# Patient Record
Sex: MALE | Race: WHITE | Employment: FULL TIME | URBAN - METROPOLITAN AREA
[De-identification: names, ages, dates, MRNs, and addresses within clinical notes are randomized per-mention and may not be internally consistent; named-entity substitution may affect disease eponyms.]

---

## 2017-03-08 PROBLEM — I10 ESSENTIAL HYPERTENSION WITH GOAL BLOOD PRESSURE LESS THAN 130/85: Status: ACTIVE | Noted: 2017-03-08

## 2017-03-08 PROBLEM — R94.31 ABNORMAL EKG: Status: ACTIVE | Noted: 2017-03-08

## 2017-03-08 PROBLEM — E66.01 MORBID OBESITY (HCC): Status: ACTIVE | Noted: 2017-03-08

## 2017-03-08 PROBLEM — I26.99 PULMONARY EMBOLISM (HCC): Status: ACTIVE | Noted: 2017-03-08

## 2017-03-08 PROBLEM — Z79.01 WARFARIN ANTICOAGULATION: Status: ACTIVE | Noted: 2017-03-08

## 2017-03-08 PROBLEM — E11.9 TYPE 2 DIABETES MELLITUS WITHOUT COMPLICATION, WITHOUT LONG-TERM CURRENT USE OF INSULIN (HCC): Status: ACTIVE | Noted: 2017-03-08

## 2017-03-08 PROBLEM — G47.30 SLEEP APNEA: Status: ACTIVE | Noted: 2017-03-08

## 2017-05-05 PROBLEM — N41.0 PROSTATITIS, ACUTE: Status: ACTIVE | Noted: 2017-05-05

## 2017-05-05 PROBLEM — R30.0 BURNING WITH URINATION: Status: ACTIVE | Noted: 2017-05-05

## 2017-05-05 PROBLEM — R35.0 URINE FREQUENCY: Status: ACTIVE | Noted: 2017-05-05

## 2017-05-05 PROBLEM — N39.0 URINARY TRACT INFECTION WITH PYURIA: Status: ACTIVE | Noted: 2017-05-05

## 2017-05-05 PROBLEM — R50.9 FEVER AND CHILLS: Status: ACTIVE | Noted: 2017-05-05

## 2017-05-05 PROBLEM — E66.01 MORBID OBESITY (HCC): Status: RESOLVED | Noted: 2017-03-08 | Resolved: 2017-05-05

## 2017-05-12 ENCOUNTER — HOSPITAL ENCOUNTER (OUTPATIENT)
Dept: GENERAL RADIOLOGY | Age: 57
Discharge: HOME | End: 2017-05-12
Attending: INTERNAL MEDICINE
Payer: COMMERCIAL

## 2017-05-12 DIAGNOSIS — M25.561 CHRONIC PAIN OF RIGHT KNEE: ICD-10-CM

## 2017-05-12 DIAGNOSIS — G89.29 CHRONIC PAIN OF RIGHT KNEE: ICD-10-CM

## 2017-05-12 PROBLEM — I26.99 PULMONARY EMBOLISM (HCC): Status: RESOLVED | Noted: 2017-03-08 | Resolved: 2017-05-12

## 2017-05-12 PROBLEM — R30.0 BURNING WITH URINATION: Status: RESOLVED | Noted: 2017-05-05 | Resolved: 2017-05-12

## 2017-05-12 PROBLEM — R35.0 URINE FREQUENCY: Status: RESOLVED | Noted: 2017-05-05 | Resolved: 2017-05-12

## 2017-05-12 PROBLEM — N39.0 URINARY TRACT INFECTION WITH PYURIA: Status: RESOLVED | Noted: 2017-05-05 | Resolved: 2017-05-12

## 2017-05-12 PROBLEM — G47.30 SLEEP APNEA: Status: RESOLVED | Noted: 2017-03-08 | Resolved: 2017-05-12

## 2017-05-12 PROBLEM — R50.9 FEVER AND CHILLS: Status: RESOLVED | Noted: 2017-05-05 | Resolved: 2017-05-12

## 2017-05-12 PROCEDURE — 73562 X-RAY EXAM OF KNEE 3: CPT

## 2017-06-05 ENCOUNTER — HOSPITAL ENCOUNTER (OUTPATIENT)
Dept: PHYSICAL THERAPY | Age: 57
Discharge: HOME | End: 2017-06-05
Payer: COMMERCIAL

## 2017-06-05 PROCEDURE — 97110 THERAPEUTIC EXERCISES: CPT

## 2017-06-05 PROCEDURE — 97161 PT EVAL LOW COMPLEX 20 MIN: CPT

## 2017-06-05 NOTE — PROGRESS NOTES
Ambulatory/Rehab Services H2 Model Falls Risk Assessment    Risk Factor Pts. ·   Confusion/Disorientation/Impulsivity  []    4 ·   Symptomatic Depression  []   2 ·   Altered Elimination  []   1 ·   Dizziness/Vertigo  []   1 ·   Gender (Male)  [x]   1 ·   Any administered antiepileptics (anticonvulsants):  []   2 ·   Any administered benzodiazepines:  []   1 ·   Visual Impairment (specify):  []   1 ·   Portable Oxygen Use  []   1 ·   Orthostatic ? BP  []   1 ·   History of Recent Falls (within 3 mos.)  []   5     Ability to Rise from Chair (choose one) Pts. ·   Ability to rise in a single movement  []   0 ·   Pushes up, successful in one attempt  []   1 ·   Multiple attempts, but successful  []   3 ·   Unable to rise without assistance  []   4   Total: (5 or greater = High Risk) 1     Falls Prevention Plan:   [x]                Physical Limitations to Exercise (specify):  R knee pain   []                Mobility Assistance Device (type):   []                Exercise/Equipment Adaptation (specify):    ©2010 [facility] [address] [place] [address] Patent #[id number].  Federal Law prohibits the replication, distribution or use without written permission from [facility]

## 2017-06-05 NOTE — PROGRESS NOTES
Therapy Center at [facility] ASHKAN [facility]   [address] [place] [address], [address], [address]  Phone: [phone]   Fax: [phone]  Outpatient PHYSICAL THERAPY: Initial Assessment 6/5/2017  Fall Risk Score: 1 (? 5 = High Risk)    ICD-10: Treatment Diagnosis: Pain in right knee (M25.561), Difficulty in walking, not elsewhere classified (R26.2)  REFERRING PHYSICIAN: Cait Lau MD MD Orders: Evaluate and treat, water therapy (6/5/17)  Return Physician Appointment: TBD  MEDICAL/REFERRING DIAGNOSIS:   Pain in right knee [M25.561]  Unilateral primary osteoarthritis, right knee [M17.11]  DATE OF ONSET: Symptoms have worsened over the past 4-5 years  PRIOR LEVEL OF FUNCTION: Patient independent with all mobility and activities  PRECAUTIONS/ALLERGIES: None per patient intake form  ASSESSMENT:  ?????? ? ? This section established at most recent assessment??????????  Mr. Moises Delgado presents with R knee pain which he received a cortizone injection for 1.5 weeks ago. Pain, difficulty maintaining one position for prolonged durations of time, and functional weakness are limiting basic mobility and normal activity level. He will benefit from PT for guided rehab to promote normalized return of motion, strength, and function in order for patient to increase activity participation, improve mobility performance and improve his overall quality of life. PROBLEM LIST (Impairments causing functional limitations):  1. R knee pain  2. Decreased functional strength R knee/LE   3. Decreased gait skills    GOALS: (Goals have been discussed and agreed upon with patient.)    SHORT-TERM FUNCTIONAL GOALS: Time Frame: 4 weeks  1. Report no more than minimal, intermittent 3/10 pain R knee with basic walking and ADL's, and score greater than 60/80 on the LEFS. 2. Independent with initial HEP  3. Patient will report being able to drive without having to pull over to stretch R LE due to knee pain.   4. Patient will have 15 degrees active dorsiflexion in B LEs for improved ankle mobility and minimized passive knee strain. DISCHARGE GOALS: Time Frame: 8 weeks   1. No significant pain R knee with all normalized daily home and work activities, and score greater than 70/80 on the LEFS. 2. Patient will report no interruptions in sleep secondary to R knee pain and go up/down 6\" step with R LE leading the way on ascent and controlling descent. 4. Independent with HEP for advanced knee strengthening. REHABILITATION POTENTIAL FOR STATED GOALS: Teofilo Due OF CARE:  INTERVENTIONS PLANNED: (Benefits and precautions of physical therapy have been discussed with the patient.)  1. Aquatic physical therapy and HEP   2. Thermal and electric modalities, manual therapies for pain. 3. Manual therapies and therapeutic exercises for ROM and strength. 4. Therapeutic exercises for gait and balance. TREATMENT PLAN EFFECTIVE DATES: 6/5/17 TO 7/31/17  FREQUENCY/DURATION: Follow patient 2-3x per week for 8 weeks to address above goals. Regarding Colonel Ramón Dennison's therapy, I certify that the treatment plan above will be carried out by a therapist or under their direction. Thank you for this referral,      Marta Nicole, PT, DPT         Referring Physician Signature:          Date                             SUBJECTIVE:    Present Symptoms: Patient reports no pain upon arrival to PT. History of Present Injury/Illness (Reason for Referral): Patient reports that he has been having R knee pain for approximately 4-5 years, which began as weakness when he attempted to bring R foot into his car he would have to attempt it multiple times. Over the past year, the pain has worsened and the patient states that he experiences sharp and intense pain in R LE. Patient received a cortizone injection a week and a half ago to relieve symptoms, which some success.  Patient states that he has less pain now than prior to the injection, but his pain at its worse still approaches 7 to 8 out of 10 post-injection. States that his knee pain limits him from driving longer than 58-54 minutes as he has to pull over and stretch his R leg. Patient also states that he sits for long periods of time at work and has to move his R leg often to prevent pain. Patient currently has difficulty descending stairs with R LE controlling descent as it feels it may 'give way'. Going up/down curbs is difficult and painful with R LE. Past Medical History: None significant for this case  Current Medications: Per intake form invokana (300 mg), warfarin (3.75 mg), losartan (100 mg)     Date Last Reviewed: 6/5/17  Social History/Home Situation: Pt lives alone in a [place] home. He has  2 steps to enter house through garage. 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   OBJECTIVE:    Outcome Measure: Tool Used: Lower Extremity Functional Scale (LEFS)  Score:  Initial: 48/80 Most Recent: 48/80 (Date: 6/5/17 )   Interpretation of Score: 20 questions each scored on a 5 point scale with 0 representing \"extreme difficulty or unable to perform\" and 4 representing \"no difficulty\". The lower the score, the greater the functional disability. 80/80 represents no disability. Minimal detectable change is 9 points. Observation/Orthostatic Postural Assessment: B genu valgus. Patient ambulates with wide base of support with B pronation, which is addressed by orthotics in B shoes, and reduced B knee ROM during gait cycle. Palpation:   Reduced patellar mobility supeior/inferior bilaterally; mobility more restricted in R knee.   ROM:    R knee AROM: -2 to 109    R ankle passive DF: 15 degrees (knee extended)           L knee AROM: -1 to 110   L ankle DF PROM: 12 deg (knee extended)      Strength:    R hip flexion 5/5   R knee extension 5/5   R knee flexion 5/5 (tested in sitting)   R ankle dorsiflexion: 5/5    R ankle plantarflexion: 5/5     L hip flexion 5/5   L knee extension 5/5   L knee flexion 5/5 (tested in sitting)   L ankle dorsiflexion 5/5   L ankle plantarflexion 5/5           Neurological Screen: Sensation to light touch in tact in B LE  Functional Mobility:  Patient demonstrates independence with all functional mobility. TREATMENT:    (In addition to Assessment/Re-Assessment sessions the following treatments were rendered)    Therapeutic Exercise (9 Minutes): Instruction and performance in HEP of quad sets, gastrocsoleus stretch and standing hip abduction. Required minimal visual, verbal and demonstration cues to promote proper body alignment and reinformce proper exercise technique. .                                                                                                   HEP: Provided HEP. He verbalizes understanding.  _________________________________________________________________________________  Treatment Assessment:  Pt presents with limitations in patellar mobility bilaterally, increased knee pain and reduced ankle mobility bilaterally. Patient will benefit from continued PT intervention to address these areas and improve his overall quality of life to facilitate a return to increased activity participation. Progression/Medical Necessity:   · Skilled intervention continues to be required due to R knee pain, restricted patellar mobility and decreased activity participation and altered quality of life secondary to these deficits. .  Compliance with Program/Exercises: Will assess as treatment progresses. Reason for Continuation of Services/Other Comments:  · Patient continues to require skilled intervention due to R knee pain, restricted patellar mobility and patient's limitations with daily activities secondary to R knee  pain. .  Recommendations/Intent for next treatment session: \"Treatment next visit will focus on improving R LE strength and reducing R knee pain. \".     Total Treatment Duration: 35 minutes  PT Patient Time In/Time Out  Time In: 9045  Time Out: [address] O[place], PT, DPT

## 2017-06-07 ENCOUNTER — APPOINTMENT (OUTPATIENT)
Dept: PHYSICAL THERAPY | Age: 57
End: 2017-06-07
Payer: COMMERCIAL

## 2017-06-08 PROBLEM — M25.511 ACUTE PAIN OF RIGHT SHOULDER DUE TO TRAUMA: Status: ACTIVE | Noted: 2017-05-12

## 2017-06-08 PROBLEM — G89.11 ACUTE PAIN OF RIGHT SHOULDER DUE TO TRAUMA: Status: ACTIVE | Noted: 2017-05-12

## 2017-06-13 ENCOUNTER — HOSPITAL ENCOUNTER (OUTPATIENT)
Dept: PHYSICAL THERAPY | Age: 57
Discharge: HOME | End: 2017-06-13
Payer: COMMERCIAL

## 2017-06-13 PROCEDURE — 97110 THERAPEUTIC EXERCISES: CPT

## 2017-06-13 PROCEDURE — 97140 MANUAL THERAPY 1/> REGIONS: CPT

## 2017-06-13 NOTE — PROGRESS NOTES
Therapy Center at [facility]KAN [facility]   [address] [facility], [address] [facility], [address]  Phone: [phone]   Fax: [phone]  Outpatient PHYSICAL THERAPY: Daily Note 6/13/2017  Fall Risk Score: 1 (? 5 = High Risk)    ICD-10: Treatment Diagnosis: Pain in right knee (M25.561), Difficulty in walking, not elsewhere classified (R26.2)  REFERRING PHYSICIAN: Brittaney Cohen MD MD Orders: Evaluate and treat, water therapy (6/5/17)  Return Physician Appointment: TBD  MEDICAL/REFERRING DIAGNOSIS:   Pain in right knee [M25.561]  Unilateral primary osteoarthritis, right knee [M17.11]  DATE OF ONSET: Symptoms have worsened over the past 4-5 years  PRIOR LEVEL OF FUNCTION: Patient independent with all mobility and activities  PRECAUTIONS/ALLERGIES: None per patient intake form  ASSESSMENT:  ?????? ? ? This section established at most recent assessment??????????  Mr. Pio Murillo presents with R knee pain which he received a cortizone injection for 1.5 weeks ago. Pain, difficulty maintaining one position for prolonged durations of time, and functional weakness are limiting basic mobility and normal activity level. He will benefit from PT for guided rehab to promote normalized return of motion, strength, and function in order for patient to increase activity participation, improve mobility performance and improve his overall quality of life. PROBLEM LIST (Impairments causing functional limitations):  1. R knee pain  2. Decreased functional strength R knee/LE   3. Decreased gait skills    GOALS: (Goals have been discussed and agreed upon with patient.)    SHORT-TERM FUNCTIONAL GOALS: Time Frame: 4 weeks  1. Report no more than minimal, intermittent 3/10 pain R knee with basic walking and ADL's, and score greater than 60/80 on the LEFS. 2. Independent with initial HEP  3. Patient will report being able to drive without having to pull over to stretch R LE due to knee pain.   4. Patient will have 15 degrees active dorsiflexion in B LEs for improved ankle mobility and minimized passive knee strain. DISCHARGE GOALS: Time Frame: 8 weeks   1. No significant pain R knee with all normalized daily home and work activities, and score greater than 70/80 on the LEFS. 2. Patient will report no interruptions in sleep secondary to R knee pain and go up/down 6\" step with R LE leading the way on ascent and controlling descent. 4. Independent with HEP for advanced knee strengthening. REHABILITATION POTENTIAL FOR STATED GOALS: Ana Lauraon Diamondl OF CARE:  INTERVENTIONS PLANNED: (Benefits and precautions of physical therapy have been discussed with the patient.)  1. Aquatic physical therapy and HEP   2. Thermal and electric modalities, manual therapies for pain. 3. Manual therapies and therapeutic exercises for ROM and strength. 4. Therapeutic exercises for gait and balance. TREATMENT PLAN EFFECTIVE DATES: 6/5/17 TO 7/31/17  FREQUENCY/DURATION: Follow patient 2-3x per week for 8 weeks to address above goals. Regarding Bre Dennison's therapy, I certify that the treatment plan above will be carried out by a therapist or under their direction. Thank you for this referral,      Ceci Bermudez, PT, DPT         Referring Physician Signature:          Date                             SUBJECTIVE:    Present Symptoms: Patient reports no pain upon arrival to PT. History of Present Injury/Illness (Reason for Referral): Patient reports that he has been having R knee pain for approximately 4-5 years, which began as weakness when he attempted to bring R foot into his car he would have to attempt it multiple times. Over the past year, the pain has worsened and the patient states that he experiences sharp and intense pain in R LE. Patient received a cortizone injection a week and a half ago to relieve symptoms, which some success.  Patient states that he has less pain now than prior to the injection, but his pain at its worse still approaches 7 to 8 out of 10 post-injection. States that his knee pain limits him from driving longer than 27-64 minutes as he has to pull over and stretch his R leg. Patient also states that he sits for long periods of time at work and has to move his R leg often to prevent pain. Patient currently has difficulty descending stairs with R LE controlling descent as it feels it may 'give way'. Going up/down curbs is difficult and painful with R LE. Past Medical History: None significant for this case  Current Medications: Per intake form invokana (300 mg), warfarin (3.75 mg), losartan (100 mg)     Date Last Reviewed: 6/5/17  Social History/Home Situation: Pt lives alone in a [place] home. He has  2 steps to enter house through garage. 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   OBJECTIVE:    Outcome Measure: Tool Used: Lower Extremity Functional Scale (LEFS)  Score:  Initial: 48/80 Most Recent: 48/80 (Date: 6/5/17 )   Interpretation of Score: 20 questions each scored on a 5 point scale with 0 representing \"extreme difficulty or unable to perform\" and 4 representing \"no difficulty\". The lower the score, the greater the functional disability. 80/80 represents no disability. Minimal detectable change is 9 points. Observation/Orthostatic Postural Assessment: B genu valgus. Patient ambulates with wide base of support with B pronation, which is addressed by orthotics in B shoes, and reduced B knee ROM during gait cycle. Palpation:   Reduced patellar mobility supeior/inferior bilaterally; mobility more restricted in R knee.   ROM:    R knee AROM: -2 to 109    R ankle passive DF: 15 degrees (knee extended)           L knee AROM: -1 to 110   L ankle DF PROM: 12 deg (knee extended)      Strength:    R hip flexion 5/5   R knee extension 5/5   R knee flexion 5/5 (tested in sitting)   R ankle dorsiflexion: 5/5    R ankle plantarflexion: 5/5     L hip flexion 5/5   L knee extension 5/5   L knee flexion 5/5 (tested in sitting)   L ankle dorsiflexion 5/5   L ankle plantarflexion 5/5           Neurological Screen: Sensation to light touch in tact in B LE  Functional Mobility:  Patient demonstrates independence with all functional mobility. TREATMENT:    (In addition to Assessment/Re-Assessment sessions the following treatments were rendered)  Subjective: Patient reported that his pain has been significantly better since his knee injection. Pain pre-treatment: 0 out of 10             Pain post-treatment: 0 out of 10    Therapeutic Exercise (29 Minutes): Instruction and performance in HEP of quad sets, gastrocsoleus stretch and standing hip abduction. Required minimal visual, verbal and demonstration cues to promote proper body alignment and reinformce proper exercise technique. .     Manual therapy (11 minutes): Grade III-IV patellar mobilizations R LE for improved R patellar mobility, R knee PROM for reduced R knee discomfort, R ankle talocrural joint mobilizations (grade III-IV) for improved R ankle DF ROM     Date:  6/13/17 Date:   Date:     Activity/Exercise Parameters Parameters Parameters   Quad sets 2 x 15     Short arc quads 2 x 15, 4#     Long arc quads 2 x 10, 4#     Hamstring curls Blue, 2 x 15; standing 4# 2 x 10     Standing hip abduction 2 x 10 B     Calf raises 2 x 15     Step ups  4\", 2 x 10 B     Slantboard calf stretch 5 x 20\" B                                                                                                            Treatment Assessment:  Pt minimally limited by R knee symptoms, reporting mild discomfort with step ups in R LE. Patient demonstrates good R knee ROM. Progression/Medical Necessity: Compliant  Recommendations/Intent for next treatment session: \"Treatment next visit will focus on improving R LE strength and reducing R knee pain. \".     Total Treatment Duration: 40 minutes  PT Patient Time In/Time Out  Time In: 3181  Time Out: Sourav Pulido, PT, DPT

## 2017-06-14 ENCOUNTER — HOSPITAL ENCOUNTER (OUTPATIENT)
Dept: PHYSICAL THERAPY | Age: 57
Discharge: HOME | End: 2017-06-14
Payer: COMMERCIAL

## 2017-06-14 PROCEDURE — 97113 AQUATIC THERAPY/EXERCISES: CPT

## 2017-06-14 NOTE — PROGRESS NOTES
[facility] at [facility] ASHKAN BIGGS   [address] [place] [address], [address], [address]  Phone: [phone]   Fax: [phone]  Outpatient PHYSICAL THERAPY: Daily Note 6/14/2017  Fall Risk Score: 1 (? 5 = High Risk)    ICD-10: Treatment Diagnosis: Pain in right knee (M25.561), Difficulty in walking, not elsewhere classified (R26.2)  REFERRING PHYSICIAN: Bandar Herrera MD MD Orders: Evaluate and treat, water therapy (6/5/17)  Return Physician Appointment: TBD  MEDICAL/REFERRING DIAGNOSIS:   Pain in right knee [M25.561]  Unilateral primary osteoarthritis, right knee [M17.11]  DATE OF ONSET: Symptoms have worsened over the past 4-5 years  PRIOR LEVEL OF FUNCTION: Patient independent with all mobility and activities  PRECAUTIONS/ALLERGIES: None per patient intake form  ASSESSMENT:  ?????? ? ? This section established at most recent assessment??????????  Mr. Myranda Seymour presents with R knee pain which he received a cortizone injection for 1.5 weeks ago. Pain, difficulty maintaining one position for prolonged durations of time, and functional weakness are limiting basic mobility and normal activity level. He will benefit from PT for guided rehab to promote normalized return of motion, strength, and function in order for patient to increase activity participation, improve mobility performance and improve his overall quality of life. PROBLEM LIST (Impairments causing functional limitations):  1. R knee pain  2. Decreased functional strength R knee/LE   3. Decreased gait skills    GOALS: (Goals have been discussed and agreed upon with patient.)    SHORT-TERM FUNCTIONAL GOALS: Time Frame: 4 weeks  1. Report no more than minimal, intermittent 3/10 pain R knee with basic walking and ADL's, and score greater than 60/80 on the LEFS. 2. Independent with initial HEP  3. Patient will report being able to drive without having to pull over to stretch R LE due to knee pain.   4. Patient will have 15 degrees active dorsiflexion in B LEs for improved ankle mobility and minimized passive knee strain. DISCHARGE GOALS: Time Frame: 8 weeks   1. No significant pain R knee with all normalized daily home and work activities, and score greater than 70/80 on the LEFS. 2. Patient will report no interruptions in sleep secondary to R knee pain and go up/down 6\" step with R LE leading the way on ascent and controlling descent. 4. Independent with HEP for advanced knee strengthening. REHABILITATION POTENTIAL FOR STATED GOALS: Gaurav Metz OF CARE:  INTERVENTIONS PLANNED: (Benefits and precautions of physical therapy have been discussed with the patient.)  1. Aquatic physical therapy and HEP   2. Thermal and electric modalities, manual therapies for pain. 3. Manual therapies and therapeutic exercises for ROM and strength. 4. Therapeutic exercises for gait and balance. TREATMENT PLAN EFFECTIVE DATES: 6/5/17 TO 7/31/17  FREQUENCY/DURATION: Follow patient 2-3x per week for 8 weeks to address above goals. Regarding Jeffrey Dennison's therapy, I certify that the treatment plan above will be carried out by a therapist or under their direction. Thank you for this referral,      Amarjit Summers, PT, DPT         Referring Physician Signature:          Date                             SUBJECTIVE:    Present Symptoms: Patient reports no pain upon arrival to PT. History of Present Injury/Illness (Reason for Referral): Patient reports that he has been having R knee pain for approximately 4-5 years, which began as weakness when he attempted to bring R foot into his car he would have to attempt it multiple times. Over the past year, the pain has worsened and the patient states that he experiences sharp and intense pain in R LE. Patient received a cortizone injection a week and a half ago to relieve symptoms, which some success.  Patient states that he has less pain now than prior to the injection, but his pain at its worse still approaches 7 to 8 out of 10 post-injection. States that his knee pain limits him from driving longer than 49-93 minutes as he has to pull over and stretch his R leg. Patient also states that he sits for long periods of time at work and has to move his R leg often to prevent pain. Patient currently has difficulty descending stairs with R LE controlling descent as it feels it may 'give way'. Going up/down curbs is difficult and painful with R LE. Past Medical History: None significant for this case  Current Medications: Per intake form invokana (300 mg), warfarin (3.75 mg), losartan (100 mg)     Date Last Reviewed: 6/5/17  Social History/Home Situation: Pt lives alone in a [place] home. He has  2 steps to enter house through garage. 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   OBJECTIVE:    Outcome Measure: Tool Used: Lower Extremity Functional Scale (LEFS)  Score:  Initial: 48/80 Most Recent: 48/80 (Date: 6/5/17 )   Interpretation of Score: 20 questions each scored on a 5 point scale with 0 representing \"extreme difficulty or unable to perform\" and 4 representing \"no difficulty\". The lower the score, the greater the functional disability. 80/80 represents no disability. Minimal detectable change is 9 points. Observation/Orthostatic Postural Assessment: B genu valgus. Patient ambulates with wide base of support with B pronation, which is addressed by orthotics in B shoes, and reduced B knee ROM during gait cycle. Palpation:   Reduced patellar mobility supeior/inferior bilaterally; mobility more restricted in R knee.   ROM:    R knee AROM: -2 to 109    R ankle passive DF: 15 degrees (knee extended)           L knee AROM: -1 to 110   L ankle DF PROM: 12 deg (knee extended)      Strength:    R hip flexion 5/5   R knee extension 5/5   R knee flexion 5/5 (tested in sitting)   R ankle dorsiflexion: 5/5    R ankle plantarflexion: 5/5     L hip flexion 5/5   L knee extension 5/5   L knee flexion 5/5 (tested in sitting)   L ankle dorsiflexion 5/5   L ankle plantarflexion 5/5           Neurological Screen: Sensation to light touch in tact in B LE  Functional Mobility:  Patient demonstrates independence with all functional mobility. TREATMENT:    (In addition to Assessment/Re-Assessment sessions the following treatments were rendered)  Subjective: Patient reported that his pain has been significantly better since his knee injection. Pain pre-treatment: 5 out of 10             Pain post-treatment: 3 out of 10    Therapeutic Exercise ( ):      Manual therapy (0 minutes): not today   Date:  6/13/17 Date:   Date:     Activity/Exercise Parameters Parameters Parameters   Quad sets 2 x 15     Short arc quads 2 x 15, 4#     Long arc quads 2 x 10, 4#     Hamstring curls Blue, 2 x 15; standing 4# 2 x 10     Standing hip abduction 2 x 10 B     Calf raises 2 x 15     Step ups  4\", 2 x 10 B     Slantboard calf stretch 5 x 20\" B       Aquatic Therapy (45 minutes): Aquatic treatment performed per flow grid for Decreased muscle strength, Decreased endurance, Decreased range of motion, Decompression, Ease of movement and Low impact and reduced weight bearing activity. Cues provided for ex and gait.       Aquatic Exercise Log       Date  6/14 Date   Date   Date   Date     Activity/ Exercise Parameters Parameters Parameters Parameters Parameters   Walking forward 6       Walking backward 6       Walking sideways 6         Marching 6         Goose Step 6  Long step 6         Tip toes          Heels          Lunges        Side step squats        LE Exercises 2#         Hip Flex/Ext 12         Hip Abd/Add 12         Hip IR/ER          Calf raises 12         Knee Flex 12         Squats          Leg Circles 10/10         Step Ups 10       UE Exercises          Squeeze In          Push Down          Pull Down          Bicep/Tricep        Rows/Press outs         Chi Positions          Trunk Rotation        Deep H2O/ Noodles 7' with noodle and wts Stabilization          Arms only          Legs only Jog 2 min       Cross   Country 2 min         Scissors 2 min         Crab walk        Lower abdominal   work           Cardio          Jogging        Lap   Swimming          Stretches          Hamstrings          Heelcords          Piriformis          Neck                                                                                                               Treatment Assessment:  Pt did well in the pool with exercises. He had reduced pain in the water and was able to bear weight with the knee flexed which he reported he was not able to do on land. Progression/Medical Necessity: Compliant  Recommendations/Intent for next treatment session: \"Treatment next visit will focus on improving R LE strength and reducing R knee pain. \".     Total Treatment Duration: 45 minutes       Jessy Lambert, PT,

## 2017-06-20 ENCOUNTER — HOSPITAL ENCOUNTER (OUTPATIENT)
Dept: PHYSICAL THERAPY | Age: 57
Discharge: HOME | End: 2017-06-20
Payer: COMMERCIAL

## 2017-06-20 PROCEDURE — 97140 MANUAL THERAPY 1/> REGIONS: CPT

## 2017-06-20 PROCEDURE — 97110 THERAPEUTIC EXERCISES: CPT

## 2017-06-20 NOTE — PROGRESS NOTES
[facility]rapy Center at [facility]GS   [address] [place] [address], [address], [address]  Phone: [phone]   Fax: [phone]  Outpatient PHYSICAL THERAPY: Daily Note 6/20/2017  Fall Risk Score: 1 (? 5 = High Risk)    ICD-10: Treatment Diagnosis: Pain in right knee (M25.561), Difficulty in walking, not elsewhere classified (R26.2)  REFERRING PHYSICIAN: Gi Santos MD MD Orders: Evaluate and treat, water therapy (6/5/17)  Return Physician Appointment: TBD  MEDICAL/REFERRING DIAGNOSIS:   Pain in right knee [M25.561]  Unilateral primary osteoarthritis, right knee [M17.11]  DATE OF ONSET: Symptoms have worsened over the past 4-5 years  PRIOR LEVEL OF FUNCTION: Patient independent with all mobility and activities  PRECAUTIONS/ALLERGIES: None per patient intake form  ASSESSMENT:  ?????? ? ? This section established at most recent assessment??????????  Mr. Kyle Granger presents with R knee pain which he received a cortizone injection for 1.5 weeks ago. Pain, difficulty maintaining one position for prolonged durations of time, and functional weakness are limiting basic mobility and normal activity level. He will benefit from PT for guided rehab to promote normalized return of motion, strength, and function in order for patient to increase activity participation, improve mobility performance and improve his overall quality of life. PROBLEM LIST (Impairments causing functional limitations):  1. R knee pain  2. Decreased functional strength R knee/LE   3. Decreased gait skills    GOALS: (Goals have been discussed and agreed upon with patient.)    SHORT-TERM FUNCTIONAL GOALS: Time Frame: 4 weeks  1. Report no more than minimal, intermittent 3/10 pain R knee with basic walking and ADL's, and score greater than 60/80 on the LEFS. 2. Independent with initial HEP  3. Patient will report being able to drive without having to pull over to stretch R LE due to knee pain.   4. Patient will have 15 degrees active dorsiflexion in B LEs for improved ankle mobility and minimized passive knee strain. DISCHARGE GOALS: Time Frame: 8 weeks   1. No significant pain R knee with all normalized daily home and work activities, and score greater than 70/80 on the LEFS. 2. Patient will report no interruptions in sleep secondary to R knee pain and go up/down 6\" step with R LE leading the way on ascent and controlling descent. 4. Independent with HEP for advanced knee strengthening. REHABILITATION POTENTIAL FOR STATED GOALS: Teofilo Due OF CARE:  INTERVENTIONS PLANNED: (Benefits and precautions of physical therapy have been discussed with the patient.)  1. Aquatic physical therapy and HEP   2. Thermal and electric modalities, manual therapies for pain. 3. Manual therapies and therapeutic exercises for ROM and strength. 4. Therapeutic exercises for gait and balance. TREATMENT PLAN EFFECTIVE DATES: 6/5/17 TO 7/31/17  FREQUENCY/DURATION: Follow patient 2-3x per week for 8 weeks to address above goals. Regarding Colonel Ramón Dennison's therapy, I certify that the treatment plan above will be carried out by a therapist or under their direction. Thank you for this referral,      Marta Nicole, PT, DPT         Referring Physician Signature:          Date                             SUBJECTIVE:    Present Symptoms: Patient reports no pain upon arrival to PT. History of Present Injury/Illness (Reason for Referral): Patient reports that he has been having R knee pain for approximately 4-5 years, which began as weakness when he attempted to bring R foot into his car he would have to attempt it multiple times. Over the past year, the pain has worsened and the patient states that he experiences sharp and intense pain in R LE. Patient received a cortizone injection a week and a half ago to relieve symptoms, which some success.  Patient states that he has less pain now than prior to the injection, but his pain at its worse still approaches 7 to 8 out of 10 post-injection. States that his knee pain limits him from driving longer than 78-52 minutes as he has to pull over and stretch his R leg. Patient also states that he sits for long periods of time at work and has to move his R leg often to prevent pain. Patient currently has difficulty descending stairs with R LE controlling descent as it feels it may 'give way'. Going up/down curbs is difficult and painful with R LE. Past Medical History: None significant for this case  Current Medications: Per intake form invokana (300 mg), warfarin (3.75 mg), losartan (100 mg)     Date Last Reviewed: 6/5/17  Social History/Home Situation: Pt lives alone in a [place] home. He has  2 steps to enter house through garage. 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   OBJECTIVE:    Outcome Measure: Tool Used: Lower Extremity Functional Scale (LEFS)  Score:  Initial: 48/80 Most Recent: 48/80 (Date: 6/5/17 )   Interpretation of Score: 20 questions each scored on a 5 point scale with 0 representing \"extreme difficulty or unable to perform\" and 4 representing \"no difficulty\". The lower the score, the greater the functional disability. 80/80 represents no disability. Minimal detectable change is 9 points. Observation/Orthostatic Postural Assessment: B genu valgus. Patient ambulates with wide base of support with B pronation, which is addressed by orthotics in B shoes, and reduced B knee ROM during gait cycle. Palpation:   Reduced patellar mobility supeior/inferior bilaterally; mobility more restricted in R knee.   ROM:    R knee AROM: -2 to 109    R ankle passive DF: 15 degrees (knee extended)           L knee AROM: -1 to 110   L ankle DF PROM: 12 deg (knee extended)      Strength:    R hip flexion 5/5   R knee extension 5/5   R knee flexion 5/5 (tested in sitting)   R ankle dorsiflexion: 5/5    R ankle plantarflexion: 5/5     L hip flexion 5/5   L knee extension 5/5   L knee flexion 5/5 (tested in sitting)   L ankle dorsiflexion 5/5   L ankle plantarflexion 5/5           Neurological Screen: Sensation to light touch in tact in B LE  Functional Mobility:  Patient demonstrates independence with all functional mobility. TREATMENT:    (In addition to Assessment/Re-Assessment sessions the following treatments were rendered)  Subjective: Patient reported that he has had increased knee discomfort since aquatic therapy. Believes that he was too aggressive when performing exercises and should have been more mindful of his knee when exercising. Pain pre-treatment: No 'pain' reported but ongoing soreness since aquatic therapy reported            Pain post-treatment: Stated that his knee \"definitely felt better\" after PT    Therapeutic Exercise (16 Minutes):  Exercises performed for improved R knee strength and stability and reduced discomfort. Patient required verbal cues and demonstration for proper technique. See grid below for exercises performed:     Date:  6/13/17 Date:  6/20/17 Date:     Activity/Exercise Parameters Parameters Parameters   Quad sets 2 x 15 2 x 15    Short arc quads 2 x 15, 4# 2 x 15 5#     Long arc quads 2 x 10, 4# 2 x 15 5#    Hamstring curls Blue, 2 x 15; standing 4# 2 x 10 Blue 2 x 15    Standing hip abduction 2 x 10 B -    Calf raises 2 x 15 2 x 20    Step ups  4\", 2 x 10 B -    Slantboard calf stretch 5 x 20\" B 5 x 20\" B      Manual therapy (24 minutes): grade II R ant-post tibiofemoral joint mobilizations to improve R knee discomfort; grade III R patellofemoral joint mobilizations to reduce R knee discomfot; grade III R ankle dorsiflexion mobilizations to improve R ankle mobility to minimize discomfort to R knee    Aquatic Therapy (0 minutes): Not performed this treatment.       Aquatic Exercise Log       Date  6/14 Date   Date   Date   Date     Activity/ Exercise Parameters Parameters Parameters Parameters Parameters   Walking forward 6       Walking backward 6       Walking sideways 6 Marching 6         Goose Step 6  Long step 6         Tip toes          Heels          Lunges        Side step squats        LE Exercises 2#         Hip Flex/Ext 12         Hip Abd/Add 12         Hip IR/ER          Calf raises 12         Knee Flex 12         Squats          Leg Circles 10/10         Step Ups 10       UE Exercises          Squeeze In          Push Down          Pull Down          Bicep/Tricep        Rows/Press outs         Chi Positions          Trunk Rotation        Deep H2O/ Noodles 7' with noodle and wts         Stabilization          Arms only          Legs only Jog 2 min       Cross   Country 2 min         Scissors 2 min         Crab walk        Lower abdominal   work           Cardio          Jogging        Lap   Swimming          Stretches          Hamstrings          Heelcords          Piriformis          Neck                                                                                                                Treatment Assessment:  Pt able to relieve discomfort during PT today. Patient will benefit from continued aquatic therapy despite increased discomfort in order to facilitate improved R LE strengthening. Progression/Medical Necessity: Compliant  Recommendations/Intent for next treatment session: \"Treatment next visit will focus on improving R LE strength and reducing R knee pain. \".     Total Treatment Duration: 40 minutes  PT Patient Time In/Time Out  Time In: 1517  Time Out: [address], PT,DPT

## 2017-06-21 ENCOUNTER — APPOINTMENT (OUTPATIENT)
Dept: PHYSICAL THERAPY | Age: 57
End: 2017-06-21
Payer: COMMERCIAL

## 2017-06-27 ENCOUNTER — HOSPITAL ENCOUNTER (OUTPATIENT)
Dept: PHYSICAL THERAPY | Age: 57
Discharge: HOME | End: 2017-06-27
Payer: COMMERCIAL

## 2017-06-27 PROCEDURE — 97110 THERAPEUTIC EXERCISES: CPT

## 2017-06-27 PROCEDURE — 97140 MANUAL THERAPY 1/> REGIONS: CPT

## 2017-06-27 NOTE — PROGRESS NOTES
[facility]   [address] [place] [address], [address], [address]  Phone: [phone]   Fax: [phone]  Outpatient PHYSICAL THERAPY: Daily Note 6/27/2017  Fall Risk Score: 1 (? 5 = High Risk)    ICD-10: Treatment Diagnosis: Pain in right knee (M25.561), Difficulty in walking, not elsewhere classified (R26.2)  REFERRING PHYSICIAN: Marissa Burk MD MD Orders: Evaluate and treat, water therapy (6/5/17)  Return Physician Appointment: TBD  MEDICAL/REFERRING DIAGNOSIS:   Pain in right knee [M25.561]  Unilateral primary osteoarthritis, right knee [M17.11]  DATE OF ONSET: Symptoms have worsened over the past 4-5 years  PRIOR LEVEL OF FUNCTION: Patient independent with all mobility and activities  PRECAUTIONS/ALLERGIES: None per patient intake form  ASSESSMENT:  ?????? ? ? This section established at most recent assessment??????????  Mr. Nuno hWelan presents with R knee pain which he received a cortizone injection for 1.5 weeks ago. Pain, difficulty maintaining one position for prolonged durations of time, and functional weakness are limiting basic mobility and normal activity level. He will benefit from PT for guided rehab to promote normalized return of motion, strength, and function in order for patient to increase activity participation, improve mobility performance and improve his overall quality of life. PROBLEM LIST (Impairments causing functional limitations):  1. R knee pain  2. Decreased functional strength R knee/LE   3. Decreased gait skills    GOALS: (Goals have been discussed and agreed upon with patient.)    SHORT-TERM FUNCTIONAL GOALS: Time Frame: 4 weeks  1. Report no more than minimal, intermittent 3/10 pain R knee with basic walking and ADL's, and score greater than 60/80 on the LEFS. 2. Independent with initial HEP  3. Patient will report being able to drive without having to pull over to stretch R LE due to knee pain.   4. Patient will have 15 degrees active dorsiflexion in B LEs for improved ankle mobility and minimized passive knee strain. DISCHARGE GOALS: Time Frame: 8 weeks   1. No significant pain R knee with all normalized daily home and work activities, and score greater than 70/80 on the LEFS. 2. Patient will report no interruptions in sleep secondary to R knee pain and go up/down 6\" step with R LE leading the way on ascent and controlling descent. 4. Independent with HEP for advanced knee strengthening. REHABILITATION POTENTIAL FOR STATED GOALS: Tarsha Ernst OF CARE:  INTERVENTIONS PLANNED: (Benefits and precautions of physical therapy have been discussed with the patient.)  1. Aquatic physical therapy and HEP   2. Thermal and electric modalities, manual therapies for pain. 3. Manual therapies and therapeutic exercises for ROM and strength. 4. Therapeutic exercises for gait and balance. TREATMENT PLAN EFFECTIVE DATES: 6/5/17 TO 7/31/17  FREQUENCY/DURATION: Follow patient 2-3x per week for 8 weeks to address above goals. Regarding Leeanna Dennison's therapy, I certify that the treatment plan above will be carried out by a therapist or under their direction. Thank you for this referral,      Yadi Blas, PT, DPT         Referring Physician Signature:          Date                             SUBJECTIVE:    Present Symptoms: Patient reports no pain upon arrival to PT. History of Present Injury/Illness (Reason for Referral): Patient reports that he has been having R knee pain for approximately 4-5 years, which began as weakness when he attempted to bring R foot into his car he would have to attempt it multiple times. Over the past year, the pain has worsened and the patient states that he experiences sharp and intense pain in R LE. Patient received a cortizone injection a week and a half ago to relieve symptoms, which some success.  Patient states that he has less pain now than prior to the injection, but his pain at its worse still approaches 7 to 8 out of 10 post-injection. States that his knee pain limits him from driving longer than 71-07 minutes as he has to pull over and stretch his R leg. Patient also states that he sits for long periods of time at work and has to move his R leg often to prevent pain. Patient currently has difficulty descending stairs with R LE controlling descent as it feels it may 'give way'. Going up/down curbs is difficult and painful with R LE. Past Medical History: None significant for this case  Current Medications: Per intake form invokana (300 mg), warfarin (3.75 mg), losartan (100 mg)     Date Last Reviewed: 6/5/17  Social History/Home Situation: Pt lives alone in a [place] home. He has  2 steps to enter house through garage. 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   OBJECTIVE:    Outcome Measure: Tool Used: Lower Extremity Functional Scale (LEFS)  Score:  Initial: 48/80 Most Recent: 48/80 (Date: 6/5/17 )   Interpretation of Score: 20 questions each scored on a 5 point scale with 0 representing \"extreme difficulty or unable to perform\" and 4 representing \"no difficulty\". The lower the score, the greater the functional disability. 80/80 represents no disability. Minimal detectable change is 9 points. Observation/Orthostatic Postural Assessment: B genu valgus. Patient ambulates with wide base of support with B pronation, which is addressed by orthotics in B shoes, and reduced B knee ROM during gait cycle. Palpation:   Reduced patellar mobility supeior/inferior bilaterally; mobility more restricted in R knee.   ROM:    R knee AROM: -2 to 109    R ankle passive DF: 15 degrees (knee extended)           L knee AROM: -1 to 110   L ankle DF PROM: 12 deg (knee extended)      Strength:    R hip flexion 5/5   R knee extension 5/5   R knee flexion 5/5 (tested in sitting)   R ankle dorsiflexion: 5/5    R ankle plantarflexion: 5/5     L hip flexion 5/5   L knee extension 5/5   L knee flexion 5/5 (tested in sitting)   L ankle dorsiflexion 5/5   L ankle plantarflexion 5/5           Neurological Screen: Sensation to light touch in tact in B LE  Functional Mobility:  Patient demonstrates independence with all functional mobility. TREATMENT:    (In addition to Assessment/Re-Assessment sessions the following treatments were rendered)  Subjective: Patient repots that his R knee has been feeling better. He has consciously adjusted his walking and standing to avoid locking R knee, and reports that he has experienced improvements in symptoms following this adaptation. Pain pre-treatment: No 'pain'; quadriceps soreness reported following reduced knee locking when at rest and walking          Pain post-treatment: Stated that his knee \"felt good\"  Therapeutic Exercise (27 Minutes):  Exercises performed for improved R knee strength and stability and reduced discomfort. Patient required verbal cues and demonstration for proper technique. See grid below for exercises performed:      Date:  6/13/17 Date:  6/20/17 Date:  6/26/17   Activity/Exercise Parameters Parameters Parameters   Quad sets 2 x 15 2 x 15 -   Short arc quads 2 x 15, 4# 2 x 15 5#  2 x 15  5#   Long arc quads 2 x 10, 4# 2 x 15 5# -   Hamstring curls Blue, 2 x 15; standing 4# 2 x 10 Blue 2 x 15 Standing 3 x 15   Standing hip abduction 2 x 10 B - 3 x 15   Calf raises 2 x 15 2 x 20 2 x 15   Step ups  4\", 2 x 10 B - 4# 2 x 10 B   Slantboard calf stretch 5 x 20\" B 5 x 20\" B 3 x 30\" each LE     Manual therapy (15 minutes): grade III R ant-post tibiofemoral joint mobilizations to improve R knee discomfort and improve R knee ROM; grade III R patellofemoral joint mobilizations to reduce R knee discomfot; grade III R ankle dorsiflexion mobilizations to improve R ankle mobility to minimize discomfort to R knee    Aquatic Therapy (0 minutes): Not performed this treatment.       Aquatic Exercise Log       Date  6/14 Date   Date   Date   Date     Activity/ Exercise Parameters Parameters Parameters Parameters Parameters   Walking forward 6       Walking backward 6       Walking sideways 6         Marching 6         Goose Step 6  Long step 6         Tip toes          Heels          Lunges        Side step squats        LE Exercises 2#         Hip Flex/Ext 12         Hip Abd/Add 12         Hip IR/ER          Calf raises 12         Knee Flex 12         Squats          Leg Circles 10/10         Step Ups 10       UE Exercises          Squeeze In          Push Down          Pull Down          Bicep/Tricep        Rows/Press outs         Chi Positions          Trunk Rotation        Deep H2O/ Noodles 7' with noodle and wts         Stabilization          Arms only          Legs only Jog 2 min       Cross   Country 2 min         Scissors 2 min         Crab walk        Lower abdominal   work           Cardio          Jogging        Lap   Swimming          Stretches          Hamstrings          Heelcords          Piriformis          Neck                                                                                                                Treatment Assessment:  Pt has made good progress with gait performance and overall symptom relief. Patient demonstrated good ambulation performance with increased knee flexion and heel strike and minimizing joint stress by increasing muscle activity. Progression/Medical Necessity: Compliant  Recommendations/Intent for next treatment session: \"Treatment next visit will focus on improving R LE strength and reducing R knee pain. \".     Total Treatment Duration: 42 minutes  PT Patient Time In/Time Out  Time In: 0189  Time Out: [address], PT,DPT

## 2017-06-28 ENCOUNTER — HOSPITAL ENCOUNTER (OUTPATIENT)
Dept: PHYSICAL THERAPY | Age: 57
Discharge: HOME | End: 2017-06-28
Payer: COMMERCIAL

## 2017-06-28 PROCEDURE — 97110 THERAPEUTIC EXERCISES: CPT

## 2017-06-28 NOTE — PROGRESS NOTES
[facility] at [facility]   [address] [place] [address],  Lisa Dai, [address]  Phone: [phone]   Fax: [phone]  Outpatient PHYSICAL THERAPY: Daily Note 6/28/2017  Fall Risk Score: 1 (? 5 = High Risk)    ICD-10: Treatment Diagnosis: Pain in right knee (M25.561), Difficulty in walking, not elsewhere classified (R26.2)  REFERRING PHYSICIAN: Lee Goldberg MD MD Orders: Evaluate and treat, water therapy (6/5/17)  Return Physician Appointment: TBD  MEDICAL/REFERRING DIAGNOSIS:   Pain in right knee [M25.561]  Unilateral primary osteoarthritis, right knee [M17.11]  DATE OF ONSET: Symptoms have worsened over the past 4-5 years  PRIOR LEVEL OF FUNCTION: Patient independent with all mobility and activities  PRECAUTIONS/ALLERGIES: None per patient intake form  ASSESSMENT:  ?????? ? ? This section established at most recent assessment??????????  Mr. Yasmin Garcia presents with R knee pain which he received a cortizone injection for 1.5 weeks ago. Pain, difficulty maintaining one position for prolonged durations of time, and functional weakness are limiting basic mobility and normal activity level. He will benefit from PT for guided rehab to promote normalized return of motion, strength, and function in order for patient to increase activity participation, improve mobility performance and improve his overall quality of life. PROBLEM LIST (Impairments causing functional limitations):  1. R knee pain  2. Decreased functional strength R knee/LE   3. Decreased gait skills    GOALS: (Goals have been discussed and agreed upon with patient.)    SHORT-TERM FUNCTIONAL GOALS: Time Frame: 4 weeks  1. Report no more than minimal, intermittent 3/10 pain R knee with basic walking and ADL's, and score greater than 60/80 on the LEFS. 2. Independent with initial HEP  3. Patient will report being able to drive without having to pull over to stretch R LE due to knee pain.   4. Patient will have 15 degrees active dorsiflexion in B LEs for improved ankle mobility and minimized passive knee strain. DISCHARGE GOALS: Time Frame: 8 weeks   1. No significant pain R knee with all normalized daily home and work activities, and score greater than 70/80 on the LEFS. 2. Patient will report no interruptions in sleep secondary to R knee pain and go up/down 6\" step with R LE leading the way on ascent and controlling descent. 4. Independent with HEP for advanced knee strengthening. REHABILITATION POTENTIAL FOR STATED GOALS: Reatha Helio OF CARE:  INTERVENTIONS PLANNED: (Benefits and precautions of physical therapy have been discussed with the patient.)  1. Aquatic physical therapy and HEP   2. Thermal and electric modalities, manual therapies for pain. 3. Manual therapies and therapeutic exercises for ROM and strength. 4. Therapeutic exercises for gait and balance. TREATMENT PLAN EFFECTIVE DATES: 6/5/17 TO 7/31/17  FREQUENCY/DURATION: Follow patient 2-3x per week for 8 weeks to address above goals. Regarding Taurus Dennison's therapy, I certify that the treatment plan above will be carried out by a therapist or under their direction. Thank you for this referral,      Rafita Yañez, PT         Referring Physician Signature:          Date                             SUBJECTIVE:    Present Symptoms: Patient reports no pain upon arrival to PT. History of Present Injury/Illness (Reason for Referral): Patient reports that he has been having R knee pain for approximately 4-5 years, which began as weakness when he attempted to bring R foot into his car he would have to attempt it multiple times. Over the past year, the pain has worsened and the patient states that he experiences sharp and intense pain in R LE. Patient received a cortizone injection a week and a half ago to relieve symptoms, which some success.  Patient states that he has less pain now than prior to the injection, but his pain at its worse still approaches 7 to 8 out of 10 post-injection. States that his knee pain limits him from driving longer than 16-22 minutes as he has to pull over and stretch his R leg. Patient also states that he sits for long periods of time at work and has to move his R leg often to prevent pain. Patient currently has difficulty descending stairs with R LE controlling descent as it feels it may 'give way'. Going up/down curbs is difficult and painful with R LE. Past Medical History: None significant for this case  Current Medications: Per intake form invokana (300 mg), warfarin (3.75 mg), losartan (100 mg)     Date Last Reviewed: 6/28/17  Social History/Home Situation: Pt lives alone in a [place] home. He has  2 steps to enter house through garage. 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   OBJECTIVE:    Outcome Measure: Tool Used: Lower Extremity Functional Scale (LEFS)  Score:  Initial: 48/80 Most Recent: 48/80 (Date: 6/5/17 )   Interpretation of Score: 20 questions each scored on a 5 point scale with 0 representing \"extreme difficulty or unable to perform\" and 4 representing \"no difficulty\". The lower the score, the greater the functional disability. 80/80 represents no disability. Minimal detectable change is 9 points. Observation/Orthostatic Postural Assessment: B genu valgus. Patient ambulates with wide base of support with B pronation, which is addressed by orthotics in B shoes, and reduced B knee ROM during gait cycle. Palpation:   Reduced patellar mobility supeior/inferior bilaterally; mobility more restricted in R knee.   ROM:    R knee AROM: -2 to 109    R ankle passive DF: 15 degrees (knee extended)           L knee AROM: -1 to 110   L ankle DF PROM: 12 deg (knee extended)      Strength:    R hip flexion 5/5   R knee extension 5/5   R knee flexion 5/5 (tested in sitting)   R ankle dorsiflexion: 5/5    R ankle plantarflexion: 5/5     L hip flexion 5/5   L knee extension 5/5   L knee flexion 5/5 (tested in sitting)   L ankle dorsiflexion 5/5   L ankle plantarflexion 5/5           Neurological Screen: Sensation to light touch in tact in B LE  Functional Mobility:  Patient demonstrates independence with all functional mobility. TREATMENT:    (In addition to Assessment/Re-Assessment sessions the following treatments were rendered)  Subjective: Patient stated that he had been focusing on his gait pattern and that has helped with his pain. Pain pre-treatment: mild         Pain post-treatment: no real pain  Therapeutic Exercise ( ):  Exercises performed for improved R knee strength and stability and reduced discomfort. Patient required verbal cues and demonstration for proper technique. See grid below for exercises performed:      Date:  6/13/17 Date:  6/20/17 Date:  6/26/17   Activity/Exercise Parameters Parameters Parameters   Quad sets 2 x 15 2 x 15 -   Short arc quads 2 x 15, 4# 2 x 15 5#  2 x 15  5#   Long arc quads 2 x 10, 4# 2 x 15 5# -   Hamstring curls Blue, 2 x 15; standing 4# 2 x 10 Blue 2 x 15 Standing 3 x 15   Standing hip abduction 2 x 10 B - 3 x 15   Calf raises 2 x 15 2 x 20 2 x 15   Step ups  4\", 2 x 10 B - 4# 2 x 10 B   Slantboard calf stretch 5 x 20\" B 5 x 20\" B 3 x 30\" each LE     Manual therapy (0 minutes): Aquatic Therapy (30 minutes):        Aquatic Exercise Log       Date  6/14 Date  6/28 Date   Date   Date     Activity/ Exercise Parameters Parameters Parameters Parameters Parameters   Walking forward 6 6      Walking backward 6 6      Walking sideways 6 6        Marching 6 6        Goose Step 6  Long step 6 6  6 long step        Tip toes  3        Heels  3        Lunges        Side step squats        LE Exercises 2# 2#        Hip Flex/Ext 12 12        Hip Abd/Add 12 12        Hip IR/ER          Calf raises 12 12        Knee Flex 12 12        Squats          Leg Circles 10/10 10/10        Step Ups 10 10      UE Exercises          Squeeze In          Push Down          Pull Down          Bicep/Tricep Rows/Press outs         Chi Positions          Trunk Rotation        Deep H2O/ Noodles 7' with noodle and wts 7' with noodle and wts        Stabilization          Arms only          Legs only Jog 2 min Jog 2 min      Cross   Country 2 min 2 min        Scissors 2 min 2 min        Crab walk        Lower abdominal   work           Cardio          Jogging        Lap   Swimming          Stretches          Hamstrings          Heelcords          Piriformis          Neck                                                                                                                Treatment Assessment:  Pt did well with aquatics. He had min pain felt with backward walking associated with knee ext. Progression/Medical Necessity: Compliant  Recommendations/Intent for next treatment session: \"Treatment next visit will focus on improving R LE strength and reducing R knee pain. \".     Total Treatment Duration: 30 minutes  PT Patient Time In/Time Out  Time In: 0315  Time Out: 0345    Larry Ramirez, PT

## 2017-07-03 ENCOUNTER — APPOINTMENT (OUTPATIENT)
Dept: PHYSICAL THERAPY | Age: 57
End: 2017-07-03
Payer: COMMERCIAL

## 2017-07-05 ENCOUNTER — HOSPITAL ENCOUNTER (OUTPATIENT)
Dept: PHYSICAL THERAPY | Age: 57
Discharge: HOME | End: 2017-07-05
Payer: COMMERCIAL

## 2017-07-05 PROCEDURE — 97113 AQUATIC THERAPY/EXERCISES: CPT

## 2017-07-05 NOTE — PROGRESS NOTES
Therapy Center at [facility] ASHKAN BIGGS   [address] [facility], [address] [facility], [address]  Phone: [phone]   Fax: [phone]  Outpatient PHYSICAL THERAPY: Daily Note 7/5/2017  Fall Risk Score: 1 (? 5 = High Risk)    ICD-10: Treatment Diagnosis: Pain in right knee (M25.561), Difficulty in walking, not elsewhere classified (R26.2)  REFERRING PHYSICIAN: Pravin Juarez MD MD Orders: Evaluate and treat, water therapy (6/5/17)  Return Physician Appointment: TBD  MEDICAL/REFERRING DIAGNOSIS:   Pain in right knee [M25.561]  Unilateral primary osteoarthritis, right knee [M17.11]  DATE OF ONSET: Symptoms have worsened over the past 4-5 years  PRIOR LEVEL OF FUNCTION: Patient independent with all mobility and activities  PRECAUTIONS/ALLERGIES: None per patient intake form  ASSESSMENT:  ?????? ? ? This section established at most recent assessment??????????  Mr. Carlitos Heller presents with R knee pain which he received a cortizone injection for 1.5 weeks ago. Pain, difficulty maintaining one position for prolonged durations of time, and functional weakness are limiting basic mobility and normal activity level. He will benefit from PT for guided rehab to promote normalized return of motion, strength, and function in order for patient to increase activity participation, improve mobility performance and improve his overall quality of life. PROBLEM LIST (Impairments causing functional limitations):  1. R knee pain  2. Decreased functional strength R knee/LE   3. Decreased gait skills    GOALS: (Goals have been discussed and agreed upon with patient.)    SHORT-TERM FUNCTIONAL GOALS: Time Frame: 4 weeks  1. Report no more than minimal, intermittent 3/10 pain R knee with basic walking and ADL's, and score greater than 60/80 on the LEFS. 2. Independent with initial HEP  3. Patient will report being able to drive without having to pull over to stretch R LE due to knee pain.   4. Patient will have 15 degrees active dorsiflexion in B LEs for improved ankle mobility and minimized passive knee strain. DISCHARGE GOALS: Time Frame: 8 weeks   1. No significant pain R knee with all normalized daily home and work activities, and score greater than 70/80 on the LEFS. 2. Patient will report no interruptions in sleep secondary to R knee pain and go up/down 6\" step with R LE leading the way on ascent and controlling descent. 4. Independent with HEP for advanced knee strengthening. REHABILITATION POTENTIAL FOR STATED GOALS: Aster Au OF CARE:  INTERVENTIONS PLANNED: (Benefits and precautions of physical therapy have been discussed with the patient.)  1. Aquatic physical therapy and HEP   2. Thermal and electric modalities, manual therapies for pain. 3. Manual therapies and therapeutic exercises for ROM and strength. 4. Therapeutic exercises for gait and balance. TREATMENT PLAN EFFECTIVE DATES: 6/5/17 TO 7/31/17  FREQUENCY/DURATION: Follow patient 2-3x per week for 8 weeks to address above goals. Regarding Phillips Sever Mabry's therapy, I certify that the treatment plan above will be carried out by a therapist or under their direction. Thank you for this referral,      Rimma Zambrano, PT         Referring Physician Signature:          Date                             SUBJECTIVE:    Present Symptoms: Patient reports no pain upon arrival to PT. History of Present Injury/Illness (Reason for Referral): Patient reports that he has been having R knee pain for approximately 4-5 years, which began as weakness when he attempted to bring R foot into his car he would have to attempt it multiple times. Over the past year, the pain has worsened and the patient states that he experiences sharp and intense pain in R LE. Patient received a cortizone injection a week and a half ago to relieve symptoms, which some success.  Patient states that he has less pain now than prior to the injection, but his pain at its worse still approaches 7 to 8 out of 10 post-injection. States that his knee pain limits him from driving longer than 40-33 minutes as he has to pull over and stretch his R leg. Patient also states that he sits for long periods of time at work and has to move his R leg often to prevent pain. Patient currently has difficulty descending stairs with R LE controlling descent as it feels it may 'give way'. Going up/down curbs is difficult and painful with R LE. Past Medical History: None significant for this case  Current Medications: Per intake form invokana (300 mg), warfarin (3.75 mg), losartan (100 mg)     Date Last Reviewed: 6/28/17  Social History/Home Situation: Pt lives alone in a [place] home. He has  2 steps to enter house through garage. 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   OBJECTIVE:    Outcome Measure: Tool Used: Lower Extremity Functional Scale (LEFS)  Score:  Initial: 48/80 Most Recent: 48/80 (Date: 6/5/17 )   Interpretation of Score: 20 questions each scored on a 5 point scale with 0 representing \"extreme difficulty or unable to perform\" and 4 representing \"no difficulty\". The lower the score, the greater the functional disability. 80/80 represents no disability. Minimal detectable change is 9 points. Observation/Orthostatic Postural Assessment: B genu valgus. Patient ambulates with wide base of support with B pronation, which is addressed by orthotics in B shoes, and reduced B knee ROM during gait cycle. Palpation:   Reduced patellar mobility supeior/inferior bilaterally; mobility more restricted in R knee.   ROM:    R knee AROM: -2 to 109    R ankle passive DF: 15 degrees (knee extended)           L knee AROM: -1 to 110   L ankle DF PROM: 12 deg (knee extended)      Strength:    R hip flexion 5/5   R knee extension 5/5   R knee flexion 5/5 (tested in sitting)   R ankle dorsiflexion: 5/5    R ankle plantarflexion: 5/5     L hip flexion 5/5   L knee extension 5/5   L knee flexion 5/5 (tested in sitting)   L ankle dorsiflexion 5/5   L ankle plantarflexion 5/5           Neurological Screen: Sensation to light touch in tact in B LE  Functional Mobility:  Patient demonstrates independence with all functional mobility. TREATMENT:    (In addition to Assessment/Re-Assessment sessions the following treatments were rendered)  Subjective: Patient states he did not hurt after last pool session. Pain pre-treatment: mild         Pain post-treatment: no real pain  Therapeutic Exercise ( ):  Exercises performed for improved R knee strength and stability and reduced discomfort. Patient required verbal cues and demonstration for proper technique. See grid below for exercises performed:      Date:  6/13/17 Date:  6/20/17 Date:  6/26/17   Activity/Exercise Parameters Parameters Parameters   Quad sets 2 x 15 2 x 15 -   Short arc quads 2 x 15, 4# 2 x 15 5#  2 x 15  5#   Long arc quads 2 x 10, 4# 2 x 15 5# -   Hamstring curls Blue, 2 x 15; standing 4# 2 x 10 Blue 2 x 15 Standing 3 x 15   Standing hip abduction 2 x 10 B - 3 x 15   Calf raises 2 x 15 2 x 20 2 x 15   Step ups  4\", 2 x 10 B - 4# 2 x 10 B   Slantboard calf stretch 5 x 20\" B 5 x 20\" B 3 x 30\" each LE     Manual therapy (0 minutes): Aquatic Therapy (45 minutes):        Aquatic Exercise Log       Date  6/14 Date  6/28 Date  7/5/17 Date   Date     Activity/ Exercise Parameters Parameters Parameters Parameters Parameters   Walking forward 6 6 6     Walking backward 6 6 6     Walking sideways 6 6 6       Marching 6 6 6       Goose Step 6  Long step 6 6  6 long step 6  6 long step       Tip toes  3 3       Heels  3 3       Lunges        Side step squats        LE Exercises 2# 2# 2#       Hip Flex/Ext 12 12 15       Hip Abd/Add 12 12 15       Hip IR/ER          Calf raises 12 12 15       Knee Flex 12 12 15       Squats          Leg Circles 10/10 10/10 15/15       Step Ups 10 10 15     UE Exercises          Squeeze In          Push Down          Pull Down          Bicep/Tricep Rows/Press outs         Chi Positions          Trunk Rotation        Deep H2O/ Noodles 7' with noodle and wts 7' with noodle and wts 7' with noodle and 2#       Stabilization          Arms only          Legs only Jog 2 min Jog 2 min Jog 2 min     Cross   Country 2 min 2 min 2 min       Scissors 2 min 2 min 2 min       Knee ext kicks   2 min     Lower abdominal   work           Cardio          Jogging        Lap   Swimming          Stretches          Hamstrings          Heelcords          Piriformis          Neck                                                                                                                Treatment Assessment:  Pt did well with aquatics. He did not have any complaints of pain. Progression/Medical Necessity: Compliant  Recommendations/Intent for next treatment session: \"Treatment next visit will focus on improving R LE strength and reducing R knee pain. \".     Total Treatment Duration: 45 minutes  PT Patient Time In/Time Out  Time In: 0315  Time Out: 0400    Tanya Whitt, PT

## 2017-07-10 ENCOUNTER — HOSPITAL ENCOUNTER (OUTPATIENT)
Dept: PHYSICAL THERAPY | Age: 57
Discharge: HOME | End: 2017-07-10
Payer: COMMERCIAL

## 2017-07-10 PROCEDURE — 97110 THERAPEUTIC EXERCISES: CPT

## 2017-07-10 PROCEDURE — 97140 MANUAL THERAPY 1/> REGIONS: CPT

## 2017-07-10 NOTE — PROGRESS NOTES
Therapy Center at [facility] ASHKAN BIGGS   [address] [facility], [address] [facility], [address]  Phone: [phone]   Fax: [phone]  Outpatient PHYSICAL THERAPY: Daily Note 7/10/2017  Fall Risk Score: 1 (? 5 = High Risk)    ICD-10: Treatment Diagnosis: Pain in right knee (M25.561), Difficulty in walking, not elsewhere classified (R26.2)  REFERRING PHYSICIAN: Rosalinda Torres MD MD Orders: Evaluate and treat, water therapy (6/5/17)  Return Physician Appointment: TBD  MEDICAL/REFERRING DIAGNOSIS:   Pain in right knee [M25.561]  Unilateral primary osteoarthritis, right knee [M17.11]  DATE OF ONSET: Symptoms have worsened over the past 4-5 years  PRIOR LEVEL OF FUNCTION: Patient independent with all mobility and activities  PRECAUTIONS/ALLERGIES: None per patient intake form  ASSESSMENT:  ?????? ? ? This section established at most recent assessment??????????  Mr. Vero Herbert presents with R knee pain which he received a cortizone injection for 1.5 weeks ago. Pain, difficulty maintaining one position for prolonged durations of time, and functional weakness are limiting basic mobility and normal activity level. He will benefit from PT for guided rehab to promote normalized return of motion, strength, and function in order for patient to increase activity participation, improve mobility performance and improve his overall quality of life. PROBLEM LIST (Impairments causing functional limitations):  1. R knee pain  2. Decreased functional strength R knee/LE   3. Decreased gait skills    GOALS: (Goals have been discussed and agreed upon with patient.)    SHORT-TERM FUNCTIONAL GOALS: Time Frame: 4 weeks  1. Report no more than minimal, intermittent 3/10 pain R knee with basic walking and ADL's, and score greater than 60/80 on the LEFS. 2. Independent with initial HEP  3. Patient will report being able to drive without having to pull over to stretch R LE due to knee pain.   4. Patient will have 15 degrees active dorsiflexion in B LEs for improved ankle mobility and minimized passive knee strain. DISCHARGE GOALS: Time Frame: 8 weeks   1. No significant pain R knee with all normalized daily home and work activities, and score greater than 70/80 on the LEFS. 2. Patient will report no interruptions in sleep secondary to R knee pain and go up/down 6\" step with R LE leading the way on ascent and controlling descent. 4. Independent with HEP for advanced knee strengthening. REHABILITATION POTENTIAL FOR STATED GOALS: Sofia Amen OF CARE:  INTERVENTIONS PLANNED: (Benefits and precautions of physical therapy have been discussed with the patient.)  1. Aquatic physical therapy and HEP   2. Thermal and electric modalities, manual therapies for pain. 3. Manual therapies and therapeutic exercises for ROM and strength. 4. Therapeutic exercises for gait and balance. TREATMENT PLAN EFFECTIVE DATES: 6/5/17 TO 7/31/17  FREQUENCY/DURATION: Follow patient 2-3x per week for 8 weeks to address above goals. Regarding Torin Dennison's therapy, I certify that the treatment plan above will be carried out by a therapist or under their direction. Thank you for this referral,      Sheba Hinojosa, PT         Referring Physician Signature:          Date                             SUBJECTIVE:    Present Symptoms: Patient reports no pain upon arrival to PT. History of Present Injury/Illness (Reason for Referral): Patient reports that he has been having R knee pain for approximately 4-5 years, which began as weakness when he attempted to bring R foot into his car he would have to attempt it multiple times. Over the past year, the pain has worsened and the patient states that he experiences sharp and intense pain in R LE. Patient received a cortizone injection a week and a half ago to relieve symptoms, which some success.  Patient states that he has less pain now than prior to the injection, but his pain at its worse still approaches 7 to 8 out of 10 post-injection. States that his knee pain limits him from driving longer than 97-99 minutes as he has to pull over and stretch his R leg. Patient also states that he sits for long periods of time at work and has to move his R leg often to prevent pain. Patient currently has difficulty descending stairs with R LE controlling descent as it feels it may 'give way'. Going up/down curbs is difficult and painful with R LE. Past Medical History: None significant for this case  Current Medications: Per intake form invokana (300 mg), warfarin (3.75 mg), losartan (100 mg)     Date Last Reviewed: 6/28/17  Social History/Home Situation: Pt lives alone in a [place] home. He has  2 steps to enter house through garage. 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   OBJECTIVE:    Outcome Measure: Tool Used: Lower Extremity Functional Scale (LEFS)  Score:  Initial: 48/80 Most Recent: 48/80 (Date: 6/5/17 )   Interpretation of Score: 20 questions each scored on a 5 point scale with 0 representing \"extreme difficulty or unable to perform\" and 4 representing \"no difficulty\". The lower the score, the greater the functional disability. 80/80 represents no disability. Minimal detectable change is 9 points. Observation/Orthostatic Postural Assessment: B genu valgus. Patient ambulates with wide base of support with B pronation, which is addressed by orthotics in B shoes, and reduced B knee ROM during gait cycle. Palpation:   Reduced patellar mobility supeior/inferior bilaterally; mobility more restricted in R knee.   ROM:    R knee AROM: -2 to 109    R ankle passive DF: 15 degrees (knee extended)           L knee AROM: -1 to 110   L ankle DF PROM: 12 deg (knee extended)      Strength:    R hip flexion 5/5   R knee extension 5/5   R knee flexion 5/5 (tested in sitting)   R ankle dorsiflexion: 5/5    R ankle plantarflexion: 5/5     L hip flexion 5/5   L knee extension 5/5   L knee flexion 5/5 (tested in sitting)   L ankle dorsiflexion 5/5   L ankle plantarflexion 5/5           Neurological Screen: Sensation to light touch in tact in B LE  Functional Mobility:  Patient demonstrates independence with all functional mobility. TREATMENT:    (In addition to Assessment/Re-Assessment sessions the following treatments were rendered)  Subjective: Patient states that he has occasional R knee soreness and discomfort, primarily after prolonged sitting, but continues to be improving. Pain pre-treatment: none reported         Pain post-treatment: none reported  Therapeutic Exercise ( 15 minutes):  Exercises performed for improved R knee strength and stability and reduced discomfort. Patient required verbal cues and demonstration for proper technique. See grid below for exercises performed:      Date:  6/13/17 Date:  6/20/17 Date:  6/26/17 Date  7-10-17   Activity/Exercise Parameters Parameters Parameters    Quad sets 2 x 15 2 x 15 - -   Short arc quads 2 x 15, 4# 2 x 15 5#  2 x 15  5# -   Long arc quads 2 x 10, 4# 2 x 15 5# - -   Hamstring curls Blue, 2 x 15; standing 4# 2 x 10 Blue 2 x 15 Standing 3 x 15 2 x 15   Standing hip abduction 2 x 10 B - 3 x 15 2 x 15   Calf raises 2 x 15 2 x 20 2 x 15 2 x 20   Step ups  4\", 2 x 10 B - 4# 2 x 10 B 6\" 2 x 15 B   Slantboard calf stretch 5 x 20\" B 5 x 20\" B 3 x 30\" each LE 4 x 20\" each LE     Manual therapy (15 minutes): Grade II R tibiofemoral joint mobilizations for decreased symptoms, R patellar mobilizations for improved R knee discomfort.   Aquatic Therapy (0 minutes):  Not performed on 7/10/17    Aquatic Exercise Log       Date  6/14 Date  6/28 Date  7/5/17 Date   Date     Activity/ Exercise Parameters Parameters Parameters Parameters Parameters   Walking forward 6 6 6     Walking backward 6 6 6     Walking sideways 6 6 6       Marching 6 6 6       Goose Step 6  Long step 6 6  6 long step 6  6 long step       Tip toes  3 3       Heels  3 3       Lunges        Side step squats        LE Exercises 2# 2# 2#       Hip Flex/Ext 12 12 15       Hip Abd/Add 12 12 15       Hip IR/ER          Calf raises 12 12 15       Knee Flex 12 12 15       Squats          Leg Circles 10/10 10/10 15/15       Step Ups 10 10 15     UE Exercises          Squeeze In          Push Down          Pull Down          Bicep/Tricep        Rows/Press outs         Chi Positions          Trunk Rotation        Deep H2O/ Noodles 7' with noodle and wts 7' with noodle and wts 7' with noodle and 2#       Stabilization          Arms only          Legs only Jog 2 min Jog 2 min Jog 2 min     Cross   Country 2 min 2 min 2 min       Scissors 2 min 2 min 2 min       Knee ext kicks   2 min     Lower abdominal   work           Cardio          Jogging        Lap   Swimming          Stretches          Hamstrings          Heelcords          Piriformis          Neck                                                                                                                Treatment Assessment:  Pt continues to make progress with less R knee pain. Patient able to progress with step ups without pain. Progression/Medical Necessity: Compliant  Recommendations/Intent for next treatment session: \"Treatment next visit will focus on improving R LE strength and reducing R knee pain. \".     Total Treatment Duration: 30 minutes  PT Patient Time In/Time Out  Time In: 7849  Time Out: 0358 Florence Aponte, PT

## 2017-07-12 ENCOUNTER — HOSPITAL ENCOUNTER (OUTPATIENT)
Dept: PHYSICAL THERAPY | Age: 57
Discharge: HOME | End: 2017-07-12
Payer: COMMERCIAL

## 2017-07-12 PROCEDURE — 97113 AQUATIC THERAPY/EXERCISES: CPT

## 2017-07-13 NOTE — PROGRESS NOTES
Therapy Center at [facility] A[facility]KAN [facility]   [address] [place] [address], [address], [address]  Phone: [phone]   Fax: [phone]  Outpatient PHYSICAL THERAPY: Daily Note 7/12/2017  Fall Risk Score: 1 (? 5 = High Risk)    ICD-10: Treatment Diagnosis: Pain in right knee (M25.561), Difficulty in walking, not elsewhere classified (R26.2)  REFERRING PHYSICIAN: Beverly Canales MD MD Orders: Evaluate and treat, water therapy (6/5/17)  Return Physician Appointment: TBD  MEDICAL/REFERRING DIAGNOSIS:   Pain in right knee [M25.561]  Unilateral primary osteoarthritis, right knee [M17.11]  DATE OF ONSET: Symptoms have worsened over the past 4-5 years  PRIOR LEVEL OF FUNCTION: Patient independent with all mobility and activities  PRECAUTIONS/ALLERGIES: None per patient intake form  ASSESSMENT:  ?????? ? ? This section established at most recent assessment??????????  Mr. Atkinson Sender presents with R knee pain which he received a cortizone injection for 1.5 weeks ago. Pain, difficulty maintaining one position for prolonged durations of time, and functional weakness are limiting basic mobility and normal activity level. He will benefit from PT for guided rehab to promote normalized return of motion, strength, and function in order for patient to increase activity participation, improve mobility performance and improve his overall quality of life. PROBLEM LIST (Impairments causing functional limitations):  1. R knee pain  2. Decreased functional strength R knee/LE   3. Decreased gait skills    GOALS: (Goals have been discussed and agreed upon with patient.)    SHORT-TERM FUNCTIONAL GOALS: Time Frame: 4 weeks  1. Report no more than minimal, intermittent 3/10 pain R knee with basic walking and ADL's, and score greater than 60/80 on the LEFS. 2. Independent with initial HEP  3. Patient will report being able to drive without having to pull over to stretch R LE due to knee pain.   4. Patient will have 15 degrees active dorsiflexion in B LEs for improved ankle mobility and minimized passive knee strain. DISCHARGE GOALS: Time Frame: 8 weeks   1. No significant pain R knee with all normalized daily home and work activities, and score greater than 70/80 on the LEFS. 2. Patient will report no interruptions in sleep secondary to R knee pain and go up/down 6\" step with R LE leading the way on ascent and controlling descent. 4. Independent with HEP for advanced knee strengthening. REHABILITATION POTENTIAL FOR STATED GOALS: [facility] OF CARE:  INTERVENTIONS PLANNED: (Benefits and precautions of physical therapy have been discussed with the patient.)  1. Aquatic physical therapy and HEP   2. Thermal and electric modalities, manual therapies for pain. 3. Manual therapies and therapeutic exercises for ROM and strength. 4. Therapeutic exercises for gait and balance. TREATMENT PLAN EFFECTIVE DATES: 6/5/17 TO 7/31/17  FREQUENCY/DURATION: Follow patient 2-3x per week for 8 weeks to address above goals. Regarding Duyen Yael Dennison's therapy, I certify that the treatment plan above will be carried out by a therapist or under their direction. Thank you for this referral,      Tanya Whitt, PT         Referring Physician Signature:          Date                             SUBJECTIVE:    Present Symptoms: Patient reports no pain upon arrival to PT. History of Present Injury/Illness (Reason for Referral): Patient reports that he has been having R knee pain for approximately 4-5 years, which began as weakness when he attempted to bring R foot into his car he would have to attempt it multiple times. Over the past year, the pain has worsened and the patient states that he experiences sharp and intense pain in R LE. Patient received a cortizone injection a week and a half ago to relieve symptoms, which some success.  Patient states that he has less pain now than prior to the injection, but his pain at its worse still approaches 7 to 8 out of 10 post-injection. States that his knee pain limits him from driving longer than 62-59 minutes as he has to pull over and stretch his R leg. Patient also states that he sits for long periods of time at work and has to move his R leg often to prevent pain. Patient currently has difficulty descending stairs with R LE controlling descent as it feels it may 'give way'. Going up/down curbs is difficult and painful with R LE. Past Medical History: None significant for this case  Current Medications: Per intake form invokana (300 mg), warfarin (3.75 mg), losartan (100 mg)     Date Last Reviewed: 7/12/17  Social History/Home Situation: Pt lives alone in a [place] home. He has  2 steps to enter house through garage. 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   OBJECTIVE:    Outcome Measure: Tool Used: Lower Extremity Functional Scale (LEFS)  Score:  Initial: 48/80 Most Recent: 48/80 (Date: 6/5/17 )   Interpretation of Score: 20 questions each scored on a 5 point scale with 0 representing \"extreme difficulty or unable to perform\" and 4 representing \"no difficulty\". The lower the score, the greater the functional disability. 80/80 represents no disability. Minimal detectable change is 9 points. Observation/Orthostatic Postural Assessment: B genu valgus. Patient ambulates with wide base of support with B pronation, which is addressed by orthotics in B shoes, and reduced B knee ROM during gait cycle. Palpation:   Reduced patellar mobility supeior/inferior bilaterally; mobility more restricted in R knee.   ROM:    R knee AROM: -2 to 109    R ankle passive DF: 15 degrees (knee extended)           L knee AROM: -1 to 110   L ankle DF PROM: 12 deg (knee extended)      Strength:    R hip flexion 5/5   R knee extension 5/5   R knee flexion 5/5 (tested in sitting)   R ankle dorsiflexion: 5/5    R ankle plantarflexion: 5/5     L hip flexion 5/5   L knee extension 5/5   L knee flexion 5/5 (tested in sitting)   L ankle dorsiflexion 5/5   L ankle plantarflexion 5/5           Neurological Screen: Sensation to light touch in tact in B LE  Functional Mobility:  Patient demonstrates independence with all functional mobility. TREATMENT:    (In addition to Assessment/Re-Assessment sessions the following treatments were rendered)  Subjective: Patient states that he has occasional R knee soreness and discomfort, primarily after prolonged sitting, but continues to be improving. Pain pre-treatment: none reported         Pain post-treatment: none reported  Therapeutic Exercise ( 15 minutes):  Exercises performed for improved R knee strength and stability and reduced discomfort. Patient required verbal cues and demonstration for proper technique. See grid below for exercises performed:      Date:  6/13/17 Date:  6/20/17 Date:  6/26/17 Date  7-10-17   Activity/Exercise Parameters Parameters Parameters    Quad sets 2 x 15 2 x 15 - -   Short arc quads 2 x 15, 4# 2 x 15 5#  2 x 15  5# -   Long arc quads 2 x 10, 4# 2 x 15 5# - -   Hamstring curls Blue, 2 x 15; standing 4# 2 x 10 Blue 2 x 15 Standing 3 x 15 2 x 15   Standing hip abduction 2 x 10 B - 3 x 15 2 x 15   Calf raises 2 x 15 2 x 20 2 x 15 2 x 20   Step ups  4\", 2 x 10 B - 4# 2 x 10 B 6\" 2 x 15 B   Slantboard calf stretch 5 x 20\" B 5 x 20\" B 3 x 30\" each LE 4 x 20\" each LE     Manual therapy (15 minutes): Grade II R tibiofemoral joint mobilizations for decreased symptoms, R patellar mobilizations for improved R knee discomfort.   Aquatic Therapy (0 minutes):  Not performed on 7/10/17    Aquatic Exercise Log       Date  6/14 Date  6/28 Date  7/5/17 Date  7/12 Date     Activity/ Exercise Parameters Parameters Parameters Parameters Parameters   Walking forward 6 6 6 6    Walking backward 6 6 6 6    Walking sideways 6 6 6 6      Marching 6 6 6 6      Goose Step 6  Long step 6 6  6 long step 6  6 long step 6  6 long step      Tip toes  3 3 3      Heels  3 3 3      Lunges        Side step squats        LE Exercises 2# 2# 2# 3#      Hip Flex/Ext 12 12 15 20      Hip Abd/Add 12 12 15 20      Hip IR/ER          Calf raises 12 12 15 20      Knee Flex 12 12 15 20      Squats          Leg Circles 10/10 10/10 15/15 15/15      Step Ups 10 10 15 20    UE Exercises          Squeeze In          Push Down          Pull Down          Bicep/Tricep        Rows/Press outs         Chi Positions          Trunk Rotation        Deep H2O/ Noodles 7' with noodle and wts 7' with noodle and wts 7' with noodle and 2# 7' with noodle  2#      Stabilization          Arms only          Legs only Jog 2 min Jog 2 min Jog 2 min Jog 2 min    Cross   Country 2 min 2 min 2 min 2 min      Scissors 2 min 2 min 2 min 2 min      Knee ext kicks   2 min 2min    Lower abdominal   work           Cardio          Jogging        Lap   Swimming          Stretches          Hamstrings          Heelcords          Piriformis          Neck                                                                                                                Treatment Assessment:  Pt states he is making good progress. He is pleased. He states he is ready to work harder to strengthen his LE. Progression/Medical Necessity: Compliant  Recommendations/Intent for next treatment session: \"Treatment next visit will focus on improving R LE strength and reducing R knee pain. \".     Total Treatment Duration: 45 minutes  PT Patient Time In/Time Out  Time In: 0315  Time Out: 0400    Bishop Santos PT

## 2017-07-19 ENCOUNTER — HOSPITAL ENCOUNTER (OUTPATIENT)
Dept: PHYSICAL THERAPY | Age: 57
Discharge: HOME | End: 2017-07-19
Payer: COMMERCIAL

## 2017-07-19 PROCEDURE — 97113 AQUATIC THERAPY/EXERCISES: CPT

## 2017-07-19 NOTE — PROGRESS NOTES
Therapy Center at [facility]   [address] [place] [address], [address], [address]  Phone: [phone]   Fax: [phone]  Outpatient PHYSICAL THERAPY: Daily Note 7/19/2017  Fall Risk Score: 1 (? 5 = High Risk)    ICD-10: Treatment Diagnosis: Pain in right knee (M25.561), Difficulty in walking, not elsewhere classified (R26.2)  REFERRING PHYSICIAN: Vivien Gonzalez MD MD Orders: Evaluate and treat, water therapy (6/5/17)  Return Physician Appointment: TBD  MEDICAL/REFERRING DIAGNOSIS:   Pain in right knee [M25.561]  Unilateral primary osteoarthritis, right knee [M17.11]  DATE OF ONSET: Symptoms have worsened over the past 4-5 years  PRIOR LEVEL OF FUNCTION: Patient independent with all mobility and activities  PRECAUTIONS/ALLERGIES: None per patient intake form  ASSESSMENT:  ?????? ? ? This section established at most recent assessment??????????  Mr. Melinda Carrasquillo presents with R knee pain which he received a cortizone injection for 1.5 weeks ago. Pain, difficulty maintaining one position for prolonged durations of time, and functional weakness are limiting basic mobility and normal activity level. He will benefit from PT for guided rehab to promote normalized return of motion, strength, and function in order for patient to increase activity participation, improve mobility performance and improve his overall quality of life. PROBLEM LIST (Impairments causing functional limitations):  1. R knee pain  2. Decreased functional strength R knee/LE   3. Decreased gait skills    GOALS: (Goals have been discussed and agreed upon with patient.)    SHORT-TERM FUNCTIONAL GOALS: Time Frame: 4 weeks  1. Report no more than minimal, intermittent 3/10 pain R knee with basic walking and ADL's, and score greater than 60/80 on the LEFS. 2. Independent with initial HEP  3. Patient will report being able to drive without having to pull over to stretch R LE due to knee pain.   4. Patient will have 15 degrees active dorsiflexion in B LEs for improved ankle mobility and minimized passive knee strain. DISCHARGE GOALS: Time Frame: 8 weeks   1. No significant pain R knee with all normalized daily home and work activities, and score greater than 70/80 on the LEFS. 2. Patient will report no interruptions in sleep secondary to R knee pain and go up/down 6\" step with R LE leading the way on ascent and controlling descent. 4. Independent with HEP for advanced knee strengthening. REHABILITATION POTENTIAL FOR STATED GOALS: [facility] OF CARE:  INTERVENTIONS PLANNED: (Benefits and precautions of physical therapy have been discussed with the patient.)  1. Aquatic physical therapy and HEP   2. Thermal and electric modalities, manual therapies for pain. 3. Manual therapies and therapeutic exercises for ROM and strength. 4. Therapeutic exercises for gait and balance. TREATMENT PLAN EFFECTIVE DATES: 6/5/17 TO 7/31/17  FREQUENCY/DURATION: Follow patient 2-3x per week for 8 weeks to address above goals. Regarding Torin Dennison's therapy, I certify that the treatment plan above will be carried out by a therapist or under their direction. Thank you for this referral,      Edvin Gong, PT         Referring Physician Signature:          Date                             SUBJECTIVE:    Present Symptoms: Patient reports no pain upon arrival to PT. History of Present Injury/Illness (Reason for Referral): Patient reports that he has been having R knee pain for approximately 4-5 years, which began as weakness when he attempted to bring R foot into his car he would have to attempt it multiple times. Over the past year, the pain has worsened and the patient states that he experiences sharp and intense pain in R LE. Patient received a cortizone injection a week and a half ago to relieve symptoms, which some success.  Patient states that he has less pain now than prior to the injection, but his pain at its worse still approaches 7 to 8 out of 10 post-injection. States that his knee pain limits him from driving longer than 62-85 minutes as he has to pull over and stretch his R leg. Patient also states that he sits for long periods of time at work and has to move his R leg often to prevent pain. Patient currently has difficulty descending stairs with R LE controlling descent as it feels it may 'give way'. Going up/down curbs is difficult and painful with R LE. Past Medical History: None significant for this case  Current Medications: Per intake form invokana (300 mg), warfarin (3.75 mg), losartan (100 mg)     Date Last Reviewed: 7/19/17  Social History/Home Situation: Pt lives alone in a [place] home. He has  2 steps to enter house through garage. 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   OBJECTIVE:    Outcome Measure: Tool Used: Lower Extremity Functional Scale (LEFS)  Score:  Initial: 48/80 Most Recent: 48/80 (Date: 6/5/17 )   Interpretation of Score: 20 questions each scored on a 5 point scale with 0 representing \"extreme difficulty or unable to perform\" and 4 representing \"no difficulty\". The lower the score, the greater the functional disability. 80/80 represents no disability. Minimal detectable change is 9 points. Observation/Orthostatic Postural Assessment: B genu valgus. Patient ambulates with wide base of support with B pronation, which is addressed by orthotics in B shoes, and reduced B knee ROM during gait cycle. Palpation:   Reduced patellar mobility supeior/inferior bilaterally; mobility more restricted in R knee.   ROM:    R knee AROM: -2 to 109    R ankle passive DF: 15 degrees (knee extended)           L knee AROM: -1 to 110   L ankle DF PROM: 12 deg (knee extended)      Strength:    R hip flexion 5/5   R knee extension 5/5   R knee flexion 5/5 (tested in sitting)   R ankle dorsiflexion: 5/5    R ankle plantarflexion: 5/5     L hip flexion 5/5   L knee extension 5/5   L knee flexion 5/5 (tested in sitting)   L ankle dorsiflexion 5/5   L ankle plantarflexion 5/5           Neurological Screen: Sensation to light touch in tact in B LE  Functional Mobility:  Patient demonstrates independence with all functional mobility. TREATMENT:    (In addition to Assessment/Re-Assessment sessions the following treatments were rendered)  Subjective: Patient states that he has R knee pain with walking but it continues to improve. Pain pre-treatment:0/10        Pain post-treatment: 0/10  Therapeutic Exercise ( 0 minutes):  Exercises performed for improved R knee strength and stability and reduced discomfort. Patient required verbal cues and demonstration for proper technique. See grid below for exercises performed:      Date:  6/13/17 Date:  6/20/17 Date:  6/26/17 Date  7-10-17 Date  7-17-17   Activity/Exercise Parameters Parameters Parameters     Quad sets 2 x 15 2 x 15 - - -   Short arc quads 2 x 15, 4# 2 x 15 5#  2 x 15  5# -    Long arc quads 2 x 10, 4# 2 x 15 5# - - 10# 2 x 15   Hamstring curls Blue, 2 x 15; standing 4# 2 x 10 Blue 2 x 15 Standing 3 x 15 2 x 15 Sitting, blue 2 x 15; standing 3# 2 x 15   Standing hip abduction 2 x 10 B - 3 x 15 2 x 15 3# 2 x 15   Calf raises 2 x 15 2 x 20 2 x 15 2 x 20 3 x 15   Step ups  4\", 2 x 10 B - 4# 2 x 10 B 6\" 2 x 15 B 8\" 2 x 15 ea   Slantboard calf stretch 5 x 20\" B 5 x 20\" B 3 x 30\" each LE 4 x 20\" each LE 3 x 30\" each      Manual therapy (13 minutes): Grade II R tibiofemoral joint mobilizations for decreased symptoms, R patellar mobilizations for improved R knee discomfort. R LE PROM for reduced discomfort and to increase blood flow to area.   Aquatic Therapy (0 minutes):  Not performed on 7/17/17    Aquatic Exercise Log       Date  7/19 Date     Activity/ Exercise Parameters  5# Parameters   Walking forward 6    Walking backward 6    Walking sideways 6      Marching 6      Goose Step 6  6 long step      Tip toes 3      Heels 3      Lunges     Side step squats     LE Exercises 5#      Hip Flex/Ext 20      Hip Abd/Add 20      Hip IR/ER       Calf raises 20      Knee Flex 20      Squats       Leg Circles 15/15      Step Ups 20    UE Exercises       Squeeze In       Push Down       Pull Down       Bicep/Tricep     Rows/Press outs      Chi Positions       Trunk Rotation     Deep H2O/ Noodles 7' with noodle  5#      Stabilization       Arms only       Legs only Jog 2 min    Cross   Country 2 min      Scissors 2 min      Knee ext kicks 2min    Lower abdominal   work        Cardio       Jogging     Lap   Swimming       Stretches       Hamstrings       Heelcords       Piriformis       Neck                                                                                                             Treatment Assessment:  Pt did well with aquatic therapy. He tolerated increased wts today. Progression/Medical Necessity: Compliant  Recommendations/Intent for next treatment session: \"Treatment next visit will focus on improving R LE strength and reducing R knee pain. \".     Total Treatment Duration: 45 minutes  PT Patient Time In/Time Out  Time In: 0315  Time Out: 0400    Zhanna Johnson, PT

## 2017-07-24 ENCOUNTER — HOSPITAL ENCOUNTER (OUTPATIENT)
Dept: PHYSICAL THERAPY | Age: 57
Discharge: HOME | End: 2017-07-24
Payer: COMMERCIAL

## 2017-07-24 PROCEDURE — 97110 THERAPEUTIC EXERCISES: CPT

## 2017-07-24 PROCEDURE — 97140 MANUAL THERAPY 1/> REGIONS: CPT

## 2017-07-24 NOTE — PROGRESS NOTES
[facility]   [address] [facility], [address] [facility], [address]  Phone: [phone]   Fax: [phone]  Outpatient PHYSICAL THERAPY: Daily Note, Progress Report and Recertification 6/93/0775  Fall Risk Score: 1 (? 5 = High Risk)    ICD-10: Treatment Diagnosis: Pain in right knee (M25.561), Difficulty in walking, not elsewhere classified (R26.2)  REFERRING PHYSICIAN: Flaca Camejo MD MD Orders: Evaluate and treat, water therapy (6/5/17)  Return Physician Appointment: TBD  MEDICAL/REFERRING DIAGNOSIS:   Pain in right knee [M25.561]  Unilateral primary osteoarthritis, right knee [M17.11]  DATE OF ONSET: Symptoms have worsened over the past 4-5 years  PRIOR LEVEL OF FUNCTION: Patient independent with all mobility and activities  PRECAUTIONS/ALLERGIES: None per patient intake form  ASSESSMENT:  ?????? ? ? This section established at most recent assessment??????????  Mr. Naveen Hendrickson presents to PT with R knee pain that has significantly improved since his initial visit. Patient reports less discomfort and less limitations now than when he began PT in early June. Patient has been attending both land and aquatic therapy to relieve R knee pain. Patient will benefit from continued physical therapy to progress patient towards discharge with a HEP for continued symptom management. PROBLEM LIST (Impairments causing functional limitations):  1. R knee pain  2. Decreased functional strength R knee/LE   3. Decreased gait skills    GOALS: (Goals have been discussed and agreed upon with patient.)    SHORT-TERM FUNCTIONAL GOALS: Time Frame: 4 weeks  1. Report no more than minimal, intermittent 3/10 pain R knee with basic walking and ADL's, and score greater than 60/80 on the LEFS. Ongoing 7-24-17  2. Independent with initial HEP MET 7-24-17  3. Patient will report being able to drive without having to pull over to stretch R LE due to knee pain. Ongoing 7-24-17, has not been on a car trip long enough   4. Patient will have 15 degrees active dorsiflexion in B LEs for improved ankle mobility and minimized passive knee strain. MET 7-24-17  DISCHARGE GOALS: Time Frame: 8 weeks   1. No significant pain R knee with all normalized daily home and work activities, and score greater than 70/80 on the LEFS. Ongoing 7-24-17  2. Patient will report no interruptions in sleep secondary to R knee pain and go up/down 6\" step with R LE leading the way on ascent and controlling descent. MET 7-24-17  4. Independent with HEP for advanced knee strengthening. REHABILITATION POTENTIAL FOR STATED GOALS: Suzanne Haywood OF CARE:  INTERVENTIONS PLANNED: (Benefits and precautions of physical therapy have been discussed with the patient.)  1. Aquatic physical therapy and HEP   2. Thermal and electric modalities, manual therapies for pain. 3. Manual therapies and therapeutic exercises for ROM and strength. 4. Therapeutic exercises for gait and balance. TREATMENT PLAN EFFECTIVE DATES: 7/24/17 TO 9/4/17  FREQUENCY/DURATION: Follow patient 2x per week for 6 weeks to address above goals. Regarding Fabiana Dennison's therapy, I certify that the treatment plan above will be carried out by a therapist or under their direction. Thank you for this referral,      Derrek Luna, PT         Referring Physician Signature:          Date                             SUBJECTIVE:    Present Symptoms: Patient reports no pain upon arrival to PT. History of Present Injury/Illness (Reason for Referral): Patient reports that he has been having R knee pain for approximately 4-5 years, which began as weakness when he attempted to bring R foot into his car he would have to attempt it multiple times. Over the past year, the pain has worsened and the patient states that he experiences sharp and intense pain in R LE. Patient received a cortizone injection a week and a half ago to relieve symptoms, which some success.  Patient states that he has less pain now than prior to the injection, but his pain at its worse still approaches 7 to 8 out of 10 post-injection. States that his knee pain limits him from driving longer than 25-23 minutes as he has to pull over and stretch his R leg. Patient also states that he sits for long periods of time at work and has to move his R leg often to prevent pain. Patient currently has difficulty descending stairs with R LE controlling descent as it feels it may 'give way'. Going up/down curbs is difficult and painful with R LE. Past Medical History: None significant for this case  Current Medications: Per intake form invokana (300 mg), warfarin (3.75 mg), losartan (100 mg)     Date Last Reviewed: 7/24/17  Social History/Home Situation: Pt lives alone in a [place] home. He has  2 steps to enter house through garage. 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   OBJECTIVE:    Outcome Measure: Tool Used: Lower Extremity Functional Scale (LEFS)  Score:  Initial: 48/80 Most Recent: 52/80 (Date: 7/24/17 )   Interpretation of Score: 20 questions each scored on a 5 point scale with 0 representing \"extreme difficulty or unable to perform\" and 4 representing \"no difficulty\". The lower the score, the greater the functional disability. 80/80 represents no disability. Minimal detectable change is 9 points. Observation/Orthostatic Postural Assessment: B genu valgus. Patient ambulates with wide base of support with B pronation, which is addressed by orthotics in B shoes, and reduced B knee ROM during gait cycle. Palpation:   Reduced patellar mobility supeior/inferior bilaterally; mobility more restricted in R knee.   ROM:    R knee AROM: -2 to 109    R ankle passive DF: 15 degrees (knee extended)           L knee AROM: -1 to 110   L ankle DF PROM: 12 deg (knee extended)      Strength:    R hip flexion 5/5   R knee extension 5/5   R knee flexion 5/5 (tested in sitting)   R ankle dorsiflexion: 5/5    R ankle plantarflexion: 5/5     L hip flexion 5/5   L knee extension 5/5   L knee flexion 5/5 (tested in sitting)   L ankle dorsiflexion 5/5   L ankle plantarflexion 5/5           Neurological Screen: Sensation to light touch in tact in B LE  Functional Mobility:  Patient demonstrates independence with all functional mobility. TREATMENT:    (In addition to Assessment/Re-Assessment sessions the following treatments were rendered)  Subjective: Patient states that he has had a good week with his R knee. He reports that his pain has been very low and that he has been able to increase his activity level a little without worsening pain. Pain pre-treatment: 0-1/10        Pain post-treatment: \"it feels good\"; none reported  Therapeutic Exercise ( 28 minutes):  Exercises performed for improved R knee strength and stability and reduced discomfort. Patient required verbal cues and demonstration for proper technique. See grid below for exercises performed:      Date:  6/13/17 Date:  6/20/17 Date:  6/26/17 Date  7-10-17 Date  7-17-17 Date  7-24-17   Activity/Exercise Parameters Parameters Parameters      Quad sets 2 x 15 2 x 15 - - - -   Short arc quads 2 x 15, 4# 2 x 15 5#  2 x 15  5# -  -   Long arc quads 2 x 10, 4# 2 x 15 5# - - 10# 2 x 15 10# 2 x 15   Hamstring curls Blue, 2 x 15; standing 4# 2 x 10 Blue 2 x 15 Standing 3 x 15 2 x 15 Sitting, blue 2 x 15; standing 3# 2 x 15 Sitting, blue 2 x 15; standing 3# 2 x 15   Standing hip abduction 2 x 10 B - 3 x 15 2 x 15 3# 2 x 15 3# 2 x 15   Calf raises 2 x 15 2 x 20 2 x 15 2 x 20 3 x 15 2 x 20   Step ups  4\", 2 x 10 B - 4# 2 x 10 B 6\" 2 x 15 B 8\" 2 x 15 ea 8\" 2 x 15 ea   Slantboard calf stretch 5 x 20\" B 5 x 20\" B 3 x 30\" each LE 4 x 20\" each LE 3 x 30\" each  3 x 30\" ea   Sit-to-stands      2 x 10 from mat table w/o use of hands                       Manual therapy (12 minutes): Grade II R tibiofemoral joint mobilizations for decreased symptoms, R patellar mobilizations for improved R knee discomfort.  R LE PROM for reduced discomfort and to increase blood flow to area. R ankle DF joint mobilizations (grade III) performed for improved R ankle mobility. Aquatic Therapy (0 minutes):  Not performed on 7/24/17    Aquatic Exercise Log       Date  6/14 Date  6/28 Date  7/5/17 Date  7/12 Date     Activity/ Exercise Parameters Parameters Parameters Parameters Parameters   Walking forward 6 6 6 6    Walking backward 6 6 6 6    Walking sideways 6 6 6 6      Marching 6 6 6 6      Goose Step 6  Long step 6 6  6 long step 6  6 long step 6  6 long step      Tip toes  3 3 3      Heels  3 3 3      Lunges        Side step squats        LE Exercises 2# 2# 2# 3#      Hip Flex/Ext 12 12 15 20      Hip Abd/Add 12 12 15 20      Hip IR/ER          Calf raises 12 12 15 20      Knee Flex 12 12 15 20      Squats          Leg Circles 10/10 10/10 15/15 15/15      Step Ups 10 10 15 20    UE Exercises          Squeeze In          Push Down          Pull Down          Bicep/Tricep        Rows/Press outs         Chi Positions          Trunk Rotation        Deep H2O/ Noodles 7' with noodle and wts 7' with noodle and wts 7' with noodle and 2# 7' with noodle  2#      Stabilization          Arms only          Legs only Jog 2 min Jog 2 min Jog 2 min Jog 2 min    Cross   Country 2 min 2 min 2 min 2 min      Scissors 2 min 2 min 2 min 2 min      Knee ext kicks   2 min 2min    Lower abdominal   work           Cardio          Jogging        Lap   Swimming          Stretches          Hamstrings          Heelcords          Piriformis          Neck                                                                                                                Treatment Assessment:  Pt is making very good progress with not only his R knee symptoms, but also with his understanding of his aggravations and eases and how to manage his symptoms when not in the clinic. Patient also has improving R patellar mobility.  Patient is progressing towards discharge with HEP and possible continuation of aquatic therapy. Patient will continue with land and aquatic therapy for now until that time when he can replace land sessions with HEP. Progression/Medical Necessity: Compliant  Recommendations/Intent for next treatment session: \"Treatment next visit will focus on improving R LE strength and reducing R knee pain. \".     Total Treatment Duration: 42 minutes  PT Patient Time In/Time Out  Time In: 1432  Time Out: 145 Amari Stovall, PT

## 2017-07-26 ENCOUNTER — HOSPITAL ENCOUNTER (OUTPATIENT)
Dept: PHYSICAL THERAPY | Age: 57
Discharge: HOME | End: 2017-07-26
Payer: COMMERCIAL

## 2017-07-26 PROCEDURE — 97113 AQUATIC THERAPY/EXERCISES: CPT

## 2017-07-26 NOTE — PROGRESS NOTES
[facility]   [address] [place] [address], [address], [address]  Phone: [phone]   Fax: [phone]  Outpatient PHYSICAL THERAPY: Daily Note 7/26/2017  Fall Risk Score: 1 (? 5 = High Risk)    ICD-10: Treatment Diagnosis: Pain in right knee (M25.561), Difficulty in walking, not elsewhere classified (R26.2)  REFERRING PHYSICIAN: Ana Maria Officer, MD GOMES Orders: Evaluate and treat, water therapy (6/5/17)  Return Physician Appointment: TBD  MEDICAL/REFERRING DIAGNOSIS:   Pain in right knee [M25.561]  Unilateral primary osteoarthritis, right knee [M17.11]  DATE OF ONSET: Symptoms have worsened over the past 4-5 years  PRIOR LEVEL OF FUNCTION: Patient independent with all mobility and activities  PRECAUTIONS/ALLERGIES: None per patient intake form  ASSESSMENT:  ?????? ? ? This section established at most recent assessment??????????  Mr. Leidy Cobian presents to PT with R knee pain that has significantly improved since his initial visit. Patient reports less discomfort and less limitations now than when he began PT in early June. Patient has been attending both land and aquatic therapy to relieve R knee pain. Patient will benefit from continued physical therapy to progress patient towards discharge with a HEP for continued symptom management. PROBLEM LIST (Impairments causing functional limitations):  1. R knee pain  2. Decreased functional strength R knee/LE   3. Decreased gait skills    GOALS: (Goals have been discussed and agreed upon with patient.)    SHORT-TERM FUNCTIONAL GOALS: Time Frame: 4 weeks  1. Report no more than minimal, intermittent 3/10 pain R knee with basic walking and ADL's, and score greater than 60/80 on the LEFS. Ongoing 7-24-17  2. Independent with initial HEP MET 7-24-17  3. Patient will report being able to drive without having to pull over to stretch R LE due to knee pain. Ongoing 7-24-17, has not been on a car trip long enough   4.  Patient will have 15 degrees active dorsiflexion in B LEs for improved ankle mobility and minimized passive knee strain. MET 7-24-17  DISCHARGE GOALS: Time Frame: 8 weeks   1. No significant pain R knee with all normalized daily home and work activities, and score greater than 70/80 on the LEFS. Ongoing 7-24-17  2. Patient will report no interruptions in sleep secondary to R knee pain and go up/down 6\" step with R LE leading the way on ascent and controlling descent. MET 7-24-17  4. Independent with HEP for advanced knee strengthening. REHABILITATION POTENTIAL FOR STATED GOALS: Alisson English OF CARE:  INTERVENTIONS PLANNED: (Benefits and precautions of physical therapy have been discussed with the patient.)  1. Aquatic physical therapy and HEP   2. Thermal and electric modalities, manual therapies for pain. 3. Manual therapies and therapeutic exercises for ROM and strength. 4. Therapeutic exercises for gait and balance. TREATMENT PLAN EFFECTIVE DATES: 7/24/17 TO 9/4/17  FREQUENCY/DURATION: Follow patient 2x per week for 6 weeks to address above goals. Regarding Colin Dennison's therapy, I certify that the treatment plan above will be carried out by a therapist or under their direction. Thank you for this referral,      Shadia Cruz, PT         Referring Physician Signature:          Date                             SUBJECTIVE:    Present Symptoms: Patient reports no pain upon arrival to PT. History of Present Injury/Illness (Reason for Referral): Patient reports that he has been having R knee pain for approximately 4-5 years, which began as weakness when he attempted to bring R foot into his car he would have to attempt it multiple times. Over the past year, the pain has worsened and the patient states that he experiences sharp and intense pain in R LE. Patient received a cortizone injection a week and a half ago to relieve symptoms, which some success.  Patient states that he has less pain now than prior to the injection, but his pain at its worse still approaches 7 to 8 out of 10 post-injection. States that his knee pain limits him from driving longer than 76-66 minutes as he has to pull over and stretch his R leg. Patient also states that he sits for long periods of time at work and has to move his R leg often to prevent pain. Patient currently has difficulty descending stairs with R LE controlling descent as it feels it may 'give way'. Going up/down curbs is difficult and painful with R LE. Past Medical History: None significant for this case  Current Medications: Per intake form invokana (300 mg), warfarin (3.75 mg), losartan (100 mg)     Date Last Reviewed: 7/24/17  Social History/Home Situation: Pt lives alone in a [place] home. He has  2 steps to enter house through garage. 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   OBJECTIVE:    Outcome Measure: Tool Used: Lower Extremity Functional Scale (LEFS)  Score:  Initial: 48/80 Most Recent: 52/80 (Date: 7/24/17 )   Interpretation of Score: 20 questions each scored on a 5 point scale with 0 representing \"extreme difficulty or unable to perform\" and 4 representing \"no difficulty\". The lower the score, the greater the functional disability. 80/80 represents no disability. Minimal detectable change is 9 points. Observation/Orthostatic Postural Assessment: B genu valgus. Patient ambulates with wide base of support with B pronation, which is addressed by orthotics in B shoes, and reduced B knee ROM during gait cycle. Palpation:   Reduced patellar mobility supeior/inferior bilaterally; mobility more restricted in R knee.   ROM:    R knee AROM: -2 to 109    R ankle passive DF: 15 degrees (knee extended)           L knee AROM: -1 to 110   L ankle DF PROM: 12 deg (knee extended)      Strength:    R hip flexion 5/5   R knee extension 5/5   R knee flexion 5/5 (tested in sitting)   R ankle dorsiflexion: 5/5    R ankle plantarflexion: 5/5     L hip flexion 5/5   L knee extension 5/5   L knee flexion 5/5 (tested in sitting)   L ankle dorsiflexion 5/5   L ankle plantarflexion 5/5           Neurological Screen: Sensation to light touch in tact in B LE  Functional Mobility:  Patient demonstrates independence with all functional mobility. TREATMENT:    (In addition to Assessment/Re-Assessment sessions the following treatments were rendered)  Subjective: Pt states he continues to do well. He has some episodes of pain but he states he is much improved since his surgery. Pain pre-treatment: 0-1/10        Pain post-treatment: 0/10  Therapeutic Exercise (  minutes):  Exercises performed for improved R knee strength and stability and reduced discomfort. Patient required verbal cues and demonstration for proper technique. See grid below for exercises performed:      Date:  6/13/17 Date:  6/20/17 Date:  6/26/17 Date  7-10-17 Date  7-17-17 Date  7-24-17   Activity/Exercise Parameters Parameters Parameters      Quad sets 2 x 15 2 x 15 - - - -   Short arc quads 2 x 15, 4# 2 x 15 5#  2 x 15  5# -  -   Long arc quads 2 x 10, 4# 2 x 15 5# - - 10# 2 x 15 10# 2 x 15   Hamstring curls Blue, 2 x 15; standing 4# 2 x 10 Blue 2 x 15 Standing 3 x 15 2 x 15 Sitting, blue 2 x 15; standing 3# 2 x 15 Sitting, blue 2 x 15; standing 3# 2 x 15   Standing hip abduction 2 x 10 B - 3 x 15 2 x 15 3# 2 x 15 3# 2 x 15   Calf raises 2 x 15 2 x 20 2 x 15 2 x 20 3 x 15 2 x 20   Step ups  4\", 2 x 10 B - 4# 2 x 10 B 6\" 2 x 15 B 8\" 2 x 15 ea 8\" 2 x 15 ea   Slantboard calf stretch 5 x 20\" B 5 x 20\" B 3 x 30\" each LE 4 x 20\" each LE 3 x 30\" each  3 x 30\" ea   Sit-to-stands      2 x 10 from mat table w/o use of hands                       Manual therapy (12 minutes): Grade II R tibiofemoral joint mobilizations for decreased symptoms, R patellar mobilizations for improved R knee discomfort. R LE PROM for reduced discomfort and to increase blood flow to area.  R ankle DF joint mobilizations (grade III) performed for improved R ankle mobility. Aquatic Therapy (45 minutes): Aquatic Exercise Log       Date  6/14 Date  6/28 Date  7/5/17 Date  7/12 Date  7/26   Activity/ Exercise Parameters Parameters Parameters Parameters Parameters  5#   Walking forward 6 6 6 6 6   Walking backward 6 6 6 6 6   Walking sideways 6 6 6 6 6     Marching 6 6 6 6 6     Goose Step 6  Long step 6 6  6 long step 6  6 long step 6  6 long step 6     Tip toes  3 3 3 3     Heels  3 3 3 3     Lunges     6   Side step squats        LE Exercises 2# 2# 2# 3# 5#     Hip Flex/Ext 12 12 15 20 15     Hip Abd/Add 12 12 15 20 15     Hip IR/ER          Calf raises 12 12 15 20 15     Knee Flex 12 12 15 20 15     Squats          Leg Circles 10/10 10/10 15/15 15/15 15/15     Step Ups 10 10 15 20 15   UE Exercises          Squeeze In          Push Down          Pull Down          Bicep/Tricep        Rows/Press outs         Chi Positions          Trunk Rotation        Deep H2O/ Noodles 7' with noodle and wts 7' with noodle and wts 7' with noodle and 2# 7' with noodle  2# 7' with noodle  5#     Stabilization          Arms only          Legs only Jog 2 min Jog 2 min Jog 2 min Jog 2 min 2 min   Cross   Country 2 min 2 min 2 min 2 min 2 min     Scissors 2 min 2 min 2 min 2 min 2 min     Knee ext kicks   2 min 2min 2 min   Lower abdominal   work           Cardio          Jogging        Lap   Swimming          Stretches          Hamstrings          Heelcords          Piriformis          Neck                                                                                                                Treatment Assessment:  Pt is doing well with aquatic therapy. Mee Vera He is tolerating wts well. Progression/Medical Necessity: Compliant  Recommendations/Intent for next treatment session: \"Treatment next visit will focus on improving R LE strength and reducing R knee pain. \".     Total Treatment Duration: 45 minutes  PT Patient Time In/Time Out  Time In: 0315  Time Out: [address], PT

## 2017-08-09 ENCOUNTER — HOSPITAL ENCOUNTER (OUTPATIENT)
Dept: PHYSICAL THERAPY | Age: 57
Discharge: HOME | End: 2017-08-09
Payer: COMMERCIAL

## 2017-08-09 PROCEDURE — 97113 AQUATIC THERAPY/EXERCISES: CPT

## 2017-08-09 NOTE — PROGRESS NOTES
[facility] at [facility]   [address] [place] [address], [address], [address]  Phone: [phone]   Fax: [phone]  Outpatient PHYSICAL THERAPY: Daily Note 8/9/2017  Fall Risk Score: 1 (? 5 = High Risk)    ICD-10: Treatment Diagnosis: Pain in right knee (M25.561), Difficulty in walking, not elsewhere classified (R26.2)  REFERRING PHYSICIAN: Marcelo Muñoz MD MD Orders: Evaluate and treat, water therapy (6/5/17)  Return Physician Appointment: TBD  MEDICAL/REFERRING DIAGNOSIS:   Pain in right knee [M25.561]  Unilateral primary osteoarthritis, right knee [M17.11]  DATE OF ONSET: Symptoms have worsened over the past 4-5 years  PRIOR LEVEL OF FUNCTION: Patient independent with all mobility and activities  PRECAUTIONS/ALLERGIES: None per patient intake form  ASSESSMENT:  ?????? ? ? This section established at most recent assessment??????????  Mr. Allegra Martinez presents to PT with R knee pain that has significantly improved since his initial visit. Patient reports less discomfort and less limitations now than when he began PT in early June. Patient has been attending both land and aquatic therapy to relieve R knee pain. Patient will benefit from continued physical therapy to progress patient towards discharge with a HEP for continued symptom management. PROBLEM LIST (Impairments causing functional limitations):  1. R knee pain  2. Decreased functional strength R knee/LE   3. Decreased gait skills    GOALS: (Goals have been discussed and agreed upon with patient.)    SHORT-TERM FUNCTIONAL GOALS: Time Frame: 4 weeks  1. Report no more than minimal, intermittent 3/10 pain R knee with basic walking and ADL's, and score greater than 60/80 on the LEFS. Ongoing 7-24-17  2. Independent with initial HEP MET 7-24-17  3. Patient will report being able to drive without having to pull over to stretch R LE due to knee pain. Ongoing 7-24-17, has not been on a car trip long enough   4.  Patient will have 15 degrees active dorsiflexion in B LEs for improved ankle mobility and minimized passive knee strain. MET 7-24-17  DISCHARGE GOALS: Time Frame: 8 weeks   1. No significant pain R knee with all normalized daily home and work activities, and score greater than 70/80 on the LEFS. Ongoing 7-24-17  2. Patient will report no interruptions in sleep secondary to R knee pain and go up/down 6\" step with R LE leading the way on ascent and controlling descent. MET 7-24-17  4. Independent with HEP for advanced knee strengthening. REHABILITATION POTENTIAL FOR STATED GOALS: Tarik Kim OF CARE:  INTERVENTIONS PLANNED: (Benefits and precautions of physical therapy have been discussed with the patient.)  1. Aquatic physical therapy and HEP   2. Thermal and electric modalities, manual therapies for pain. 3. Manual therapies and therapeutic exercises for ROM and strength. 4. Therapeutic exercises for gait and balance. TREATMENT PLAN EFFECTIVE DATES: 7/24/17 TO 9/4/17  FREQUENCY/DURATION: Follow patient 2x per week for 6 weeks to address above goals. Regarding Yahir Dennison's therapy, I certify that the treatment plan above will be carried out by a therapist or under their direction. Thank you for this referral,      Shaneka Mendoza, PT         Referring Physician Signature:          Date                             SUBJECTIVE:    Present Symptoms: Patient reports no pain upon arrival to PT. History of Present Injury/Illness (Reason for Referral): Patient reports that he has been having R knee pain for approximately 4-5 years, which began as weakness when he attempted to bring R foot into his car he would have to attempt it multiple times. Over the past year, the pain has worsened and the patient states that he experiences sharp and intense pain in R LE. Patient received a cortizone injection a week and a half ago to relieve symptoms, which some success.  Patient states that he has less pain now than prior to the injection, but his pain at its worse still approaches 7 to 8 out of 10 post-injection. States that his knee pain limits him from driving longer than 90-63 minutes as he has to pull over and stretch his R leg. Patient also states that he sits for long periods of time at work and has to move his R leg often to prevent pain. Patient currently has difficulty descending stairs with R LE controlling descent as it feels it may 'give way'. Going up/down curbs is difficult and painful with R LE. Past Medical History: None significant for this case  Current Medications: Per intake form invokana (300 mg), warfarin (3.75 mg), losartan (100 mg)     Date Last Reviewed: 7/24/17  Social History/Home Situation: Pt lives alone in a [place] home. He has  2 steps to enter house through garage. 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   OBJECTIVE:    Outcome Measure: Tool Used: Lower Extremity Functional Scale (LEFS)  Score:  Initial: 48/80 Most Recent: 52/80 (Date: 7/24/17 )   Interpretation of Score: 20 questions each scored on a 5 point scale with 0 representing \"extreme difficulty or unable to perform\" and 4 representing \"no difficulty\". The lower the score, the greater the functional disability. 80/80 represents no disability. Minimal detectable change is 9 points. Observation/Orthostatic Postural Assessment: B genu valgus. Patient ambulates with wide base of support with B pronation, which is addressed by orthotics in B shoes, and reduced B knee ROM during gait cycle. Palpation:   Reduced patellar mobility supeior/inferior bilaterally; mobility more restricted in R knee.   ROM:    R knee AROM: -2 to 109    R ankle passive DF: 15 degrees (knee extended)           L knee AROM: -1 to 110   L ankle DF PROM: 12 deg (knee extended)      Strength:    R hip flexion 5/5   R knee extension 5/5   R knee flexion 5/5 (tested in sitting)   R ankle dorsiflexion: 5/5    R ankle plantarflexion: 5/5     L hip flexion 5/5   L knee extension 5/5   L knee flexion 5/5 (tested in sitting)   L ankle dorsiflexion 5/5   L ankle plantarflexion 5/5           Neurological Screen: Sensation to light touch in tact in B LE  Functional Mobility:  Patient demonstrates independence with all functional mobility. TREATMENT:    (In addition to Assessment/Re-Assessment sessions the following treatments were rendered)  Subjective: Pt states he continues to be pleased with his progress. He states he has not had any real pain in awhile. Pain pre-treatment: 0/10        Pain post-treatment: 0/10  Therapeutic Exercise (  minutes):  Exercises performed for improved R knee strength and stability and reduced discomfort. Patient required verbal cues and demonstration for proper technique. See grid below for exercises performed:      Date:  6/13/17 Date:  6/20/17 Date:  6/26/17 Date  7-10-17 Date  7-17-17 Date  7-24-17   Activity/Exercise Parameters Parameters Parameters      Quad sets 2 x 15 2 x 15 - - - -   Short arc quads 2 x 15, 4# 2 x 15 5#  2 x 15  5# -  -   Long arc quads 2 x 10, 4# 2 x 15 5# - - 10# 2 x 15 10# 2 x 15   Hamstring curls Blue, 2 x 15; standing 4# 2 x 10 Blue 2 x 15 Standing 3 x 15 2 x 15 Sitting, blue 2 x 15; standing 3# 2 x 15 Sitting, blue 2 x 15; standing 3# 2 x 15   Standing hip abduction 2 x 10 B - 3 x 15 2 x 15 3# 2 x 15 3# 2 x 15   Calf raises 2 x 15 2 x 20 2 x 15 2 x 20 3 x 15 2 x 20   Step ups  4\", 2 x 10 B - 4# 2 x 10 B 6\" 2 x 15 B 8\" 2 x 15 ea 8\" 2 x 15 ea   Slantboard calf stretch 5 x 20\" B 5 x 20\" B 3 x 30\" each LE 4 x 20\" each LE 3 x 30\" each  3 x 30\" ea   Sit-to-stands      2 x 10 from mat table w/o use of hands                       Manual therapy (12 minutes): Grade II R tibiofemoral joint mobilizations for decreased symptoms, R patellar mobilizations for improved R knee discomfort. R LE PROM for reduced discomfort and to increase blood flow to area.  R ankle DF joint mobilizations (grade III) performed for improved R ankle mobility. Aquatic Therapy (45 minutes): Aquatic Exercise Log       Date  6/14 Date  6/28 Date  7/5/17 Date  7/12 Date  7/26 Date  8/9/17   Activity/ Exercise Parameters Parameters Parameters Parameters Parameters  5# 5#   Walking forward 6 6 6 6 6 6   Walking backward 6 6 6 6 6 6   Walking sideways 6 6 6 6 6 6     Marching 6 6 6 6 6 6     Goose Step 6  Long step 6 6  6 long step 6  6 long step 6  6 long step 6 6     Tip toes  3 3 3 3 3     Heels  3 3 3 3 3     Lunges     6 6   Side step squats         LE Exercises 2# 2# 2# 3# 5# 5#     Hip Flex/Ext 12 12 15 20 15 20     Hip Abd/Add 12 12 15 20 15 20     Hip IR/ER           Calf raises 12 12 15 20 15 20     Knee Flex 12 12 15 20 15 20     Squats      20     Leg Circles 10/10 10/10 15/15 15/15 15/15 20/20     Step Ups 10 10 15 20 15 20   UE Exercises           Squeeze In           Push Down           Pull Down           Bicep/Tricep         Rows/Press outs          Chi Positions           Trunk Rotation         Deep H2O/ Noodles 7' with noodle and wts 7' with noodle and wts 7' with noodle and 2# 7' with noodle  2# 7' with noodle  5# 7' with rings and 5#     Stabilization           Arms only           Legs only Jog 2 min Jog 2 min Jog 2 min Jog 2 min 2 min 2 min   Cross   Country 2 min 2 min 2 min 2 min 2 min 2 min     Scissors 2 min 2 min 2 min 2 min 2 min 2 min     Knee ext kicks   2 min 2min 2 min 2 min   Lower abdominal   work            Cardio           Jogging         Lap   Swimming           Stretches           Hamstrings           Heelcords           Piriformis           Neck                                                                                                                 Treatment Assessment:  Pt continues to do well. Tolerating wts in the pool well. Progression/Medical Necessity: Compliant  Recommendations/Intent for next treatment session: \"Treatment next visit will focus on improving R LE strength and reducing R knee pain. \". Total Treatment Duration: 45 minutes  PT Patient Time In/Time Out  Time In: 0315  Time Out: 0400    Kenney Menard PT

## 2017-08-14 ENCOUNTER — HOSPITAL ENCOUNTER (OUTPATIENT)
Dept: PHYSICAL THERAPY | Age: 57
Discharge: HOME | End: 2017-08-14
Payer: COMMERCIAL

## 2017-08-14 PROCEDURE — 97110 THERAPEUTIC EXERCISES: CPT

## 2017-08-14 PROCEDURE — 97140 MANUAL THERAPY 1/> REGIONS: CPT

## 2017-08-14 NOTE — PROGRESS NOTES
[facility]rapy Center at [facility]   [address] [place] [address], [address], [address]  Phone: [phone]   Fax: [phone]  Outpatient PHYSICAL THERAPY: Daily Note 8/14/2017  Fall Risk Score: 1 (? 5 = High Risk)    ICD-10: Treatment Diagnosis: Pain in right knee (M25.561), Difficulty in walking, not elsewhere classified (R26.2)  REFERRING PHYSICIAN: Kiki Sanchez MD MD Orders: Evaluate and treat, water therapy (6/5/17)  Return Physician Appointment: TBD  MEDICAL/REFERRING DIAGNOSIS:   Pain in right knee [M25.561]  Unilateral primary osteoarthritis, right knee [M17.11]  DATE OF ONSET: Symptoms have worsened over the past 4-5 years  PRIOR LEVEL OF FUNCTION: Patient independent with all mobility and activities  PRECAUTIONS/ALLERGIES: None per patient intake form  ASSESSMENT:  ?????? ? ? This section established at most recent assessment??????????  Mr. Jeannine Carlos presents to PT with R knee pain that has significantly improved since his initial visit. Patient reports less discomfort and less limitations now than when he began PT in early June. Patient has been attending both land and aquatic therapy to relieve R knee pain. Patient will benefit from continued physical therapy to progress patient towards discharge with a HEP for continued symptom management. PROBLEM LIST (Impairments causing functional limitations):  1. R knee pain  2. Decreased functional strength R knee/LE   3. Decreased gait skills    GOALS: (Goals have been discussed and agreed upon with patient.)    SHORT-TERM FUNCTIONAL GOALS: Time Frame: 4 weeks  1. Report no more than minimal, intermittent 3/10 pain R knee with basic walking and ADL's, and score greater than 60/80 on the LEFS. Ongoing 7-24-17  2. Independent with initial HEP MET 7-24-17  3. Patient will report being able to drive without having to pull over to stretch R LE due to knee pain. Ongoing 7-24-17, has not been on a car trip long enough   4.  Patient will have 15 degrees active dorsiflexion in B LEs for improved ankle mobility and minimized passive knee strain. MET 7-24-17  DISCHARGE GOALS: Time Frame: 8 weeks   1. No significant pain R knee with all normalized daily home and work activities, and score greater than 70/80 on the LEFS. Ongoing 7-24-17  2. Patient will report no interruptions in sleep secondary to R knee pain and go up/down 6\" step with R LE leading the way on ascent and controlling descent. MET 7-24-17  4. Independent with HEP for advanced knee strengthening. REHABILITATION POTENTIAL FOR STATED GOALS: Randal Walsh OF CARE:  INTERVENTIONS PLANNED: (Benefits and precautions of physical therapy have been discussed with the patient.)  1. Aquatic physical therapy and HEP   2. Thermal and electric modalities, manual therapies for pain. 3. Manual therapies and therapeutic exercises for ROM and strength. 4. Therapeutic exercises for gait and balance. TREATMENT PLAN EFFECTIVE DATES: 7/24/17 TO 9/4/17  FREQUENCY/DURATION: Follow patient 2x per week for 6 weeks to address above goals. SUBJECTIVE:    Present Symptoms: Patient reports no pain upon arrival to PT. History of Present Injury/Illness (Reason for Referral): Patient reports that he has been having R knee pain for approximately 4-5 years, which began as weakness when he attempted to bring R foot into his car he would have to attempt it multiple times. Over the past year, the pain has worsened and the patient states that he experiences sharp and intense pain in R LE. Patient received a cortizone injection a week and a half ago to relieve symptoms, which some success. Patient states that he has less pain now than prior to the injection, but his pain at its worse still approaches 7 to 8 out of 10 post-injection. States that his knee pain limits him from driving longer than 59-90 minutes as he has to pull over and stretch his R leg.  Patient also states that he sits for long periods of time at work and has to move his R leg often to prevent pain. Patient currently has difficulty descending stairs with R LE controlling descent as it feels it may 'give way'. Going up/down curbs is difficult and painful with R LE. Past Medical History: None significant for this case  Current Medications: Per intake form invokana (300 mg), warfarin (3.75 mg), losartan (100 mg)     Date Last Reviewed: 7/24/17  Social History/Home Situation: Pt lives alone in a [place] home. He has  2 steps to enter house through garage. 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   OBJECTIVE:    Outcome Measure: Tool Used: Lower Extremity Functional Scale (LEFS)  Score:  Initial: 48/80 Most Recent: 52/80 (Date: 7/24/17 )   Interpretation of Score: 20 questions each scored on a 5 point scale with 0 representing \"extreme difficulty or unable to perform\" and 4 representing \"no difficulty\". The lower the score, the greater the functional disability. 80/80 represents no disability. Minimal detectable change is 9 points. Observation/Orthostatic Postural Assessment: B genu valgus. Patient ambulates with wide base of support with B pronation, which is addressed by orthotics in B shoes, and reduced B knee ROM during gait cycle. Palpation:   Reduced patellar mobility supeior/inferior bilaterally; mobility more restricted in R knee. ROM:    R knee AROM: -2 to 109    R ankle passive DF: 15 degrees (knee extended)           L knee AROM: -1 to 110   L ankle DF PROM: 12 deg (knee extended)      Strength:    R hip flexion 5/5   R knee extension 5/5   R knee flexion 5/5 (tested in sitting)   R ankle dorsiflexion: 5/5    R ankle plantarflexion: 5/5     L hip flexion 5/5   L knee extension 5/5   L knee flexion 5/5 (tested in sitting)   L ankle dorsiflexion 5/5   L ankle plantarflexion 5/5           Neurological Screen: Sensation to light touch in tact in B LE  Functional Mobility:  Patient demonstrates independence with all functional mobility. TREATMENT:    (In addition to Assessment/Re-Assessment sessions the following treatments were rendered)  Subjective: Pt reports that he feels today can be his last land therapy session secondary to improvements with knee pain and improved function. Patient stated that he will continue with aquatic therapy. Pain pre-treatment: 0-1/10        Pain post-treatment: 0/10  Therapeutic Exercise ( 31 minutes):  Exercises performed for improved R knee strength and stability and reduced discomfort. See grid below for exercises performed:      Date:  6/13/17 Date:  6/20/17 Date:  6/26/17 Date  7-10-17 Date  7-17-17 Date  7-24-17 Date  8-14-17   Activity/Exercise Parameters Parameters Parameters       Quad sets 2 x 15 2 x 15 - - - -    Short arc quads 2 x 15, 4# 2 x 15 5#  2 x 15  5# -  -    Long arc quads 2 x 10, 4# 2 x 15 5# - - 10# 2 x 15 10# 2 x 15 10# 2 x 15   Hamstring curls Blue, 2 x 15; standing 4# 2 x 10 Blue 2 x 15 Standing 3 x 15 2 x 15 Sitting, blue 2 x 15; standing 3# 2 x 15 Sitting, blue 2 x 15; standing 3# 2 x 15 Sitting, blue 2 x 15; standing 2 x 15   Standing hip abduction 2 x 10 B - 3 x 15 2 x 15 3# 2 x 15 3# 2 x 15 2 x 15   Calf raises 2 x 15 2 x 20 2 x 15 2 x 20 3 x 15 2 x 20 -   Step ups  4\", 2 x 10 B - 4# 2 x 10 B 6\" 2 x 15 B 8\" 2 x 15 ea 8\" 2 x 15 ea -   Slantboard calf stretch 5 x 20\" B 5 x 20\" B 3 x 30\" each LE 4 x 20\" each LE 3 x 30\" each  3 x 30\" ea 3 x 30\" each   Sit-to-stands      2 x 10 from mat table w/o use of hands 1 x 15 from mat table w/o use of hands                         Manual therapy ( 8 minutes):Grade III patellofemoral mobilizations performed bilaterally to assess and improve patellar mobility, R knee PROM for improved R knee ROM and reduced stiffness. HEP: Pt provided with HEP for sit-to-stands, hamstring curls, hip abduction, and calf stretch. Patient verbalized and demonstrated understanding.     Aquatic Therapy (0 minutes):  Not performed on 8-    Aquatic Exercise Log       Date  6/14 Date  6/28 Date  7/5/17 Date  7/12 Date     Activity/ Exercise Parameters Parameters Parameters Parameters Parameters   Walking forward 6 6 6 6    Walking backward 6 6 6 6    Walking sideways 6 6 6 6      Marching 6 6 6 6      Goose Step 6  Long step 6 6  6 long step 6  6 long step 6  6 long step      Tip toes  3 3 3      Heels  3 3 3      Lunges        Side step squats        LE Exercises 2# 2# 2# 3#      Hip Flex/Ext 12 12 15 20      Hip Abd/Add 12 12 15 20      Hip IR/ER          Calf raises 12 12 15 20      Knee Flex 12 12 15 20      Squats          Leg Circles 10/10 10/10 15/15 15/15      Step Ups 10 10 15 20    UE Exercises          Squeeze In          Push Down          Pull Down          Bicep/Tricep        Rows/Press outs         Chi Positions          Trunk Rotation        Deep H2O/ Noodles 7' with noodle and wts 7' with noodle and wts 7' with noodle and 2# 7' with noodle  2#      Stabilization          Arms only          Legs only Jog 2 min Jog 2 min Jog 2 min Jog 2 min    Cross   Country 2 min 2 min 2 min 2 min      Scissors 2 min 2 min 2 min 2 min      Knee ext kicks   2 min 2min    Lower abdominal   work           Cardio          Jogging        Lap   Swimming          Stretches          Hamstrings          Heelcords          Piriformis          Neck                                                                                                                Treatment Assessment:  Improved R knee ROM to -4 to 114 degrees. Patient will be discontinued from land therapy at this time secondary to symptom improvments, functional improvement and ability to manage symptoms independently. Patient experienced no increased pain with exercises performed today and demonstrated understanding of HEP.   Progression/Medical Necessity: Compliant  Recommendations/Intent for next treatment session: \"Treatment will continue in aquatic therapy only as this will be his final land therapy session due to improvements\".     Total Treatment Duration: 39 minutes  PT Patient Time In/Time Out  Time In: 1436  Time Out: 6847 JAY Heck PT

## 2017-08-16 ENCOUNTER — HOSPITAL ENCOUNTER (OUTPATIENT)
Dept: PHYSICAL THERAPY | Age: 57
Discharge: HOME | End: 2017-08-16
Payer: COMMERCIAL

## 2017-08-16 PROCEDURE — 97113 AQUATIC THERAPY/EXERCISES: CPT

## 2017-08-16 NOTE — PROGRESS NOTES
[facility]rapy [facility] at [facility]   [address] [place] [address], [address] Lisa Dai, [address]  Phone: [phone]   Fax: [phone]  Outpatient PHYSICAL THERAPY: Daily Note 8/16/2017  Fall Risk Score: 1 (? 5 = High Risk)    ICD-10: Treatment Diagnosis: Pain in right knee (M25.561), Difficulty in walking, not elsewhere classified (R26.2)  REFERRING PHYSICIAN: Kayleigh Wahl MD MD Orders: Evaluate and treat, water therapy (6/5/17)  Return Physician Appointment: TBD  MEDICAL/REFERRING DIAGNOSIS:   Pain in right knee [M25.561]  Unilateral primary osteoarthritis, right knee [M17.11]  DATE OF ONSET: Symptoms have worsened over the past 4-5 years  PRIOR LEVEL OF FUNCTION: Patient independent with all mobility and activities  PRECAUTIONS/ALLERGIES: None per patient intake form  ASSESSMENT:  ?????? ? ? This section established at most recent assessment??????????  Mr. Sandrea Cowden presents to PT with R knee pain that has significantly improved since his initial visit. Patient reports less discomfort and less limitations now than when he began PT in early June. Patient has been attending both land and aquatic therapy to relieve R knee pain. Patient will benefit from continued physical therapy to progress patient towards discharge with a HEP for continued symptom management. PROBLEM LIST (Impairments causing functional limitations):  1. R knee pain  2. Decreased functional strength R knee/LE   3. Decreased gait skills    GOALS: (Goals have been discussed and agreed upon with patient.)    SHORT-TERM FUNCTIONAL GOALS: Time Frame: 4 weeks  1. Report no more than minimal, intermittent 3/10 pain R knee with basic walking and ADL's, and score greater than 60/80 on the LEFS. Ongoing 7-24-17  2. Independent with initial HEP MET 7-24-17  3. Patient will report being able to drive without having to pull over to stretch R LE due to knee pain. Ongoing 7-24-17, has not been on a car trip long enough   4.  Patient will have 15 degrees active dorsiflexion in B LEs for improved ankle mobility and minimized passive knee strain. MET 7-24-17  DISCHARGE GOALS: Time Frame: 8 weeks   1. No significant pain R knee with all normalized daily home and work activities, and score greater than 70/80 on the LEFS. Ongoing 7-24-17  2. Patient will report no interruptions in sleep secondary to R knee pain and go up/down 6\" step with R LE leading the way on ascent and controlling descent. MET 7-24-17  4. Independent with HEP for advanced knee strengthening. REHABILITATION POTENTIAL FOR STATED GOALS: Aster Au OF CARE:  INTERVENTIONS PLANNED: (Benefits and precautions of physical therapy have been discussed with the patient.)  1. Aquatic physical therapy and HEP   2. Thermal and electric modalities, manual therapies for pain. 3. Manual therapies and therapeutic exercises for ROM and strength. 4. Therapeutic exercises for gait and balance. TREATMENT PLAN EFFECTIVE DATES: 7/24/17 TO 9/4/17  FREQUENCY/DURATION: Follow patient 2x per week for 6 weeks to address above goals. SUBJECTIVE:    Present Symptoms: Patient reports no pain upon arrival to PT. History of Present Injury/Illness (Reason for Referral): Patient reports that he has been having R knee pain for approximately 4-5 years, which began as weakness when he attempted to bring R foot into his car he would have to attempt it multiple times. Over the past year, the pain has worsened and the patient states that he experiences sharp and intense pain in R LE. Patient received a cortizone injection a week and a half ago to relieve symptoms, which some success. Patient states that he has less pain now than prior to the injection, but his pain at its worse still approaches 7 to 8 out of 10 post-injection. States that his knee pain limits him from driving longer than 18-25 minutes as he has to pull over and stretch his R leg.  Patient also states that he sits for long periods of time at work and has to move his R leg often to prevent pain. Patient currently has difficulty descending stairs with R LE controlling descent as it feels it may 'give way'. Going up/down curbs is difficult and painful with R LE. Past Medical History: None significant for this case  Current Medications: Per intake form invokana (300 mg), warfarin (3.75 mg), losartan (100 mg)     Date Last Reviewed: 7/24/17  Social History/Home Situation: Pt lives alone in a [place] home. He has  2 steps to enter house through garage. 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   OBJECTIVE:    Outcome Measure: Tool Used: Lower Extremity Functional Scale (LEFS)  Score:  Initial: 48/80 Most Recent: 52/80 (Date: 7/24/17 )   Interpretation of Score: 20 questions each scored on a 5 point scale with 0 representing \"extreme difficulty or unable to perform\" and 4 representing \"no difficulty\". The lower the score, the greater the functional disability. 80/80 represents no disability. Minimal detectable change is 9 points. Observation/Orthostatic Postural Assessment: B genu valgus. Patient ambulates with wide base of support with B pronation, which is addressed by orthotics in B shoes, and reduced B knee ROM during gait cycle. Palpation:   Reduced patellar mobility supeior/inferior bilaterally; mobility more restricted in R knee. ROM:    R knee AROM: -2 to 109    R ankle passive DF: 15 degrees (knee extended)           L knee AROM: -1 to 110   L ankle DF PROM: 12 deg (knee extended)      Strength:    R hip flexion 5/5   R knee extension 5/5   R knee flexion 5/5 (tested in sitting)   R ankle dorsiflexion: 5/5    R ankle plantarflexion: 5/5     L hip flexion 5/5   L knee extension 5/5   L knee flexion 5/5 (tested in sitting)   L ankle dorsiflexion 5/5   L ankle plantarflexion 5/5           Neurological Screen: Sensation to light touch in tact in B LE  Functional Mobility:  Patient demonstrates independence with all functional mobility. TREATMENT:    (In addition to Assessment/Re-Assessment sessions the following treatments were rendered)  Subjective: Pt reports that he feels today can be his last land therapy session secondary to improvements with knee pain and improved function. Patient stated that he will continue with aquatic therapy. Pain pre-treatment: 0-1/10        Pain post-treatment: 0/10      Aquatic Therapy (45 minutes):       Aquatic Exercise Log       Date  6/14 Date  6/28 Date  7/5/17 Date  7/12 Date  8/16/17   Activity/ Exercise Parameters Parameters Parameters Parameters Parameters   Walking forward 6 6 6 6 6   Walking backward 6 6 6 6 6   Walking sideways 6 6 6 6 6     Marching 6 6 6 6 6     Goose Step 6  Long step 6 6  6 long step 6  6 long step 6  6 long step 6  6 long step     Tip toes  3 3 3 3     Heels  3 3 3 3     Lunges        Side step squats        LE Exercises 2# 2# 2# 3# 5#     Hip Flex/Ext 12 12 15 20 15     Hip Abd/Add 12 12 15 20 15     Hip IR/ER          Calf raises 12 12 15 20 15     Knee Flex 12 12 15 20 15     Squats          Leg Circles 10/10 10/10 15/15 15/15 15/15     Step Ups 10 10 15 20 20   UE Exercises          Squeeze In          Push Down          Pull Down          Bicep/Tricep        Rows/Press outs         Chi Positions          Trunk Rotation        Deep H2O/ Noodles 7' with noodle and wts 7' with noodle and wts 7' with noodle and 2# 7' with noodle  2# 7' with noodle 5#     Stabilization          Arms only          Legs only Jog 2 min Jog 2 min Jog 2 min Jog 2 min Jog 2 min   Cross   Country 2 min 2 min 2 min 2 min 2 min     Scissors 2 min 2 min 2 min 2 min 2 min     Knee ext kicks   2 min 2min 2 min   Lower abdominal   work           Cardio          Jogging        Lap   Swimming          Stretches          Hamstrings          Heelcords          Piriformis          Neck                                                                                                                Treatment Assessment: Pt doing well. He states he has not had any pain to speak of over the past week. He plans to attend 1 additional aquatic session for review of HEP. Progression/Medical Necessity: Compliant  Recommendations/Intent for next treatment session: \"Treatment will continue in aquatic therapy only as this will be his final land therapy session due to improvements\".     Total Treatment Duration: 45 minutes       Carmine Lew, PT

## 2017-08-17 ENCOUNTER — APPOINTMENT (OUTPATIENT)
Dept: PHYSICAL THERAPY | Age: 57
End: 2017-08-17
Payer: COMMERCIAL

## 2017-08-21 ENCOUNTER — APPOINTMENT (OUTPATIENT)
Dept: PHYSICAL THERAPY | Age: 57
End: 2017-08-21
Payer: COMMERCIAL

## 2017-08-23 ENCOUNTER — APPOINTMENT (OUTPATIENT)
Dept: PHYSICAL THERAPY | Age: 57
End: 2017-08-23
Payer: COMMERCIAL

## 2017-08-28 ENCOUNTER — APPOINTMENT (OUTPATIENT)
Dept: PHYSICAL THERAPY | Age: 57
End: 2017-08-28
Payer: COMMERCIAL

## 2017-08-30 ENCOUNTER — APPOINTMENT (OUTPATIENT)
Dept: PHYSICAL THERAPY | Age: 57
End: 2017-08-30
Payer: COMMERCIAL

## 2017-10-04 ENCOUNTER — HOSPITAL ENCOUNTER (OUTPATIENT)
Dept: PHYSICAL THERAPY | Age: 57
Discharge: HOME | End: 2017-10-04
Payer: COMMERCIAL

## 2017-10-04 PROCEDURE — 97110 THERAPEUTIC EXERCISES: CPT

## 2017-10-04 PROCEDURE — 97161 PT EVAL LOW COMPLEX 20 MIN: CPT

## 2017-10-04 NOTE — PROGRESS NOTES
Ambulatory/Rehab Services H2 Model Falls Risk Assessment    Risk Factor Pts. ·   Confusion/Disorientation/Impulsivity  []    4 ·   Symptomatic Depression  []   2 ·   Altered Elimination  []   1 ·   Dizziness/Vertigo  []   1 ·   Gender (Male)  [x]   1 ·   Any administered antiepileptics (anticonvulsants):  []   2 ·   Any administered benzodiazepines:  []   1 ·   Visual Impairment (specify):  []   1 ·   Portable Oxygen Use  []   1 ·   Orthostatic ? BP  []   1 ·   History of Recent Falls (within 3 mos.)  [x]   5     Ability to Rise from Chair (choose one) Pts. ·   Ability to rise in a single movement  [x]   0 ·   Pushes up, successful in one attempt  []   1 ·   Multiple attempts, but successful  []   3 ·   Unable to rise without assistance  []   4   Total: (5 or greater = High Risk) 6     Falls Prevention Plan:   []                Physical Limitations to Exercise (specify):   []                Mobility Assistance Device (type):   []                Exercise/Equipment Adaptation (specify):    ©2010 [facility] [address] Patent #[id number].  Federal Law prohibits the replication, distribution or use without written permission from [facility]

## 2017-10-04 NOTE — PROGRESS NOTES
Franck Herman  : 1960  Payor: [address] [place] [address] Road / Plan: [place] / Product Type: Workers Comp /  [address]  at [facility]  [address] [place] [address], 4 Lazara Armstrong.  [phone]   [phone]         OUTPATIENT PHYSICAL THERAPY:Initial Assessment and Daily Note 10/4/2017    ICD-10: Treatment Diagnosis: Pain in right shoulder (M25.511), Stiffness of right shoulder, not elsewhere classified (M25.611), Unspecified rotator cuff tear or rupture of right shoulder, not specified as traumatic (M75.101)  Precautions/Allergies:   Contrave [naltrexone-bupropion]; Lisinopril; and Other medication   Fall Risk Score: 6 (? 5 = High Risk)  MD Orders: Evaluate and treat MEDICAL/REFERRING DIAGNOSIS:  right shoulder pain   DATE OF ONSET: 2017  REFERRING PHYSICIAN: Geronimo Bowen MD  RETURN PHYSICIAN APPOINTMENT: TBD     INITIAL ASSESSMENT:  Mr. Samantha Pappas presents with R shoulder pain  which has persisted for 3 months following a fall. Patient exhibits reduced R shoulder ROM, increased R shoulder pain, and decreased use of R upper extremity secondary to pain and stiffness. Patient may benefit from PT intervention to improve function and use of R shoulder to limit pain and increase functional use of dominant hand. PROBLEM LIST (Impacting functional limitations):  1. Decreased Strength  2. Increased Pain  3. Decreased Activity Tolerance  4. Decreased Flexibility/Joint Mobility INTERVENTIONS PLANNED:  1. Home Exercise Program (HEP)  2. Range of Motion (ROM)  3. Therapeutic Exercise/Strengthening  4. Ultrasound (US)   TREATMENT PLAN:  Effective Dates: 10-4-17 TO 17. Frequency/Duration: 2-3 visits per week for 12 weeks  GOALS: (Goals have been discussed and agreed upon with patient.)  Short-Term Functional Goals: Time Frame: 6 weeks  1.  Patient will report 2-3/10 R shoulder pain at worst with use of R arm.  2. Patient will be able to exhibit 5/5 R and abduction and 4+/5 R shoulder ER strength   3. Patient will have 170 degrees of passive flexion and abduction without R shoulder pain. 4. Patient will be independent with initial HEP  Discharge Goals: Time Frame: 12 weeks  1. Patient will report 0-1/10 intermittent R shoulder pain with activity and score less than 15% on DASH  2. Patient will report no limitations with overhead activities and self care secondary to R shoulder/UE pain. 3. Patient will demonstrate 5/5 R lower and middle trapezius strength and demonstrate 150 degrees of active R forward elevation without increased pain. 4. Patient will be independent with final [facility]  Rehabilitation Potential For Stated Goals: Bal[place]ola Amena Dennison's therapy, I certify that the treatment plan above will be carried out by a therapist or under their direction. Thank you for this referral,      Felton Clay PT       Referring Physician Signature:     Vinay Monroe MD              Date                    The information in this section was collected on 10-4-17 (except where otherwise noted). HISTORY:   History of Present Injury/Illness (Reason for Referral):  Patient fell in June 2017 and reached forward with R arm to catch himself and felt shoulder pain and weakness. Afterward he had significant shoulder pain that limited his sleeping and use of R hand. Patient states that he had an MRI which revealed a rotator cuff tear, but that he is hoping to avoid surgery via physical therapy intervention. He states that his pain and range of motion have improved since the first few weeks following injury but is still quite limited compared to the left. Patient has history of bulging cervical discs for which he has received physical therapy intervention for approx 15-20 years ago. Pt denies increased numbness since injury. Past Medical History/Comorbidities:   Mr. Chuckie Mcfarlane  has a past medical history of Abnormal glucose; Depression; Gout; History of narcotic addiction ([address]  Knee pain, right; Morbid obesity ([address] Pulmonary nodule, right; and Visual disturbance. Mr. Kristen Yung  has a past surgical history that includes tonsillectomy; colonoscopy (10/20/2015); and refractive surgery. Social History/Living Environment:    Patient lives alone in a [place] with 2 steps to enter house through garage. Prior Level of Function/Work/Activity:  Patient works in [employer]  Side:         RIGHT    Current Medications:       Current Outpatient Prescriptions:     diclofenac (FLECTOR) 1.3 % pt12, 1 Patch by TransDERmal route every twelve (12) hours every twelve (12) hours. Indications: SPRAINS AND STRAINS, Disp: 60 Patch, Rfl: 0    glucose blood VI test strips (FREESTYLE LITE STRIPS) strip, Test daily, Disp: 90 Strip, Rfl: 4    losartan (COZAAR) 100 mg tablet, Take 1 Tab by mouth daily. , Disp: 90 Tab, Rfl: 4    canagliflozin (INVOKANA) 300 mg tablet, Take 1 Tab by mouth Daily (before breakfast). , Disp: 30 Tab, Rfl: 12    warfarin (COUMADIN) 7.5 mg tablet, TAKE 1 TABLET DAILY, Disp: 90 Tab, Rfl: 2   Date Last Reviewed:  10-4-17   Number of Personal Factors/Comorbidities that affect the Plan of Care: 1-2: MODERATE COMPLEXITY   EXAMINATION:   Observation/Orthostatic Postural Assessment:          Patient demonstrates increased upper trapezius activation when elevating R upper extremity in flexion and abduction. Patient has rounded shoulders and kyphotic posture. Palpation:          Patient has increased tightness to anterior shoulder along anterior and medial deltoid and biceps.  Patient has reduced articular R glenohumeral motion with posterior and inferior glenohumeral glides  ROM:          R shoulder PROM (supine):           Flex  162 degrees,  degrees, ER 56 degrees (@ 45 degrees ABD), IR 76 degrees (@ 45 deg ABD)        R shoulder AROM (seated):           Flex 140 degrees,  degrees, ER to back of head, IR to L5-S1       L shoulder AROM (seated)           Flex 172 degrees,  ABD, ER T2, IR T8  Strength:          R shoulder: flexion 5/5, ABD 4+/5, ER (prone) 4/5, IR (prone) 5/5; middle trapezius 4+/5, lower trapezius 4/5        L shoulder: 5/5 for all movements         Special Tests:          Positive empty can/full can on R (negative on L); reynoso-bowen and Neer's negative on R, negative belly press B     Body Structures Involved:  1. Nerves  2. Bones  3. Joints  4. Muscles Body Functions Affected:  1. Sensory/Pain  2. Neuromusculoskeletal  3. Movement Related Activities and Participation Affected:  1. Self Care  2. Domestic Life  3. Interpersonal Interactions and Relationships  4. Community, Social and [place]   Number of elements (examined above) that affect the Plan of Care: 4+: HIGH COMPLEXITY   CLINICAL PRESENTATION:   Presentation: Stable and uncomplicated: LOW COMPLEXITY   CLINICAL DECISION MAKING:   Outcome Measure: Tool Used: Disabilities of the Arm, Shoulder and Hand (DASH) Questionnaire - Quick Version  Score:  Initial: TBD (next visit) /55  Most Recent: X/55 (Date: -- )   Interpretation of Score: The DASH is designed to measure the activities of daily living in person's with upper extremity dysfunction or pain. Each section is scored on a 1-5 scale, 5 representing the greatest disability. The scores of each section are added together for a total score of 55. Medical Necessity:   · Skilled intervention continues to be required due to reduced R shoulder ROM, reduced R shoulder strength and increased R shoulder pain which limit use of R UE. Reason for Services/Other Comments:  · Patient continues to require skilled intervention due to patient's limitations with functional activities with R UE.    Use of outcome tool(s) and clinical judgement create a POC that gives a: Clear prediction of patient's progress: LOW COMPLEXITY            TREATMENT:   (In addition to Assessment/Re-Assessment sessions the following treatments were rendered)  Pre-treatment Symptoms/Complaints: Patient reports that he has found ways to [employer]" his R shoulder but that he must be conscious of movements with it at all times. Has noticed that he uses shoulder elevation to raise R arm and has to work to keep from bringing his shoulder toward his ear. Pain: Initial:     1-2/10 Post Session: No increase in pain noted   THERAPEUTIC EXERCISE: (10 minutes):  Exercises per grid below to improve mobility and strength. Required moderate verbal, manual and tactile cues to promote proper body posture and promote proper body mechanics. Progressed complexity of movement as indicated. Date  10-4-17 Date:   Date:     Activity/Exercise Parameters Parameters Parameters   Scapular retractions X 10     Wand flexion X 10     Wand ABD X 10     Wand ER X 10                         HEP: Pt performed exercises listed in grid above and verbalized understanding. Patient     Treatment/Session Assessment:    · Response to Treatment:  Pt's ROM limited by pain. Patient had better-than-anticipated active ROM and strength but has obvious movement deficits and aberrant movement patterns to achieve elevation of R UE.  · Compliance with Program/Exercises: Will assess as treatment progresses. · Recommendations/Intent for next treatment session: \"Next visit will focus on reducing pain, improving R shoulder ROM and strengthening scapular strengthening. \".   Total Treatment Duration: 40 minutes  PT Patient Time In/Time Out  Time In: 1440  Time Out: [address], PT

## 2017-10-16 ENCOUNTER — HOSPITAL ENCOUNTER (OUTPATIENT)
Dept: PHYSICAL THERAPY | Age: 57
Discharge: HOME | End: 2017-10-16
Payer: COMMERCIAL

## 2017-10-16 PROCEDURE — 97110 THERAPEUTIC EXERCISES: CPT

## 2017-10-16 PROCEDURE — 97140 MANUAL THERAPY 1/> REGIONS: CPT

## 2017-10-16 NOTE — PROGRESS NOTES
Denisa Lacy  : 1960  Payor: [address] [place] [address] Road / Plan: Vasile Conroy / Product Type: Workers Comp /  [address]  [facility]  [address] [place] [address], 4 [facility], [address] [place] [address]  [phone]   [phone]         OUTPATIENT PHYSICAL [address] Note 10/16/2017    ICD-10: Treatment Diagnosis: Pain in right shoulder (M25.511), Stiffness of right shoulder, not elsewhere classified (M25.611), Unspecified rotator cuff tear or rupture of right shoulder, not specified as traumatic (M75.101)  Precautions/Allergies:   Contrave [naltrexone-bupropion]; Lisinopril; and Other medication   Fall Risk Score: 6 (? 5 = High Risk)  MD Orders: Evaluate and treat MEDICAL/REFERRING DIAGNOSIS:  right shoulder pain   DATE OF ONSET: 2017  REFERRING PHYSICIAN: Darion Edmonds MD  RETURN PHYSICIAN APPOINTMENT: TBD     INITIAL ASSESSMENT:  Mr. Candy Amin presents with R shoulder pain  which has persisted for 3 months following a fall. Patient exhibits reduced R shoulder ROM, increased R shoulder pain, and decreased use of R upper extremity secondary to pain and stiffness. Patient may benefit from PT intervention to improve function and use of R shoulder to limit pain and increase functional use of dominant hand. PROBLEM LIST (Impacting functional limitations):  1. Decreased Strength  2. Increased Pain  3. Decreased Activity Tolerance  4. Decreased Flexibility/Joint Mobility INTERVENTIONS PLANNED:  1. Home Exercise Program (HEP)  2. Range of Motion (ROM)  3. Therapeutic Exercise/Strengthening  4. Ultrasound (US)   TREATMENT PLAN:  Effective Dates: 10-4-17 TO 17. Frequency/Duration: 2-3 visits per week for 12 weeks  GOALS: (Goals have been discussed and agreed upon with patient.)  Short-Term Functional Goals: Time Frame: 6 weeks  1. Patient will report 2-3/10 R shoulder pain at worst with use of R arm.  2. Patient will be able to exhibit 5/5 R and abduction and 4+/5 R shoulder ER strength   3.  Patient will have 170 degrees of passive flexion and abduction without R shoulder pain. 4. Patient will be independent with initial HEP  Discharge Goals: Time Frame: 12 weeks  1. Patient will report 0-1/10 intermittent R shoulder pain with activity and score less than 15% on DASH  2. Patient will report no limitations with overhead activities and self care secondary to R shoulder/UE pain. 3. Patient will demonstrate 5/5 R lower and middle trapezius strength and demonstrate 150 degrees of active R forward elevation without increased pain. 4. Patient will be independent with final HEP                The information in this section was collected on 10-4-17 (except where otherwise noted). HISTORY:   History of Present Injury/Illness (Reason for Referral):  Patient fell in June 2017 and reached forward with R arm to catch himself and felt shoulder pain and weakness. Afterward he had significant shoulder pain that limited his sleeping and use of R hand. Patient states that he had an MRI which revealed a rotator cuff tear, but that he is hoping to avoid surgery via physical therapy intervention. He states that his pain and range of motion have improved since the first few weeks following injury but is still quite limited compared to the left. Patient has history of bulging cervical discs for which he has received physical therapy intervention for approx 15-20 years ago. Pt denies increased numbness since injury. Past Medical History/Comorbidities:   Mr. Samantha Pappas  has a past medical history of Abnormal glucose; Depression; Gout; History of narcotic addiction ([address] Knee pain, right; Morbid obesity ([address] Pulmonary nodule, right; and Visual disturbance. Mr. Samantha Pappas  has a past surgical history that includes tonsillectomy; colonoscopy (10/20/2015); and refractive surgery. Social History/Living Environment:    Patient lives alone in a [place] with 2 steps to enter house through garage.   Prior Level of Function/Work/Activity:  Patient works in [employer]  Side:         RIGHT    Current Medications:       Current Outpatient Prescriptions:     diclofenac (FLECTOR) 1.3 % pt12, 1 Patch by TransDERmal route every twelve (12) hours every twelve (12) hours. Indications: SPRAINS AND STRAINS, Disp: 60 Patch, Rfl: 0    glucose blood VI test strips (FREESTYLE LITE STRIPS) strip, Test daily, Disp: 90 Strip, Rfl: 4    losartan (COZAAR) 100 mg tablet, Take 1 Tab by mouth daily. , Disp: 90 Tab, Rfl: 4    canagliflozin (INVOKANA) 300 mg tablet, Take 1 Tab by mouth Daily (before breakfast). , Disp: 30 Tab, Rfl: 12    warfarin (COUMADIN) 7.5 mg tablet, TAKE 1 TABLET DAILY, Disp: 90 Tab, Rfl: 2   Date Last Reviewed:  10-4-17   Number of Personal Factors/Comorbidities that affect the Plan of Care: 1-2: MODERATE COMPLEXITY   EXAMINATION:   Observation/Orthostatic Postural Assessment:          Patient demonstrates increased upper trapezius activation when elevating R upper extremity in flexion and abduction. Patient has rounded shoulders and kyphotic posture. Palpation:          Patient has increased tightness to anterior shoulder along anterior and medial deltoid and biceps.  Patient has reduced articular R glenohumeral motion with posterior and inferior glenohumeral glides  ROM:          R shoulder PROM (supine):           Flex  162 degrees,  degrees, ER 56 degrees (@ 45 degrees ABD), IR 76 degrees (@ 45 deg ABD)        R shoulder AROM (seated):           Flex 140 degrees,  degrees, ER to back of head, IR to L5-S1       L shoulder AROM (seated)           Flex 172 degrees,  ABD, ER T2, IR T8  Strength:          R shoulder: flexion 5/5, ABD 4+/5, ER (prone) 4/5, IR (prone) 5/5; middle trapezius 4+/5, lower trapezius 4/5        L shoulder: 5/5 for all movements         Special Tests:          Positive empty can/full can on R (negative on L); reynoso-bowen and Neer's negative on R, negative belly press B Body Structures Involved:  1. Nerves  2. Bones  3. Joints  4. Muscles Body Functions Affected:  1. Sensory/Pain  2. Neuromusculoskeletal  3. Movement Related Activities and Participation Affected:  1. Self Care  2. Domestic Life  3. Interpersonal Interactions and Relationships  4. Community, Social and [place]   Number of elements (examined above) that affect the Plan of Care: 4+: HIGH COMPLEXITY   CLINICAL PRESENTATION:   Presentation: Stable and uncomplicated: LOW COMPLEXITY   CLINICAL DECISION MAKING:   Outcome Measure: Tool Used: Disabilities of the Arm, Shoulder and Hand (DASH) Questionnaire - Quick Version  Score:  Initial: TBD (next visit) /55  Most Recent: X/55 (Date: -- )   Interpretation of Score: The DASH is designed to measure the activities of daily living in person's with upper extremity dysfunction or pain. Each section is scored on a 1-5 scale, 5 representing the greatest disability. The scores of each section are added together for a total score of 55. Medical Necessity:   · Skilled intervention continues to be required due to reduced R shoulder ROM, reduced R shoulder strength and increased R shoulder pain which limit use of R UE. Reason for Services/Other Comments:  · Patient continues to require skilled intervention due to patient's limitations with functional activities with R UE. Use of outcome tool(s) and clinical judgement create a POC that gives a: Clear prediction of patient's progress: LOW COMPLEXITY            TREATMENT:   (In addition to Assessment/Re-Assessment sessions the following treatments were rendered)  Pre-treatment Symptoms/Complaints:  Patient reports that he was able to perform HEP and feels that his arm is moving better after doing them. Pain: Initial:     0/10 Post Session: No increase in pain noted after PT   THERAPEUTIC EXERCISE: (17 minutes):  Exercises per grid below to improve mobility and strength.   Required moderate verbal, manual and tactile cues to promote proper body posture and promote proper body mechanics. Progressed complexity of movement as indicated. Manual therapy (18 minutes): R shoulder PROM (flex, ABD, and ER) and joint mobilizations (grade III-IV) for improved R shoulder ROM and reduced discomfort. Date  10-4-17 Date:  10-16-17 Date:     Activity/Exercise Parameters Parameters Parameters   Scapular retractions X 10 Black 2 x 20 (elbows flexed and ext)    Wand flexion X 10     Wand ABD X 10     Wand ER X 10     Pulleys  ABD 2 x 20    Side-lying ER  3# 3 x 10    Dynamic stabilization  Supine with R elbow extended and with elbow bent                              HEP: Pt provided with pulleys for home use to improve R shoulder ROM    Treatment/Session Assessment:    · Response to Treatment:  Patient making good progress with R shoulder ROM, but remains limited and stiff into ER and will need continued focus on improving ER ROM. Patient demonstrates good scapular strength and active flexion and abduction ROM. · Compliance with Program/Exercises: Compliant. · Recommendations/Intent for next treatment session: \"Next visit will focus on reducing pain, improving R shoulder ROM and strengthening scapular strengthening. \".   Total Treatment Duration: 35 minutes  PT Patient Time In/Time Out  Time In: 1515  Time Out: [address], PT

## 2017-10-18 ENCOUNTER — HOSPITAL ENCOUNTER (OUTPATIENT)
Dept: PHYSICAL THERAPY | Age: 57
Discharge: HOME | End: 2017-10-18
Payer: COMMERCIAL

## 2017-10-18 PROCEDURE — 97140 MANUAL THERAPY 1/> REGIONS: CPT

## 2017-10-18 PROCEDURE — 97110 THERAPEUTIC EXERCISES: CPT

## 2017-10-18 NOTE — PROGRESS NOTES
Orquidea Carlisle  : 1960  Payor: [address] [place] [address] Road / Plan: Gi [employer] / Product Type: Workers Comp /  [address]  at [address] [place] [address] Rd  [facility], [address] [place], [place] [address]  [phone]   [phone]         OUTPATIENT PHYSICAL [address] Note 10/18/2017    ICD-10: Treatment Diagnosis: Pain in right shoulder (M25.511), Stiffness of right shoulder, not elsewhere classified (M25.611), Unspecified rotator cuff tear or rupture of right shoulder, not specified as traumatic (M75.101)  Precautions/Allergies:   Contrave [naltrexone-bupropion]; Lisinopril; and Other medication   Fall Risk Score: 6 (? 5 = High Risk)  MD Orders: Evaluate and treat MEDICAL/REFERRING DIAGNOSIS:  right shoulder pain   DATE OF ONSET: 2017  REFERRING PHYSICIAN: Lynsey Hernandez MD  RETURN PHYSICIAN APPOINTMENT: TBD     INITIAL ASSESSMENT:  Mr. Kalie Shah presents with R shoulder pain  which has persisted for 3 months following a fall. Patient exhibits reduced R shoulder ROM, increased R shoulder pain, and decreased use of R upper extremity secondary to pain and stiffness. Patient may benefit from PT intervention to improve function and use of R shoulder to limit pain and increase functional use of dominant hand. PROBLEM LIST (Impacting functional limitations):  1. Decreased Strength  2. Increased Pain  3. Decreased Activity Tolerance  4. Decreased Flexibility/Joint Mobility INTERVENTIONS PLANNED:  1. Home Exercise Program (HEP)  2. Range of Motion (ROM)  3. Therapeutic Exercise/Strengthening  4. Ultrasound (US)   TREATMENT PLAN:  Effective Dates: 10-4-17 TO 17. Frequency/Duration: 2-3 visits per week for 12 weeks  GOALS: (Goals have been discussed and agreed upon with patient.)  Short-Term Functional Goals: Time Frame: 6 weeks  1. Patient will report 2-3/10 R shoulder pain at worst with use of R arm.  2. Patient will be able to exhibit 5/5 R and abduction and 4+/5 R shoulder ER strength   3.  Patient will have 170 degrees of passive flexion and abduction without R shoulder pain. 4. Patient will be independent with initial HEP  Discharge Goals: Time Frame: 12 weeks  1. Patient will report 0-1/10 intermittent R shoulder pain with activity and score less than 15% on DASH  2. Patient will report no limitations with overhead activities and self care secondary to R shoulder/UE pain. 3. Patient will demonstrate 5/5 R lower and middle trapezius strength and demonstrate 150 degrees of active R forward elevation without increased pain. 4. Patient will be independent with final HEP                The information in this section was collected on 10-4-17 (except where otherwise noted). HISTORY:   History of Present Injury/Illness (Reason for Referral):  Patient fell in June 2017 and reached forward with R arm to catch himself and felt shoulder pain and weakness. Afterward he had significant shoulder pain that limited his sleeping and use of R hand. Patient states that he had an MRI which revealed a rotator cuff tear, but that he is hoping to avoid surgery via physical therapy intervention. He states that his pain and range of motion have improved since the first few weeks following injury but is still quite limited compared to the left. Patient has history of bulging cervical discs for which he has received physical therapy intervention for approx 15-20 years ago. Pt denies increased numbness since injury. Past Medical History/Comorbidities:   Mr. Trey Castro  has a past medical history of Abnormal glucose; Depression; Gout; History of narcotic addiction ([address] Knee pain, right; Morbid obesity ([address] Pulmonary nodule, right; and Visual disturbance. Mr. Trey Castro  has a past surgical history that includes tonsillectomy; colonoscopy (10/20/2015); and refractive surgery. Social History/Living Environment:    Patient lives alone in a [place] with 2 steps to enter house through garage.   Prior Level of Function/Work/Activity:  Patient works in [employer]  Side:         RIGHT    Current Medications:       Current Outpatient Prescriptions:     diclofenac (FLECTOR) 1.3 % pt12, 1 Patch by TransDERmal route every twelve (12) hours every twelve (12) hours. Indications: SPRAINS AND STRAINS, Disp: 60 Patch, Rfl: 0    glucose blood VI test strips (FREESTYLE LITE STRIPS) strip, Test daily, Disp: 90 Strip, Rfl: 4    losartan (COZAAR) 100 mg tablet, Take 1 Tab by mouth daily. , Disp: 90 Tab, Rfl: 4    canagliflozin (INVOKANA) 300 mg tablet, Take 1 Tab by mouth Daily (before breakfast). , Disp: 30 Tab, Rfl: 12    warfarin (COUMADIN) 7.5 mg tablet, TAKE 1 TABLET DAILY, Disp: 90 Tab, Rfl: 2   Date Last Reviewed:  10-4-17   Number of Personal Factors/Comorbidities that affect the Plan of Care: 1-2: MODERATE COMPLEXITY   EXAMINATION:   Observation/Orthostatic Postural Assessment:          Patient demonstrates increased upper trapezius activation when elevating R upper extremity in flexion and abduction. Patient has rounded shoulders and kyphotic posture. Palpation:          Patient has increased tightness to anterior shoulder along anterior and medial deltoid and biceps.  Patient has reduced articular R glenohumeral motion with posterior and inferior glenohumeral glides  ROM:          R shoulder PROM (supine):           Flex  162 degrees,  degrees, ER 56 degrees (@ 45 degrees ABD), IR 76 degrees (@ 45 deg ABD)        R shoulder AROM (seated):           Flex 140 degrees,  degrees, ER to back of head, IR to L5-S1       L shoulder AROM (seated)           Flex 172 degrees,  ABD, ER T2, IR T8  Strength:          R shoulder: flexion 5/5, ABD 4+/5, ER (prone) 4/5, IR (prone) 5/5; middle trapezius 4+/5, lower trapezius 4/5        L shoulder: 5/5 for all movements         Special Tests:          Positive empty can/full can on R (negative on L); reynoso-bowen and Neer's negative on R, negative belly press B Body Structures Involved:  1. Nerves  2. Bones  3. Joints  4. Muscles Body Functions Affected:  1. Sensory/Pain  2. Neuromusculoskeletal  3. Movement Related Activities and Participation Affected:  1. Self Care  2. Domestic Life  3. Interpersonal Interactions and Relationships  4. Community, Social and [place]   Number of elements (examined above) that affect the Plan of Care: 4+: HIGH COMPLEXITY   CLINICAL PRESENTATION:   Presentation: Stable and uncomplicated: LOW COMPLEXITY   CLINICAL DECISION MAKING:   Outcome Measure: Tool Used: Disabilities of the Arm, Shoulder and Hand (DASH) Questionnaire - Quick Version  Score:  Initial: TBD (next visit) /55  Most Recent: X/55 (Date: -- )   Interpretation of Score: The DASH is designed to measure the activities of daily living in person's with upper extremity dysfunction or pain. Each section is scored on a 1-5 scale, 5 representing the greatest disability. The scores of each section are added together for a total score of 55. Medical Necessity:   · Skilled intervention continues to be required due to reduced R shoulder ROM, reduced R shoulder strength and increased R shoulder pain which limit use of R UE. Reason for Services/Other Comments:  · Patient continues to require skilled intervention due to patient's limitations with functional activities with R UE. Use of outcome tool(s) and clinical judgement create a POC that gives a: Clear prediction of patient's progress: LOW COMPLEXITY            TREATMENT:   (In addition to Assessment/Re-Assessment sessions the following treatments were rendered)  Pre-treatment Symptoms/Complaints:  Patient reports that he has no discomfort in his R arm upon arrival to PT and was not sore after his last treatment. Pain: Initial:     0/10 Post Session: No increase in pain noted after PT   THERAPEUTIC EXERCISE: (28 minutes):  Exercises per grid below to improve mobility and strength.   Required moderate verbal, manual and tactile cues to promote proper body posture and promote proper body mechanics. Progressed complexity of movement as indicated. Manual therapy (15 minutes): R shoulder PROM (flex, ABD, and ER) and joint mobilizations (grade III-IV) for improved R shoulder ROM and reduced discomfort. Date  10-4-17 Date:  10-16-17 Date:  10-18-17   Activity/Exercise Parameters Parameters Parameters   Scapular retractions X 10 Black 2 x 20 (elbows flexed and ext) 10# 1 x 10, 13# 2 x 15    Wand flexion X 10     Wand ABD X 10     Wand ER X 10     Pulleys  ABD 2 x 20    Side-lying ER  3# 3 x 10 3# 3 x 10   Dynamic stabilization  Supine with R elbow extended and with elbow bent Supine with R elbow ext and elbow flexed; x 3 each   D2 flexion   2 x 12 - difficult for patient to perform   External rotation   3# 3 x 10   Scaption   3# 2 x 15   Shoulder extensions   Blue 2 x 15     HEP: Pt provided with pulleys for home use to improve R shoulder ROM    Treatment/Session Assessment:    · Response to Treatment:  Patient had some discomfort reported with D2 flexion and increased tightness with diagonal movement. Patient primarily limited with R shoulder ER ROM but demonstrates some improvement with passive ER. · Compliance with Program/Exercises: Compliant. · Recommendations/Intent for next treatment session: \"Next visit will focus on reducing pain, improving R shoulder ROM and strengthening scapular strengthening. \".   Total Treatment Duration: 43 minutes  PT Patient Time In/Time Out  Time In: 6036  Time Out: Fadi Vigil PT

## 2017-10-19 PROBLEM — G47.30 SLEEP APNEA: Status: ACTIVE | Noted: 2017-10-19

## 2017-10-19 PROBLEM — E66.01 MORBID OBESITY, UNSPECIFIED OBESITY TYPE (HCC): Status: ACTIVE | Noted: 2017-10-19

## 2017-10-23 ENCOUNTER — HOSPITAL ENCOUNTER (OUTPATIENT)
Dept: PHYSICAL THERAPY | Age: 57
Discharge: HOME | End: 2017-10-23
Payer: COMMERCIAL

## 2017-10-23 PROCEDURE — 97110 THERAPEUTIC EXERCISES: CPT

## 2017-10-23 PROCEDURE — 97140 MANUAL THERAPY 1/> REGIONS: CPT

## 2017-10-23 NOTE — PROGRESS NOTES
Kenna Armstrong  : 1960  Payor: [address] [place] [address] Road / Plan: Carito Martinez / Product Type: Workers Comp /  [address]  at [facility] [employer]  [address] [place] [address], [address].  [phone]   [phone]         OUTPATIENT PHYSICAL [address] Note 10/23/2017    ICD-10: Treatment Diagnosis: Pain in right shoulder (M25.511), Stiffness of right shoulder, not elsewhere classified (M25.611), Unspecified rotator cuff tear or rupture of right shoulder, not specified as traumatic (M75.101)  Precautions/Allergies:   Contrave [naltrexone-bupropion]; Lisinopril; and Other medication   Fall Risk Score: 6 (? 5 = High Risk)  MD Orders: Evaluate and treat MEDICAL/REFERRING DIAGNOSIS:  right shoulder pain   DATE OF ONSET: 2017  REFERRING PHYSICIAN: Simon Wang MD  RETURN PHYSICIAN APPOINTMENT: TBD     INITIAL ASSESSMENT:  Mr. Ifeanyi Gillespie presents with R shoulder pain  which has persisted for 3 months following a fall. Patient exhibits reduced R shoulder ROM, increased R shoulder pain, and decreased use of R upper extremity secondary to pain and stiffness. Patient may benefit from PT intervention to improve function and use of R shoulder to limit pain and increase functional use of dominant hand. PROBLEM LIST (Impacting functional limitations):  1. Decreased Strength  2. Increased Pain  3. Decreased Activity Tolerance  4. Decreased Flexibility/Joint Mobility INTERVENTIONS PLANNED:  1. Home Exercise Program (HEP)  2. Range of Motion (ROM)  3. Therapeutic Exercise/Strengthening  4. Ultrasound (US)   TREATMENT PLAN:  Effective Dates: 10-4-17 TO 17. Frequency/Duration: 2-3 visits per week for 12 weeks  GOALS: (Goals have been discussed and agreed upon with patient.)  Short-Term Functional Goals: Time Frame: 6 weeks  1. Patient will report 2-3/10 R shoulder pain at worst with use of R arm.  2. Patient will be able to exhibit 5/5 R and abduction and 4+/5 R shoulder ER strength   3.  Patient will have 170 degrees of passive flexion and abduction without R shoulder pain. 4. Patient will be independent with initial HEP  Discharge Goals: Time Frame: 12 weeks  1. Patient will report 0-1/10 intermittent R shoulder pain with activity and score less than 15% on DASH  2. Patient will report no limitations with overhead activities and self care secondary to R shoulder/UE pain. 3. Patient will demonstrate 5/5 R lower and middle trapezius strength and demonstrate 150 degrees of active R forward elevation without increased pain. 4. Patient will be independent with final HEP                The information in this section was collected on 10-4-17 (except where otherwise noted). HISTORY:   History of Present Injury/Illness (Reason for Referral):  Patient fell in June 2017 and reached forward with R arm to catch himself and felt shoulder pain and weakness. Afterward he had significant shoulder pain that limited his sleeping and use of R hand. Patient states that he had an MRI which revealed a rotator cuff tear, but that he is hoping to avoid surgery via physical therapy intervention. He states that his pain and range of motion have improved since the first few weeks following injury but is still quite limited compared to the left. Patient has history of bulging cervical discs for which he has received physical therapy intervention for approx 15-20 years ago. Pt denies increased numbness since injury. Past Medical History/Comorbidities:   Mr. Jovani Rothman  has a past medical history of Abnormal glucose; Depression; Gout; History of narcotic addiction ([address] Knee pain, right; Morbid obesity ([address] Pulmonary nodule, right; and Visual disturbance. Mr. Jovani Rothman  has a past surgical history that includes tonsillectomy; colonoscopy (10/20/2015); and refractive surgery. Social History/Living Environment:    Patient lives alone in a [place] with 2 steps to enter house through garage.   Prior Level of Function/Work/Activity:  Patient works in [employer]  Side:         RIGHT    Current Medications:       Current Outpatient Prescriptions:     glucose blood VI test strips (FREESTYLE LITE STRIPS) strip, Test daily, Disp: 90 Strip, Rfl: 4    losartan (COZAAR) 100 mg tablet, Take 1 Tab by mouth daily. , Disp: 90 Tab, Rfl: 4    canagliflozin (INVOKANA) 300 mg tablet, Take 1 Tab by mouth Daily (before breakfast). , Disp: 30 Tab, Rfl: 12    warfarin (COUMADIN) 7.5 mg tablet, TAKE 1 TABLET DAILY, Disp: 90 Tab, Rfl: 2   Date Last Reviewed:  10-4-17   Number of Personal Factors/Comorbidities that affect the Plan of Care: 1-2: MODERATE COMPLEXITY   EXAMINATION:   Observation/Orthostatic Postural Assessment:          Patient demonstrates increased upper trapezius activation when elevating R upper extremity in flexion and abduction. Patient has rounded shoulders and kyphotic posture. Palpation:          Patient has increased tightness to anterior shoulder along anterior and medial deltoid and biceps. Patient has reduced articular R glenohumeral motion with posterior and inferior glenohumeral glides  ROM:          R shoulder PROM (supine):           Flex  162 degrees,  degrees, ER 56 degrees (@ 45 degrees ABD), IR 76 degrees (@ 45 deg ABD)        R shoulder AROM (seated):           Flex 140 degrees,  degrees, ER to back of head, IR to L5-S1       L shoulder AROM (seated)           Flex 172 degrees,  ABD, ER T2, IR T8  Strength:          R shoulder: flexion 5/5, ABD 4+/5, ER (prone) 4/5, IR (prone) 5/5; middle trapezius 4+/5, lower trapezius 4/5        L shoulder: 5/5 for all movements         Special Tests:          Positive empty can/full can on R (negative on L); reynoso-bowen and Neer's negative on R, negative belly press B     Body Structures Involved:  1. Nerves  2. Bones  3. Joints  4. Muscles Body Functions Affected:  1. Sensory/Pain  2. Neuromusculoskeletal  3.  Movement Related Activities and Participation Affected:  1. Self Care  2. Domestic Life  3. Interpersonal Interactions and Relationships  4. Community, Social and [place]   Number of elements (examined above) that affect the Plan of Care: 4+: HIGH COMPLEXITY   CLINICAL PRESENTATION:   Presentation: Stable and uncomplicated: LOW COMPLEXITY   CLINICAL DECISION MAKING:   Outcome Measure: Tool Used: Disabilities of the Arm, Shoulder and Hand (DASH) Questionnaire - Quick Version  Score:  Initial: TBD (next visit) /55  Most Recent: X/55 (Date: -- )   Interpretation of Score: The DASH is designed to measure the activities of daily living in person's with upper extremity dysfunction or pain. Each section is scored on a 1-5 scale, 5 representing the greatest disability. The scores of each section are added together for a total score of 55. Medical Necessity:   · Skilled intervention continues to be required due to reduced R shoulder ROM, reduced R shoulder strength and increased R shoulder pain which limit use of R UE. Reason for Services/Other Comments:  · Patient continues to require skilled intervention due to patient's limitations with functional activities with R UE. Use of outcome tool(s) and clinical judgement create a POC that gives a: Clear prediction of patient's progress: LOW COMPLEXITY            TREATMENT:   (In addition to Assessment/Re-Assessment sessions the following treatments were rendered)  Pre-treatment Symptoms/Complaints:  Patient reports that he has had very little discomfort in R shoulder. Pain: Initial:     0/10 Post Session: No increase in pain noted after PT   THERAPEUTIC EXERCISE: (29 minutes):  Exercises per grid below to improve mobility and strength. Required moderate verbal, manual and tactile cues to promote proper body posture and promote proper body mechanics. Progressed complexity of movement as indicated.      Manual therapy (15 minutes): R shoulder PROM (flex, ABD, and ER) and joint mobilizations (grade III-IV) for improved R shoulder ROM and reduced discomfort. Date  10-4-17 Date:  10-16-17 Date:  10-18-17 Date  10-23-17   Activity/Exercise Parameters Parameters Parameters    Scapular retractions X 10 Black 2 x 20 (elbows flexed and ext) 10# 1 x 10, 13# 2 x 15  13# 3 x 10   Wand flexion X 10      Wand ABD X 10      Wand ER X 10   X 15   Pulleys  ABD 2 x 20     Side-lying ER  3# 3 x 10 3# 3 x 10    Dynamic stabilization  Supine with R elbow extended and with elbow bent Supine with R elbow ext and elbow flexed; x 3 each Red flexbar 2 x 10 R UE   D2 flexion   2 x 12 - difficult for patient to perform 2 x 12    External rotation   3# 3 x 10 3# 2 x 15   Scaption   3# 2 x 15 4# 3 x 12   Shoulder extensions   Blue 2 x 15    Standing lower trap raises    Red 2 x 12                          HEP: Added resisted scapular retractions, D2 flexion without resistance, wand ER, and bilateral theraband ER to HEP. Provided pt with green therband; pt verbalized understanding. Treatment/Session Assessment:    · Response to Treatment:  Patient has very good ROM and is improving with passive ER. Patient requires occasional cues to perform exercises correctly but is making good progress with minimal symptom irritation. Pt remains stiff with glides of humeral head. · Compliance with Program/Exercises: Compliant. · Recommendations/Intent for next treatment session: \"Next visit will focus on reducing pain, improving R shoulder ROM and strengthening scapular strengthening. \".   Total Treatment Duration: 44 minutes  PT Patient Time In/Time Out  Time In: 7869  Time Out: [address], PT

## 2017-10-25 ENCOUNTER — HOSPITAL ENCOUNTER (OUTPATIENT)
Dept: PHYSICAL THERAPY | Age: 57
Discharge: HOME | End: 2017-10-25
Payer: COMMERCIAL

## 2017-10-25 PROCEDURE — 97140 MANUAL THERAPY 1/> REGIONS: CPT

## 2017-10-25 PROCEDURE — 97110 THERAPEUTIC EXERCISES: CPT

## 2017-10-25 NOTE — PROGRESS NOTES
Nadya Tamayo  : 1960  Payor: [address] [place] [address] Road / Plan: Ada Sacks / Product Type: Workers Comp /  [address]  at [facility] [employer]  [address] [place] [address], [address], [address] [place] [address]  [phone]   [phone]         OUTPATIENT PHYSICAL [address] Note 10/25/2017    ICD-10: Treatment Diagnosis: Pain in right shoulder (M25.511), Stiffness of right shoulder, not elsewhere classified (M25.611), Unspecified rotator cuff tear or rupture of right shoulder, not specified as traumatic (M75.101)  Precautions/Allergies:   Contrave [naltrexone-bupropion]; Lisinopril; and Other medication   Fall Risk Score: 6 (? 5 = High Risk)  MD Orders: Evaluate and treat MEDICAL/REFERRING DIAGNOSIS:  right shoulder pain   DATE OF ONSET: 2017  REFERRING PHYSICIAN: Colton Winston MD  RETURN PHYSICIAN APPOINTMENT: TBD     INITIAL ASSESSMENT:  Mr. Belvie Bloch presents with R shoulder pain  which has persisted for 3 months following a fall. Patient exhibits reduced R shoulder ROM, increased R shoulder pain, and decreased use of R upper extremity secondary to pain and stiffness. Patient may benefit from PT intervention to improve function and use of R shoulder to limit pain and increase functional use of dominant hand. PROBLEM LIST (Impacting functional limitations):  1. Decreased Strength  2. Increased Pain  3. Decreased Activity Tolerance  4. Decreased Flexibility/Joint Mobility INTERVENTIONS PLANNED:  1. Home Exercise Program (HEP)  2. Range of Motion (ROM)  3. Therapeutic Exercise/Strengthening  4. Ultrasound (US)   TREATMENT PLAN:  Effective Dates: 10-4-17 TO 17. Frequency/Duration: 2-3 visits per week for 12 weeks  GOALS: (Goals have been discussed and agreed upon with patient.)  Short-Term Functional Goals: Time Frame: 6 weeks  1. Patient will report 2-3/10 R shoulder pain at worst with use of R arm.  2. Patient will be able to exhibit 5/5 R and abduction and 4+/5 R shoulder ER strength   3.  Patient will have 170 degrees of passive flexion and abduction without R shoulder pain. 4. Patient will be independent with initial HEP  Discharge Goals: Time Frame: 12 weeks  1. Patient will report 0-1/10 intermittent R shoulder pain with activity and score less than 15% on DASH  2. Patient will report no limitations with overhead activities and self care secondary to R shoulder/UE pain. 3. Patient will demonstrate 5/5 R lower and middle trapezius strength and demonstrate 150 degrees of active R forward elevation without increased pain. 4. Patient will be independent with final HEP                The information in this section was collected on 10-4-17 (except where otherwise noted). HISTORY:   History of Present Injury/Illness (Reason for Referral):  Patient fell in June 2017 and reached forward with R arm to catch himself and felt shoulder pain and weakness. Afterward he had significant shoulder pain that limited his sleeping and use of R hand. Patient states that he had an MRI which revealed a rotator cuff tear, but that he is hoping to avoid surgery via physical therapy intervention. He states that his pain and range of motion have improved since the first few weeks following injury but is still quite limited compared to the left. Patient has history of bulging cervical discs for which he has received physical therapy intervention for approx 15-20 years ago. Pt denies increased numbness since injury. Past Medical History/Comorbidities:   Mr. Rissa Poe  has a past medical history of Abnormal glucose; Depression; Gout; History of narcotic addiction ([address] Knee pain, right; Morbid obesity ([address] Pulmonary nodule, right; and Visual disturbance. Mr. Rissa Poe  has a past surgical history that includes tonsillectomy; colonoscopy (10/20/2015); and refractive surgery. Social History/Living Environment:    Patient lives alone in a [place] with 2 steps to enter house through garage.   Prior Level of Function/Work/Activity:  Patient works in [employer]  Side:         RIGHT    Current Medications:       Current Outpatient Prescriptions:     glucose blood VI test strips (FREESTYLE LITE STRIPS) strip, Test daily, Disp: 90 Strip, Rfl: 4    losartan (COZAAR) 100 mg tablet, Take 1 Tab by mouth daily. , Disp: 90 Tab, Rfl: 4    canagliflozin (INVOKANA) 300 mg tablet, Take 1 Tab by mouth Daily (before breakfast). , Disp: 30 Tab, Rfl: 12    warfarin (COUMADIN) 7.5 mg tablet, TAKE 1 TABLET DAILY, Disp: 90 Tab, Rfl: 2   Date Last Reviewed:  10-4-17   Number of Personal Factors/Comorbidities that affect the Plan of Care: 1-2: MODERATE COMPLEXITY   EXAMINATION:   Observation/Orthostatic Postural Assessment:          Patient demonstrates increased upper trapezius activation when elevating R upper extremity in flexion and abduction. Patient has rounded shoulders and kyphotic posture. Palpation:          Patient has increased tightness to anterior shoulder along anterior and medial deltoid and biceps. Patient has reduced articular R glenohumeral motion with posterior and inferior glenohumeral glides  ROM:          R shoulder PROM (supine):           Flex  162 degrees,  degrees, ER 56 degrees (@ 45 degrees ABD), IR 76 degrees (@ 45 deg ABD)        R shoulder AROM (seated):           Flex 140 degrees,  degrees, ER to back of head, IR to L5-S1       L shoulder AROM (seated)           Flex 172 degrees,  ABD, ER T2, IR T8  Strength:          R shoulder: flexion 5/5, ABD 4+/5, ER (prone) 4/5, IR (prone) 5/5; middle trapezius 4+/5, lower trapezius 4/5        L shoulder: 5/5 for all movements         Special Tests:          Positive empty can/full can on R (negative on L); reynoso-bowen and Neer's negative on R, negative belly press B     Body Structures Involved:  1. Nerves  2. Bones  3. Joints  4. Muscles Body Functions Affected:  1. Sensory/Pain  2. Neuromusculoskeletal  3.  Movement Related Activities and Participation Affected:  1. Self Care  2. Domestic Life  3. Interpersonal Interactions and Relationships  4. Community, Social and [place]   Number of elements (examined above) that affect the Plan of Care: 4+: HIGH COMPLEXITY   CLINICAL PRESENTATION:   Presentation: Stable and uncomplicated: LOW COMPLEXITY   CLINICAL DECISION MAKING:   Outcome Measure: Tool Used: Disabilities of the Arm, Shoulder and Hand (DASH) Questionnaire - Quick Version  Score:  Initial: TBD (next visit) /55  Most Recent: X/55 (Date: -- )   Interpretation of Score: The DASH is designed to measure the activities of daily living in person's with upper extremity dysfunction or pain. Each section is scored on a 1-5 scale, 5 representing the greatest disability. The scores of each section are added together for a total score of 55. Medical Necessity:   · Skilled intervention continues to be required due to reduced R shoulder ROM, reduced R shoulder strength and increased R shoulder pain which limit use of R UE. Reason for Services/Other Comments:  · Patient continues to require skilled intervention due to patient's limitations with functional activities with R UE. Use of outcome tool(s) and clinical judgement create a POC that gives a: Clear prediction of patient's progress: LOW COMPLEXITY            TREATMENT:   (In addition to Assessment/Re-Assessment sessions the following treatments were rendered)  Pre-treatment Symptoms/Complaints:  Patient reports that he was mildly sore after last PT treatment but has not had pain. Pain: Initial:     0/10 Post Session: No increase in pain noted after PT   THERAPEUTIC EXERCISE: (24 minutes):  Exercises per grid below to improve mobility and strength. Required moderate verbal, manual and tactile cues to promote proper body posture and promote proper body mechanics. Progressed complexity of movement as indicated.  Pt required cues to perform standing shoulder scaption with proper technique. Manual therapy (15 minutes): R shoulder PROM (flex, ABD, and ER) and joint mobilizations (grade III-IV) for improved R shoulder ROM and reduced discomfort. Date  10-4-17 Date:  10-16-17 Date:  10-18-17 Date  10-23-17 Date  10-25-17   Activity/Exercise Parameters Parameters Parameters     Scapular retractions X 10 Black 2 x 20 (elbows flexed and ext) 10# 1 x 10, 13# 2 x 15  13# 3 x 10 17#2 x 15   Wand flexion X 10       Wand ABD X 10       Wand ER X 10   X 15 X 15   Pulleys  ABD 2 x 20      Side-lying ER  3# 3 x 10 3# 3 x 10     Dynamic stabilization  Supine with R elbow extended and with elbow bent Supine with R elbow ext and elbow flexed; x 3 each Red flexbar 2 x 10 R UE Red flexbar 2 x 15 R UE   D2 flexion   2 x 12 - difficult for patient to perform 2 x 12  2 x 12   External rotation   3# 3 x 10 3# 2 x 15 3# 3 x 15   Scaption   3# 2 x 15 4# 3 x 12 5# 2 x 10   Shoulder extensions   Blue 2 x 15  7# 2 x 15   Standing lower trap raises    Red 2 x 12    Prone Y's     2 x 12   Shoulder IR     Stretch with strap 5 x 15\"     Active IR/ER with back against wall and shoulder ABD to 90 deg; x 10             HEP: Added resisted scapular retractions, D2 flexion without resistance, wand ER, and bilateral theraband ER to HEP. Provided pt with green therband; pt verbalized understanding. Treatment/Session Assessment:    · Response to Treatment:  Patient continues to exhibit improved R shoulder function and was able to perform D2 flexion with minimal difficulty today. Patient is making good progress with his rehabilitation, but remains limited with IR and active ER ROM. · Compliance with Program/Exercises: Compliant. · Recommendations/Intent for next treatment session: \"Next visit will add pectoralis stretching for improved active ER\".   Total Treatment Duration: 39 minutes  PT Patient Time In/Time Out  Time In: 7734  Time Out: [address] [facility],

## 2017-10-30 ENCOUNTER — HOSPITAL ENCOUNTER (OUTPATIENT)
Dept: PHYSICAL THERAPY | Age: 57
Discharge: HOME | End: 2017-10-30
Payer: COMMERCIAL

## 2017-10-30 PROCEDURE — 97140 MANUAL THERAPY 1/> REGIONS: CPT

## 2017-10-30 PROCEDURE — 97110 THERAPEUTIC EXERCISES: CPT

## 2017-10-30 NOTE — PROGRESS NOTES
Chey Beebe  : 1960  Payor: [address] [place] [address] Road / Plan: Jacky Her / Product Type: Workers Comp /  [address]  at [address] [place] [address] Rd  1101 [facility], 4 [facility], [address] [place] [address]  [phone]   [phone]         OUTPATIENT PHYSICAL [address] Note 10/30/2017    ICD-10: Treatment Diagnosis: Pain in right shoulder (M25.511), Stiffness of right shoulder, not elsewhere classified (M25.611), Unspecified rotator cuff tear or rupture of right shoulder, not specified as traumatic (M75.101)  Precautions/Allergies:   Contrave [naltrexone-bupropion]; Lisinopril; and Other medication   Fall Risk Score: 6 (? 5 = High Risk)  MD Orders: Evaluate and treat MEDICAL/REFERRING DIAGNOSIS:  right shoulder pain   DATE OF ONSET: 2017  REFERRING PHYSICIAN: Justina Mcgowan MD  RETURN PHYSICIAN APPOINTMENT: TBD     INITIAL ASSESSMENT:  Mr. Dede Talamantes presents with R shoulder pain  which has persisted for 3 months following a fall. Patient exhibits reduced R shoulder ROM, increased R shoulder pain, and decreased use of R upper extremity secondary to pain and stiffness. Patient may benefit from PT intervention to improve function and use of R shoulder to limit pain and increase functional use of dominant hand. PROBLEM LIST (Impacting functional limitations):  1. Decreased Strength  2. Increased Pain  3. Decreased Activity Tolerance  4. Decreased Flexibility/Joint Mobility INTERVENTIONS PLANNED:  1. Home Exercise Program (HEP)  2. Range of Motion (ROM)  3. Therapeutic Exercise/Strengthening  4. Ultrasound (US)   TREATMENT PLAN:  Effective Dates: 10-4-17 TO 17. Frequency/Duration: 2-3 visits per week for 12 weeks  GOALS: (Goals have been discussed and agreed upon with patient.)  Short-Term Functional Goals: Time Frame: 6 weeks  1. Patient will report 2-3/10 R shoulder pain at worst with use of R arm.  2. Patient will be able to exhibit 5/5 R and abduction and 4+/5 R shoulder ER strength   3.  Patient will have 170 degrees of passive flexion and abduction without R shoulder pain. 4. Patient will be independent with initial HEP  Discharge Goals: Time Frame: 12 weeks  1. Patient will report 0-1/10 intermittent R shoulder pain with activity and score less than 15% on DASH  2. Patient will report no limitations with overhead activities and self care secondary to R shoulder/UE pain. 3. Patient will demonstrate 5/5 R lower and middle trapezius strength and demonstrate 150 degrees of active R forward elevation without increased pain. 4. Patient will be independent with final HEP                The information in this section was collected on 10-4-17 (except where otherwise noted). HISTORY:   History of Present Injury/Illness (Reason for Referral):  Patient fell in June 2017 and reached forward with R arm to catch himself and felt shoulder pain and weakness. Afterward he had significant shoulder pain that limited his sleeping and use of R hand. Patient states that he had an MRI which revealed a rotator cuff tear, but that he is hoping to avoid surgery via physical therapy intervention. He states that his pain and range of motion have improved since the first few weeks following injury but is still quite limited compared to the left. Patient has history of bulging cervical discs for which he has received physical therapy intervention for approx 15-20 years ago. Pt denies increased numbness since injury. Past Medical History/Comorbidities:   Mr. Logan Sandy  has a past medical history of Abnormal glucose; Depression; Gout; History of narcotic addiction ([address] Knee pain, right; Morbid obesity ([address] Pulmonary nodule, right; and Visual disturbance. Mr. Logan Sandy  has a past surgical history that includes tonsillectomy; colonoscopy (10/20/2015); and refractive surgery. Social History/Living Environment:    Patient lives alone in a [place] with 2 steps to enter house through garage.   Prior Level of Function/Work/Activity:  Patient works in [employer]  Side:         RIGHT    Current Medications:       Current Outpatient Prescriptions:     glucose blood VI test strips (FREESTYLE LITE STRIPS) strip, Test daily, Disp: 90 Strip, Rfl: 4    losartan (COZAAR) 100 mg tablet, Take 1 Tab by mouth daily. , Disp: 90 Tab, Rfl: 4    canagliflozin (INVOKANA) 300 mg tablet, Take 1 Tab by mouth Daily (before breakfast). , Disp: 30 Tab, Rfl: 12    warfarin (COUMADIN) 7.5 mg tablet, TAKE 1 TABLET DAILY, Disp: 90 Tab, Rfl: 2   Date Last Reviewed:  10-4-17   Number of Personal Factors/Comorbidities that affect the Plan of Care: 1-2: MODERATE COMPLEXITY   EXAMINATION:   Observation/Orthostatic Postural Assessment:          Patient demonstrates increased upper trapezius activation when elevating R upper extremity in flexion and abduction. Patient has rounded shoulders and kyphotic posture. Palpation:          Patient has increased tightness to anterior shoulder along anterior and medial deltoid and biceps. Patient has reduced articular R glenohumeral motion with posterior and inferior glenohumeral glides  ROM:          R shoulder PROM (supine):           Flex  162 degrees,  degrees, ER 56 degrees (@ 45 degrees ABD), IR 76 degrees (@ 45 deg ABD)        R shoulder AROM (seated):           Flex 140 degrees,  degrees, ER to back of head, IR to L5-S1       L shoulder AROM (seated)           Flex 172 degrees,  ABD, ER T2, IR T8  Strength:          R shoulder: flexion 5/5, ABD 4+/5, ER (prone) 4/5, IR (prone) 5/5; middle trapezius 4+/5, lower trapezius 4/5        L shoulder: 5/5 for all movements         Special Tests:          Positive empty can/full can on R (negative on L); reynoso-bowen and Neer's negative on R, negative belly press B     Body Structures Involved:  1. Nerves  2. Bones  3. Joints  4. Muscles Body Functions Affected:  1. Sensory/Pain  2. Neuromusculoskeletal  3.  Movement Related Activities and Participation Affected:  1. Self Care  2. Domestic Life  3. Interpersonal Interactions and Relationships  4. Community, Social and [place]   Number of elements (examined above) that affect the Plan of Care: 4+: HIGH COMPLEXITY   CLINICAL PRESENTATION:   Presentation: Stable and uncomplicated: LOW COMPLEXITY   CLINICAL DECISION MAKING:   Outcome Measure: Tool Used: Disabilities of the Arm, Shoulder and Hand (DASH) Questionnaire - Quick Version  Score:  Initial: TBD (next visit) /55  Most Recent: X/55 (Date: -- )   Interpretation of Score: The DASH is designed to measure the activities of daily living in person's with upper extremity dysfunction or pain. Each section is scored on a 1-5 scale, 5 representing the greatest disability. The scores of each section are added together for a total score of 55. Medical Necessity:   · Skilled intervention continues to be required due to reduced R shoulder ROM, reduced R shoulder strength and increased R shoulder pain which limit use of R UE. Reason for Services/Other Comments:  · Patient continues to require skilled intervention due to patient's limitations with functional activities with R UE. Use of outcome tool(s) and clinical judgement create a POC that gives a: Clear prediction of patient's progress: LOW COMPLEXITY            TREATMENT:   (In addition to Assessment/Re-Assessment sessions the following treatments were rendered)  Pre-treatment Symptoms/Complaints:  Patient states that his R arm was sore when he woke this morning, but that it feels better now. Reported that his R shoulder \"feels great\" after PT  Pain: Initial:     No pain noted; just soreness Post Session: None after PT   THERAPEUTIC EXERCISE: (11 minutes):  Exercises per grid below to improve mobility and strength. Required moderate verbal, manual and tactile cues to promote proper body posture and promote proper body mechanics.   Pt required demonstration for new exercises and minimal cues to perform with appropriate technique. Manual therapy (29 minutes): R shoulder PROM (flex, ABD, and ER; D2 and D1 flexion/extension) and glenohumeral joint mobilizations (grade III-IV) for improved R shoulder ROM and reduced discomfort. Date  10-4-17 Date:  10-16-17 Date:  10-18-17 Date  10-23-17 Date  10-25-17 Date  10-30-17   Activity/Exercise Parameters Parameters Parameters      Scapular retractions X 10 Black 2 x 20 (elbows flexed and ext) 10# 1 x 10, 13# 2 x 15  13# 3 x 10 17#2 x 15 Standing (peg 3), black 2 x 15   Wand flexion X 10        Wand ABD X 10        Wand ER X 10   X 15 X 15    Pulleys  ABD 2 x 20       Side-lying ER  3# 3 x 10 3# 3 x 10      Dynamic stabilization  Supine with R elbow extended and with elbow bent Supine with R elbow ext and elbow flexed; x 3 each Red flexbar 2 x 10 R UE Red flexbar 2 x 15 R UE Manual 2 x   D2 flexion   2 x 12 - difficult for patient to perform 2 x 12  2 x 12 2 x 12   External rotation   3# 3 x 10 3# 2 x 15 3# 3 x 15    Scaption   3# 2 x 15 4# 3 x 12 5# 2 x 10    Shoulder extensions   Blue 2 x 15  7# 2 x 15    Standing lower trap raises    Red 2 x 12     Prone Y's     2 x 12 Standing, green 3 x 10   Shoulder IR     Stretch with strap 5 x 15\"     Active IR/ER with back against wall and shoulder ABD to 90 deg; x 10    Shoulder T's      Black 3 x 10     HEP:No changes made to HEP. Treatment/Session Assessment:    · Response to Treatment:  Focused most of treatment on improving ROM and loosening R shoulder with increased glenohumeral mobilizations and PNF PROM. · Compliance with Program/Exercises: Compliant. · Recommendations/Intent for next treatment session: \"Next visit will add pectoralis stretching for improved active ER\".   Total Treatment Duration: 40 minutes  PT Patient Time In/Time Out  Time In: 1520  Time Out: [address], PT

## 2017-11-01 ENCOUNTER — HOSPITAL ENCOUNTER (OUTPATIENT)
Dept: PHYSICAL THERAPY | Age: 57
Discharge: HOME | End: 2017-11-01
Payer: COMMERCIAL

## 2017-11-01 PROCEDURE — 97110 THERAPEUTIC EXERCISES: CPT

## 2017-11-01 PROCEDURE — 97140 MANUAL THERAPY 1/> REGIONS: CPT

## 2017-11-01 NOTE — PROGRESS NOTES
Severo Herrera  : 1960  Payor: [address] [place] [address] Road / Plan: Momo Learn / Product Type: Workers Comp /  [address]  at [address] [place] [address] Rd  1101 [facility], [facility] [address]  [phone]   [phone]         OUTPATIENT PHYSICAL [address]olas[address] Note 2017    ICD-10: Treatment Diagnosis: Pain in right shoulder (M25.511), Stiffness of right shoulder, not elsewhere classified (M25.611), Unspecified rotator cuff tear or rupture of right shoulder, not specified as traumatic (M75.101)  Precautions/Allergies:   Contrave [naltrexone-bupropion]; Lisinopril; and Other medication   Fall Risk Score: 6 (? 5 = High Risk)  MD Orders: Evaluate and treat MEDICAL/REFERRING DIAGNOSIS:  right shoulder pain   DATE OF ONSET: 2017  REFERRING PHYSICIAN: Alaina Morris MD  RETURN PHYSICIAN APPOINTMENT: 17     INITIAL ASSESSMENT:  Mr. Dayna Zaragoza presents with R shoulder pain  which has persisted for 3 months following a fall. Patient exhibits reduced R shoulder ROM, increased R shoulder pain, and decreased use of R upper extremity secondary to pain and stiffness. Patient may benefit from PT intervention to improve function and use of R shoulder to limit pain and increase functional use of dominant hand. PROBLEM LIST (Impacting functional limitations):  1. Decreased Strength  2. Increased Pain  3. Decreased Activity Tolerance  4. Decreased Flexibility/Joint Mobility INTERVENTIONS PLANNED:  1. Home Exercise Program (HEP)  2. Range of Motion (ROM)  3. Therapeutic Exercise/Strengthening  4. Ultrasound (US)   TREATMENT PLAN:  Effective Dates: 10-4-17 TO 17. Frequency/Duration: 2-3 visits per week for 12 weeks  GOALS: (Goals have been discussed and agreed upon with patient.)  Short-Term Functional Goals: Time Frame: 6 weeks  1.  Patient will report 2-3/10 R shoulder pain at worst with use of R arm.  2. Patient will be able to exhibit 5/5 R and abduction and 4+/5 R shoulder ER strength 3. Patient will have 170 degrees of passive flexion and abduction without R shoulder pain. 4. Patient will be independent with initial HEP  Discharge Goals: Time Frame: 12 weeks  1. Patient will report 0-1/10 intermittent R shoulder pain with activity and score less than 15% on DASH  2. Patient will report no limitations with overhead activities and self care secondary to R shoulder/UE pain. 3. Patient will demonstrate 5/5 R lower and middle trapezius strength and demonstrate 150 degrees of active R forward elevation without increased pain. 4. Patient will be independent with final HEP                The information in this section was collected on 10-4-17 (except where otherwise noted). HISTORY:   History of Present Injury/Illness (Reason for Referral):  Patient fell in June 2017 and reached forward with R arm to catch himself and felt shoulder pain and weakness. Afterward he had significant shoulder pain that limited his sleeping and use of R hand. Patient states that he had an MRI which revealed a rotator cuff tear, but that he is hoping to avoid surgery via physical therapy intervention. He states that his pain and range of motion have improved since the first few weeks following injury but is still quite limited compared to the left. Patient has history of bulging cervical discs for which he has received physical therapy intervention for approx 15-20 years ago. Pt denies increased numbness since injury. Past Medical History/Comorbidities:   Mr. Phylicia Andrew  has a past medical history of Abnormal glucose; Depression; Gout; History of narcotic addiction ([address] Knee pain, right; Morbid obesity ([address] Pulmonary nodule, right; and Visual disturbance. Mr. Phylicia Andrew  has a past surgical history that includes tonsillectomy; colonoscopy (10/20/2015); and refractive surgery. Social History/Living Environment:    Patient lives alone in a [place] with 2 steps to enter house through garage.   Prior Level of Function/Work/Activity:  Patient works in [employer]  Side:         RIGHT    Current Medications:       Current Outpatient Prescriptions:     glucose blood VI test strips (FREESTYLE LITE STRIPS) strip, Test daily, Disp: 90 Strip, Rfl: 4    losartan (COZAAR) 100 mg tablet, Take 1 Tab by mouth daily. , Disp: 90 Tab, Rfl: 4    canagliflozin (INVOKANA) 300 mg tablet, Take 1 Tab by mouth Daily (before breakfast). , Disp: 30 Tab, Rfl: 12    warfarin (COUMADIN) 7.5 mg tablet, TAKE 1 TABLET DAILY, Disp: 90 Tab, Rfl: 2   Date Last Reviewed:  10-4-17   Number of Personal Factors/Comorbidities that affect the Plan of Care: 1-2: MODERATE COMPLEXITY   EXAMINATION:   Observation/Orthostatic Postural Assessment:          Patient demonstrates increased upper trapezius activation when elevating R upper extremity in flexion and abduction. Patient has rounded shoulders and kyphotic posture. Palpation:          Patient has increased tightness to anterior shoulder along anterior and medial deltoid and biceps. Patient has reduced articular R glenohumeral motion with posterior and inferior glenohumeral glides  ROM:          R shoulder PROM (supine):           Flex  162 degrees,  degrees, ER 56 degrees (@ 45 degrees ABD), IR 76 degrees (@ 45 deg ABD)        R shoulder AROM (seated):           Flex 140 degrees,  degrees, ER to back of head, IR to L5-S1       L shoulder AROM (seated)           Flex 172 degrees,  ABD, ER T2, IR T8  Strength:          R shoulder: flexion 5/5, ABD 4+/5, ER (prone) 4/5, IR (prone) 5/5; middle trapezius 4+/5, lower trapezius 4/5        L shoulder: 5/5 for all movements         Special Tests:          Positive empty can/full can on R (negative on L); reynoso-bowen and Neer's negative on R, negative belly press B     Body Structures Involved:  1. Nerves  2. Bones  3. Joints  4. Muscles Body Functions Affected:  1. Sensory/Pain  2. Neuromusculoskeletal  3.  Movement Related Activities and Participation Affected:  1. Self Care  2. Domestic Life  3. Interpersonal Interactions and Relationships  4. Community, Social and [place]   Number of elements (examined above) that affect the Plan of Care: 4+: HIGH COMPLEXITY   CLINICAL PRESENTATION:   Presentation: Stable and uncomplicated: LOW COMPLEXITY   CLINICAL DECISION MAKING:   Outcome Measure: Tool Used: Disabilities of the Arm, Shoulder and Hand (DASH) Questionnaire - Quick Version  Score:  Initial: TBD (next visit) /55  Most Recent: X/55 (Date: -- )   Interpretation of Score: The DASH is designed to measure the activities of daily living in person's with upper extremity dysfunction or pain. Each section is scored on a 1-5 scale, 5 representing the greatest disability. The scores of each section are added together for a total score of 55. Medical Necessity:   · Skilled intervention continues to be required due to reduced R shoulder ROM, reduced R shoulder strength and increased R shoulder pain which limit use of R UE. Reason for Services/Other Comments:  · Patient continues to require skilled intervention due to patient's limitations with functional activities with R UE. Use of outcome tool(s) and clinical judgement create a POC that gives a: Clear prediction of patient's progress: LOW COMPLEXITY            TREATMENT:   (In addition to Assessment/Re-Assessment sessions the following treatments were rendered)  Pre-treatment Symptoms/Complaints:  Patient reports that he feels good. Has little to no R shoulder discomfort. Pain: Initial:     0/10 Post Session: No pain reported   THERAPEUTIC EXERCISE: (16 minutes):  Exercises per grid below to improve mobility and strength. Pt required minimal cueing to perform standing external rotations correctly.     Manual therapy (23 minutes): R shoulder PROM (flex, ABD, and ER; D2 and D1 flexion/extension) and glenohumeral joint mobilizations (grade IV) for improved R shoulder ROM and reduced discomfort. Date  10-4-17 Date:  10-16-17 Date:  10-18-17 Date  10-23-17 Date  10-25-17 Date  10-30-17 Date  11-1-17   Activity/Exercise Parameters Parameters Parameters       Scapular retractions X 10 Black 2 x 20 (elbows flexed and ext) 10# 1 x 10, 13# 2 x 15  13# 3 x 10 17#2 x 15 Standing (peg 3), black 2 x 15 Standing (peg 3) black 3 x 15   Wand flexion X 10         Wand ABD X 10         Wand ER X 10   X 15 X 15     Pulleys  ABD 2 x 20        Side-lying ER  3# 3 x 10 3# 3 x 10    3# 3 x 12     Dynamic stabilization  Supine with R elbow extended and with elbow bent Supine with R elbow ext and elbow flexed; x 3 each Red flexbar 2 x 10 R UE Red flexbar 2 x 15 R UE Manual 2 x    D2 flexion   2 x 12 - difficult for patient to perform 2 x 12  2 x 12 2 x 12 2 x 12   External rotation   3# 3 x 10 3# 2 x 15 3# 3 x 15  Green 3 x 10   Scaption   3# 2 x 15 4# 3 x 12 5# 2 x 10     Shoulder extensions   Blue 2 x 15  7# 2 x 15     Standing lower trap raises    Red 2 x 12      Prone Y's     2 x 12 Standing, green 3 x 10 Standing, green 3 x 10   Shoulder IR     Stretch with strap 5 x 15\"     Active IR/ER with back against wall and shoulder ABD to 90 deg; x 10     Shoulder T's      Black 3 x 10      HEP:Added doorway stretch for improved flexibility in internal rotators. Pt verbalized and demonstrated understanding. Treatment/Session Assessment:    · Response to Treatment:  Patient improving with R UE ROM both actively and passively. Patient remains limited with external rotation but is progressing. · Compliance with Program/Exercises: Compliant. · Recommendations/Intent for next treatment session: \"Next visit will continue to progress patient with ER and IR ROM and continue to improve scapular strength and stability.   Total Treatment Duration: 43 minutes  PT Patient Time In/Time Out  Time In: 1516  Time Out: [address], PT

## 2017-11-06 ENCOUNTER — HOSPITAL ENCOUNTER (OUTPATIENT)
Dept: PHYSICAL THERAPY | Age: 57
Discharge: HOME | End: 2017-11-06
Payer: COMMERCIAL

## 2017-11-06 PROCEDURE — 97140 MANUAL THERAPY 1/> REGIONS: CPT

## 2017-11-06 PROCEDURE — 97110 THERAPEUTIC EXERCISES: CPT

## 2017-11-06 NOTE — PROGRESS NOTES
Orquidea Carlisle  : 1960  Payor: [address] [place] [address] Road / Plan: [employer] / Product Type: Workers Comp /  [address]  at [address] [place] [address] Rd  1101 [facility], [address] [place], [place] [address]  [phone]   [phone]         OUTPATIENT PHYSICAL [address] N[address]co Note 2017    ICD-10: Treatment Diagnosis: Pain in right shoulder (M25.511), Stiffness of right shoulder, not elsewhere classified (M25.611), Unspecified rotator cuff tear or rupture of right shoulder, not specified as traumatic (M75.101)  Precautions/Allergies:   Contrave [naltrexone-bupropion]; Lisinopril; and Other medication   Fall Risk Score: 6 (? 5 = High Risk)  MD Orders: Evaluate and treat MEDICAL/REFERRING DIAGNOSIS:  right shoulder pain   DATE OF ONSET: 2017  REFERRING PHYSICIAN: Lynsey Hernandez MD  RETURN PHYSICIAN APPOINTMENT: 17     INITIAL ASSESSMENT:  Mr. Kalie Shah presents with R shoulder pain  which has persisted for 3 months following a fall. Patient exhibits reduced R shoulder ROM, increased R shoulder pain, and decreased use of R upper extremity secondary to pain and stiffness. Patient may benefit from PT intervention to improve function and use of R shoulder to limit pain and increase functional use of dominant hand. PROBLEM LIST (Impacting functional limitations):  1. Decreased Strength  2. Increased Pain  3. Decreased Activity Tolerance  4. Decreased Flexibility/Joint Mobility INTERVENTIONS PLANNED:  1. Home Exercise Program (HEP)  2. Range of Motion (ROM)  3. Therapeutic Exercise/Strengthening  4. Ultrasound (US)   TREATMENT PLAN:  Effective Dates: 10-4-17 TO 17. Frequency/Duration: 2-3 visits per week for 12 weeks  GOALS: (Goals have been discussed and agreed upon with patient.)  Short-Term Functional Goals: Time Frame: 6 weeks  1.  Patient will report 2-3/10 R shoulder pain at worst with use of R arm.  2. Patient will be able to exhibit 5/5 R and abduction and 4+/5 R shoulder ER strength 3. Patient will have 170 degrees of passive flexion and abduction without R shoulder pain. 4. Patient will be independent with initial HEP  Discharge Goals: Time Frame: 12 weeks  1. Patient will report 0-1/10 intermittent R shoulder pain with activity and score less than 15% on DASH  2. Patient will report no limitations with overhead activities and self care secondary to R shoulder/UE pain. 3. Patient will demonstrate 5/5 R lower and middle trapezius strength and demonstrate 150 degrees of active R forward elevation without increased pain. 4. Patient will be independent with final HEP                The information in this section was collected on 10-4-17 (except where otherwise noted). HISTORY:   History of Present Injury/Illness (Reason for Referral):  Patient fell in June 2017 and reached forward with R arm to catch himself and felt shoulder pain and weakness. Afterward he had significant shoulder pain that limited his sleeping and use of R hand. Patient states that he had an MRI which revealed a rotator cuff tear, but that he is hoping to avoid surgery via physical therapy intervention. He states that his pain and range of motion have improved since the first few weeks following injury but is still quite limited compared to the left. Patient has history of bulging cervical discs for which he has received physical therapy intervention for approx 15-20 years ago. Pt denies increased numbness since injury. Past Medical History/Comorbidities:   Mr. Lenetta Schwab  has a past medical history of Abnormal glucose; Depression; Gout; History of narcotic addiction ([address] Knee pain, right; Morbid obesity ([address] Pulmonary nodule, right; and Visual disturbance. Mr. Lenetta Schwab  has a past surgical history that includes tonsillectomy; colonoscopy (10/20/2015); and refractive surgery. Social History/Living Environment:    Patient lives alone in a [place] with 2 steps to enter house through garage.   Prior Level of Function/Work/Activity:  Patient works in [employer]  Side:         RIGHT    Current Medications:       Current Outpatient Prescriptions:     glucose blood VI test strips (FREESTYLE LITE STRIPS) strip, Test daily, Disp: 90 Strip, Rfl: 4    losartan (COZAAR) 100 mg tablet, Take 1 Tab by mouth daily. , Disp: 90 Tab, Rfl: 4    canagliflozin (INVOKANA) 300 mg tablet, Take 1 Tab by mouth Daily (before breakfast). , Disp: 30 Tab, Rfl: 12    warfarin (COUMADIN) 7.5 mg tablet, TAKE 1 TABLET DAILY, Disp: 90 Tab, Rfl: 2   Date Last Reviewed:  10-4-17   Number of Personal Factors/Comorbidities that affect the Plan of Care: 1-2: MODERATE COMPLEXITY   EXAMINATION:   Observation/Orthostatic Postural Assessment:          Patient demonstrates increased upper trapezius activation when elevating R upper extremity in flexion and abduction. Patient has rounded shoulders and kyphotic posture. Palpation:          Patient has increased tightness to anterior shoulder along anterior and medial deltoid and biceps. Patient has reduced articular R glenohumeral motion with posterior and inferior glenohumeral glides  ROM:          R shoulder PROM (supine):           Flex  162 degrees,  degrees, ER 56 degrees (@ 45 degrees ABD), IR 76 degrees (@ 45 deg ABD)        R shoulder AROM (seated):           Flex 140 degrees,  degrees, ER to back of head, IR to L5-S1       L shoulder AROM (seated)           Flex 172 degrees,  ABD, ER T2, IR T8  Strength:          R shoulder: flexion 5/5, ABD 4+/5, ER (prone) 4/5, IR (prone) 5/5; middle trapezius 4+/5, lower trapezius 4/5        L shoulder: 5/5 for all movements         Special Tests:          Positive empty can/full can on R (negative on L); reynoso-bowen and Neer's negative on R, negative belly press B     Body Structures Involved:  1. Nerves  2. Bones  3. Joints  4. Muscles Body Functions Affected:  1. Sensory/Pain  2. Neuromusculoskeletal  3.  Movement Related Activities and Participation Affected:  1. Self Care  2. Domestic Life  3. Interpersonal Interactions and Relationships  4. Community, Social and [place]   Number of elements (examined above) that affect the Plan of Care: 4+: HIGH COMPLEXITY   CLINICAL PRESENTATION:   Presentation: Stable and uncomplicated: LOW COMPLEXITY   CLINICAL DECISION MAKING:   Outcome Measure: Tool Used: Disabilities of the Arm, Shoulder and Hand (DASH) Questionnaire - Quick Version  Score:  Initial: TBD (next visit) /55  Most Recent: X/55 (Date: -- )   Interpretation of Score: The DASH is designed to measure the activities of daily living in person's with upper extremity dysfunction or pain. Each section is scored on a 1-5 scale, 5 representing the greatest disability. The scores of each section are added together for a total score of 55. Medical Necessity:   · Skilled intervention continues to be required due to reduced R shoulder ROM, reduced R shoulder strength and increased R shoulder pain which limit use of R UE. Reason for Services/Other Comments:  · Patient continues to require skilled intervention due to patient's limitations with functional activities with R UE. Use of outcome tool(s) and clinical judgement create a POC that gives a: Clear prediction of patient's progress: LOW COMPLEXITY            TREATMENT:   (In addition to Assessment/Re-Assessment sessions the following treatments were rendered)  Pre-treatment Symptoms/Complaints:  Patient reports that his shoulder was sore over the weekend but that it feels better today. Pain: Initial:     0/10 Post Session: No pain reported   THERAPEUTIC EXERCISE: (14 minutes):  Exercises per grid below to improve mobility and strength. Manual therapy (26 minutes): R shoulder PROM (flex, ABD, and ER; D2 and D1 flexion/extension) and glenohumeral joint mobilizations (grade IV) for improved R shoulder ROM and reduced discomfort.      Date  10-4-17 Date:  10-16-17 Date:  10-18-17 Date  10-23-17 Date  10-25-17 Date  10-30-17 Date  11-1-17 Date  11-6-17   Activity/Exercise Parameters Parameters Parameters        Scapular retractions X 10 Black 2 x 20 (elbows flexed and ext) 10# 1 x 10, 13# 2 x 15  13# 3 x 10 17#2 x 15 Standing (peg 3), black 2 x 15 Standing (peg 3) black 3 x 15 13# bilateral, 2 x 15   Wand flexion X 10          Wand ABD X 10          Wand ER X 10   X 15 X 15      Pulleys  ABD 2 x 20         Side-lying ER  3# 3 x 10 3# 3 x 10    3# 3 x 12   4# 3 x 10   Dynamic stabilization  Supine with R elbow extended and with elbow bent Supine with R elbow ext and elbow flexed; x 3 each Red flexbar 2 x 10 R UE Red flexbar 2 x 15 R UE Manual 2 x     D2 flexion   2 x 12 - difficult for patient to perform 2 x 12  2 x 12 2 x 12 2 x 12 2 x 12   External rotation   3# 3 x 10 3# 2 x 15 3# 3 x 15  Green 3 x 10    Scaption   3# 2 x 15 4# 3 x 12 5# 2 x 10      Shoulder extensions   Blue 2 x 15  7# 2 x 15      Standing lower trap raises    Red 2 x 12       Prone Y's     2 x 12 Standing, green 3 x 10 Standing, green 3 x 10 2 x 15   Shoulder IR     Stretch with strap 5 x 15\"     Active IR/ER with back against wall and shoulder ABD to 90 deg; x 10      Shoulder T's      Black 3 x 10  Prone 2 x 15 1#     HEP: No changes made to HEP. Treatment/Session Assessment:    · Response to Treatment:  Patient making excellent progress with R shoulder ROM, both actively and passively. Patient demonstrates improvement with passive ER of R shoulder and improved performance with D2 flexion pattern. · Compliance with Program/Exercises: Compliant. · Recommendations/Intent for next treatment session: \"Next visit will continue to progress patient with ER and IR ROM and continue to improve scapular strength and stability.   Total Treatment Duration: 40 minutes  PT Patient Time In/Time Out  Time In: 1516  Time Out: [address], PT

## 2017-11-08 ENCOUNTER — HOSPITAL ENCOUNTER (OUTPATIENT)
Dept: PHYSICAL THERAPY | Age: 57
Discharge: HOME | End: 2017-11-08
Payer: COMMERCIAL

## 2017-11-08 PROCEDURE — 97140 MANUAL THERAPY 1/> REGIONS: CPT

## 2017-11-08 PROCEDURE — 97110 THERAPEUTIC EXERCISES: CPT

## 2017-11-08 NOTE — PROGRESS NOTES
Sushila Older  : 1960  Payor: [address] / Plan:  [place] / Product Type: Workers Comp /  [address]  at [address] [place] [address] Rd  1101 [facility], [address], [address] [place] [address]  [phone]   [phone]         OUTPATIENT PHYSICAL [address] Note 2017    ICD-10: Treatment Diagnosis: Pain in right shoulder (M25.511), Stiffness of right shoulder, not elsewhere classified (M25.611), Unspecified rotator cuff tear or rupture of right shoulder, not specified as traumatic (M75.101)  Precautions/Allergies:   Contrave [naltrexone-bupropion]; Lisinopril; and Other medication   Fall Risk Score: 6 (? 5 = High Risk)  MD Orders: Evaluate and treat MEDICAL/REFERRING DIAGNOSIS:  right shoulder pain   DATE OF ONSET: 2017  REFERRING PHYSICIAN: Johnna Razo MD  RETURN PHYSICIAN APPOINTMENT: 17     INITIAL ASSESSMENT:  Mr. Melly Cancino presents with R shoulder pain  which has persisted for 3 months following a fall. Patient exhibits reduced R shoulder ROM, increased R shoulder pain, and decreased use of R upper extremity secondary to pain and stiffness. Patient may benefit from PT intervention to improve function and use of R shoulder to limit pain and increase functional use of dominant hand. PROBLEM LIST (Impacting functional limitations):  1. Decreased Strength  2. Increased Pain  3. Decreased Activity Tolerance  4. Decreased Flexibility/Joint Mobility INTERVENTIONS PLANNED:  1. Home Exercise Program (HEP)  2. Range of Motion (ROM)  3. Therapeutic Exercise/Strengthening  4. Ultrasound (US)   TREATMENT PLAN:  Effective Dates: 10-4-17 TO 17. Frequency/Duration: 2-3 visits per week for 12 weeks  GOALS: (Goals have been discussed and agreed upon with patient.)  Short-Term Functional Goals: Time Frame: 6 weeks  1.  Patient will report 2-3/10 R shoulder pain at worst with use of R arm.  2. Patient will be able to exhibit 5/5 R and abduction and 4+/5 R shoulder ER strength 3. Patient will have 170 degrees of passive flexion and abduction without R shoulder pain. 4. Patient will be independent with initial HEP  Discharge Goals: Time Frame: 12 weeks  1. Patient will report 0-1/10 intermittent R shoulder pain with activity and score less than 15% on DASH  2. Patient will report no limitations with overhead activities and self care secondary to R shoulder/UE pain. 3. Patient will demonstrate 5/5 R lower and middle trapezius strength and demonstrate 150 degrees of active R forward elevation without increased pain. 4. Patient will be independent with final HEP                The information in this section was collected on 10-4-17 (except where otherwise noted). HISTORY:   History of Present Injury/Illness (Reason for Referral):  Patient fell in June 2017 and reached forward with R arm to catch himself and felt shoulder pain and weakness. Afterward he had significant shoulder pain that limited his sleeping and use of R hand. Patient states that he had an MRI which revealed a rotator cuff tear, but that he is hoping to avoid surgery via physical therapy intervention. He states that his pain and range of motion have improved since the first few weeks following injury but is still quite limited compared to the left. Patient has history of bulging cervical discs for which he has received physical therapy intervention for approx 15-20 years ago. Pt denies increased numbness since injury. Past Medical History/Comorbidities:   Mr. Belvie Bloch  has a past medical history of Abnormal glucose; Depression; Gout; History of narcotic addiction ([address] Knee pain, right; Morbid obesity ([address] Pulmonary nodule, right; and Visual disturbance. Mr. Belvie Bloch  has a past surgical history that includes tonsillectomy; colonoscopy (10/20/2015); and refractive surgery. Social History/Living Environment:    Patient lives alone in a [place] with 2 steps to enter house through garage.   Prior Level of Function/Work/Activity:  Patient works in [employer]  Side:         RIGHT    Current Medications:       Current Outpatient Prescriptions:     glucose blood VI test strips (FREESTYLE LITE STRIPS) strip, Test daily, Disp: 90 Strip, Rfl: 4    losartan (COZAAR) 100 mg tablet, Take 1 Tab by mouth daily. , Disp: 90 Tab, Rfl: 4    canagliflozin (INVOKANA) 300 mg tablet, Take 1 Tab by mouth Daily (before breakfast). , Disp: 30 Tab, Rfl: 12    warfarin (COUMADIN) 7.5 mg tablet, TAKE 1 TABLET DAILY, Disp: 90 Tab, Rfl: 2   Date Last Reviewed:  11/8/2017     Number of Personal Factors/Comorbidities that affect the Plan of Care: 1-2: MODERATE COMPLEXITY   EXAMINATION:   Observation/Orthostatic Postural Assessment:          Patient demonstrates increased upper trapezius activation when elevating R upper extremity in flexion and abduction. Patient has rounded shoulders and kyphotic posture. Palpation:          Patient has increased tightness to anterior shoulder along anterior and medial deltoid and biceps. Patient has reduced articular R glenohumeral motion with posterior and inferior glenohumeral glides  ROM:          R shoulder PROM (supine):           Flex  162 degrees,  degrees, ER 56 degrees (@ 45 degrees ABD), IR 76 degrees (@ 45 deg ABD)        R shoulder AROM (seated):           Flex 140 degrees,  degrees, ER to back of head, IR to L5-S1       L shoulder AROM (seated)           Flex 172 degrees,  ABD, ER T2, IR T8  Strength:          R shoulder: flexion 5/5, ABD 4+/5, ER (prone) 4/5, IR (prone) 5/5; middle trapezius 4+/5, lower trapezius 4/5        L shoulder: 5/5 for all movements         Special Tests:          Positive empty can/full can on R (negative on L); reynoso-bowen and Neer's negative on R, negative belly press B     Body Structures Involved:  1. Nerves  2. Bones  3. Joints  4. Muscles Body Functions Affected:  1. Sensory/Pain  2. Neuromusculoskeletal  3.  Movement Related Activities and Participation Affected:  1. Self Care  2. Domestic Life  3. Interpersonal Interactions and Relationships  4. Community, Social and [place]   Number of elements (examined above) that affect the Plan of Care: 4+: HIGH COMPLEXITY   CLINICAL PRESENTATION:   Presentation: Stable and uncomplicated: LOW COMPLEXITY   CLINICAL DECISION MAKING:   Outcome Measure: Tool Used: Disabilities of the Arm, Shoulder and Hand (DASH) Questionnaire - Quick Version  Score:  Initial: TBD (next visit) /55  Most Recent: X/55 (Date: -- )   Interpretation of Score: The DASH is designed to measure the activities of daily living in person's with upper extremity dysfunction or pain. Each section is scored on a 1-5 scale, 5 representing the greatest disability. The scores of each section are added together for a total score of 55. Medical Necessity:   · Skilled intervention continues to be required due to reduced R shoulder ROM, reduced R shoulder strength and increased R shoulder pain which limit use of R UE. Reason for Services/Other Comments:  · Patient continues to require skilled intervention due to patient's limitations with functional activities with R UE. Use of outcome tool(s) and clinical judgement create a POC that gives a: Clear prediction of patient's progress: LOW COMPLEXITY            TREATMENT:   (In addition to Assessment/Re-Assessment sessions the following treatments were rendered)  Pre-treatment Symptoms/Complaints:  Patient reports that his shoulder was not very sore after his last PT treatment and that it feels pretty good. Stated that he is able to take his shirt off without discomfort now. Pain: Initial:     0/10 Post Session: No pain reported   THERAPEUTIC EXERCISE: (28 minutes):  Exercises per grid below to improve mobility and strength. Cues for proper hand positioning to minimize R shoulder discomfort.     Manual therapy (15 minutes): R shoulder PROM (flex, ABD, and ER; D2 and D1 flexion/extension) and glenohumeral joint mobilizations (grade IV) for improved R shoulder ROM and reduced discomfort. Date  10-25-17 Date  10-30-17 Date  11-1-17 Date  11-6-17 Date  11-8-17   Activity/Exercise        Scapular retractions 17#2 x 15 Standing (peg 3), black 2 x 15 Standing (peg 3) black 3 x 15 13# bilateral, 2 x 15 17# bilateral 3 x 15   Wand flexion        Wand ABD        Wand ER X 15       Pulleys        Side-lying ER   3# 3 x 12   4# 3 x 10 7# cable 2 x 15   Dynamic stabilization Red flexbar 2 x 15 R UE Manual 2 x      D2 flexion 2 x 12 2 x 12 2 x 12 2 x 12 2 x 12   External rotation 3# 3 x 15  Green 3 x 10  Active in scapular plane 2 x 10   Scaption 5# 2 x 10    4# 3 x 10   Shoulder extensions 7# 2 x 15       Standing lower trap raises        Prone Y's 2 x 12 Standing, green 3 x 10 Standing, green 3 x 10 2 x 15 Standing, blue 2 x 10   Shoulder IR Stretch with strap 5 x 15\"     Active IR/ER with back against wall and shoulder ABD to 90 deg; x 10       Shoulder T's  Black 3 x 10  Prone 2 x 15 1# Standing, 7# 3 x 10 R UE   Overhead press     Red  2 x 10 neutral                      HEP: No changes made to HEP. Treatment/Session Assessment:    · Response to Treatment:  Patient is making good progress with his R shoulder/arm strength, but remains limited with active and passive ER, albeit patient is improving with this. · Compliance with Program/Exercises: Compliant. · Recommendations/Intent for next treatment session: \"Next visit will continue to progress patient with ER and IR ROM and continue to improve scapular strength and stability.   Total Treatment Duration: 43 minutes  PT Patient Time In/Time Out  Time In: 2099  Time Out: Fadi Vigil PT

## 2017-11-20 ENCOUNTER — HOSPITAL ENCOUNTER (OUTPATIENT)
Dept: PHYSICAL THERAPY | Age: 57
Discharge: HOME | End: 2017-11-20
Payer: COMMERCIAL

## 2017-11-20 PROCEDURE — 97140 MANUAL THERAPY 1/> REGIONS: CPT

## 2017-11-20 PROCEDURE — 97110 THERAPEUTIC EXERCISES: CPT

## 2017-11-20 NOTE — PROGRESS NOTES
Audi Ohm  : 1960  Payor: [address] [place] [address] Road / Plan: Gissel Hollis / Product Type: [employer] /  [address]  at [facility] [employer]  [address] [place] [address], [address].  [phone]   [phone]         OUTPATIENT PHYSICAL [address] Note and Progress Report 2017    ICD-10: Treatment Diagnosis: Pain in right shoulder (M25.511), Stiffness of right shoulder, not elsewhere classified (M25.611), Unspecified rotator cuff tear or rupture of right shoulder, not specified as traumatic (M75.101)  Precautions/Allergies:   Contrave [naltrexone-bupropion]; Lisinopril; and Other medication   Fall Risk Score: 6 (? 5 = High Risk)  MD Orders: Evaluate and treat MEDICAL/REFERRING DIAGNOSIS:  right shoulder pain   DATE OF ONSET: 2017  REFERRING PHYSICIAN: Gina Robles MD  RETURN PHYSICIAN APPOINTMENT: 11 ASSESSMENT:  Mr. Jolene Feldman has attended 10 PT treatments to address R shoulder pain and reduced ROM. Patient has made improvements with R shoulder strength, ROM and reduced discomfort. Patient will benefit from ongoing PT to continue to improve ROM and strength, as patient has reduced R shoulder ER and IR actively and passively. PROBLEM LIST (Impacting functional limitations):  1. Decreased Strength  2. Increased Pain  3. Decreased Activity Tolerance  4. Decreased Flexibility/Joint Mobility INTERVENTIONS PLANNED:  1. Home Exercise Program (HEP)  2. Range of Motion (ROM)  3. Therapeutic Exercise/Strengthening  4. Ultrasound (US)   TREATMENT PLAN:  Effective Dates: 10-4-17 TO 17. Frequency/Duration: 2-3 visits per week for 12 weeks  GOALS: (Goals have been discussed and agreed upon with patient.)  Short-Term Functional Goals: Time Frame: 6 weeks  1. Patient will report 2-3/10 R shoulder pain at worst with use of R arm. MET 17  2. Patient will be able to exhibit 5/5 R and abduction and 4+/5 R shoulder ER strength  MET 17  3.  Patient will have 170 degrees of passive flexion and abduction without R shoulder pain. Ongoing 11-20-17  4. Patient will be independent with initial HEP MET 11-20-17  Discharge Goals: Time Frame: 12 weeks  1. Patient will report 0-1/10 intermittent R shoulder pain with activity and score less than 15% on DASH  2. Patient will report no limitations with overhead activities and self care secondary to R shoulder/UE pain. 3. Patient will demonstrate 5/5 R lower and middle trapezius strength and demonstrate 150 degrees of active R forward elevation without increased pain. 4. Patient will be independent with final [facility], PT  11/20/2017                  The information in this section was collected on 10-4-17 (except where otherwise noted). HISTORY:   History of Present Injury/Illness (Reason for Referral):  Patient fell in June 2017 and reached forward with R arm to catch himself and felt shoulder pain and weakness. Afterward he had significant shoulder pain that limited his sleeping and use of R hand. Patient states that he had an MRI which revealed a rotator cuff tear, but that he is hoping to avoid surgery via physical therapy intervention. He states that his pain and range of motion have improved since the first few weeks following injury but is still quite limited compared to the left. Patient has history of bulging cervical discs for which he has received physical therapy intervention for approx 15-20 years ago. Pt denies increased numbness since injury. Past Medical History/Comorbidities:   Mr. Lenetta Schwab  has a past medical history of Abnormal glucose; Depression; Gout; History of narcotic addiction ([address] Knee pain, right; Morbid obesity ([address] Pulmonary nodule, right; and Visual disturbance. Mr. Lenetta Schwab  has a past surgical history that includes tonsillectomy; colonoscopy (10/20/2015); and refractive surgery.   Social History/Living Environment:    Patient lives alone in a [place] with 2 steps to enter house through garage. Prior Level of Function/Work/Activity:  Patient works in [employer]  Side:         RIGHT    Current Medications:       Current Outpatient Prescriptions:     glucose blood VI test strips (FREESTYLE LITE STRIPS) strip, Test daily, Disp: 90 Strip, Rfl: 4    losartan (COZAAR) 100 mg tablet, Take 1 Tab by mouth daily. , Disp: 90 Tab, Rfl: 4    canagliflozin (INVOKANA) 300 mg tablet, Take 1 Tab by mouth Daily (before breakfast). , Disp: 30 Tab, Rfl: 12    warfarin (COUMADIN) 7.5 mg tablet, TAKE 1 TABLET DAILY, Disp: 90 Tab, Rfl: 2   Date Last Reviewed:  11/20/2017     Number of Personal Factors/Comorbidities that affect the Plan of Care: 1-2: MODERATE COMPLEXITY   EXAMINATION:     ROM:          R shoulder PROM (supine):           Flex  162 degrees,  degrees, ER 56 degrees (@ 45 degrees ABD), IR 76 degrees (@ 45 deg ABD)        R shoulder AROM (seated):           Flex 140 degrees,  degrees, ER to back of head, IR to L5-S1       L shoulder AROM (seated)           Flex 172 degrees,  ABD, ER T2, IR T8    11-20-17         R shoulder PROM (supine)         Flex:  163 deg        ABD:    160 deg    ER: 60 deg @ 45 deg ABD         R shoulder AROM flex 154 deg,  deg, ER  Back of head      IR L5-S1  Strength:          R shoulder: flexion 5/5, ABD 5/5, ER (sitting) 4+/5, IR (sitting) 5/5        Special Tests:          Positive empty can/full can on R (negative on L); reynoso-bowen and Neer's negative on R, negative belly press B     Body Structures Involved:  1. Nerves  2. Bones  3. Joints  4. Muscles Body Functions Affected:  1. Sensory/Pain  2. Neuromusculoskeletal  3. Movement Related Activities and Participation Affected:  1. Self Care  2. Domestic Life  3. Interpersonal Interactions and Relationships  4.  Community, Social and [place]   Number of elements (examined above) that affect the Plan of Care: 4+: HIGH COMPLEXITY   CLINICAL PRESENTATION:   Presentation: Stable and uncomplicated: LOW COMPLEXITY   CLINICAL DECISION MAKING:   Outcome Measure: Tool Used: Disabilities of the Arm, Shoulder and Hand (DASH) Questionnaire - Quick Version  Score:  Initial: TBD (next visit) /55  Most Recent: 16/55 (Date: 11-20-17 )   Interpretation of Score: The DASH is designed to measure the activities of daily living in person's with upper extremity dysfunction or pain. Each section is scored on a 1-5 scale, 5 representing the greatest disability. The scores of each section are added together for a total score of 55. Medical Necessity:   · Skilled intervention continues to be required due to reduced R shoulder ROM, reduced R shoulder strength and increased R shoulder pain which limit use of R UE. Reason for Services/Other Comments:  · Patient continues to require skilled intervention due to patient's limitations with functional activities with R UE. Use of outcome tool(s) and clinical judgement create a POC that gives a: Clear prediction of patient's progress: LOW COMPLEXITY            TREATMENT:   (In addition to Assessment/Re-Assessment sessions the following treatments were rendered)  Pre-treatment Symptoms/Complaints:  Patient stated that he returned to MD who was pleased with his progress and that he approved 1 x week for 6 weeks. Pain: Initial:     0/10 Post Session: No pain reported   THERAPEUTIC EXERCISE: (26 minutes):  Exercises per grid below to improve mobility and strength. Cues for proper hand positioning to minimize R shoulder discomfort. Manual therapy (14 minutes): R shoulder PROM (flex, ABD, and ER) and glenohumeral joint mobilizations (grade IV) for improved R shoulder ROM and reduced discomfort.      Date  10-25-17 Date  10-30-17 Date  11-1-17 Date  11-6-17 Date  11-8-17 Date  11-20-17   Activity/Exercise         Scapular retractionsh 17#2 x 15 Standing (peg 3), black 2 x 15 Standing (peg 3) black 3 x 15 13# bilateral, 2 x 15 17# bilateral 3 x 15 17# R UE 2 x 15 Wand flexion         Wand ABD         Wand ER X 15        Pulleys         Side-lying ER   3# 3 x 12   4# 3 x 10 7# cable 2 x 15 7# cable 3 x 12   Dynamic stabilization Red flexbar 2 x 15 R UE Manual 2 x       D2 flexion 2 x 12 2 x 12 2 x 12 2 x 12 2 x 12 2 x 12   External rotation 3# 3 x 15  Green 3 x 10  Active in scapular plane 2 x 10 Actve; elbows at sides 3 x 10   Scaption 5# 2 x 10    4# 3 x 10 5# 3 x 10 B   Shoulder extensions 7# 2 x 15        Standing lower trap raises         Prone Y's 2 x 12 Standing, green 3 x 10 Standing, green 3 x 10 2 x 15 Standing, blue 2 x 10 Standing, blue 2 x 12   Shoulder IR Stretch with strap 5 x 15\"     Active IR/ER with back against wall and shoulder ABD to 90 deg; x 10        Shoulder T's  Black 3 x 10  Prone 2 x 15 1# Standing, 7# 3 x 10 R UE Standing 7# 3 x 10 R UE   Overhead press     Red  2 x 10 neutral  Red 3 x 10 neutral                        HEP: No changes made to HEP. Treatment/Session Assessment:    · Response to Treatment:  Patient has made progress with R shoulder strength and ROM, but remains limited with active ER and IR. Patient had minimal discomfort with any therapeutic exercises performed today. · Compliance with Program/Exercises: Compliant. · Recommendations/Intent for next treatment session: \"Next visit will continue to progress patient with ER and IR ROM and continue to improve scapular strength and stability.   Total Treatment Duration: 40 minutes  PT Patient Time In/Time Out  Time In: 1430  Time Out: 79 Lisa Li PT

## 2017-11-22 ENCOUNTER — HOSPITAL ENCOUNTER (OUTPATIENT)
Dept: PHYSICAL THERAPY | Age: 57
Discharge: HOME | End: 2017-11-22
Payer: COMMERCIAL

## 2017-11-22 PROCEDURE — 97110 THERAPEUTIC EXERCISES: CPT

## 2017-11-22 PROCEDURE — 97140 MANUAL THERAPY 1/> REGIONS: CPT

## 2017-11-22 NOTE — PROGRESS NOTES
Lurdes Baker  : 1960  Payor: [address] [place] [address] Road / Plan: [facility] / Product Type: Workers Comp /  [address]  at [facility] [employer]AN [employer]GS  [address] [place] [address], [address].  [phone]   [phone]         OUTPATIENT PHYSICAL [address] Note 2017    ICD-10: Treatment Diagnosis: Pain in right shoulder (M25.511), Stiffness of right shoulder, not elsewhere classified (M25.611), Unspecified rotator cuff tear or rupture of right shoulder, not specified as traumatic (M75.101)  Precautions/Allergies:   Contrave [naltrexone-bupropion]; Lisinopril; and Other medication   Fall Risk Score: 6 (? 5 = High Risk)  MD Orders: Evaluate and treat MEDICAL/REFERRING DIAGNOSIS:  right shoulder pain   DATE OF ONSET: 2017  REFERRING PHYSICIAN: King Jon MD  RETURN PHYSICIAN APPOINTMENT: 11 ASSESSMENT:  Mr. Tacho Granados has attended 10 PT treatments to address R shoulder pain and reduced ROM. Patient has made improvements with R shoulder strength, ROM and reduced discomfort. Patient will benefit from ongoing PT to continue to improve ROM and strength, as patient has reduced R shoulder ER and IR actively and passively. PROBLEM LIST (Impacting functional limitations):  1. Decreased Strength  2. Increased Pain  3. Decreased Activity Tolerance  4. Decreased Flexibility/Joint Mobility INTERVENTIONS PLANNED:  1. Home Exercise Program (HEP)  2. Range of Motion (ROM)  3. Therapeutic Exercise/Strengthening  4. Ultrasound (US)   TREATMENT PLAN:  Effective Dates: 10-4-17 TO 17. Frequency/Duration: 2-3 visits per week for 12 weeks  GOALS: (Goals have been discussed and agreed upon with patient.)  Short-Term Functional Goals: Time Frame: 6 weeks  1. Patient will report 2-3/10 R shoulder pain at worst with use of R arm. MET 17  2. Patient will be able to exhibit 5/5 R and abduction and 4+/5 R shoulder ER strength  MET 17  3.  Patient will have 170 degrees of passive flexion and abduction without R shoulder pain. Ongoing 11-20-17  4. Patient will be independent with initial HEP MET 11-20-17  Discharge Goals: Time Frame: 12 weeks  1. Patient will report 0-1/10 intermittent R shoulder pain with activity and score less than 15% on DASH  2. Patient will report no limitations with overhead activities and self care secondary to R shoulder/UE pain. 3. Patient will demonstrate 5/5 R lower and middle trapezius strength and demonstrate 150 degrees of active R forward elevation without increased pain. 4. Patient will be independent with final [facility], PT  11/22/2017                  The information in this section was collected on 10-4-17 (except where otherwise noted). HISTORY:   History of Present Injury/Illness (Reason for Referral):  Patient fell in June 2017 and reached forward with R arm to catch himself and felt shoulder pain and weakness. Afterward he had significant shoulder pain that limited his sleeping and use of R hand. Patient states that he had an MRI which revealed a rotator cuff tear, but that he is hoping to avoid surgery via physical therapy intervention. He states that his pain and range of motion have improved since the first few weeks following injury but is still quite limited compared to the left. Patient has history of bulging cervical discs for which he has received physical therapy intervention for approx 15-20 years ago. Pt denies increased numbness since injury. Past Medical History/Comorbidities:   Mr. Dayna Zaragoza  has a past medical history of Abnormal glucose; Depression; Gout; History of narcotic addiction ([address] Knee pain, right; Morbid obesity ([address] Pulmonary nodule, right; and Visual disturbance. Mr. Dayna Zaragoza  has a past surgical history that includes tonsillectomy; colonoscopy (10/20/2015); and refractive surgery.   Social History/Living Environment:    Patient lives alone in a [place] with 2 steps to enter house through garage. Prior Level of Function/Work/Activity:  Patient works in [employer]  Side:         RIGHT    Current Medications:       Current Outpatient Prescriptions:     glucose blood VI test strips (FREESTYLE LITE STRIPS) strip, Test daily, Disp: 90 Strip, Rfl: 4    losartan (COZAAR) 100 mg tablet, Take 1 Tab by mouth daily. , Disp: 90 Tab, Rfl: 4    canagliflozin (INVOKANA) 300 mg tablet, Take 1 Tab by mouth Daily (before breakfast). , Disp: 30 Tab, Rfl: 12    warfarin (COUMADIN) 7.5 mg tablet, TAKE 1 TABLET DAILY, Disp: 90 Tab, Rfl: 2   Date Last Reviewed:  11/22/2017     Number of Personal Factors/Comorbidities that affect the Plan of Care: 1-2: MODERATE COMPLEXITY   EXAMINATION:     ROM:          R shoulder PROM (supine):           Flex  162 degrees,  degrees, ER 56 degrees (@ 45 degrees ABD), IR 76 degrees (@ 45 deg ABD)        R shoulder AROM (seated):           Flex 140 degrees,  degrees, ER to back of head, IR to L5-S1       L shoulder AROM (seated)           Flex 172 degrees,  ABD, ER T2, IR T8    11-20-17         R shoulder PROM (supine)         Flex:  163 deg        ABD:    160 deg    ER: 60 deg @ 45 deg ABD         R shoulder AROM flex 154 deg,  deg, ER  Back of head      IR L5-S1  Strength:          R shoulder: flexion 5/5, ABD 5/5, ER (sitting) 4+/5, IR (sitting) 5/5        Special Tests:          Positive empty can/full can on R (negative on L); reynoso-bowen and Neer's negative on R, negative belly press B     Body Structures Involved:  1. Nerves  2. Bones  3. Joints  4. Muscles Body Functions Affected:  1. Sensory/Pain  2. Neuromusculoskeletal  3. Movement Related Activities and Participation Affected:  1. Self Care  2. Domestic Life  3. Interpersonal Interactions and Relationships  4.  Community, Social and [address]   Number of elements (examined above) that affect the Plan of Care: 4+: HIGH COMPLEXITY   CLINICAL PRESENTATION:   Presentation: Stable and uncomplicated: LOW COMPLEXITY   CLINICAL DECISION MAKING:   Outcome Measure: Tool Used: Disabilities of the Arm, Shoulder and Hand (DASH) Questionnaire - Quick Version  Score:  Initial: TBD (next visit) /55  Most Recent: 16/55 (Date: 11-20-17 )   Interpretation of Score: The DASH is designed to measure the activities of daily living in person's with upper extremity dysfunction or pain. Each section is scored on a 1-5 scale, 5 representing the greatest disability. The scores of each section are added together for a total score of 55. Medical Necessity:   · Skilled intervention continues to be required due to reduced R shoulder ROM, reduced R shoulder strength and increased R shoulder pain which limit use of R UE. Reason for Services/Other Comments:  · Patient continues to require skilled intervention due to patient's limitations with functional activities with R UE. Use of outcome tool(s) and clinical judgement create a POC that gives a: Clear prediction of patient's progress: LOW COMPLEXITY            TREATMENT:   (In addition to Assessment/Re-Assessment sessions the following treatments were rendered)  Pre-treatment Symptoms/Complaints:  Patient reported that his R shoulder felt better following PT. Stated that he has been having increased L shoulder pain recently but his R shoulder symptoms have been improving. Pain: Initial:     0/10 Post Session: 0/10   THERAPEUTIC EXERCISE: (28 minutes):  Exercises per grid below to improve mobility and strength. Demonstration provided for patient to perform therapeutic exercises correctly. Patient cued to use caution when performing internal rotation stretch as not to irritate R shoulder. Manual therapy (14 minutes): R shoulder PROM (flex, ABD, and ER) and glenohumeral joint mobilizations (grade III-IV) for improved R shoulder ROM and reduced discomfort.      Date  10-25-17 Date  10-30-17 Date  11-1-17 Date  11-6-17 Date  11-8-17 Date  11-20-17 Date  11-22-17 Activity/Exercise          Scapular retractionsh 17#2 x 15 Standing (peg 3), black 2 x 15 Standing (peg 3) black 3 x 15 13# bilateral, 2 x 15 17# bilateral 3 x 15 17# R UE 2 x 15 17# Bilateral 3 x 12   Wand flexion          Wand ABD          Wand ER X 15         Pulleys          Side-lying ER   3# 3 x 12   4# 3 x 10 7# cable 2 x 15 7# cable 3 x 12 7# cable 3 x 10   Dynamic stabilization Red flexbar 2 x 15 R UE Manual 2 x        D2 flexion 2 x 12 2 x 12 2 x 12 2 x 12 2 x 12 2 x 12 2 x 12   External rotation 3# 3 x 15  Green 3 x 10  Active in scapular plane 2 x 10 Actve; elbows at sides 3 x 10 Active; elbows at side 3 x 10   Scaption 5# 2 x 10    4# 3 x 10 5# 3 x 10 B 5# 2 x 15 B   Shoulder extensions 7# 2 x 15         Standing lower trap raises          Prone Y's 2 x 12 Standing, green 3 x 10 Standing, green 3 x 10 2 x 15 Standing, blue 2 x 10 Standing, blue 2 x 12 Standing, blue   3 x 10   Shoulder IR Stretch with strap 5 x 15\"     Active IR/ER with back against wall and shoulder ABD to 90 deg; x 10      Stretch with strap 5 x 15\"   Shoulder T's  Black 3 x 10  Prone 2 x 15 1# Standing, 7# 3 x 10 R UE Standing 7# 3 x 10 R UE Standing 7# 3 x 10 R UE   Overhead press     Red  2 x 10 neutral  Red 3 x 10 neutral                           HEP: No changes made to HEP. Treatment/Session Assessment:    · Response to Treatment:  Patient is making good progress with both active IR and ER. Patient is improving with scapular strength as well. · Compliance with Program/Exercises: Compliant. · Recommendations/Intent for next treatment session: \"Next visit will continue to progress patient with ER and IR ROM and continue to improve scapular strength and stability.   Total Treatment Duration: 42 minutes  PT Patient Time In/Time Out  Time In: 6037  Time Out: [address], PT

## 2017-11-29 ENCOUNTER — HOSPITAL ENCOUNTER (OUTPATIENT)
Dept: PHYSICAL THERAPY | Age: 57
Discharge: HOME | End: 2017-11-29
Payer: COMMERCIAL

## 2017-11-29 PROCEDURE — 97110 THERAPEUTIC EXERCISES: CPT

## 2017-11-29 NOTE — PROGRESS NOTES
Orlando De La O  : 1960  [facility] [address] / Plan: [place] / Product Type: Workers Comp /  [address]  at [address] [place] [address] Rd  [facility], [address] L[address].  [phone]   [phone]         OUTPATIENT PHYSICAL [address] Note 2017    ICD-10: Treatment Diagnosis: Pain in right shoulder (M25.511), Stiffness of right shoulder, not elsewhere classified (M25.611), Unspecified rotator cuff tear or rupture of right shoulder, not specified as traumatic (M75.101)  Precautions/Allergies:   Contrave [naltrexone-bupropion]; Lisinopril; and Other medication   Fall Risk Score: 6 (? 5 = High Risk)  MD Orders: Evaluate and treat MEDICAL/REFERRING DIAGNOSIS:  right shoulder pain   DATE OF ONSET: 2017  REFERRING PHYSICIAN: Rosanna Lam MD  RETURN PHYSICIAN APPOINTMENT: 11 ASSESSMENT:  Mr. Washington Castorena has attended 10 PT treatments to address R shoulder pain and reduced ROM. Patient has made improvements with R shoulder strength, ROM and reduced discomfort. Patient will benefit from ongoing PT to continue to improve ROM and strength, as patient has reduced R shoulder ER and IR actively and passively. PROBLEM LIST (Impacting functional limitations):  1. Decreased Strength  2. Increased Pain  3. Decreased Activity Tolerance  4. Decreased Flexibility/Joint Mobility INTERVENTIONS PLANNED:  1. Home Exercise Program (HEP)  2. Range of Motion (ROM)  3. Therapeutic Exercise/Strengthening  4. Ultrasound (US)   TREATMENT PLAN:  Effective Dates: 10-4-17 TO 17. Frequency/Duration: 2-3 visits per week for 12 weeks  GOALS: (Goals have been discussed and agreed upon with patient.)  Short-Term Functional Goals: Time Frame: 6 weeks  1. Patient will report 2-3/10 R shoulder pain at worst with use of R arm. MET 17  2. Patient will be able to exhibit 5/5 R and abduction and 4+/5 R shoulder ER strength  MET 17  3.  Patient will have 170 degrees of passive flexion and abduction without R shoulder pain. Ongoing 11-20-17  4. Patient will be independent with initial HEP MET 11-20-17  Discharge Goals: Time Frame: 12 weeks  1. Patient will report 0-1/10 intermittent R shoulder pain with activity and score less than 15% on DASH  2. Patient will report no limitations with overhead activities and self care secondary to R shoulder/UE pain. 3. Patient will demonstrate 5/5 R lower and middle trapezius strength and demonstrate 150 degrees of active R forward elevation without increased pain. 4. Patient will be independent with final [place], PT  11/29/2017                  The information in this section was collected on 10-4-17 (except where otherwise noted). HISTORY:   History of Present Injury/Illness (Reason for Referral):  Patient fell in June 2017 and reached forward with R arm to catch himself and felt shoulder pain and weakness. Afterward he had significant shoulder pain that limited his sleeping and use of R hand. Patient states that he had an MRI which revealed a rotator cuff tear, but that he is hoping to avoid surgery via physical therapy intervention. He states that his pain and range of motion have improved since the first few weeks following injury but is still quite limited compared to the left. Patient has history of bulging cervical discs for which he has received physical therapy intervention for approx 15-20 years ago. Pt denies increased numbness since injury. Past Medical History/Comorbidities:   Mr. Gabrielle Frey  has a past medical history of Abnormal glucose; Depression; Gout; History of narcotic addiction ([address] Knee pain, right; Morbid obesity ([address] Pulmonary nodule, right; and Visual disturbance. Mr. Gabrielle Frey  has a past surgical history that includes tonsillectomy; colonoscopy (10/20/2015); and refractive surgery.   Social History/Living Environment:    Patient lives alone in a [place] with 2 steps to enter house through garage. Prior Level of Function/Work/Activity:  Patient works in [employer]  Side:         RIGHT    Current Medications:       Current Outpatient Prescriptions:     warfarin (COUMADIN) 7.5 mg tablet, TAKE 1 TABLET DAILY, Disp: 90 Tab, Rfl: 4    glucose blood VI test strips (FREESTYLE LITE STRIPS) strip, Test daily, Disp: 90 Strip, Rfl: 4    losartan (COZAAR) 100 mg tablet, Take 1 Tab by mouth daily. , Disp: 90 Tab, Rfl: 4    canagliflozin (INVOKANA) 300 mg tablet, Take 1 Tab by mouth Daily (before breakfast). , Disp: 30 Tab, Rfl: 12    warfarin (COUMADIN) 7.5 mg tablet, TAKE 1 TABLET DAILY, Disp: 90 Tab, Rfl: 2   Date Last Reviewed:  11/29/2017     Number of Personal Factors/Comorbidities that affect the Plan of Care: 1-2: MODERATE COMPLEXITY   EXAMINATION:     ROM:          R shoulder PROM (supine):           Flex  162 degrees,  degrees, ER 56 degrees (@ 45 degrees ABD), IR 76 degrees (@ 45 deg ABD)        R shoulder AROM (seated):           Flex 140 degrees,  degrees, ER to back of head, IR to L5-S1       L shoulder AROM (seated)           Flex 172 degrees,  ABD, ER T2, IR T8    11-20-17         R shoulder PROM (supine)         Flex:  163 deg        ABD:    160 deg    ER: 60 deg @ 45 deg ABD         R shoulder AROM flex 154 deg,  deg, ER  Back of head      IR L5-S1  Strength:          R shoulder: flexion 5/5, ABD 5/5, ER (sitting) 4+/5, IR (sitting) 5/5        Special Tests:          Positive empty can/full can on R (negative on L); reynoso-bowen and Neer's negative on R, negative belly press B     Body Structures Involved:  1. Nerves  2. Bones  3. Joints  4. Muscles Body Functions Affected:  1. Sensory/Pain  2. Neuromusculoskeletal  3. Movement Related Activities and Participation Affected:  1. Self Care  2. Domestic Life  3. Interpersonal Interactions and Relationships  4.  Community, Social and [place]   Number of elements (examined above) that affect the Plan of Care: 4+: HIGH COMPLEXITY   CLINICAL PRESENTATION:   Presentation: Stable and uncomplicated: LOW COMPLEXITY   CLINICAL DECISION MAKING:   Outcome Measure: Tool Used: Disabilities of the Arm, Shoulder and Hand (DASH) Questionnaire - Quick Version  Score:  Initial: TBD (next visit) /55  Most Recent: 16/55 (Date: 11-20-17 )   Interpretation of Score: The DASH is designed to measure the activities of daily living in person's with upper extremity dysfunction or pain. Each section is scored on a 1-5 scale, 5 representing the greatest disability. The scores of each section are added together for a total score of 55. Medical Necessity:   · Skilled intervention continues to be required due to reduced R shoulder ROM, reduced R shoulder strength and increased R shoulder pain which limit use of R UE. Reason for Services/Other Comments:  · Patient continues to require skilled intervention due to patient's limitations with functional activities with R UE. Use of outcome tool(s) and clinical judgement create a POC that gives a: Clear prediction of patient's progress: LOW COMPLEXITY            TREATMENT:   (In addition to Assessment/Re-Assessment sessions the following treatments were rendered)  Pre-treatment Symptoms/Complaints:  Patient reports no shoulder pain upon arrival to PT. Pain: Initial:     0/10 Post Session: 0/10   THERAPEUTIC EXERCISE: (33 minutes):  Exercises per grid below to improve mobility and strength. Demonstration provided for patient to perform therapeutic exercises correctly. Patient cued to use avoid elevating shoulders with overhead activities to prevent further impingement. 4 min warm up on UBE to begin PT. Manual therapy (0 minutes):Not performed today.       Date  11-6-17 Date  11-8-17 Date  11-20-17 Date  11-22-17 Date  11-29-17   Activity/Exercise        Scapular retractionsh 13# bilateral, 2 x 15 17# bilateral 3 x 15 17# R UE 2 x 15 17# Bilateral 3 x 12 17# bilateral 3 x 12   Wand flexion Wand ABD        Wand ER        Pulleys        Side-lying ER 4# 3 x 10 7# cable 2 x 15 7# cable 3 x 12 7# cable 3 x 10 7# 3 x 12   Dynamic stabilization        D2 flexion 2 x 12 2 x 12 2 x 12 2 x 12 Wall slides D1, D2 x 12 each    D2 flexion 2 x 10   External rotation  Active in scapular plane 2 x 10 Actve; elbows at sides 3 x 10 Active; elbows at side 3 x 10 Active; elbows at side 3 x 10    Standing row + ER red 2 x 10   Scaption  4# 3 x 10 5# 3 x 10 B 5# 2 x 15 B 6# 3 x 10 B   Shoulder extensions        Standing lower trap raises        Prone Y's 2 x 15 Standing, blue 2 x 10 Standing, blue 2 x 12 Standing, blue   3 x 10 Standing. Blue 3 x 10   Shoulder IR    Stretch with strap 5 x 15\" Stretch 10 x 5\"   Shoulder T's Prone 2 x 15 1# Standing, 7# 3 x 10 R UE Standing 7# 3 x 10 R UE Standing 7# 3 x 10 R UE Standing, blue 3 x 10   Overhead press  Red  2 x 10 neutral  Red 3 x 10 neutral   Red 3 x 10  Neutral    Flexbar     2 x 10 R UE             HEP: No changes made to HEP. Treatment/Session Assessment:    · Response to Treatment:  Patient continues to improve with R shoulder strength and AROM, but remains limited in capsular pattern with rotation being the most limited. Patient continues to show good progress with strength and demonstrates much less upper trapezius compensation to elevate R UE.  · Compliance with Program/Exercises: Compliant. · Recommendations/Intent for next treatment session: \"Next visit will continue to progress patient with ER and IR ROM and continue to improve scapular strength and stability.   Total Treatment Duration: 37 minutes  PT Patient Time In/Time Out  Time In: 7915  Time Out: [address], PT

## 2017-12-06 ENCOUNTER — HOSPITAL ENCOUNTER (OUTPATIENT)
Dept: PHYSICAL THERAPY | Age: 57
Discharge: HOME | End: 2017-12-06
Payer: COMMERCIAL

## 2017-12-06 PROCEDURE — 97110 THERAPEUTIC EXERCISES: CPT

## 2017-12-06 PROCEDURE — 97140 MANUAL THERAPY 1/> REGIONS: CPT

## 2017-12-06 NOTE — PROGRESS NOTES
Lurdes Baker  : 1960  Payor: [address] [place] [address] Road / Plan: [facility] / Product Type: Workers Comp /  [employer]  at [facility] [employer]  [address] [place] [address], 4 Lisa Tierney, [address] [place] [address]  [phone]   [phone]         OUTPATIENT PHYSICAL [address] Note 2017    ICD-10: Treatment Diagnosis: Pain in right shoulder (M25.511), Stiffness of right shoulder, not elsewhere classified (M25.611), Unspecified rotator cuff tear or rupture of right shoulder, not specified as traumatic (M75.101)  Precautions/Allergies:   Contrave [naltrexone-bupropion]; Lisinopril; and Other medication   Fall Risk Score: 6 (? 5 = High Risk)  MD Orders: Evaluate and treat MEDICAL/REFERRING DIAGNOSIS:  right shoulder pain   DATE OF ONSET: 2017  REFERRING PHYSICIAN: King Jon MD  RETURN PHYSICIAN APPOINTMENT: 11 ASSESSMENT:  Mr. Tacho Granados has attended 10 PT treatments to address R shoulder pain and reduced ROM. Patient has made improvements with R shoulder strength, ROM and reduced discomfort. Patient will benefit from ongoing PT to continue to improve ROM and strength, as patient has reduced R shoulder ER and IR actively and passively. PROBLEM LIST (Impacting functional limitations):  1. Decreased Strength  2. Increased Pain  3. Decreased Activity Tolerance  4. Decreased Flexibility/Joint Mobility INTERVENTIONS PLANNED:  1. Home Exercise Program (HEP)  2. Range of Motion (ROM)  3. Therapeutic Exercise/Strengthening  4. Ultrasound (US)   TREATMENT PLAN:  Effective Dates: 10-4-17 TO 17. Frequency/Duration: 2-3 visits per week for 12 weeks  GOALS: (Goals have been discussed and agreed upon with patient.)  Short-Term Functional Goals: Time Frame: 6 weeks  1. Patient will report 2-3/10 R shoulder pain at worst with use of R arm. MET 17  2. Patient will be able to exhibit 5/5 R and abduction and 4+/5 R shoulder ER strength  MET 17  3.  Patient will have 170 degrees of passive flexion and abduction without R shoulder pain. Ongoing 11-20-17  4. Patient will be independent with initial HEP MET 11-20-17  Discharge Goals: Time Frame: 12 weeks  1. Patient will report 0-1/10 intermittent R shoulder pain with activity and score less than 15% on DASH  2. Patient will report no limitations with overhead activities and self care secondary to R shoulder/UE pain. 3. Patient will demonstrate 5/5 R lower and middle trapezius strength and demonstrate 150 degrees of active R forward elevation without increased pain. 4. Patient will be independent with final DmReynolds, PT  12/6/2017                  The information in this section was collected on 10-4-17 (except where otherwise noted). HISTORY:   History of Present Injury/Illness (Reason for Referral):  Patient fell in June 2017 and reached forward with R arm to catch himself and felt shoulder pain and weakness. Afterward he had significant shoulder pain that limited his sleeping and use of R hand. Patient states that he had an MRI which revealed a rotator cuff tear, but that he is hoping to avoid surgery via physical therapy intervention. He states that his pain and range of motion have improved since the first few weeks following injury but is still quite limited compared to the left. Patient has history of bulging cervical discs for which he has received physical therapy intervention for approx 15-20 years ago. Pt denies increased numbness since injury. Past Medical History/Comorbidities:   Mr. Rissa Poe  has a past medical history of Abnormal glucose; Depression; Gout; History of narcotic addiction ([address] Knee pain, right; Morbid obesity ([address] Pulmonary nodule, right; and Visual disturbance. Mr. Rissa Poe  has a past surgical history that includes tonsillectomy; colonoscopy (10/20/2015); and refractive surgery. Social History/Living Environment:    Patient lives alone in a [place] with 2 steps to enter house through garage.   Prior Level of Function/Work/Activity:  Patient works in "[employer]  Side:         RIGHT    Current Medications:       Current Outpatient Prescriptions:     warfarin (COUMADIN) 7.5 mg tablet, TAKE 1 TABLET DAILY, Disp: 90 Tab, Rfl: 4    glucose blood VI test strips (FREESTYLE LITE STRIPS) strip, Test daily, Disp: 90 Strip, Rfl: 4    losartan (COZAAR) 100 mg tablet, Take 1 Tab by mouth daily. , Disp: 90 Tab, Rfl: 4    canagliflozin (INVOKANA) 300 mg tablet, Take 1 Tab by mouth Daily (before breakfast). , Disp: 30 Tab, Rfl: 12    warfarin (COUMADIN) 7.5 mg tablet, TAKE 1 TABLET DAILY, Disp: 90 Tab, Rfl: 2   Date Last Reviewed:  12/6/2017     Number of Personal Factors/Comorbidities that affect the Plan of Care: 1-2: MODERATE COMPLEXITY   EXAMINATION:     ROM:          R shoulder PROM (supine):           Flex  162 degrees,  degrees, ER 56 degrees (@ 45 degrees ABD), IR 76 degrees (@ 45 deg ABD)        R shoulder AROM (seated):           Flex 140 degrees,  degrees, ER to back of head, IR to L5-S1       L shoulder AROM (seated)           Flex 172 degrees,  ABD, ER T2, IR T8    11-20-17         R shoulder PROM (supine)         Flex:  163 deg        ABD:    160 deg    ER: 60 deg @ 45 deg ABD         R shoulder AROM flex 154 deg,  deg, ER  Back of head      IR L5-S1  Strength:          R shoulder: flexion 5/5, ABD 5/5, ER (sitting) 4+/5, IR (sitting) 5/5        Special Tests:          Positive empty can/full can on R (negative on L); reynoso-bowen and Neer's negative on R, negative belly press B     Body Structures Involved:  1. Nerves  2. Bones  3. Joints  4. Muscles Body Functions Affected:  1. Sensory/Pain  2. Neuromusculoskeletal  3. Movement Related Activities and Participation Affected:  1. Self Care  2. Domestic Life  3. Interpersonal Interactions and Relationships  4.  Community, Social and [place]   Number of elements (examined above) that affect the Plan of Care: 4+: HIGH COMPLEXITY CLINICAL PRESENTATION:   Presentation: Stable and uncomplicated: LOW COMPLEXITY   CLINICAL DECISION MAKING:   Outcome Measure: Tool Used: Disabilities of the Arm, Shoulder and Hand (DASH) Questionnaire - Quick Version  Score:  Initial: TBD (next visit) /55  Most Recent: 16/55 (Date: 11-20-17 )   Interpretation of Score: The DASH is designed to measure the activities of daily living in person's with upper extremity dysfunction or pain. Each section is scored on a 1-5 scale, 5 representing the greatest disability. The scores of each section are added together for a total score of 55. Medical Necessity:   · Skilled intervention continues to be required due to reduced R shoulder ROM, reduced R shoulder strength and increased R shoulder pain which limit use of R UE. Reason for Services/Other Comments:  · Patient continues to require skilled intervention due to patient's limitations with functional activities with R UE. Use of outcome tool(s) and clinical judgement create a POC that gives a: Clear prediction of patient's progress: LOW COMPLEXITY            TREATMENT:   (In addition to Assessment/Re-Assessment sessions the following treatments were rendered)  Pre-treatment Symptoms/Complaints:  Patient reports no pain today but has noticed increased discomfort when reaching today, which reaches a 1-2/10 discomfort. Pain: Initial:     0/10 Post Session: 0/10   THERAPEUTIC EXERCISE: (28 minutes):  Exercises per grid below to improve mobility and strength. Pt received minimal cues to perform exercises with proper form. 5 min warm up on UBE to begin PT. Manual therapy (15 minutes): for continued improvement with R shoulder ROM and reduced discomfort. Grade IV glenohumeral joint mobilizations to improve R shoulder ROM and reduce discomfort.  Patient      Date  11-6-17 Date  11-8-17 Date  11-20-17 Date  11-22-17 Date  11-29-17 Date  12-6-17      Activity/Exercise            Scapular retractionsh 13# bilateral, 2 x 15 17# bilateral 3 x 15 17# R UE 2 x 15 17# Bilateral 3 x 12 17# bilateral 3 x 12       Wand flexion            Wand ABD            Wand ER            Pulleys            Side-lying ER 4# 3 x 10 7# cable 2 x 15 7# cable 3 x 12 7# cable 3 x 10 7# 3 x 12       Dynamic stabilization            D2 flexion 2 x 12 2 x 12 2 x 12 2 x 12 Wall slides D1, D2 x 12 each    D2 flexion 2 x 10 Wall slides D1, D2 x 10 each    D2 flexion 2 x 10      External rotation  Active in scapular plane 2 x 10 Actve; elbows at sides 3 x 10 Active; elbows at side 3 x 10 Active; elbows at side 3 x 10    Standing row + ER red 2 x 10 Standing, black 3 x 10      Scaption  4# 3 x 10 5# 3 x 10 B 5# 2 x 15 B 6# 3 x 10 B 6# 3 x 10 B      Shoulder extensions            Standing lower trap raises            Prone Y's 2 x 15 Standing, blue 2 x 10 Standing, blue 2 x 12 Standing, blue   3 x 10 Standing. Blue 3 x 10 Standing Blue, 3 x 10      Shoulder IR    Stretch with strap 5 x 15\" Stretch 10 x 5\"       Shoulder T's Prone 2 x 15 1# Standing, 7# 3 x 10 R UE Standing 7# 3 x 10 R UE Standing 7# 3 x 10 R UE Standing, blue 3 x 10 Standing blue 3 x 10      Overhead press  Red  2 x 10 neutral  Red 3 x 10 neutral   Red 3 x 10  Neutral  Red x 10 neutral   Green 2 x 10, neutral       Flexbar     2 x 10 R UE 3 x 10 R UE      Shoulder Y's      Red 2 x 10        HEP: No changes made to HEP. Treatment/Session Assessment:    · Response to Treatment:  Patient demonstrated improved glenohumeral mobility with shoulder mobilizations this PM. Patient demonstrated improved R shoulder active ROM with D1 and D2 shoulder flexion. Patient is doing well. · Compliance with Program/Exercises: Compliant. · Recommendations/Intent for next treatment session: \"Plan of care changed to 1 visit per week per MD order. Patient to continue working on D1 flexion.   Total Treatment Duration: 43 minutes  PT Patient Time In/Time Out  Time In: 1508  Time Out: [address], PT

## 2017-12-12 PROBLEM — Z00.00 ROUTINE GENERAL MEDICAL EXAMINATION AT A HEALTH CARE FACILITY: Status: ACTIVE | Noted: 2017-12-12

## 2017-12-12 PROBLEM — E11.9 CONTROLLED TYPE 2 DIABETES MELLITUS WITHOUT COMPLICATION, WITHOUT LONG-TERM CURRENT USE OF INSULIN (HCC): Status: ACTIVE | Noted: 2017-12-12

## 2017-12-13 ENCOUNTER — APPOINTMENT (OUTPATIENT)
Dept: PHYSICAL THERAPY | Age: 57
End: 2017-12-13
Payer: COMMERCIAL

## 2017-12-14 ENCOUNTER — HOSPITAL ENCOUNTER (OUTPATIENT)
Dept: PHYSICAL THERAPY | Age: 57
Discharge: HOME | End: 2017-12-14
Payer: COMMERCIAL

## 2017-12-14 PROCEDURE — 97110 THERAPEUTIC EXERCISES: CPT

## 2017-12-14 NOTE — PROGRESS NOTES
Agata Pfeiffer  : 1960  Payor: [address] [place] [address] Road / Plan: [employer] / Product Type: Workers Comp /  [address]  at [address] [place] [address] Rd  [facility], [address].  [phone]   [phone]         OUTPATIENT PHYSICAL [address] Note 2017    ICD-10: Treatment Diagnosis: Pain in right shoulder (M25.511), Stiffness of right shoulder, not elsewhere classified (M25.611), Unspecified rotator cuff tear or rupture of right shoulder, not specified as traumatic (M75.101)  Precautions/Allergies:   Contrave [naltrexone-bupropion]; Lisinopril; and Other medication   Fall Risk Score: 6 (? 5 = High Risk)  MD Orders: Evaluate and treat MEDICAL/REFERRING DIAGNOSIS:  right shoulder pain   DATE OF ONSET: 2017  REFERRING PHYSICIAN: Jaison Rodriguez MD  RETURN PHYSICIAN APPOINTMENT: 11 ASSESSMENT:  Mr. Clyde Degroot has attended 10 PT treatments to address R shoulder pain and reduced ROM. Patient has made improvements with R shoulder strength, ROM and reduced discomfort. Patient will benefit from ongoing PT to continue to improve ROM and strength, as patient has reduced R shoulder ER and IR actively and passively. PROBLEM LIST (Impacting functional limitations):  1. Decreased Strength  2. Increased Pain  3. Decreased Activity Tolerance  4. Decreased Flexibility/Joint Mobility INTERVENTIONS PLANNED:  1. Home Exercise Program (HEP)  2. Range of Motion (ROM)  3. Therapeutic Exercise/Strengthening  4. Ultrasound (US)   TREATMENT PLAN:  Effective Dates: 10-4-17 TO 17. Frequency/Duration: 2-3 visits per week for 12 weeks  GOALS: (Goals have been discussed and agreed upon with patient.)  Short-Term Functional Goals: Time Frame: 6 weeks  1. Patient will report 2-3/10 R shoulder pain at worst with use of R arm. MET 17  2. Patient will be able to exhibit 5/5 R and abduction and 4+/5 R shoulder ER strength  MET 17  3.  Patient will have 170 degrees of passive flexion and abduction without R shoulder pain. Ongoing 11-20-17  4. Patient will be independent with initial HEP MET 11-20-17  Discharge Goals: Time Frame: 12 weeks  1. Patient will report 0-1/10 intermittent R shoulder pain with activity and score less than 15% on DASH  2. Patient will report no limitations with overhead activities and self care secondary to R shoulder/UE pain. 3. Patient will demonstrate 5/5 R lower and middle trapezius strength and demonstrate 150 degrees of active R forward elevation without increased pain. 4. Patient will be independent with final [place], PT  12/14/2017                  The information in this section was collected on 10-4-17 (except where otherwise noted). HISTORY:   History of Present Injury/Illness (Reason for Referral):  Patient fell in June 2017 and reached forward with R arm to catch himself and felt shoulder pain and weakness. Afterward he had significant shoulder pain that limited his sleeping and use of R hand. Patient states that he had an MRI which revealed a rotator cuff tear, but that he is hoping to avoid surgery via physical therapy intervention. He states that his pain and range of motion have improved since the first few weeks following injury but is still quite limited compared to the left. Patient has history of bulging cervical discs for which he has received physical therapy intervention for approx 15-20 years ago. Pt denies increased numbness since injury. Past Medical History/Comorbidities:   Mr. Phylicia Andrew  has a past medical history of Gout and History of narcotic addiction ([facility] [address] Mr. Phylicia Andrew  has a past surgical history that includes tonsillectomy; colonoscopy (10/20/2015); and refractive surgery. Social History/Living Environment:    Patient lives alone in a [place] with 2 steps to enter house through garage.   Prior Level of Function/Work/Activity:  Patient works in [employer]:         RIGHT    Current Medications: Current Outpatient Prescriptions:     amLODIPine (NORVASC) 5 mg tablet, Take 1 Tab by mouth daily. , Disp: 90 Tab, Rfl: 4    warfarin (COUMADIN) 7.5 mg tablet, TAKE 1 TABLET DAILY, Disp: 90 Tab, Rfl: 4    glucose blood VI test strips (FREESTYLE LITE STRIPS) strip, Test daily, Disp: 90 Strip, Rfl: 4    losartan (COZAAR) 100 mg tablet, Take 1 Tab by mouth daily. , Disp: 90 Tab, Rfl: 4    canagliflozin (INVOKANA) 300 mg tablet, Take 1 Tab by mouth Daily (before breakfast). , Disp: 30 Tab, Rfl: 12    warfarin (COUMADIN) 7.5 mg tablet, TAKE 1 TABLET DAILY, Disp: 90 Tab, Rfl: 2   Date Last Reviewed:  12/14/2017     Number of Personal Factors/Comorbidities that affect the Plan of Care: 1-2: MODERATE COMPLEXITY   EXAMINATION:     ROM:          R shoulder PROM (supine):           Flex  162 degrees,  degrees, ER 56 degrees (@ 45 degrees ABD), IR 76 degrees (@ 45 deg ABD)        R shoulder AROM (seated):           Flex 140 degrees,  degrees, ER to back of head, IR to L5-S1       L shoulder AROM (seated)           Flex 172 degrees,  ABD, ER T2, IR T8    11-20-17         R shoulder PROM (supine)         Flex:  163 deg        ABD:    160 deg    ER: 60 deg @ 45 deg ABD         R shoulder AROM flex 154 deg,  deg, ER  Back of head      IR L5-S1  Strength:          R shoulder: flexion 5/5, ABD 5/5, ER (sitting) 4+/5, IR (sitting) 5/5        Special Tests:          Positive empty can/full can on R (negative on L); reynoso-bowen and Neer's negative on R, negative belly press B     Body Structures Involved:  1. Nerves  2. Bones  3. Joints  4. Muscles Body Functions Affected:  1. Sensory/Pain  2. Neuromusculoskeletal  3. Movement Related Activities and Participation Affected:  1. Self Care  2. Domestic Life  3. Interpersonal Interactions and Relationships  4.  Community, Social and [place]   Number of elements (examined above) that affect the Plan of Care: 4+: HIGH COMPLEXITY   CLINICAL PRESENTATION:   Presentation: Stable and uncomplicated: LOW COMPLEXITY   CLINICAL DECISION MAKING:   Outcome Measure: Tool Used: Disabilities of the Arm, Shoulder and Hand (DASH) Questionnaire - Quick Version  Score:  Initial: TBD (next visit) /55  Most Recent: 16/55 (Date: 11-20-17 )   Interpretation of Score: The DASH is designed to measure the activities of daily living in person's with upper extremity dysfunction or pain. Each section is scored on a 1-5 scale, 5 representing the greatest disability. The scores of each section are added together for a total score of 55. Medical Necessity:   · Skilled intervention continues to be required due to reduced R shoulder ROM, reduced R shoulder strength and increased R shoulder pain which limit use of R UE. Reason for Services/Other Comments:  · Patient continues to require skilled intervention due to patient's limitations with functional activities with R UE. Use of outcome tool(s) and clinical judgement create a POC that gives a: Clear prediction of patient's progress: LOW COMPLEXITY            TREATMENT:   (In addition to Assessment/Re-Assessment sessions the following treatments were rendered)  Pre-treatment Symptoms/Complaints:  Patient reports no pain today upon arrival to PT. States that he had an injection into his knee and those are feeling good, too. Pain: Initial:     0/10 Post Session: 0/10   THERAPEUTIC EXERCISE: (31 minutes):  Exercises per grid below to improve mobility and strength. Pt received minimal cues to perform exercises with proper form. 5 min warm up on UBE to begin PT. Manual therapy (0 minutes): Not performed today.      Date  11-6-17 Date  11-8-17 Date  11-20-17 Date  11-22-17 Date  11-29-17 Date  12-6-17 Date  12-14-17     Activity/Exercise            Scapular retractionsh 13# bilateral, 2 x 15 17# bilateral 3 x 15 17# R UE 2 x 15 17# Bilateral 3 x 12 17# bilateral 3 x 12  17# unilateral R   3 x 15     Wand flexion Wand ABD            Wand ER            Pulleys            Side-lying ER 4# 3 x 10 7# cable 2 x 15 7# cable 3 x 12 7# cable 3 x 10 7# 3 x 12       Dynamic stabilization            D2 flexion 2 x 12 2 x 12 2 x 12 2 x 12 Wall slides D1, D2 x 12 each    D2 flexion 2 x 10 Wall slides D1, D2 x 10 each    D2 flexion 2 x 10 Wall slides  D1, D1 2 x 10 each     External rotation  Active in scapular plane 2 x 10 Actve; elbows at sides 3 x 10 Active; elbows at side 3 x 10 Active; elbows at side 3 x 10    Standing row + ER red 2 x 10 Standing, black 3 x 10 Standing, black 3 x 10     Scaption  4# 3 x 10 5# 3 x 10 B 5# 2 x 15 B 6# 3 x 10 B 6# 3 x 10 B 6# 3 x 12 B     Shoulder extensions            Standing lower trap raises            Prone Y's 2 x 15 Standing, blue 2 x 10 Standing, blue 2 x 12 Standing, blue   3 x 10 Standing. Blue 3 x 10 Standing Blue, 3 x 10 See below     Shoulder IR    Stretch with strap 5 x 15\" Stretch 10 x 5\"       Shoulder T's Prone 2 x 15 1# Standing, 7# 3 x 10 R UE Standing 7# 3 x 10 R UE Standing 7# 3 x 10 R UE Standing, blue 3 x 10 Standing blue 3 x 10 Black, 3 x 10 B     Overhead press  Red  2 x 10 neutral  Red 3 x 10 neutral   Red 3 x 10  Neutral  Red x 10 neutral   Green 2 x 10, neutral  Red 2 x 10 neutral       Flexbar     2 x 10 R UE 3 x 10 R UE Blue 2 x 15 R UE     Shoulder Y's      Red 2 x 10 Green 2 x 10 B     curls       7# 3 x 10 B                               HEP: No changes made to HEP. Treatment/Session Assessment:    · Response to Treatment:  Patient performed all therapeutic exercises well with no discomfort. Patient continues to show improvement with R shoulder AROM, especially with diagonal motions. · Compliance with Program/Exercises: Compliant. · Recommendations/Intent for next treatment session: \"Plan of care changed to 1 visit per week per MD order. Patient to have progress report at next treatment.   Total Treatment Duration: 31 minutes  PT Patient Time In/Time Out  Time In: 1447  Time Out: [address], PT

## 2017-12-20 ENCOUNTER — HOSPITAL ENCOUNTER (OUTPATIENT)
Dept: PHYSICAL THERAPY | Age: 57
Discharge: HOME | End: 2017-12-20
Payer: COMMERCIAL

## 2017-12-20 PROCEDURE — 97110 THERAPEUTIC EXERCISES: CPT

## 2017-12-20 PROCEDURE — 97140 MANUAL THERAPY 1/> REGIONS: CPT

## 2017-12-20 NOTE — PROGRESS NOTES
Ariadne Tellez  : 1960  Payor: [facility] / Plan: [employer] / Product Type: Workers Comp /  [address]  at [facility] [employer]  [address] [place] [address], [address].  [phone]   [phone]         OUTPATIENT PHYSICAL [address] Note 2017    ICD-10: Treatment Diagnosis: Pain in right shoulder (M25.511), Stiffness of right shoulder, not elsewhere classified (M25.611), Unspecified rotator cuff tear or rupture of right shoulder, not specified as traumatic (M75.101)  Precautions/Allergies:   Contrave [naltrexone-bupropion]; Lisinopril; and Other medication   Fall Risk Score: 6 (? 5 = High Risk)  MD Orders: Evaluate and treat MEDICAL/REFERRING DIAGNOSIS:  right shoulder pain   DATE OF ONSET: 2017  REFERRING PHYSICIAN: Davonte Christianson MD  RETURN PHYSICIAN APPOINTMENT: TBD     17 ASSESSMENT:  Mr. Zion Salomon has attended 10 PT treatments to address R shoulder pain and reduced ROM. Patient has made improvements with R shoulder strength, ROM and reduced discomfort. Patient will benefit from ongoing PT to continue to improve ROM and strength, as patient has reduced R shoulder ER and IR actively and passively. PROBLEM LIST (Impacting functional limitations):  1. Decreased Strength  2. Increased Pain  3. Decreased Activity Tolerance  4. Decreased Flexibility/Joint Mobility INTERVENTIONS PLANNED:  1. Home Exercise Program (HEP)  2. Range of Motion (ROM)  3. Therapeutic Exercise/Strengthening  4. Ultrasound (US)   TREATMENT PLAN:  Effective Dates: 10-4-17 TO 17. Frequency/Duration: 2-3 visits per week for 12 weeks  GOALS: (Goals have been discussed and agreed upon with patient.)  Short-Term Functional Goals: Time Frame: 6 weeks  1. Patient will report 2-3/10 R shoulder pain at worst with use of R arm. MET 17  2. Patient will be able to exhibit 5/5 R and abduction and 4+/5 R shoulder ER strength  MET 17  3.  Patient will have 170 degrees of passive flexion and abduction without R shoulder pain. Ongoing 11-20-17  4. Patient will be independent with initial HEP MET 11-20-17  Discharge Goals: Time Frame: 12 weeks  1. Patient will report 0-1/10 intermittent R shoulder pain with activity and score less than 15% on DASH  2. Patient will report no limitations with overhead activities and self care secondary to R shoulder/UE pain. 3. Patient will demonstrate 5/5 R lower and middle trapezius strength and demonstrate 150 degrees of active R forward elevation without increased pain. 4. Patient will be [facility]  12/20/2017                  The information in this section was collected on 10-4-17 (except where otherwise noted). HISTORY:   History of Present Injury/Illness (Reason for Referral):  Patient fell in June 2017 and reached forward with R arm to catch himself and felt shoulder pain and weakness. Afterward he had significant shoulder pain that limited his sleeping and use of R hand. Patient states that he had an MRI which revealed a rotator cuff tear, but that he is hoping to avoid surgery via physical therapy intervention. He states that his pain and range of motion have improved since the first few weeks following injury but is still quite limited compared to the left. Patient has history of bulging cervical discs for which he has received physical therapy intervention for approx 15-20 years ago. Pt denies increased numbness since injury. Past Medical History/Comorbidities:   Mr. Washington Castorena  has a past medical history of Gout and History of narcotic addiction ([facility] [address] Mr. Washington Castorena  has a past surgical history that includes hx tonsillectomy; hx colonoscopy (10/20/2015); and hx refractive surgery. Social History/Living Environment:    Patient lives alone in a [place] with 2 steps to enter house through garage.   Prior Level of Function/Work/Activity:  Patient works in [employer]:         RIGHT    Current Medications: Current Outpatient Prescriptions:     amLODIPine (NORVASC) 5 mg tablet, Take 1 Tab by mouth daily. , Disp: 90 Tab, Rfl: 4    warfarin (COUMADIN) 7.5 mg tablet, TAKE 1 TABLET DAILY, Disp: 90 Tab, Rfl: 4    glucose blood VI test strips (FREESTYLE LITE STRIPS) strip, Test daily, Disp: 90 Strip, Rfl: 4    losartan (COZAAR) 100 mg tablet, Take 1 Tab by mouth daily. , Disp: 90 Tab, Rfl: 4    canagliflozin (INVOKANA) 300 mg tablet, Take 1 Tab by mouth Daily (before breakfast). , Disp: 30 Tab, Rfl: 12    warfarin (COUMADIN) 7.5 mg tablet, TAKE 1 TABLET DAILY, Disp: 90 Tab, Rfl: 2   Date Last Reviewed:  12/20/2017     Number of Personal Factors/Comorbidities that affect the Plan of Care: 1-2: MODERATE COMPLEXITY   EXAMINATION:     ROM:          R shoulder PROM (supine):           Flex  162 degrees,  degrees, ER 56 degrees (@ 45 degrees ABD), IR 76 degrees (@ 45 deg ABD)        R shoulder AROM (seated):           Flex 140 degrees,  degrees, ER to back of head, IR to L5-S1       L shoulder AROM (seated)           Flex 172 degrees,  ABD, ER T2, IR T8    11-20-17         R shoulder PROM (supine)         Flex:  163 deg        ABD:    160 deg    ER: 60 deg @ 45 deg ABD         R shoulder AROM flex 154 deg,  deg, ER  Back of head      IR L5-S1  Strength:          R shoulder: flexion 5/5, ABD 5/5, ER (sitting) 4+/5, IR (sitting) 5/5        Special Tests:          Positive empty can/full can on R (negative on L); reynoso-bowen and Neer's negative on R, negative belly press B     Body Structures Involved:  1. Nerves  2. Bones  3. Joints  4. Muscles Body Functions Affected:  1. Sensory/Pain  2. Neuromusculoskeletal  3. Movement Related Activities and Participation Affected:  1. Self Care  2. Domestic Life  3. Interpersonal Interactions and Relationships  4.  Community, Social and [place]   Number of elements (examined above) that affect the Plan of Care: 4+: HIGH COMPLEXITY   CLINICAL PRESENTATION:   Presentation: Stable and uncomplicated: LOW COMPLEXITY   CLINICAL DECISION MAKING:   Outcome Measure: Tool Used: Disabilities of the Arm, Shoulder and Hand (DASH) Questionnaire - Quick Version  Score:  Initial: TBD (next visit) /55  16/55 (Date: 11-20-17 ) Most recent: 17/55 (12-20-17)   Interpretation of Score: The DASH is designed to measure the activities of daily living in person's with upper extremity dysfunction or pain. Each section is scored on a 1-5 scale, 5 representing the greatest disability. The scores of each section are added together for a total score of 55. Medical Necessity:   · Skilled intervention continues to be required due to reduced R shoulder ROM, reduced R shoulder strength and increased R shoulder pain which limit use of R UE. Reason for Services/Other Comments:  · Patient continues to require skilled intervention due to patient's limitations with functional activities with R UE. Use of outcome tool(s) and clinical judgement create a POC that gives a: Clear prediction of patient's progress: LOW COMPLEXITY            TREATMENT:   (In addition to Assessment/Re-Assessment sessions the following treatments were rendered)  Pre-treatment Symptoms/Complaints:  Patient reports that his R shoulder has been a little stiff today and that it has been a little sore. Pain: Initial:     1-2/10 Post Session: 0/10; stated that \"it feels good\"   THERAPEUTIC EXERCISE: (33 minutes):  Exercises per grid below to improve mobility and strength. Pt received cues to perform  scaptions to appropriate shoulder height. Manual therapy (9 minutes): R shoulder PROM and grade III-IV joint mobilizations to improve R shoulder ROM and reduce tension prior to performing therapeutic exercises.      Date  11-6-17 Date  11-8-17 Date  11-20-17 Date  11-22-17 Date  11-29-17 Date  12-6-17 Date  12-14-17 12-20-17    Activity/Exercise            Scapular retractions 13# bilateral, 2 x 15 17# bilateral 3 x 15 17# R UE 2 x 15 17# Bilateral 3 x 12 17# bilateral 3 x 12  17# unilateral R   3 x 15 20# unilateral R   3 x 12    Wand flexion            Wand ABD            Wand ER            Pulleys            Side-lying ER 4# 3 x 10 7# cable 2 x 15 7# cable 3 x 12 7# cable 3 x 10 7# 3 x 12   See below    Dynamic stabilization            D2 flexion 2 x 12 2 x 12 2 x 12 2 x 12 Wall slides D1, D2 x 12 each    D2 flexion 2 x 10 Wall slides D1, D2 x 10 each    D2 flexion 2 x 10 Wall slides  D1, D1 2 x 10 each Wall slides flex, D1, D2 x 15 ea    External rotation  Active in scapular plane 2 x 10 Actve; elbows at sides 3 x 10 Active; elbows at side 3 x 10 Active; elbows at side 3 x 10    Standing row + ER red 2 x 10 Standing, black 3 x 10 Standing, black 3 x 10 Standing blue 3 x 10 R UE    Standing, active 2 x 15 B with elbows at side    Scaption  4# 3 x 10 5# 3 x 10 B 5# 2 x 15 B 6# 3 x 10 B 6# 3 x 10 B 6# 3 x 12 B 6# 3 x 12 B    Shoulder extensions            Standing lower trap raises            Prone Y's 2 x 15 Standing, blue 2 x 10 Standing, blue 2 x 12 Standing, blue   3 x 10 Standing. Blue 3 x 10 Standing Blue, 3 x 10 See below See below    Shoulder IR    Stretch with strap 5 x 15\" Stretch 10 x 5\"       Shoulder T's Prone 2 x 15 1# Standing, 7# 3 x 10 R UE Standing 7# 3 x 10 R UE Standing 7# 3 x 10 R UE Standing, blue 3 x 10 Standing blue 3 x 10 Black, 3 x 10 B Black 3 x 10 B    Overhead press  Red  2 x 10 neutral  Red 3 x 10 neutral   Red 3 x 10  Neutral  Red x 10 neutral   Green 2 x 10, neutral  Red 2 x 10 neutral   Red 3 x 10 neutral     Flexbar     2 x 10 R UE 3 x 10 R UE Blue 2 x 15 R UE Blue 2 x 15 R UE    Shoulder Y's      Red 2 x 10 Green 2 x 10 B Green 3 x 10 B    curls       7# 3 x 10 B 8# 3 x 10 B                              HEP: No changes made to HEP.     Treatment/Session Assessment:    · Response to Treatment:  Patient has improved R shoulder ROM to 165 degrees of flexion actively, and active ER to reach C2 and active IR to L3-4. Patient has made great progress with his R shoulder AROM, especially reaching behind his back. · Compliance with Program/Exercises: Compliant. · Recommendations/Intent for next treatment session: \"Patient scheduled to return to MD in early January with hopes to continue with PT secondary to ongoing progress. Will await MD decision. No scheduled appointments at this time. \"  Total Treatment Duration: 42 minutes  PT Patient Time In/Time Out  Time In: 1517  Time Out: [address], PT

## 2017-12-27 ENCOUNTER — APPOINTMENT (OUTPATIENT)
Dept: PHYSICAL THERAPY | Age: 57
End: 2017-12-27
Payer: COMMERCIAL

## 2018-02-14 NOTE — PROGRESS NOTES
Duane Shi  : 1960  Payor: [facility] [place][note 2][address] / Plan: Radhajavon Coppola / Product Type: Workers Comp /  [address]  at [facility] ASHKAN [employer]  [address], [address], [address] [facility]  [phone]   [phone]         OUTPATIENT PHYSICAL THERAPY:Discontinuation Summary 2018    ICD-10: Treatment Diagnosis: Pain in right shoulder (M25.511), Stiffness of right shoulder, not elsewhere classified (M25.611), Unspecified rotator cuff tear or rupture of right shoulder, not specified as traumatic (M75.101)  Precautions/Allergies:   Contrave [naltrexone-bupropion]; Lisinopril; and Other medication   Fall Risk Score: 6 (? 5 = High Risk)  MD Orders: Evaluate and treat MEDICAL/REFERRING DIAGNOSIS:  right shoulder pain   DATE OF ONSET: 2017  REFERRING PHYSICIAN: Haley Goetz MD       Duane Shi has been seen in physical therapy from 10-4-17 to 17 for 15 visits. Treatment has been discontinued at this time due to Expiration of plan of care without further referral by ordering physician and patient failing to return for additional treatment. The below goals were met prior to discontinuation. Some goals were not met due to short duration of patient treatment and inability to return R shoulder to full ROM. Thank you for this referral.          GOALS: (Goals have been discussed and agreed upon with patient.)  Short-Term Functional Goals: Time Frame: 6 weeks  1. Patient will report 2-3/10 R shoulder pain at worst with use of R arm. MET 17  2. Patient will be able to exhibit 5/5 R and abduction and 4+/5 R shoulder ER strength  MET 17  3. Patient will have 170 degrees of passive flexion and abduction without R shoulder pain. Ongoing 17  4. Patient will be independent with initial HEP MET 17  Discharge Goals: Time Frame: 12 weeks  1. Patient will report 0-1/10 intermittent R shoulder pain with activity and score less than 15% on DASH Not met  2.  Patient will report no limitations with overhead activities and self care secondary to R shoulder/UE pain. Not met  3. Patient will demonstrate 5/5 R lower and middle trapezius strength and demonstrate 150 degrees of active R forward elevation without increased pain. Not met   4. Patient will be independent with final HEP Not met    Chary Li, PT  2/14/2018                  The information in this section was collected on 10-4-17 (except where otherwise noted). HISTORY:   History of Present Injury/Illness (Reason for Referral):  Patient fell in June 2017 and reached forward with R arm to catch himself and felt shoulder pain and weakness. Afterward he had significant shoulder pain that limited his sleeping and use of R hand. Patient states that he had an MRI which revealed a rotator cuff tear, but that he is hoping to avoid surgery via physical therapy intervention. He states that his pain and range of motion have improved since the first few weeks following injury but is still quite limited compared to the left. Patient has history of bulging cervical discs for which he has received physical therapy intervention for approx 15-20 years ago. Pt denies increased numbness since injury. Past Medical History/Comorbidities:   Mr. Kristen Yung  has a past medical history of Gout and History of narcotic addiction ([facility] [address] Mr. Kristen Yung  has a past surgical history that includes hx tonsillectomy; hx colonoscopy (10/20/2015); and hx refractive surgery. Social History/Living Environment:    Patient lives alone in a [place] with 2 steps to enter house through garage. Prior Level of Function/Work/Activity:  Patient works in [email or web address]  Side:         RIGHT    Current Medications:       Current Outpatient Prescriptions:     INVOKANA 300 mg tablet, TAKE ONE TABLET BY MOUTH EVERY DAY BEFORE BREAKFAST, Disp: 30 Tab, Rfl: 11    amLODIPine (NORVASC) 5 mg tablet, Take 1 Tab by mouth daily. , Disp: 90 Tab, Rfl: 4    warfarin (COUMADIN) 7.5 mg tablet, TAKE 1 TABLET DAILY, Disp: 90 Tab, Rfl: 4    glucose blood VI test strips (FREESTYLE LITE STRIPS) strip, Test daily, Disp: 90 Strip, Rfl: 4    losartan (COZAAR) 100 mg tablet, Take 1 Tab by mouth daily. , Disp: 90 Tab, Rfl: 4    warfarin (COUMADIN) 7.5 mg tablet, TAKE 1 TABLET DAILY, Disp: 90 Tab, Rfl: 2   Date Last Reviewed: 12/20/17     Number of Personal Factors/Comorbidities that affect the Plan of Care: 1-2: MODERATE COMPLEXITY   EXAMINATION:     ROM:          R shoulder PROM (supine):           Flex  162 degrees,  degrees, ER 56 degrees (@ 45 degrees ABD), IR 76 degrees (@ 45 deg ABD)        R shoulder AROM (seated):           Flex 140 degrees,  degrees, ER to back of head, IR to L5-S1       L shoulder AROM (seated)           Flex 172 degrees,  ABD, ER T2, IR T8    11-20-17         R shoulder PROM (supine)         Flex:  163 deg        ABD:    160 deg    ER: 60 deg @ 45 deg ABD         R shoulder AROM flex 154 deg,  deg, ER  Back of head      IR L5-S1  Strength:          R shoulder: flexion 5/5, ABD 5/5, ER (sitting) 4+/5, IR (sitting) 5/5        Special Tests:          Positive empty can/full can on R (negative on L); reynoso-bowen and Neer's negative on R, negative belly press B     Body Structures Involved:  1. Nerves  2. Bones  3. Joints  4. Muscles Body Functions Affected:  1. Sensory/Pain  2. Neuromusculoskeletal  3. Movement Related Activities and Participation Affected:  1. Self Care  2. Domestic Life  3. Interpersonal Interactions and Relationships  4. Community, Social and [place]   Number of elements (examined above) that affect the Plan of Care: 4+: HIGH COMPLEXITY   CLINICAL PRESENTATION:   Presentation: Stable and uncomplicated: LOW COMPLEXITY   CLINICAL DECISION MAKING:   Outcome Measure:    Tool Used: Disabilities of the Arm, Shoulder and Hand (DASH) Questionnaire - Quick Version  Score:  Initial: TBD (next visit) /55  16/55 (Date: 11-20-17 ) Most recent: 17/55 (12-20-17)   Interpretation of Score: The DASH is designed to measure the activities of daily living in person's with upper extremity dysfunction or pain. Each section is scored on a 1-5 scale, 5 representing the greatest disability. The scores of each section are added together for a total score of 55. Medical Necessity:   · Patient no longer requires PT services at this time for this current episode . Reason for Services/Other Comments:  · Patient no longer requires skilled PT services due to improvement and discontinuation in PT services at this time. .   Use of outcome tool(s) and clinical judgement create a POC that gives a: Clear prediction of patient's progress: LOW COMPLEXITY

## 2018-07-09 ENCOUNTER — HOSPITAL ENCOUNTER (OUTPATIENT)
Dept: LAB | Age: 58
Discharge: HOME | End: 2018-07-09
Attending: SURGERY
Payer: COMMERCIAL

## 2018-07-09 LAB — TSH SERPL DL<=0.005 MIU/L-ACNC: 2.13 UIU/ML (ref 0.36–3.74)

## 2018-07-09 PROCEDURE — 80323 ALKALOIDS NOS: CPT | Performed by: SURGERY

## 2018-07-09 PROCEDURE — 84443 ASSAY THYROID STIM HORMONE: CPT | Performed by: SURGERY

## 2018-07-09 PROCEDURE — 82652 VIT D 1 25-DIHYDROXY: CPT | Performed by: SURGERY

## 2018-07-09 PROCEDURE — 36415 COLL VENOUS BLD VENIPUNCTURE: CPT | Performed by: SURGERY

## 2018-07-11 LAB — 1,25(OH)2D3 SERPL-MCNC: 51.2 PG/ML (ref 19.9–79.3)

## 2018-07-13 LAB
COTININE SERPL-MCNC: NORMAL NG/ML
NICOTINE SERPL-MCNC: NORMAL NG/ML

## 2018-07-18 PROBLEM — Z00.00 ROUTINE GENERAL MEDICAL EXAMINATION AT A HEALTH CARE FACILITY: Status: RESOLVED | Noted: 2017-12-12 | Resolved: 2018-07-18

## 2018-07-18 PROBLEM — G47.30 SLEEP APNEA: Status: ACTIVE | Noted: 2018-07-18

## 2018-07-18 PROBLEM — E66.01 MORBID OBESITY, UNSPECIFIED OBESITY TYPE (HCC): Status: ACTIVE | Noted: 2018-07-18

## 2018-07-18 PROBLEM — I48.19 PERSISTENT ATRIAL FIBRILLATION (HCC): Status: ACTIVE | Noted: 2018-07-18

## 2018-09-17 PROBLEM — E11.9 TYPE 2 DIABETES MELLITUS WITHOUT COMPLICATION, WITHOUT LONG-TERM CURRENT USE OF INSULIN (HCC): Status: ACTIVE | Noted: 2018-09-17

## 2019-03-13 PROBLEM — M79.671 HEEL PAIN, CHRONIC, RIGHT: Status: ACTIVE | Noted: 2019-03-13

## 2019-03-13 PROBLEM — G89.29 HEEL PAIN, CHRONIC, RIGHT: Status: ACTIVE | Noted: 2019-03-13

## 2019-04-01 ENCOUNTER — HOSPITAL ENCOUNTER (OUTPATIENT)
Dept: GENERAL RADIOLOGY | Age: 59
Discharge: HOME | End: 2019-04-01
Payer: COMMERCIAL

## 2019-04-01 DIAGNOSIS — Z01.811 PRE-OP CHEST EXAM: ICD-10-CM

## 2019-04-01 PROCEDURE — 71046 X-RAY EXAM CHEST 2 VIEWS: CPT

## 2019-04-16 NOTE — H&P (VIEW-ONLY)
Helga Pollard MD 
                                [address] [place] [address] Phone [phone], Fax [phone] and Physical 
 
Patient: Naseem Rdz MRN: [id number]  SSN: [id number] YOB: 1960  Age: 60 y.o. Sex: male PCP: Marifer Akers MD  
Date:  4/22/2019 Naseem Rdz returns to the [facility] after successful completion of our multidisciplinary surgical prep program.  This is his final consultation preparing him for Sleeve Gastrectomy surgery. He presents with a height of 5' 10\" (1.778 m) and weight of (!) 407 lb (184.6 kg), giving him a Body mass index is 58.4 kg/m². He has an Ideal body weight of 179 lbs, and excess body weight of 228 lbs. He started our program with a weight of 429 lbs, losing 22 lbs since starting our program. 
 
He has completed all aspects of our prep program and has been deemed an acceptable candidate for bariatric surgery. PSYCHOLOGICAL EVALUATION:   Completed with Merna Sutton MA, [facility] deeming him and appropriate surgical candidate. DIETITIAN EVALUATION:  Completed with Juliana Topete deeming him an appropriate surgical candidate. BARIATRIC LABS:   
 
Component Latest Ref Rng & Units 3/8/2019 7/9/2018 Nicotine 
    ng/mL  None detected Cotinine 
    ng/mL  None detected Hemoglobin A1c, (calculated) 4.8 - 5.6 % 5.9 (H) Estimated average glucose 
    mg/dL 123 TSH 
    0.358 - 3.740 uIU/mL  2.133 Calcitriol (Vit D 1, 25 di-OH) 19.9 - 79.3 pg/mL  51.2 CARDIAC CLEARANCE:  Completed 3-11-19 noting low risk with Paola Lentz MD. 
 
PULMONARY CLEARANCE:  Completed 4-1-19 noting low risk with Sebastián Mendez MD 
 
 
We have reviewed the procedure again today and completed our standard pre op teaching.  His questions were answered and he is ready to schedule surgery. We have discussed the procedure, diet and exercise regimens, and potential complications of surgery. The patient understands the nature and potential complication of surgery and has the capacity to follow the post operative diet, exercise, and nutritional requirements. After review, he has signed his surgical consent today. MEDICAL HISTORY: 
Morbid Obesity Sleep Apnea- uses CPAP Diabetes-oral meds, last A1c 6.1 (Invokana) Hypertension- 2 meds Osteoarthritis- knees and toe Joint pain Venous Stasis DVT and PE 2015 Paroxysmal Atrial Flutter - he states this was a one time occurrence and he was worked up extensively by Dr. Laquita Langley at [facility] with no result. History of Gout (related to a specific diuretic he was taking for HTN. One occurrence) History of Depression History of Narcotic addiction 
  
PRIOR WEIGHT LOSS ATTEMPTS:  Contrave, Hypnosis, Psychotherapy, Fad diets, reduced portion diets 
  
EXERCISE ASSESSMENT:  Walking daily 
  
DENTAL ISSUES:  No dental issues 
  
PSYCHOSOCIAL: 
He notes he is  and states main support person is his sister Anna Patton. [employer] employed in [address] [place] [address] in pursuing surgical weight loss is to improve health. He reports appropriate treatment of depression.  He states he is independent in his care, can drive a car, and can ambulate without assistance. 
  
 
HISTORY: 
Past Medical History:  
Diagnosis Date  Arthritis   
 osteo-knee, toe  Depression  History of narcotic addiction ([facility] [address]  Sleep apnea   
 has CPAP  Venous stasis He denies personal or family history of problems with anesthesia, bleeding, or clotting. He has history of DVT and pulmonary embolism in 2015. He is on Coumadin. He denies reflux or dysphagia. 
  
 
Past Surgical History:  
Procedure Laterality Date  HX COLONOSCOPY  10/20/2015 [facility] History Tobacco Use  
  Smoking status: Never Smoker  Smokeless tobacco: Never Used Substance Use Topics  Alcohol use: No  
 Drug use: No  
  Comment: prior hx narcotic abuse Family History Problem Relation Age of Onset  Hypertension Father  COPD Mother  Heart Disease Paternal Aunt  Diabetes Neg Hx MEDICATIONS: 
Current Outpatient Medications Medication Sig  pantoprazole (PROTONIX) 40 mg tablet Take 1 Tab by mouth daily. Indications: gastroesophageal reflux disease  ondansetron hcl (ZOFRAN) 8 mg tablet Take 1 Tab by mouth every eight (8) hours as needed for Nausea.  oxyCODONE-acetaminophen (PERCOCET) 5-325 mg per tablet Take 1-2 Tabs by mouth every four to six (4-6) hours as needed for Pain for up to 5 days. Max Daily Amount: 12 Tabs.  losartan (COZAAR) 100 mg tablet TAKE 1 TABLET DAILY  warfarin (COUMADIN) 7.5 mg tablet 7.5 mg per dosing based on most recent Pro time  INVOKANA 300 mg tablet TAKE ONE TABLET BY MOUTH EVERY DAY BEFORE BREAKFAST  carvedilol (COREG) 12.5 mg tablet Take 1 Tab by mouth two (2) times daily (with meals).  glucose blood VI test strips (FREESTYLE LITE STRIPS) strip Test daily No current facility-administered medications for this visit. ALLERGIES: 
Allergies Allergen Reactions  Amlodipine Other (comments) edema  Contrave [Naltrexone-Bupropion] Other (comments) Nausea first and then sweet craving  Lisinopril Cough  Other Medication Other (comments) HCTZ-gout NKDA-but do not prescribe narcotics or Mood altering drugs ROS: The patient has no difficulty with chest pain or shortness of breath. No fever or chills. Comprehensive 13 point review of systems was otherwise unremarkable except as noted above. PHYSICAL EXAM:  
Visit Vitals /76 Pulse 89 Ht 5' 10\" (1.778 m) Wt (!) 407 lb (184.6 kg) BMI 58.40 kg/m² General: Alert oriented cooperative white male in no acute distress. Eyes: Sclera are clear. Conjunctiva and lids within normal limits. No icterus. Ears and Nose: no gross deformities to visual inspection, gross hearing intact Neck: Supple, trachea midline, no appreciable thyromegaly Resp: No JVD. Breathing is  non-labored. Lungs clear to auscultation without wheezing or rhonchi CV: RRR. No murmurs, rubs or gallops appreciated, Carotid bruits are absent Abd: soft non-tender and non-distended without peritoneal signs. +bs Psych:  Mood and affect appropriate. Short-term memory and understanding intact ASSESSMENT: 
Morbid obesity with a  Body mass index is 58.4 kg/m². ready for Sleeve Gastrectomy surgery. PLAN: 
1.  Schedule for laparoscopic, possible open, Sleeve Gastrectomy, in the near future. 2.  Patient will complete our 2 week pre operative diet. 3.  Bring CPAP and incentive spirometer( given with our standard instruction to use BID 2 weeks prior to surgery) to hospital on day of surgery. 4.  The patient received prescription to use after surgery for : Zofran and Protonix. Did not give Percocet script as he would like to use Narcotics as little as possible. Will prescribe at discharge if needed. 5.  IVC filter prior to surgery. 6.  Dr. Karie Stuart to arrange Lovenox bridge. I went over the risks and benefits with the patient. For risks I went over problems with bleeding, infection and anesthesia. I also noted potential problems of injury to the esophagus, liver, spleen, stomach, pancreas, small bowel and or colon. I addition, I discussed potential problems of DVT/pulmonary embolism, urinary tract infection, wound infection, myocardial infarction, stroke and the potential need to convert to an open procedure. I quoted a 1% nationwide mortality rate for this procedure. He has signed our extensive consent form which is in the chart. Wali Quezada MD 
 
 
 
Date:  4/22/2019

## 2019-05-02 ENCOUNTER — HOSPITAL ENCOUNTER (OUTPATIENT)
Dept: LAB | Age: 59
Discharge: HOME | End: 2019-05-02
Payer: COMMERCIAL

## 2019-05-02 LAB
ANION GAP SERPL CALC-SCNC: 7 MMOL/L (ref 7–16)
BUN SERPL-MCNC: 14 MG/DL (ref 6–23)
CALCIUM SERPL-MCNC: 9.3 MG/DL (ref 8.3–10.4)
CHLORIDE SERPL-SCNC: 104 MMOL/L (ref 98–107)
CO2 SERPL-SCNC: 28 MMOL/L (ref 21–32)
CREAT SERPL-MCNC: 0.89 MG/DL (ref 0.8–1.5)
GLUCOSE SERPL-MCNC: 93 MG/DL (ref 65–100)
POTASSIUM SERPL-SCNC: 4.5 MMOL/L (ref 3.5–5.1)
SODIUM SERPL-SCNC: 139 MMOL/L (ref 136–145)

## 2019-05-02 PROCEDURE — 80048 BASIC METABOLIC PNL TOTAL CA: CPT

## 2019-05-02 PROCEDURE — 36415 COLL VENOUS BLD VENIPUNCTURE: CPT

## 2019-05-07 ENCOUNTER — ANESTHESIA EVENT (OUTPATIENT)
Dept: SURGERY | Age: 59
End: 2019-05-07
Payer: COMMERCIAL

## 2019-05-08 ENCOUNTER — ANESTHESIA (OUTPATIENT)
Dept: SURGERY | Age: 59
End: 2019-05-08
Payer: COMMERCIAL

## 2019-05-08 ENCOUNTER — APPOINTMENT (OUTPATIENT)
Dept: INTERVENTIONAL RADIOLOGY/VASCULAR | Age: 59
End: 2019-05-08
Attending: SURGERY
Payer: COMMERCIAL

## 2019-05-08 ENCOUNTER — HOSPITAL ENCOUNTER (OUTPATIENT)
Age: 59
Setting detail: OUTPATIENT SURGERY
Discharge: HOME | End: 2019-05-08
Attending: SURGERY | Admitting: SURGERY
Payer: COMMERCIAL

## 2019-05-08 VITALS
HEIGHT: 70 IN | WEIGHT: 315 LBS | TEMPERATURE: 97.5 F | RESPIRATION RATE: 16 BRPM | OXYGEN SATURATION: 96 % | DIASTOLIC BLOOD PRESSURE: 70 MMHG | HEART RATE: 80 BPM | BODY MASS INDEX: 45.1 KG/M2 | SYSTOLIC BLOOD PRESSURE: 128 MMHG

## 2019-05-08 LAB
GLUCOSE BLD STRIP.AUTO-MCNC: 94 MG/DL (ref 65–100)
INR BLD: 2.5 (ref 0.9–1.2)
PT BLD: 28.4 SECS (ref 9.6–11.6)

## 2019-05-08 PROCEDURE — 85610 PROTHROMBIN TIME: CPT

## 2019-05-08 PROCEDURE — C1887 CATHETER, GUIDING: HCPCS

## 2019-05-08 PROCEDURE — 77030019908 HC STETH ESOPH SIMS -A: Performed by: ANESTHESIOLOGY

## 2019-05-08 PROCEDURE — 76210000063 HC OR PH I REC FIRST 0.5 HR: Performed by: SURGERY

## 2019-05-08 PROCEDURE — C1769 GUIDE WIRE: HCPCS

## 2019-05-08 PROCEDURE — 74011250636 HC RX REV CODE- 250/636

## 2019-05-08 PROCEDURE — C1894 INTRO/SHEATH, NON-LASER: HCPCS

## 2019-05-08 PROCEDURE — 76000 FLUOROSCOPY <1 HR PHYS/QHP: CPT

## 2019-05-08 PROCEDURE — 37191 INS ENDOVAS VENA CAVA FILTR: CPT

## 2019-05-08 PROCEDURE — 76010000138 HC OR TIME 0.5 TO 1 HR: Performed by: SURGERY

## 2019-05-08 PROCEDURE — 74011250636 HC RX REV CODE- 250/636: Performed by: ANESTHESIOLOGY

## 2019-05-08 PROCEDURE — 82962 GLUCOSE BLOOD TEST: CPT

## 2019-05-08 PROCEDURE — 76060000032 HC ANESTHESIA 0.5 TO 1 HR: Performed by: SURGERY

## 2019-05-08 PROCEDURE — 74011250636 HC RX REV CODE- 250/636: Performed by: SURGERY

## 2019-05-08 PROCEDURE — 76210000020 HC REC RM PH II FIRST 0.5 HR: Performed by: SURGERY

## 2019-05-08 PROCEDURE — C1880 VENA CAVA FILTER: HCPCS

## 2019-05-08 DEVICE — FILTER CV L48MM DIA30MM SHTH L65CM DIA7FR UNISET JUG FEM: Type: IMPLANTABLE DEVICE | Site: VENA CAVA | Status: FUNCTIONAL

## 2019-05-08 RX ORDER — PROPOFOL 10 MG/ML
INJECTION, EMULSION INTRAVENOUS
Status: DISCONTINUED | OUTPATIENT
Start: 2019-05-08 | End: 2019-05-08 | Stop reason: HOSPADM

## 2019-05-08 RX ORDER — MIDAZOLAM HYDROCHLORIDE 1 MG/ML
2 INJECTION, SOLUTION INTRAMUSCULAR; INTRAVENOUS
Status: DISCONTINUED | OUTPATIENT
Start: 2019-05-08 | End: 2019-05-08 | Stop reason: HOSPADM

## 2019-05-08 RX ORDER — HYDROMORPHONE HYDROCHLORIDE 2 MG/ML
0.5 INJECTION, SOLUTION INTRAMUSCULAR; INTRAVENOUS; SUBCUTANEOUS
Status: DISCONTINUED | OUTPATIENT
Start: 2019-05-08 | End: 2019-05-08 | Stop reason: HOSPADM

## 2019-05-08 RX ORDER — SODIUM CHLORIDE 0.9 % (FLUSH) 0.9 %
5-40 SYRINGE (ML) INJECTION AS NEEDED
Status: DISCONTINUED | OUTPATIENT
Start: 2019-05-08 | End: 2019-05-08 | Stop reason: HOSPADM

## 2019-05-08 RX ORDER — SODIUM CHLORIDE, SODIUM LACTATE, POTASSIUM CHLORIDE, CALCIUM CHLORIDE 600; 310; 30; 20 MG/100ML; MG/100ML; MG/100ML; MG/100ML
100 INJECTION, SOLUTION INTRAVENOUS CONTINUOUS
Status: DISCONTINUED | OUTPATIENT
Start: 2019-05-08 | End: 2019-05-08 | Stop reason: HOSPADM

## 2019-05-08 RX ORDER — DIPHENHYDRAMINE HYDROCHLORIDE 50 MG/ML
INJECTION, SOLUTION INTRAMUSCULAR; INTRAVENOUS AS NEEDED
Status: DISCONTINUED | OUTPATIENT
Start: 2019-05-08 | End: 2019-05-08 | Stop reason: HOSPADM

## 2019-05-08 RX ORDER — OXYCODONE HYDROCHLORIDE 5 MG/1
5 TABLET ORAL
Status: DISCONTINUED | OUTPATIENT
Start: 2019-05-08 | End: 2019-05-08 | Stop reason: HOSPADM

## 2019-05-08 RX ORDER — MIDAZOLAM HYDROCHLORIDE 1 MG/ML
INJECTION, SOLUTION INTRAMUSCULAR; INTRAVENOUS AS NEEDED
Status: DISCONTINUED | OUTPATIENT
Start: 2019-05-08 | End: 2019-05-08 | Stop reason: HOSPADM

## 2019-05-08 RX ORDER — OXYCODONE HYDROCHLORIDE 5 MG/1
10 TABLET ORAL
Status: DISCONTINUED | OUTPATIENT
Start: 2019-05-08 | End: 2019-05-08 | Stop reason: HOSPADM

## 2019-05-08 RX ORDER — MIDAZOLAM HYDROCHLORIDE 1 MG/ML
2 INJECTION, SOLUTION INTRAMUSCULAR; INTRAVENOUS ONCE
Status: DISCONTINUED | OUTPATIENT
Start: 2019-05-08 | End: 2019-05-08 | Stop reason: HOSPADM

## 2019-05-08 RX ORDER — SODIUM CHLORIDE 0.9 % (FLUSH) 0.9 %
5-40 SYRINGE (ML) INJECTION EVERY 8 HOURS
Status: DISCONTINUED | OUTPATIENT
Start: 2019-05-08 | End: 2019-05-08 | Stop reason: HOSPADM

## 2019-05-08 RX ORDER — ONDANSETRON 2 MG/ML
4 INJECTION INTRAMUSCULAR; INTRAVENOUS ONCE
Status: DISCONTINUED | OUTPATIENT
Start: 2019-05-08 | End: 2019-05-08 | Stop reason: HOSPADM

## 2019-05-08 RX ORDER — DIPHENHYDRAMINE HYDROCHLORIDE 50 MG/ML
12.5 INJECTION, SOLUTION INTRAMUSCULAR; INTRAVENOUS
Status: DISCONTINUED | OUTPATIENT
Start: 2019-05-08 | End: 2019-05-08 | Stop reason: HOSPADM

## 2019-05-08 RX ORDER — ALBUTEROL SULFATE 0.83 MG/ML
2.5 SOLUTION RESPIRATORY (INHALATION) AS NEEDED
Status: DISCONTINUED | OUTPATIENT
Start: 2019-05-08 | End: 2019-05-08 | Stop reason: HOSPADM

## 2019-05-08 RX ORDER — FENTANYL CITRATE 50 UG/ML
100 INJECTION, SOLUTION INTRAMUSCULAR; INTRAVENOUS ONCE
Status: DISCONTINUED | OUTPATIENT
Start: 2019-05-08 | End: 2019-05-08 | Stop reason: HOSPADM

## 2019-05-08 RX ORDER — LIDOCAINE HYDROCHLORIDE 20 MG/ML
INJECTION, SOLUTION EPIDURAL; INFILTRATION; INTRACAUDAL; PERINEURAL AS NEEDED
Status: DISCONTINUED | OUTPATIENT
Start: 2019-05-08 | End: 2019-05-08 | Stop reason: HOSPADM

## 2019-05-08 RX ORDER — LIDOCAINE HYDROCHLORIDE 10 MG/ML
0.1 INJECTION INFILTRATION; PERINEURAL AS NEEDED
Status: DISCONTINUED | OUTPATIENT
Start: 2019-05-08 | End: 2019-05-08 | Stop reason: HOSPADM

## 2019-05-08 RX ORDER — NALOXONE HYDROCHLORIDE 0.4 MG/ML
0.1 INJECTION, SOLUTION INTRAMUSCULAR; INTRAVENOUS; SUBCUTANEOUS AS NEEDED
Status: DISCONTINUED | OUTPATIENT
Start: 2019-05-08 | End: 2019-05-08 | Stop reason: HOSPADM

## 2019-05-08 RX ADMIN — LIDOCAINE HYDROCHLORIDE 100 MG: 20 INJECTION, SOLUTION EPIDURAL; INFILTRATION; INTRACAUDAL; PERINEURAL at 11:02

## 2019-05-08 RX ADMIN — Medication 3 G: at 10:55

## 2019-05-08 RX ADMIN — PROPOFOL 100 MCG/KG/MIN: 10 INJECTION, EMULSION INTRAVENOUS at 11:02

## 2019-05-08 RX ADMIN — SODIUM CHLORIDE, SODIUM LACTATE, POTASSIUM CHLORIDE, AND CALCIUM CHLORIDE 100 ML/HR: 600; 310; 30; 20 INJECTION, SOLUTION INTRAVENOUS at 07:57

## 2019-05-08 RX ADMIN — DIPHENHYDRAMINE HYDROCHLORIDE 25 MG: 50 INJECTION, SOLUTION INTRAMUSCULAR; INTRAVENOUS at 11:03

## 2019-05-08 RX ADMIN — MIDAZOLAM HYDROCHLORIDE 2 MG: 1 INJECTION, SOLUTION INTRAMUSCULAR; INTRAVENOUS at 11:02

## 2019-05-08 NOTE — DISCHARGE INSTRUCTIONS
Vena Cava Filter Placement: What to Expect at [address] [facility]  A vena cava filter was put into the vena cava using a thin, flexible tube (catheter) that was inserted through a vein in your neck or groin. A vena cava filter helps prevent blood clots from traveling to the lungs and heart, where they may block blood flow. The filter may be permanent or it may be removed later. Vena cava filters may be used if you have problems taking a medicine (called a blood thinner) that prevents blood clots. After having a vena cava filter placed, you may feel tired and have some pain for several days. You may have a small bandage where the catheter was placed. This care sheet gives you a general idea about how long it will take for you to recover. But each person recovers at a different pace. Follow the steps below to feel better as quickly as possible. How can you care for yourself at home? Activity    · Take it easy for a day or two. Avoid strenuous activities, such as bicycle riding, jogging, weight lifting, or aerobic exercise, until your doctor says it is okay. Diet    · You can eat your normal diet. If your stomach is upset, try bland, low-fat foods like plain rice, broiled chicken, toast, and yogurt.     · Drink plenty of fluids to avoid becoming dehydrated. Medicines    · Your doctor will tell you if and when you can restart your medicines. He or she will also give you instructions about taking any new medicines.     · If you take blood thinners, such as warfarin (Coumadin), clopidogrel (Plavix), or aspirin, be sure to talk to your doctor. He or she will tell you if and when to start taking those medicines again. Make sure that you understand exactly what your doctor wants you to do.     · Be safe with medicines. Take pain medicines exactly as directed. ? If the doctor gave you a prescription medicine for pain, take it as prescribed.   ? If you are not taking a prescription pain medicine, ask your doctor if you can take an over-the-counter medicine.     · If you think your pain medicine is making you sick to your stomach:  ? Take your medicine after meals (unless your doctor has told you not to). ? Ask your doctor for a different pain medicine.    Care of the catheter site    · Keep a bandage over the spot where the catheter was inserted for the first day, or for as long as your doctor recommends.     · Put ice or a cold pack on the area for 10 to 20 minutes at a time to help with soreness or swelling. Do this every few hours. Put a thin cloth between the ice and your skin. Follow-up care is a key part of your treatment and safety. Be sure to make and go to all appointments, and call your doctor if you are having problems. It's also a good idea to know your test results and keep a list of the medicines you take. When should you call for help? Call 911 anytime you think you may need emergency care. For example, call if:    · You passed out (lost consciousness).     · You have severe trouble breathing.     · You have sudden chest pain and shortness of breath, or you cough up blood.    Call your doctor now or seek immediate medical care if:    · You have pain that does not get better after you take pain medicine.     · You are bleeding through your dressing. A small amount of bleeding is normal.     · You have a fast-growing, painful lump at the catheter site.     · You have signs of infection, such as:  ? Increased pain, swelling, warmth, or redness. ? Red streaks leading from the incision. ? Pus draining from the incision. ? A fever.     · You have symptoms of a blood clot in your leg, such as:  ? Pain in the calf, back of the knee, thigh, or groin. ? Redness and swelling in your leg or groin.    Watch closely for any changes in your health, and be sure to contact your doctor if you have any problems.   After general anesthesia or intravenous sedation, for 24 hours or while taking prescription Narcotics:  · Limit your activities  · A responsible adult needs to be with you for the next 24 hours  · Do not drive and operate hazardous machinery  · Do not make important personal or business decisions  · Do  not drink alcoholic beverages  · If you have not urinated within 8 hours after discharge, please contact your surgeon on call. *  Please give a list of your current medications to your Primary Care Provider. *  Please update this list whenever your medications are discontinued, doses are      changed, or new medications (including over-the-counter products) are added. *  Please carry medication information at all times in case of emergency situations. These are general instructions for a healthy lifestyle:  No smoking/ No tobacco products/ Avoid exposure to second hand smoke  Surgeon General's Warning:  Quitting smoking now greatly reduces serious risk to your health. Obesity, smoking, and sedentary lifestyle greatly increases your risk for illness  A healthy diet, regular physical exercise & weight monitoring are important for maintaining a healthy lifestyle    You may be retaining fluid if you have a history of heart failure or if you experience any of the following symptoms:  Weight gain of 3 pounds or more overnight or 5 pounds in a week, increased swelling in our hands or feet or shortness of breath while lying flat in bed. Please call your doctor as soon as you notice any of these symptoms; do not wait until your next office visit. Recognize signs and symptoms of STROKE:  F-face looks uneven  A-arms unable to move or move unevenly  S-speech slurred or non-existent  T-time-call 911 as soon as signs and symptoms begin-DO NOT go       Back to bed or wait to see if you get better-TIME IS BRAIN.

## 2019-05-08 NOTE — ANESTHESIA PREPROCEDURE EVALUATION
Relevant Problems No relevant active problems Anesthetic History Review of Systems / Medical History Patient summary reviewed, nursing notes reviewed and pertinent labs reviewed Pulmonary Sleep apnea: CPAP Neuro/Psych Cardiovascular Hypertension: well controlled Exercise tolerance: >4 METS 
  
GI/Hepatic/Renal 
  
 
 
 
 
 
 Endo/Other Diabetes: well controlled, type 2, using insulin Other Findings Physical Exam 
 
Airway Mallampati: II 
TM Distance: 4 - 6 cm Neck ROM: normal range of motion Mouth opening: Normal 
 
 Cardiovascular Regular rate and rhythm,  S1 and S2 normal,  no murmur, click, rub, or gallop Dental 
No notable dental hx Pulmonary Breath sounds clear to auscultation Abdominal 
 
 
 
 Other Findings Anesthetic Plan ASA: 3 Anesthesia type: total IV anesthesia and general - backup Induction: Intravenous Anesthetic plan and risks discussed with: Patient

## 2019-05-08 NOTE — BRIEF OP NOTE
BRIEF OPERATIVE NOTE Date of Procedure: 2019 Preoperative Diagnosis: Body mass index (BMI) of 50-59.9 in adult [facility]) [Z68.43] Postoperative Diagnosis: Body mass index (BMI) of 50-59.9 in adult [facility]) [Z68.43] Procedure(s): ULTRASOUND GUIDED ACCESS, INFERIOR VENA CAVAGRAM, VENA CAVA FILTER INSERTION Surgeon(s) and Role: Anna Rivera, Brock Lopez MD - Primary Surgical Assistant:  
 
Surgical Staff: 
Circ-1: iNta Wheeler RN Radiology Technician: Otis Meng, RT, R, CT; Lainey Serrano; Barbara Grider, RT, R, CT Event Time In Time Out Incision Start [phone] Incision Close 1132 Anesthesia: General  
Estimated Blood Loss: 10cc Specimens: * No specimens in log * Findings:   
Complications: None Implants:  
Implant Name Type Inv. Item Serial No.  Lot No. LRB No. Used Action FILTER VENA CAVA 7F [id number] -- [id number] - [id number]  FILTER VENA CAVA 7F [id number] -- [id number]  ODIN [facility] [id number] N/A 1 Implanted

## 2019-05-08 NOTE — ANESTHESIA POSTPROCEDURE EVALUATION
Procedure(s): ULTRASOUND GUIDED ACCESS, INFERIOR VENA CAVAGRAM, VENA CAVA FILTER INSERTION. total IV anesthesia, general - backup Anesthesia Post Evaluation Multimodal analgesia: multimodal analgesia used between 6 hours prior to anesthesia start to PACU discharge Patient location during evaluation: PACU Patient participation: complete - patient participated Level of consciousness: awake and awake and alert Pain management: adequate Airway patency: patent Anesthetic complications: no 
Cardiovascular status: acceptable Respiratory status: acceptable Hydration status: acceptable Post anesthesia nausea and vomiting:  controlled Vitals Value Taken Time /67 5/8/2019 12:12 PM  
Temp 36.5 °C (97.7 °F) 5/8/2019 11:40 AM  
Pulse 67 5/8/2019 12:13 PM  
Resp 16 5/8/2019 12:03 PM  
SpO2 95 % 5/8/2019 12:13 PM  
Vitals shown include unvalidated device data.

## 2019-05-09 ENCOUNTER — ANESTHESIA EVENT (OUTPATIENT)
Dept: SURGERY | Age: 59
End: 2019-05-09
Payer: COMMERCIAL

## 2019-05-10 ENCOUNTER — HOSPITAL ENCOUNTER (OUTPATIENT)
Dept: SURGERY | Age: 59
Discharge: HOME | End: 2019-05-10
Attending: SURGERY
Payer: COMMERCIAL

## 2019-05-10 VITALS
BODY MASS INDEX: 44.1 KG/M2 | OXYGEN SATURATION: 97 % | RESPIRATION RATE: 14 BRPM | TEMPERATURE: 98 F | WEIGHT: 315 LBS | DIASTOLIC BLOOD PRESSURE: 93 MMHG | HEIGHT: 71 IN | SYSTOLIC BLOOD PRESSURE: 140 MMHG | HEART RATE: 77 BPM

## 2019-05-10 LAB
ALBUMIN SERPL-MCNC: 3.9 G/DL (ref 3.5–5)
ALBUMIN/GLOB SERPL: 1.1 {RATIO} (ref 1.2–3.5)
ALP SERPL-CCNC: 60 U/L (ref 50–136)
ALT SERPL-CCNC: 39 U/L (ref 12–65)
ANION GAP SERPL CALC-SCNC: 9 MMOL/L (ref 7–16)
AST SERPL-CCNC: 33 U/L (ref 15–37)
ATRIAL RATE: 84 BPM
BILIRUB SERPL-MCNC: 0.7 MG/DL (ref 0.2–1.1)
BUN SERPL-MCNC: 16 MG/DL (ref 6–23)
CALCIUM SERPL-MCNC: 8.7 MG/DL (ref 8.3–10.4)
CALCULATED R AXIS, ECG10: 38 DEGREES
CALCULATED T AXIS, ECG11: 26 DEGREES
CHLORIDE SERPL-SCNC: 106 MMOL/L (ref 98–107)
CO2 SERPL-SCNC: 23 MMOL/L (ref 21–32)
CREAT SERPL-MCNC: 0.77 MG/DL (ref 0.8–1.5)
DIAGNOSIS, 93000: NORMAL
ERYTHROCYTE [DISTWIDTH] IN BLOOD BY AUTOMATED COUNT: 12.8 % (ref 11.9–14.6)
EST. AVERAGE GLUCOSE BLD GHB EST-MCNC: 111 MG/DL
GLOBULIN SER CALC-MCNC: 3.7 G/DL (ref 2.3–3.5)
GLUCOSE BLD STRIP.AUTO-MCNC: 81 MG/DL (ref 65–100)
GLUCOSE SERPL-MCNC: 72 MG/DL (ref 65–100)
HBA1C MFR BLD: 5.5 %
HCT VFR BLD AUTO: 48.3 % (ref 41.1–50.3)
HGB BLD-MCNC: 16.5 G/DL (ref 13.6–17.2)
MCH RBC QN AUTO: 31.3 PG (ref 26.1–32.9)
MCHC RBC AUTO-ENTMCNC: 34.2 G/DL (ref 31.4–35)
MCV RBC AUTO: 91.5 FL (ref 79.6–97.8)
NRBC # BLD: 0 K/UL (ref 0–0.2)
PLATELET # BLD AUTO: 189 K/UL (ref 150–450)
PMV BLD AUTO: 11.1 FL (ref 9.4–12.3)
POTASSIUM SERPL-SCNC: 3.9 MMOL/L (ref 3.5–5.1)
PROT SERPL-MCNC: 7.6 G/DL (ref 6.3–8.2)
Q-T INTERVAL, ECG07: 400 MS
QRS DURATION, ECG06: 86 MS
QTC CALCULATION (BEZET), ECG08: 450 MS
RBC # BLD AUTO: 5.28 M/UL (ref 4.23–5.6)
SODIUM SERPL-SCNC: 138 MMOL/L (ref 136–145)
VENTRICULAR RATE, ECG03: 76 BPM
WBC # BLD AUTO: 5.3 K/UL (ref 4.3–11.1)

## 2019-05-10 PROCEDURE — 80053 COMPREHEN METABOLIC PANEL: CPT

## 2019-05-10 PROCEDURE — 82962 GLUCOSE BLOOD TEST: CPT

## 2019-05-10 PROCEDURE — 93005 ELECTROCARDIOGRAM TRACING: CPT | Performed by: ANESTHESIOLOGY

## 2019-05-10 PROCEDURE — 85027 COMPLETE CBC AUTOMATED: CPT

## 2019-05-10 PROCEDURE — 83036 HEMOGLOBIN GLYCOSYLATED A1C: CPT

## 2019-05-10 NOTE — PERIOP NOTES
Patient verified name and  Order for consent found in EHR and matches case posting; patient verified. Type 2 surgery, PAT assessment complete. Labs per surgeon: CBC, CMP, Hgba1c- results pending Labs per anesthesia protocol: SQBS= 81 EKG: done today- will have anesthesia review per protocol Pt is followed by [facility] for atrial fibrillation. Last cardiology office note 18, EKG 18, ECHO 18, stress test 17 in EMR for anesthesia reference. Telephone encounter 3/11/19 states \"low risk for bariatric surgery. \" Pt with hx of DVT/PE and is on coumadin. Pt had IVC filter placed 19 and states \"did well\" with procedure. Last vascular office note 19 in EMR for anesthesia reference. Instructions for coumadin and lovenox bridge found in EMR 19- pt states he understands instructions given by surgeon's office and has no questions or concerns at this time. Hibiclens and instructions given per hospital policy. Patient provided with and instructed on educational handouts including Guide to Surgery, Pain Management, Hand Hygiene, Blood Transfusion Education, and [place] [facility] Brochure. Patient answered medical/surgical history questions at their best of ability. All prior to admission medications documented in [facility]. Original medication prescription bottles NOT visualized during patient appointment. Patient instructed to hold all vitamins 7 days prior to surgery and NSAIDS 5 days prior to surgery, patient verbalized understanding. Prescription medications to hold: coumadin last dose 19. Patient instructed to continue previous medications as prescribed prior to surgery and to take the following medications the day of surgery according to anesthesia guidelines with a small sip of water: coreg, flonase PRN. Patient teach back successful and patient demonstrates knowledge of instructions.

## 2019-05-10 NOTE — PROCEDURES
[address] [facility]  PROCEDURE NOTE    Name:  Rayna Mckeon  MR#:  [id number]  :  1960  ACCOUNT #:  [de-identified]  DATE OF SERVICE:  2019    PREOPERATIVE DIAGNOSES:  Morbid obesity, preop for bariatric surgery, history of deep vein thrombosis/venous thromboembolism. POSTOPERATIVE DIAGNOSES:  Morbid obesity, preop for bariatric surgery, history of deep vein thrombosis/venous thromboembolism. PROCEDURE PERFORMED:  IVC filter placement. SURGEON:  Coreen Ohara MD    ASSISTANT:      ANESTHESIA:  IV sedation plus 1% lidocaine as local.    ESTIMATED BLOOD LOSS:  .    SPECIMENS REMOVED:  .    COMPLICATIONS:  None. IMPLANTS:  See chart. TOTAL CONTRAST:  20 mL. TOTAL FLUORO TIME:  1.9 minutes. DESCRIPTION OF PROCEDURE:  The patient was brought to the angio suite, placed on the angio table in supine position. Following adequate IV sedation and a time-out procedure, the groins were draped and prepped in a sterile fashion. The right common femoral vein was then percutaneously punctured under direct vision using ultrasound. An 0.035 guidewire was then advanced into the IVC followed by 5-Vietnamese Omni Flush catheter. A vena cavagram was performed. The location of the confluence of the renal vein with IVC was identified and marked. Next,  sheath of the [facility] filter system was advanced over the guidewire and positioned at the level of the renal veins. The filter was then advanced and then unsheathe with its apex just below the confluence of the renal veins. It was then deployed at this level. Following deployment, it was noted that the filter was in excellent position with essentially no tilt. The  sheath system was then removed and direct pressure was held, hemostasis was achieved. The patient tolerated the procedure well. IMPRESSION:  Satisfactory placement of inferior vena cava filter.       Jhony Villegas MD      [email or web address]  D:  2019 8:46  T:  2019 13:55  JOB #:  [id number]

## 2019-05-19 ENCOUNTER — ANESTHESIA EVENT (OUTPATIENT)
Dept: SURGERY | Age: 59
DRG: 620 | End: 2019-05-19
Payer: COMMERCIAL

## 2019-05-20 ENCOUNTER — HOSPITAL ENCOUNTER (INPATIENT)
Age: 59
LOS: 3 days | Discharge: HOME | DRG: 620 | End: 2019-05-23
Attending: SURGERY | Admitting: SURGERY
Payer: COMMERCIAL

## 2019-05-20 ENCOUNTER — ANESTHESIA (OUTPATIENT)
Dept: SURGERY | Age: 59
DRG: 620 | End: 2019-05-20
Payer: COMMERCIAL

## 2019-05-20 PROBLEM — I95.81 HYPOTENSION AFTER PROCEDURE: Status: ACTIVE | Noted: 2019-05-20

## 2019-05-20 LAB
ALBUMIN SERPL-MCNC: 3.2 G/DL (ref 3.5–5)
ALBUMIN/GLOB SERPL: 1.2 {RATIO} (ref 1.2–3.5)
ALP SERPL-CCNC: 50 U/L (ref 50–136)
ALT SERPL-CCNC: 71 U/L (ref 12–65)
ANION GAP SERPL CALC-SCNC: 18 MMOL/L (ref 7–16)
APPEARANCE UR: CLEAR
AST SERPL-CCNC: 60 U/L (ref 15–37)
BACTERIA URNS QL MICRO: 0 /HPF
BILIRUB SERPL-MCNC: 0.9 MG/DL (ref 0.2–1.1)
BILIRUB UR QL: ABNORMAL
BUN SERPL-MCNC: 15 MG/DL (ref 6–23)
CALCIUM SERPL-MCNC: 7.6 MG/DL (ref 8.3–10.4)
CASTS URNS QL MICRO: ABNORMAL /LPF
CHLORIDE SERPL-SCNC: 108 MMOL/L (ref 98–107)
CO2 SERPL-SCNC: 15 MMOL/L (ref 21–32)
COLOR UR: YELLOW
CREAT SERPL-MCNC: 0.84 MG/DL (ref 0.8–1.5)
EPI CELLS #/AREA URNS HPF: ABNORMAL /HPF
GLOBULIN SER CALC-MCNC: 2.7 G/DL (ref 2.3–3.5)
GLUCOSE BLD STRIP.AUTO-MCNC: 113 MG/DL (ref 65–100)
GLUCOSE BLD STRIP.AUTO-MCNC: 133 MG/DL (ref 65–100)
GLUCOSE BLD STRIP.AUTO-MCNC: 136 MG/DL (ref 65–100)
GLUCOSE BLD STRIP.AUTO-MCNC: 91 MG/DL (ref 65–100)
GLUCOSE SERPL-MCNC: 130 MG/DL (ref 65–100)
GLUCOSE UR STRIP.AUTO-MCNC: 1000 MG/DL
HCT VFR BLD AUTO: 43.8 % (ref 41.1–50.3)
HGB BLD-MCNC: 13.8 G/DL (ref 13.6–17.2)
HGB UR QL STRIP: NEGATIVE
KETONES UR QL STRIP.AUTO: 80 MG/DL
LACTATE SERPL-SCNC: 1.4 MMOL/L (ref 0.4–2)
LEUKOCYTE ESTERASE UR QL STRIP.AUTO: NEGATIVE
MAGNESIUM SERPL-MCNC: 1.8 MG/DL (ref 1.8–2.4)
NITRITE UR QL STRIP.AUTO: NEGATIVE
PH UR STRIP: 6 [PH] (ref 5–9)
POTASSIUM SERPL-SCNC: 4.5 MMOL/L (ref 3.5–5.1)
PROT SERPL-MCNC: 5.9 G/DL (ref 6.3–8.2)
PROT UR STRIP-MCNC: ABNORMAL MG/DL
RBC #/AREA URNS HPF: ABNORMAL /HPF
SODIUM SERPL-SCNC: 141 MMOL/L (ref 136–145)
SP GR UR REFRACTOMETRY: 1.03 (ref 1–1.02)
TROPONIN I SERPL-MCNC: <0.02 NG/ML (ref 0.02–0.05)
TSH SERPL DL<=0.005 MIU/L-ACNC: 0.44 UIU/ML
UROBILINOGEN UR QL STRIP.AUTO: 0.2 EU/DL (ref 0.2–1)
WBC URNS QL MICRO: ABNORMAL /HPF

## 2019-05-20 PROCEDURE — 77030027876 HC STPLR ENDOSC FLX PWR J&J -G1: Performed by: SURGERY

## 2019-05-20 PROCEDURE — 74011250636 HC RX REV CODE- 250/636: Performed by: SURGERY

## 2019-05-20 PROCEDURE — 74011000250 HC RX REV CODE- 250

## 2019-05-20 PROCEDURE — 65610000001 HC ROOM ICU GENERAL

## 2019-05-20 PROCEDURE — 84484 ASSAY OF TROPONIN QUANT: CPT

## 2019-05-20 PROCEDURE — 77030025088 HC APPL CLP LIG 1 COVD -B: Performed by: SURGERY

## 2019-05-20 PROCEDURE — 77030039425 HC BLD LARYNG TRULITE DISP TELE -A: Performed by: ANESTHESIOLOGY

## 2019-05-20 PROCEDURE — 76210000063 HC OR PH I REC FIRST 0.5 HR: Performed by: SURGERY

## 2019-05-20 PROCEDURE — C9113 INJ PANTOPRAZOLE SODIUM, VIA: HCPCS | Performed by: SURGERY

## 2019-05-20 PROCEDURE — 77030035051: Performed by: SURGERY

## 2019-05-20 PROCEDURE — 74011250636 HC RX REV CODE- 250/636

## 2019-05-20 PROCEDURE — 77030039266 HC ADH SKN EXOFIN S2SG -A: Performed by: SURGERY

## 2019-05-20 PROCEDURE — 88312 SPECIAL STAINS GROUP 1: CPT

## 2019-05-20 PROCEDURE — 76060000033 HC ANESTHESIA 1 TO 1.5 HR: Performed by: SURGERY

## 2019-05-20 PROCEDURE — 77030008756 HC TU IRR SUC STRY -B: Performed by: SURGERY

## 2019-05-20 PROCEDURE — 74011250636 HC RX REV CODE- 250/636: Performed by: ANESTHESIOLOGY

## 2019-05-20 PROCEDURE — 76210000021 HC REC RM PH II 0.5 TO 1 HR: Performed by: SURGERY

## 2019-05-20 PROCEDURE — 85018 HEMOGLOBIN: CPT

## 2019-05-20 PROCEDURE — 77030035045 HC TRCR ENDOSC VRSPRT BLDLSS COVD -B: Performed by: SURGERY

## 2019-05-20 PROCEDURE — 83735 ASSAY OF MAGNESIUM: CPT

## 2019-05-20 PROCEDURE — 65270000029 HC RM PRIVATE

## 2019-05-20 PROCEDURE — 77030008703 HC TU ET UNCUF COVD -A: Performed by: ANESTHESIOLOGY

## 2019-05-20 PROCEDURE — 36415 COLL VENOUS BLD VENIPUNCTURE: CPT

## 2019-05-20 PROCEDURE — 83605 ASSAY OF LACTIC ACID: CPT

## 2019-05-20 PROCEDURE — 77030018390 HC SPNG HEMSTAT2 J&J -B: Performed by: SURGERY

## 2019-05-20 PROCEDURE — 82962 GLUCOSE BLOOD TEST: CPT

## 2019-05-20 PROCEDURE — 77030034156 HC DEV TISS ENSEAL G2 STR J&J -F: Performed by: SURGERY

## 2019-05-20 PROCEDURE — 77030008771 HC TU NG SALEM SUMP -A: Performed by: ANESTHESIOLOGY

## 2019-05-20 PROCEDURE — 77030035048 HC TRCR ENDOSC OPTCL COVD -B: Performed by: SURGERY

## 2019-05-20 PROCEDURE — 77030018836 HC SOL IRR NACL ICUM -A: Performed by: SURGERY

## 2019-05-20 PROCEDURE — 76010000161 HC OR TIME 1 TO 1.5 HR INTENSV-TIER 1: Performed by: SURGERY

## 2019-05-20 PROCEDURE — 81003 URINALYSIS AUTO W/O SCOPE: CPT

## 2019-05-20 PROCEDURE — 77030002933 HC SUT MCRYL J&J -A: Performed by: SURGERY

## 2019-05-20 PROCEDURE — 94760 N-INVAS EAR/PLS OXIMETRY 1: CPT

## 2019-05-20 PROCEDURE — 74011250637 HC RX REV CODE- 250/637: Performed by: SURGERY

## 2019-05-20 PROCEDURE — 77030008522 HC TBNG INSUF LAPRO STRY -B: Performed by: SURGERY

## 2019-05-20 PROCEDURE — 77030007956 HC PCH ENDOSC SPEC COVD -C: Performed by: SURGERY

## 2019-05-20 PROCEDURE — 77030009968 HC RELD STPLR ENDOSC J&J -D: Performed by: SURGERY

## 2019-05-20 PROCEDURE — 77030020263 HC SOL INJ SOD CL0.9% LFCR 1000ML

## 2019-05-20 PROCEDURE — 77030019940 HC BLNKT HYPOTHRM STRY -B: Performed by: ANESTHESIOLOGY

## 2019-05-20 PROCEDURE — 77030008603 HC TRCR ENDOSC EPATH J&J -C: Performed by: SURGERY

## 2019-05-20 PROCEDURE — 77010033678 HC OXYGEN DAILY

## 2019-05-20 PROCEDURE — 0DB64Z3 EXCISION OF STOMACH, PERCUTANEOUS ENDOSCOPIC APPROACH, VERTICAL: ICD-10-PCS | Performed by: SURGERY

## 2019-05-20 PROCEDURE — 77030008477 HC STYL SATN SLP COVD -A: Performed by: ANESTHESIOLOGY

## 2019-05-20 PROCEDURE — 80053 COMPREHEN METABOLIC PANEL: CPT

## 2019-05-20 PROCEDURE — 83880 ASSAY OF NATRIURETIC PEPTIDE: CPT

## 2019-05-20 PROCEDURE — 88305 TISSUE EXAM BY PATHOLOGIST: CPT

## 2019-05-20 PROCEDURE — 84443 ASSAY THYROID STIM HORMONE: CPT

## 2019-05-20 PROCEDURE — 77030034208 HC SLV GASTRCTMY 3D CAL SYS DISP BOEH -C: Performed by: SURGERY

## 2019-05-20 PROCEDURE — 93005 ELECTROCARDIOGRAM TRACING: CPT | Performed by: HOSPITALIST

## 2019-05-20 PROCEDURE — 74011000250 HC RX REV CODE- 250: Performed by: SURGERY

## 2019-05-20 PROCEDURE — 77030033269 HC SLV COMPR SCD KNE2 CARD -B: Performed by: SURGERY

## 2019-05-20 PROCEDURE — 77030002912 HC SUT ETHBND J&J -A: Performed by: SURGERY

## 2019-05-20 RX ORDER — APREPITANT 40 MG/1
40 CAPSULE ORAL ONCE
Status: COMPLETED | OUTPATIENT
Start: 2019-05-20 | End: 2019-05-20

## 2019-05-20 RX ORDER — BUPIVACAINE HYDROCHLORIDE 5 MG/ML
INJECTION, SOLUTION EPIDURAL; INTRACAUDAL AS NEEDED
Status: DISCONTINUED | OUTPATIENT
Start: 2019-05-20 | End: 2019-05-20 | Stop reason: HOSPADM

## 2019-05-20 RX ORDER — MIDAZOLAM HYDROCHLORIDE 1 MG/ML
2 INJECTION, SOLUTION INTRAMUSCULAR; INTRAVENOUS
Status: DISCONTINUED | OUTPATIENT
Start: 2019-05-20 | End: 2019-05-20 | Stop reason: HOSPADM

## 2019-05-20 RX ORDER — FENTANYL CITRATE 50 UG/ML
100 INJECTION, SOLUTION INTRAMUSCULAR; INTRAVENOUS AS NEEDED
Status: DISCONTINUED | OUTPATIENT
Start: 2019-05-20 | End: 2019-05-20 | Stop reason: HOSPADM

## 2019-05-20 RX ORDER — HYDROMORPHONE HYDROCHLORIDE 2 MG/ML
0.5 INJECTION, SOLUTION INTRAMUSCULAR; INTRAVENOUS; SUBCUTANEOUS
Status: DISCONTINUED | OUTPATIENT
Start: 2019-05-20 | End: 2019-05-20 | Stop reason: HOSPADM

## 2019-05-20 RX ORDER — NALOXONE HYDROCHLORIDE 0.4 MG/ML
0.1 INJECTION, SOLUTION INTRAMUSCULAR; INTRAVENOUS; SUBCUTANEOUS AS NEEDED
Status: DISCONTINUED | OUTPATIENT
Start: 2019-05-20 | End: 2019-05-20 | Stop reason: HOSPADM

## 2019-05-20 RX ORDER — ACETAMINOPHEN 10 MG/ML
1000 INJECTION, SOLUTION INTRAVENOUS ONCE
Status: DISCONTINUED | OUTPATIENT
Start: 2019-05-20 | End: 2019-05-20 | Stop reason: HOSPADM

## 2019-05-20 RX ORDER — ONDANSETRON 2 MG/ML
4 INJECTION INTRAMUSCULAR; INTRAVENOUS ONCE
Status: DISCONTINUED | OUTPATIENT
Start: 2019-05-20 | End: 2019-05-20 | Stop reason: HOSPADM

## 2019-05-20 RX ORDER — ONDANSETRON 2 MG/ML
4 INJECTION INTRAMUSCULAR; INTRAVENOUS
Status: DISCONTINUED | OUTPATIENT
Start: 2019-05-20 | End: 2019-05-23 | Stop reason: HOSPADM

## 2019-05-20 RX ORDER — SODIUM CHLORIDE 0.9 % (FLUSH) 0.9 %
5-40 SYRINGE (ML) INJECTION AS NEEDED
Status: DISCONTINUED | OUTPATIENT
Start: 2019-05-20 | End: 2019-05-23 | Stop reason: HOSPADM

## 2019-05-20 RX ORDER — METOPROLOL TARTRATE 5 MG/5ML
INJECTION INTRAVENOUS AS NEEDED
Status: DISCONTINUED | OUTPATIENT
Start: 2019-05-20 | End: 2019-05-20 | Stop reason: HOSPADM

## 2019-05-20 RX ORDER — DEXTROSE 50 % IN WATER (D50W) INTRAVENOUS SYRINGE
25-50 AS NEEDED
Status: DISCONTINUED | OUTPATIENT
Start: 2019-05-20 | End: 2019-05-23 | Stop reason: HOSPADM

## 2019-05-20 RX ORDER — INSULIN LISPRO 100 [IU]/ML
INJECTION, SOLUTION INTRAVENOUS; SUBCUTANEOUS
Status: DISCONTINUED | OUTPATIENT
Start: 2019-05-20 | End: 2019-05-20 | Stop reason: SDUPTHER

## 2019-05-20 RX ORDER — GABAPENTIN 300 MG/1
300 CAPSULE ORAL ONCE
Status: COMPLETED | OUTPATIENT
Start: 2019-05-20 | End: 2019-05-20

## 2019-05-20 RX ORDER — OXYCODONE HYDROCHLORIDE 5 MG/1
10 TABLET ORAL
Status: DISCONTINUED | OUTPATIENT
Start: 2019-05-20 | End: 2019-05-20 | Stop reason: HOSPADM

## 2019-05-20 RX ORDER — ENOXAPARIN SODIUM 150 MG/ML
250 INJECTION SUBCUTANEOUS EVERY 24 HOURS
Status: DISCONTINUED | OUTPATIENT
Start: 2019-05-21 | End: 2019-05-20 | Stop reason: DRUGHIGH

## 2019-05-20 RX ORDER — NEOSTIGMINE METHYLSULFATE 1 MG/ML
INJECTION INTRAVENOUS AS NEEDED
Status: DISCONTINUED | OUTPATIENT
Start: 2019-05-20 | End: 2019-05-20 | Stop reason: HOSPADM

## 2019-05-20 RX ORDER — OXYCODONE HYDROCHLORIDE 5 MG/1
5 TABLET ORAL
Status: DISCONTINUED | OUTPATIENT
Start: 2019-05-20 | End: 2019-05-20 | Stop reason: HOSPADM

## 2019-05-20 RX ORDER — ACETAMINOPHEN 10 MG/ML
INJECTION, SOLUTION INTRAVENOUS AS NEEDED
Status: DISCONTINUED | OUTPATIENT
Start: 2019-05-20 | End: 2019-05-20 | Stop reason: HOSPADM

## 2019-05-20 RX ORDER — SODIUM CHLORIDE 0.9 % (FLUSH) 0.9 %
5-40 SYRINGE (ML) INJECTION EVERY 8 HOURS
Status: DISCONTINUED | OUTPATIENT
Start: 2019-05-20 | End: 2019-05-23 | Stop reason: HOSPADM

## 2019-05-20 RX ORDER — SIMETHICONE 80 MG
80 TABLET,CHEWABLE ORAL
Status: DISCONTINUED | OUTPATIENT
Start: 2019-05-20 | End: 2019-05-23 | Stop reason: HOSPADM

## 2019-05-20 RX ORDER — HYDROMORPHONE HYDROCHLORIDE 2 MG/ML
1 INJECTION, SOLUTION INTRAMUSCULAR; INTRAVENOUS; SUBCUTANEOUS
Status: DISCONTINUED | OUTPATIENT
Start: 2019-05-20 | End: 2019-05-23 | Stop reason: HOSPADM

## 2019-05-20 RX ORDER — ATROPINE SULFATE 0.4 MG/ML
INJECTION, SOLUTION ENDOTRACHEAL; INTRAMEDULLARY; INTRAMUSCULAR; INTRAVENOUS; SUBCUTANEOUS AS NEEDED
Status: DISCONTINUED | OUTPATIENT
Start: 2019-05-20 | End: 2019-05-20 | Stop reason: HOSPADM

## 2019-05-20 RX ORDER — ESMOLOL HYDROCHLORIDE 10 MG/ML
INJECTION INTRAVENOUS AS NEEDED
Status: DISCONTINUED | OUTPATIENT
Start: 2019-05-20 | End: 2019-05-20 | Stop reason: HOSPADM

## 2019-05-20 RX ORDER — DEXTROSE 40 %
15 GEL (GRAM) ORAL AS NEEDED
Status: DISCONTINUED | OUTPATIENT
Start: 2019-05-20 | End: 2019-05-23 | Stop reason: HOSPADM

## 2019-05-20 RX ORDER — ONDANSETRON 2 MG/ML
INJECTION INTRAMUSCULAR; INTRAVENOUS AS NEEDED
Status: DISCONTINUED | OUTPATIENT
Start: 2019-05-20 | End: 2019-05-20 | Stop reason: HOSPADM

## 2019-05-20 RX ORDER — SCOLOPAMINE TRANSDERMAL SYSTEM 1 MG/1
1 PATCH, EXTENDED RELEASE TRANSDERMAL ONCE
Status: DISCONTINUED | OUTPATIENT
Start: 2019-05-20 | End: 2019-05-21

## 2019-05-20 RX ORDER — LIDOCAINE HYDROCHLORIDE 20 MG/ML
INJECTION, SOLUTION EPIDURAL; INFILTRATION; INTRACAUDAL; PERINEURAL AS NEEDED
Status: DISCONTINUED | OUTPATIENT
Start: 2019-05-20 | End: 2019-05-20 | Stop reason: HOSPADM

## 2019-05-20 RX ORDER — INSULIN LISPRO 100 [IU]/ML
0-10 INJECTION, SOLUTION INTRAVENOUS; SUBCUTANEOUS
Status: DISCONTINUED | OUTPATIENT
Start: 2019-05-21 | End: 2019-05-23 | Stop reason: HOSPADM

## 2019-05-20 RX ORDER — DIPHENHYDRAMINE HYDROCHLORIDE 50 MG/ML
12.5 INJECTION, SOLUTION INTRAMUSCULAR; INTRAVENOUS
Status: DISCONTINUED | OUTPATIENT
Start: 2019-05-20 | End: 2019-05-23 | Stop reason: HOSPADM

## 2019-05-20 RX ORDER — DEXAMETHASONE SODIUM PHOSPHATE 4 MG/ML
INJECTION, SOLUTION INTRA-ARTICULAR; INTRALESIONAL; INTRAMUSCULAR; INTRAVENOUS; SOFT TISSUE AS NEEDED
Status: DISCONTINUED | OUTPATIENT
Start: 2019-05-20 | End: 2019-05-20 | Stop reason: HOSPADM

## 2019-05-20 RX ORDER — ACETAMINOPHEN 10 MG/ML
1000 INJECTION, SOLUTION INTRAVENOUS EVERY 6 HOURS
Status: COMPLETED | OUTPATIENT
Start: 2019-05-20 | End: 2019-05-21

## 2019-05-20 RX ORDER — CARVEDILOL 6.25 MG/1
12.5 TABLET ORAL 2 TIMES DAILY WITH MEALS
Status: DISCONTINUED | OUTPATIENT
Start: 2019-05-20 | End: 2019-05-20

## 2019-05-20 RX ORDER — LORAZEPAM 2 MG/ML
1 INJECTION, SOLUTION INTRAMUSCULAR; INTRAVENOUS
Status: DISCONTINUED | OUTPATIENT
Start: 2019-05-20 | End: 2019-05-23 | Stop reason: HOSPADM

## 2019-05-20 RX ORDER — HYDROMORPHONE HYDROCHLORIDE 2 MG/ML
0.5 INJECTION, SOLUTION INTRAMUSCULAR; INTRAVENOUS; SUBCUTANEOUS
Status: DISCONTINUED | OUTPATIENT
Start: 2019-05-20 | End: 2019-05-23 | Stop reason: HOSPADM

## 2019-05-20 RX ORDER — METOPROLOL TARTRATE 5 MG/5ML
1.25 INJECTION INTRAVENOUS
Status: DISCONTINUED | OUTPATIENT
Start: 2019-05-20 | End: 2019-05-23 | Stop reason: HOSPADM

## 2019-05-20 RX ORDER — SODIUM CHLORIDE, SODIUM LACTATE, POTASSIUM CHLORIDE, CALCIUM CHLORIDE 600; 310; 30; 20 MG/100ML; MG/100ML; MG/100ML; MG/100ML
75 INJECTION, SOLUTION INTRAVENOUS CONTINUOUS
Status: DISCONTINUED | OUTPATIENT
Start: 2019-05-20 | End: 2019-05-20 | Stop reason: HOSPADM

## 2019-05-20 RX ORDER — DIPHENHYDRAMINE HYDROCHLORIDE 50 MG/ML
12.5 INJECTION, SOLUTION INTRAMUSCULAR; INTRAVENOUS ONCE
Status: DISCONTINUED | OUTPATIENT
Start: 2019-05-20 | End: 2019-05-20 | Stop reason: HOSPADM

## 2019-05-20 RX ORDER — KETOROLAC TROMETHAMINE 30 MG/ML
30 INJECTION, SOLUTION INTRAMUSCULAR; INTRAVENOUS
Status: COMPLETED | OUTPATIENT
Start: 2019-05-20 | End: 2019-05-20

## 2019-05-20 RX ORDER — DEXTROSE 40 %
1 GEL (GRAM) ORAL AS NEEDED
Status: DISCONTINUED | OUTPATIENT
Start: 2019-05-20 | End: 2019-05-20 | Stop reason: SDUPTHER

## 2019-05-20 RX ORDER — FENTANYL CITRATE 50 UG/ML
INJECTION, SOLUTION INTRAMUSCULAR; INTRAVENOUS AS NEEDED
Status: DISCONTINUED | OUTPATIENT
Start: 2019-05-20 | End: 2019-05-20 | Stop reason: HOSPADM

## 2019-05-20 RX ORDER — NOREPINEPHRINE BITARTRATE/D5W 4MG/250ML
0-16 PLASTIC BAG, INJECTION (ML) INTRAVENOUS
Status: DISCONTINUED | OUTPATIENT
Start: 2019-05-21 | End: 2019-05-23

## 2019-05-20 RX ORDER — GLYCOPYRROLATE 0.2 MG/ML
INJECTION INTRAMUSCULAR; INTRAVENOUS AS NEEDED
Status: DISCONTINUED | OUTPATIENT
Start: 2019-05-20 | End: 2019-05-20 | Stop reason: HOSPADM

## 2019-05-20 RX ORDER — PROPOFOL 10 MG/ML
INJECTION, EMULSION INTRAVENOUS AS NEEDED
Status: DISCONTINUED | OUTPATIENT
Start: 2019-05-20 | End: 2019-05-20 | Stop reason: HOSPADM

## 2019-05-20 RX ORDER — SODIUM CHLORIDE AND POTASSIUM CHLORIDE .9; .15 G/100ML; G/100ML
100 SOLUTION INTRAVENOUS CONTINUOUS
Status: DISCONTINUED | OUTPATIENT
Start: 2019-05-20 | End: 2019-05-23

## 2019-05-20 RX ORDER — NALOXONE HYDROCHLORIDE 0.4 MG/ML
0.4 INJECTION, SOLUTION INTRAMUSCULAR; INTRAVENOUS; SUBCUTANEOUS AS NEEDED
Status: DISCONTINUED | OUTPATIENT
Start: 2019-05-20 | End: 2019-05-23 | Stop reason: HOSPADM

## 2019-05-20 RX ORDER — KETOROLAC TROMETHAMINE 30 MG/ML
30 INJECTION, SOLUTION INTRAMUSCULAR; INTRAVENOUS
Status: ACTIVE | OUTPATIENT
Start: 2019-05-20 | End: 2019-05-23

## 2019-05-20 RX ORDER — SODIUM CHLORIDE, SODIUM LACTATE, POTASSIUM CHLORIDE, CALCIUM CHLORIDE 600; 310; 30; 20 MG/100ML; MG/100ML; MG/100ML; MG/100ML
1000 INJECTION, SOLUTION INTRAVENOUS CONTINUOUS
Status: DISCONTINUED | OUTPATIENT
Start: 2019-05-20 | End: 2019-05-20 | Stop reason: HOSPADM

## 2019-05-20 RX ORDER — EPHEDRINE SULFATE 50 MG/ML
INJECTION, SOLUTION INTRAVENOUS AS NEEDED
Status: DISCONTINUED | OUTPATIENT
Start: 2019-05-20 | End: 2019-05-20 | Stop reason: HOSPADM

## 2019-05-20 RX ORDER — LIDOCAINE HYDROCHLORIDE 10 MG/ML
0.1 INJECTION INFILTRATION; PERINEURAL AS NEEDED
Status: DISCONTINUED | OUTPATIENT
Start: 2019-05-20 | End: 2019-05-20 | Stop reason: HOSPADM

## 2019-05-20 RX ORDER — ROCURONIUM BROMIDE 10 MG/ML
INJECTION, SOLUTION INTRAVENOUS AS NEEDED
Status: DISCONTINUED | OUTPATIENT
Start: 2019-05-20 | End: 2019-05-20 | Stop reason: HOSPADM

## 2019-05-20 RX ORDER — FLUTICASONE PROPIONATE 50 MCG
2 SPRAY, SUSPENSION (ML) NASAL DAILY
Status: DISCONTINUED | OUTPATIENT
Start: 2019-05-20 | End: 2019-05-23 | Stop reason: HOSPADM

## 2019-05-20 RX ORDER — ONDANSETRON 2 MG/ML
4 INJECTION INTRAMUSCULAR; INTRAVENOUS ONCE
Status: COMPLETED | OUTPATIENT
Start: 2019-05-20 | End: 2019-05-20

## 2019-05-20 RX ORDER — ENOXAPARIN SODIUM 100 MG/ML
40 INJECTION SUBCUTANEOUS EVERY 24 HOURS
Status: DISCONTINUED | OUTPATIENT
Start: 2019-05-20 | End: 2019-05-20 | Stop reason: SDUPTHER

## 2019-05-20 RX ORDER — ENOXAPARIN SODIUM 150 MG/ML
150 INJECTION SUBCUTANEOUS EVERY 12 HOURS
Status: DISCONTINUED | OUTPATIENT
Start: 2019-05-21 | End: 2019-05-21

## 2019-05-20 RX ORDER — ACETAMINOPHEN 500 MG
500 TABLET ORAL ONCE
Status: DISCONTINUED | OUTPATIENT
Start: 2019-05-20 | End: 2019-05-20 | Stop reason: HOSPADM

## 2019-05-20 RX ORDER — LOSARTAN POTASSIUM 50 MG/1
100 TABLET ORAL DAILY
Status: DISCONTINUED | OUTPATIENT
Start: 2019-05-20 | End: 2019-05-20

## 2019-05-20 RX ADMIN — APREPITANT 40 MG: 40 CAPSULE ORAL at 06:44

## 2019-05-20 RX ADMIN — GLYCOPYRROLATE 0.8 MG: 0.2 INJECTION INTRAMUSCULAR; INTRAVENOUS at 09:29

## 2019-05-20 RX ADMIN — DEXAMETHASONE SODIUM PHOSPHATE 4 MG: 4 INJECTION, SOLUTION INTRA-ARTICULAR; INTRALESIONAL; INTRAMUSCULAR; INTRAVENOUS; SOFT TISSUE at 08:59

## 2019-05-20 RX ADMIN — EPHEDRINE SULFATE 10 MG: 50 INJECTION, SOLUTION INTRAVENOUS at 08:46

## 2019-05-20 RX ADMIN — FLUTICASONE PROPIONATE 2 SPRAY: 50 SPRAY, METERED NASAL at 13:11

## 2019-05-20 RX ADMIN — ROCURONIUM BROMIDE 5 MG: 10 INJECTION, SOLUTION INTRAVENOUS at 09:03

## 2019-05-20 RX ADMIN — ONDANSETRON 4 MG: 2 INJECTION INTRAMUSCULAR; INTRAVENOUS at 09:19

## 2019-05-20 RX ADMIN — GLYCOPYRROLATE 0.2 MG: 0.2 INJECTION INTRAMUSCULAR; INTRAVENOUS at 08:46

## 2019-05-20 RX ADMIN — ACETAMINOPHEN 1000 MG: 10 INJECTION, SOLUTION INTRAVENOUS at 09:25

## 2019-05-20 RX ADMIN — LIDOCAINE HYDROCHLORIDE 100 MG: 20 INJECTION, SOLUTION EPIDURAL; INFILTRATION; INTRACAUDAL; PERINEURAL at 08:24

## 2019-05-20 RX ADMIN — GABAPENTIN 300 MG: 300 CAPSULE ORAL at 06:44

## 2019-05-20 RX ADMIN — ROCURONIUM BROMIDE 50 MG: 10 INJECTION, SOLUTION INTRAVENOUS at 08:24

## 2019-05-20 RX ADMIN — SODIUM CHLORIDE AND POTASSIUM CHLORIDE 100 ML/HR: 9; 1.49 INJECTION, SOLUTION INTRAVENOUS at 13:03

## 2019-05-20 RX ADMIN — SODIUM CHLORIDE, SODIUM LACTATE, POTASSIUM CHLORIDE, AND CALCIUM CHLORIDE: 600; 310; 30; 20 INJECTION, SOLUTION INTRAVENOUS at 09:16

## 2019-05-20 RX ADMIN — LIDOCAINE HYDROCHLORIDE 0.1 ML: 10 INJECTION, SOLUTION INFILTRATION; PERINEURAL at 06:53

## 2019-05-20 RX ADMIN — Medication 3 G: at 08:40

## 2019-05-20 RX ADMIN — ACETAMINOPHEN 1000 MG: 10 INJECTION, SOLUTION INTRAVENOUS at 23:45

## 2019-05-20 RX ADMIN — ACETAMINOPHEN 1000 MG: 10 INJECTION, SOLUTION INTRAVENOUS at 17:22

## 2019-05-20 RX ADMIN — PROPOFOL 260 MG: 10 INJECTION, EMULSION INTRAVENOUS at 08:24

## 2019-05-20 RX ADMIN — SODIUM CHLORIDE 1000 ML: 900 INJECTION, SOLUTION INTRAVENOUS at 20:38

## 2019-05-20 RX ADMIN — SODIUM CHLORIDE, SODIUM LACTATE, POTASSIUM CHLORIDE, AND CALCIUM CHLORIDE 1000 ML: 600; 310; 30; 20 INJECTION, SOLUTION INTRAVENOUS at 06:43

## 2019-05-20 RX ADMIN — METOPROLOL TARTRATE 1 MG: 5 INJECTION INTRAVENOUS at 09:27

## 2019-05-20 RX ADMIN — NEOSTIGMINE METHYLSULFATE 5 MG: 1 INJECTION INTRAVENOUS at 09:29

## 2019-05-20 RX ADMIN — ONDANSETRON 4 MG: 2 INJECTION INTRAMUSCULAR; INTRAVENOUS at 10:21

## 2019-05-20 RX ADMIN — ESMOLOL HYDROCHLORIDE 50 MG: 10 INJECTION INTRAVENOUS at 09:07

## 2019-05-20 RX ADMIN — PANTOPRAZOLE SODIUM 40 MG: 40 INJECTION, POWDER, FOR SOLUTION INTRAVENOUS at 13:07

## 2019-05-20 RX ADMIN — ATROPINE SULFATE 0.4 MG: 0.4 INJECTION, SOLUTION ENDOTRACHEAL; INTRAMEDULLARY; INTRAMUSCULAR; INTRAVENOUS; SUBCUTANEOUS at 08:48

## 2019-05-20 RX ADMIN — EPHEDRINE SULFATE 10 MG: 50 INJECTION, SOLUTION INTRAVENOUS at 08:35

## 2019-05-20 RX ADMIN — METOPROLOL TARTRATE 3 MG: 5 INJECTION INTRAVENOUS at 09:36

## 2019-05-20 RX ADMIN — METOPROLOL TARTRATE 1 MG: 5 INJECTION INTRAVENOUS at 09:17

## 2019-05-20 RX ADMIN — Medication 5 ML: at 13:13

## 2019-05-20 RX ADMIN — Medication 10 ML: at 23:45

## 2019-05-20 RX ADMIN — KETOROLAC TROMETHAMINE 30 MG: 30 INJECTION, SOLUTION INTRAMUSCULAR at 10:31

## 2019-05-20 RX ADMIN — SODIUM CHLORIDE 1000 ML: 900 INJECTION, SOLUTION INTRAVENOUS at 17:55

## 2019-05-20 RX ADMIN — LOSARTAN POTASSIUM 100 MG: 50 TABLET ORAL at 13:40

## 2019-05-20 RX ADMIN — FENTANYL CITRATE 100 MCG: 50 INJECTION, SOLUTION INTRAMUSCULAR; INTRAVENOUS at 08:24

## 2019-05-20 NOTE — ROUTINE PROCESS
TRANSFER - OUT REPORT: 
 
Verbal report given to RN(name) on Nick Perla  being transferred to med/surg(unit) for routine post - op Report consisted of patients Situation, Background, Assessment and  
Recommendations(SBAR). Information from the following report(s) SBAR, Kardex, OR Summary, Procedure Summary, Intake/Output, MAR and Cardiac Rhythm a fib was reviewed with the receiving nurse. Lines:  
Peripheral IV 05/20/19 Right Hand (Active) Site Assessment Clean, dry, & intact 5/20/2019  9:47 AM  
Phlebitis Assessment 0 5/20/2019  9:47 AM  
Infiltration Assessment 0 5/20/2019  9:47 AM  
Dressing Status Clean, dry, & intact 5/20/2019  9:47 AM  
Dressing Type Transparent;Tape 5/20/2019  9:47 AM  
Hub Color/Line Status Patent;Green; Infusing 5/20/2019  9:47 AM  
  
 
Opportunity for questions and clarification was provided. Patient transported with: 
 O2 @ 2 liters

## 2019-05-20 NOTE — PROGRESS NOTES
Surgery Addendum Called about the patient with borderline hypotension. He was given two saline boluses with minimal improvement. He had a Dowell catheter placed with 1000 cc's of urine out. A stat H/H was 13.8/43. 8. He had intraoperative fluctuations of BP and heart rate. Will consult hospitalists. May have had a cardiac event. Surgery went well with only 75 cc's of blood loss. Have not restarted his chronic anticoagulation due to fresh post-operative state. He has an IVC filter in place. Will monitior.   
Lynn Durand MD.

## 2019-05-20 NOTE — PROGRESS NOTES
Orders received for stat H&H from MD and second bolus after first is complete. Lab called and notified to come draw. First bolus still infusing. Pt BP is 89/56 HR 81 o2 sat 98%.

## 2019-05-20 NOTE — BRIEF OP NOTE
BRIEF OPERATIVE NOTE Date of Procedure: 5/20/2019 Preoperative Diagnosis: Morbid obesity ([facility] [E66.01] Postoperative Diagnosis: Morbid obesity ([facility] [E66.01] Procedure(s): LAPAROSCOPIC SLEEVE GASTRECTOMY Surgeon(s) and Role: 
   Ganga Resendez MD - Primary Surgical Assistant: None Surgical Staff: 
Circ-1: Emeka Parra RN Scrub Tech-1: Rochelle Gordon Event Time In Time Out Incision Start 5439 Incision Close 1674 Anesthesia: General  
Estimated Blood Loss: 50 cc's Specimens:  
ID Type Source Tests Collected by Time Destination 1 : PORTION OF STOMACH Fresh Stomach  Sourav Santoyo MD 5/20/2019 0930 Pathology Findings: See dictated note Complications: None. Implants: * No implants in log *

## 2019-05-20 NOTE — PROGRESS NOTES
05/20/19 1045 Dual Skin Pressure Injury Assessment Dual Skin Pressure Injury Assessment X Second Care Provider (Based on [address] [place] [address]) Ebb Peaches Skin Integumentary Skin Integumentary (WDL) X Pressure  Injury Documentation No Pressure Injury Noted-Pressure Ulcer Prevention Initiated Skin Condition/Temp Warm;Dry Skin Color Appropriate for ethnicity Skin Integrity Incision (comment) 
(abd lap sites x5 TAWNYA Steri strips) Turgor Non-tenting Wound Prevention and Protection Methods Orientation of Wound Prevention Posterior Location of Wound Prevention Sacrum/Coccyx Dressing Present  No  
Wound Offloading (Prevention Methods) Bed, pressure reduction mattress;Repositioning;Pillows Stephen Scale (11years of age and older) Sensory Perception 4 Moisture 4 Activity 4 Mobility 4 Nutrition 4 Friction and Shear 3 Stephen Score 23

## 2019-05-20 NOTE — ANESTHESIA POSTPROCEDURE EVALUATION
Procedure(s): LAPAROSCOPIC SLEEVE GASTRECTOMY. general 
 
Anesthesia Post Evaluation Multimodal analgesia: multimodal analgesia used between 6 hours prior to anesthesia start to PACU discharge Patient location during evaluation: PACU Patient participation: complete - patient participated Level of consciousness: awake and alert Pain management: adequate Airway patency: patent Anesthetic complications: no 
Cardiovascular status: acceptable Respiratory status: acceptable Hydration status: acceptable Post anesthesia nausea and vomiting:  none Vitals Value Taken Time /76 5/20/2019  9:59 AM  
Temp 37.3 °C (99.1 °F) 5/20/2019  9:44 AM  
Pulse 51 5/20/2019  9:59 AM  
Resp 16 5/20/2019  9:59 AM  
SpO2 97 % 5/20/2019  9:59 AM

## 2019-05-20 NOTE — PROGRESS NOTES
Pt made this nurse aware that nurse I took over for had told him he was on a clear liquid diet and that he could have water. Pt reports drinking around 650cc of water. Instructed pt he is NPO and is only allowed ice chips. Pt reports understanding. Dr. Cecile Ruelas made aware. No orders received.

## 2019-05-20 NOTE — ANESTHESIA PREPROCEDURE EVALUATION
Relevant Problems No relevant active problems Anesthetic History No history of anesthetic complications Review of Systems / Medical History Patient summary reviewed, nursing notes reviewed and pertinent labs reviewed Pulmonary Sleep apnea Comments: PE 4 years ago. Now with IVC filter and Coumadin - Lovenox Bridge Neuro/Psych Within defined limits Cardiovascular Hypertension Dysrhythmias : atrial fibrillation Exercise tolerance: >4 METS 
  
GI/Hepatic/Renal 
  
 
 
 
 
 
 Endo/Other Diabetes Morbid obesity and arthritis Other Findings Physical Exam 
 
Airway Mallampati: II 
TM Distance: 4 - 6 cm Neck ROM: normal range of motion Mouth opening: Normal 
 
 Cardiovascular Rhythm: regular Rate: normal 
 
 
 
 Dental 
No notable dental hx Pulmonary Breath sounds clear to auscultation Abdominal 
GI exam deferred Other Findings Anesthetic Plan ASA: 3 Anesthesia type: general 
 
 
 
 
Induction: Intravenous Anesthetic plan and risks discussed with: Patient

## 2019-05-20 NOTE — INTERVAL H&P NOTE
H&P Update: 
Lusi Archer was seen and examined. History and physical has been reviewed. The patient has been examined.  There have been no significant clinical changes since the completion of the originally dated History and Physical.

## 2019-05-20 NOTE — PROGRESS NOTES
Pt BP down to 88/56. MD notified. Bolus started. Pt reports trouble voiding and attempted to void with only 20cc of urine. Bladder scan residual showed 450cc, orders received to place espino.

## 2019-05-21 ENCOUNTER — APPOINTMENT (OUTPATIENT)
Dept: GENERAL RADIOLOGY | Age: 59
DRG: 620 | End: 2019-05-21
Attending: INTERNAL MEDICINE
Payer: COMMERCIAL

## 2019-05-21 ENCOUNTER — APPOINTMENT (OUTPATIENT)
Dept: GENERAL RADIOLOGY | Age: 59
DRG: 620 | End: 2019-05-21
Attending: SURGERY
Payer: COMMERCIAL

## 2019-05-21 LAB
ANION GAP SERPL CALC-SCNC: 19 MMOL/L (ref 7–16)
ARTERIAL PATENCY WRIST A: ABNORMAL
ATRIAL RATE: 416 BPM
BASE DEFICIT BLDV-SCNC: 11 MMOL/L
BASOPHILS # BLD: 0 K/UL (ref 0–0.2)
BASOPHILS NFR BLD: 0 % (ref 0–2)
BDY SITE: ABNORMAL
BNP SERPL-MCNC: 78 PG/ML
BODY TEMPERATURE: 98.6
BUN SERPL-MCNC: 20 MG/DL (ref 6–23)
CALCIUM SERPL-MCNC: 7.4 MG/DL (ref 8.3–10.4)
CALCULATED R AXIS, ECG10: 37 DEGREES
CALCULATED T AXIS, ECG11: 31 DEGREES
CHLORIDE SERPL-SCNC: 109 MMOL/L (ref 98–107)
CO2 BLD-SCNC: 15 MMOL/L
CO2 SERPL-SCNC: 13 MMOL/L (ref 21–32)
COLLECT TIME,HTIME: 802
CREAT SERPL-MCNC: 1.53 MG/DL (ref 0.8–1.5)
D DIMER PPP FEU-MCNC: <0.27 UG/ML(FEU)
DIAGNOSIS, 93000: NORMAL
DIFFERENTIAL METHOD BLD: ABNORMAL
EOSINOPHIL # BLD: 0 K/UL (ref 0–0.8)
EOSINOPHIL NFR BLD: 0 % (ref 0.5–7.8)
ERYTHROCYTE [DISTWIDTH] IN BLOOD BY AUTOMATED COUNT: 13.6 % (ref 11.9–14.6)
ERYTHROCYTE [DISTWIDTH] IN BLOOD BY AUTOMATED COUNT: 13.6 % (ref 11.9–14.6)
GAS FLOW.O2 O2 DELIVERY SYS: ABNORMAL L/MIN
GLUCOSE BLD STRIP.AUTO-MCNC: 123 MG/DL (ref 65–100)
GLUCOSE BLD STRIP.AUTO-MCNC: 124 MG/DL (ref 65–100)
GLUCOSE BLD STRIP.AUTO-MCNC: 124 MG/DL (ref 65–100)
GLUCOSE BLD STRIP.AUTO-MCNC: 135 MG/DL (ref 65–100)
GLUCOSE SERPL-MCNC: 110 MG/DL (ref 65–100)
HCO3 BLDV-SCNC: 14.1 MMOL/L (ref 23–28)
HCT VFR BLD AUTO: 33.6 % (ref 41.1–50.3)
HCT VFR BLD AUTO: 36.5 % (ref 41.1–50.3)
HGB BLD-MCNC: 11.2 G/DL (ref 13.6–17.2)
HGB BLD-MCNC: 12.1 G/DL (ref 13.6–17.2)
HGB BLD-MCNC: 12.3 G/DL (ref 13.6–17.2)
IMM GRANULOCYTES # BLD AUTO: 0 K/UL (ref 0–0.5)
IMM GRANULOCYTES NFR BLD AUTO: 0 % (ref 0–5)
LACTATE SERPL-SCNC: 1 MMOL/L (ref 0.4–2)
LYMPHOCYTES # BLD: 0.9 K/UL (ref 0.5–4.6)
LYMPHOCYTES NFR BLD: 13 % (ref 13–44)
MAGNESIUM SERPL-MCNC: 1.6 MG/DL (ref 1.8–2.4)
MCH RBC QN AUTO: 31.5 PG (ref 26.1–32.9)
MCH RBC QN AUTO: 32.1 PG (ref 26.1–32.9)
MCHC RBC AUTO-ENTMCNC: 33.3 G/DL (ref 31.4–35)
MCHC RBC AUTO-ENTMCNC: 33.7 G/DL (ref 31.4–35)
MCV RBC AUTO: 94.6 FL (ref 79.6–97.8)
MCV RBC AUTO: 95.3 FL (ref 79.6–97.8)
MONOCYTES # BLD: 0.3 K/UL (ref 0.1–1.3)
MONOCYTES NFR BLD: 5 % (ref 4–12)
NEUTS SEG # BLD: 5.5 K/UL (ref 1.7–8.2)
NEUTS SEG NFR BLD: 82 % (ref 43–78)
NRBC # BLD: 0 K/UL (ref 0–0.2)
NRBC # BLD: 0 K/UL (ref 0–0.2)
PCO2 BLDV: 27.5 MMHG (ref 41–51)
PH BLDV: 7.32 [PH] (ref 7.32–7.42)
PLATELET # BLD AUTO: 144 K/UL (ref 150–450)
PLATELET # BLD AUTO: 149 K/UL (ref 150–450)
PMV BLD AUTO: 11.4 FL (ref 9.4–12.3)
PMV BLD AUTO: 11.8 FL (ref 9.4–12.3)
PO2 BLDV: 39 MMHG
POTASSIUM SERPL-SCNC: 4 MMOL/L (ref 3.5–5.1)
PROCALCITONIN SERPL-MCNC: 0.1 NG/ML
Q-T INTERVAL, ECG07: 386 MS
QRS DURATION, ECG06: 90 MS
QTC CALCULATION (BEZET), ECG08: 467 MS
RBC # BLD AUTO: 3.55 M/UL (ref 4.23–5.6)
RBC # BLD AUTO: 3.83 M/UL (ref 4.23–5.6)
SAO2 % BLDV: 71 % (ref 65–88)
SERVICE CMNT-IMP: 1
SERVICE CMNT-IMP: ABNORMAL
SODIUM SERPL-SCNC: 141 MMOL/L (ref 136–145)
SPECIMEN TYPE: ABNORMAL
TROPONIN I SERPL-MCNC: <0.02 NG/ML (ref 0.02–0.05)
VENTRICULAR RATE, ECG03: 88 BPM
WBC # BLD AUTO: 6.7 K/UL (ref 4.3–11.1)
WBC # BLD AUTO: 7.3 K/UL (ref 4.3–11.1)

## 2019-05-21 PROCEDURE — 85018 HEMOGLOBIN: CPT

## 2019-05-21 PROCEDURE — 97110 THERAPEUTIC EXERCISES: CPT

## 2019-05-21 PROCEDURE — 74011636320 HC RX REV CODE- 636/320: Performed by: SURGERY

## 2019-05-21 PROCEDURE — 74220 X-RAY XM ESOPHAGUS 1CNTRST: CPT

## 2019-05-21 PROCEDURE — 86923 COMPATIBILITY TEST ELECTRIC: CPT

## 2019-05-21 PROCEDURE — C8929 TTE W OR WO FOL WCON,DOPPLER: HCPCS

## 2019-05-21 PROCEDURE — 85027 COMPLETE CBC AUTOMATED: CPT

## 2019-05-21 PROCEDURE — 85379 FIBRIN DEGRADATION QUANT: CPT

## 2019-05-21 PROCEDURE — 74011000250 HC RX REV CODE- 250: Performed by: SURGERY

## 2019-05-21 PROCEDURE — 83605 ASSAY OF LACTIC ACID: CPT

## 2019-05-21 PROCEDURE — C9113 INJ PANTOPRAZOLE SODIUM, VIA: HCPCS | Performed by: SURGERY

## 2019-05-21 PROCEDURE — 97161 PT EVAL LOW COMPLEX 20 MIN: CPT

## 2019-05-21 PROCEDURE — 86900 BLOOD TYPING SEROLOGIC ABO: CPT

## 2019-05-21 PROCEDURE — 77030039270 HC TU BLD FLTR CARD -A

## 2019-05-21 PROCEDURE — 84484 ASSAY OF TROPONIN QUANT: CPT

## 2019-05-21 PROCEDURE — 83735 ASSAY OF MAGNESIUM: CPT

## 2019-05-21 PROCEDURE — 77030020263 HC SOL INJ SOD CL0.9% LFCR 1000ML

## 2019-05-21 PROCEDURE — 36430 TRANSFUSION BLD/BLD COMPNT: CPT

## 2019-05-21 PROCEDURE — 74011250636 HC RX REV CODE- 250/636: Performed by: SURGERY

## 2019-05-21 PROCEDURE — 74011250636 HC RX REV CODE- 250/636: Performed by: HOSPITALIST

## 2019-05-21 PROCEDURE — 77030018846 HC SOL IRR STRL H20 ICUM -A

## 2019-05-21 PROCEDURE — 65610000001 HC ROOM ICU GENERAL

## 2019-05-21 PROCEDURE — 30233N1 TRANSFUSION OF NONAUTOLOGOUS RED BLOOD CELLS INTO PERIPHERAL VEIN, PERCUTANEOUS APPROACH: ICD-10-PCS | Performed by: SURGERY

## 2019-05-21 PROCEDURE — 71045 X-RAY EXAM CHEST 1 VIEW: CPT

## 2019-05-21 PROCEDURE — 85025 COMPLETE CBC W/AUTO DIFF WBC: CPT

## 2019-05-21 PROCEDURE — 74011250637 HC RX REV CODE- 250/637: Performed by: INTERNAL MEDICINE

## 2019-05-21 PROCEDURE — 36415 COLL VENOUS BLD VENIPUNCTURE: CPT

## 2019-05-21 PROCEDURE — 77030019605

## 2019-05-21 PROCEDURE — P9016 RBC LEUKOCYTES REDUCED: HCPCS

## 2019-05-21 PROCEDURE — 84145 PROCALCITONIN (PCT): CPT

## 2019-05-21 PROCEDURE — 74011000258 HC RX REV CODE- 258: Performed by: SURGERY

## 2019-05-21 PROCEDURE — 82962 GLUCOSE BLOOD TEST: CPT

## 2019-05-21 PROCEDURE — 82803 BLOOD GASES ANY COMBINATION: CPT

## 2019-05-21 PROCEDURE — 80048 BASIC METABOLIC PNL TOTAL CA: CPT

## 2019-05-21 RX ORDER — ZOLPIDEM TARTRATE 5 MG/1
5 TABLET ORAL
Status: DISCONTINUED | OUTPATIENT
Start: 2019-05-21 | End: 2019-05-23 | Stop reason: HOSPADM

## 2019-05-21 RX ORDER — PANTOPRAZOLE SODIUM 40 MG/1
40 TABLET, DELAYED RELEASE ORAL
Status: DISCONTINUED | OUTPATIENT
Start: 2019-05-22 | End: 2019-05-23 | Stop reason: HOSPADM

## 2019-05-21 RX ORDER — OXYCODONE AND ACETAMINOPHEN 5; 325 MG/1; MG/1
2 TABLET ORAL
Status: DISCONTINUED | OUTPATIENT
Start: 2019-05-21 | End: 2019-05-23 | Stop reason: HOSPADM

## 2019-05-21 RX ORDER — SODIUM CHLORIDE 9 MG/ML
250 INJECTION, SOLUTION INTRAVENOUS AS NEEDED
Status: DISCONTINUED | OUTPATIENT
Start: 2019-05-21 | End: 2019-05-23 | Stop reason: HOSPADM

## 2019-05-21 RX ORDER — OXYCODONE AND ACETAMINOPHEN 5; 325 MG/1; MG/1
1 TABLET ORAL
Status: DISCONTINUED | OUTPATIENT
Start: 2019-05-21 | End: 2019-05-23 | Stop reason: HOSPADM

## 2019-05-21 RX ORDER — CARVEDILOL 6.25 MG/1
12.5 TABLET ORAL 2 TIMES DAILY WITH MEALS
Status: DISCONTINUED | OUTPATIENT
Start: 2019-05-21 | End: 2019-05-23 | Stop reason: HOSPADM

## 2019-05-21 RX ORDER — MAGNESIUM SULFATE HEPTAHYDRATE 40 MG/ML
2 INJECTION, SOLUTION INTRAVENOUS ONCE
Status: COMPLETED | OUTPATIENT
Start: 2019-05-21 | End: 2019-05-21

## 2019-05-21 RX ADMIN — PIPERACILLIN SODIUM,TAZOBACTAM SODIUM 3.38 G: 3; .375 INJECTION, POWDER, FOR SOLUTION INTRAVENOUS at 10:02

## 2019-05-21 RX ADMIN — SODIUM CHLORIDE 1000 ML: 900 INJECTION, SOLUTION INTRAVENOUS at 01:56

## 2019-05-21 RX ADMIN — PERFLUTREN 1 ML: 6.52 INJECTION, SUSPENSION INTRAVENOUS at 10:46

## 2019-05-21 RX ADMIN — Medication 5 ML: at 14:00

## 2019-05-21 RX ADMIN — PIPERACILLIN SODIUM,TAZOBACTAM SODIUM 3.38 G: 3; .375 INJECTION, POWDER, FOR SOLUTION INTRAVENOUS at 23:57

## 2019-05-21 RX ADMIN — MAGNESIUM SULFATE HEPTAHYDRATE 2 G: 40 INJECTION, SOLUTION INTRAVENOUS at 05:07

## 2019-05-21 RX ADMIN — DIATRIZOATE MEGLUMINE AND DIATRIZOATE SODIUM 30 ML: 660; 100 LIQUID ORAL; RECTAL at 15:39

## 2019-05-21 RX ADMIN — PIPERACILLIN SODIUM,TAZOBACTAM SODIUM 3.38 G: 3; .375 INJECTION, POWDER, FOR SOLUTION INTRAVENOUS at 16:21

## 2019-05-21 RX ADMIN — ACETAMINOPHEN 1000 MG: 10 INJECTION, SOLUTION INTRAVENOUS at 03:53

## 2019-05-21 RX ADMIN — SODIUM CHLORIDE AND POTASSIUM CHLORIDE 100 ML/HR: 9; 1.49 INJECTION, SOLUTION INTRAVENOUS at 17:55

## 2019-05-21 RX ADMIN — Medication 10 ML: at 21:31

## 2019-05-21 RX ADMIN — PANTOPRAZOLE SODIUM 40 MG: 40 INJECTION, POWDER, FOR SOLUTION INTRAVENOUS at 12:15

## 2019-05-21 RX ADMIN — SODIUM CHLORIDE AND POTASSIUM CHLORIDE 100 ML/HR: 9; 1.49 INJECTION, SOLUTION INTRAVENOUS at 05:06

## 2019-05-21 RX ADMIN — CARVEDILOL 12.5 MG: 6.25 TABLET, FILM COATED ORAL at 16:20

## 2019-05-21 RX ADMIN — CARVEDILOL 12.5 MG: 6.25 TABLET, FILM COATED ORAL at 07:25

## 2019-05-21 NOTE — PROGRESS NOTES
Bedside and Verbal shift change report given to Jody Stovall (oncoming nurse) by Jaky Wilson (offgoing nurse). Report included the following information SBAR, Kardex, ED Summary, Intake/Output, MAR, Recent Results and Cardiac Rhythm A-fib.

## 2019-05-21 NOTE — PROGRESS NOTES
Problem: Mobility Impaired (Adult and Pediatric)  Goal: *Acute Goals and Plan of Care (Insert Text)  Description  STG:  (1.)Mr. Logan Sandy will move from supine to sit and sit to supine  with INDEPENDENCE within 1-3 treatment day(s). (2.)Mr. Logan Sandy will transfer from bed to chair and chair to bed with INDEPENDENCE using the least restrictive device within 1-3 treatment day(s). (3.)Mr. Logan Sandy will ambulate with INDEPENDENCE for 650 feet with the least restrictive device within 1-3 treatment day(s). (4.)Mr. Logan Sandy will be independent with his HEP within 1-3 treatment days. ________________________________________________________________________________________________     Outcome: Progressing Towards Goal     PHYSICAL THERAPY: Initial Assessment and AM 5/21/2019  INPATIENT:    Payor: Ellen Chang / Plan: [facility] / Product Type: PPO /       NAME/AGE/GENDER: Jose Serrano is a 60 y.o. male   PRIMARY DIAGNOSIS: Morbid obesity ([facility] [E66.01]  Morbid obesity with BMI of 50.0-59.9, adult ([facility] [E66.01, Z68.43] Morbid obesity with BMI of 50.0-59.9, adult ([facility]   Morbid obesity with BMI of 50.0-59.9, adult ([facility])    Procedure(s) (LRB):  LAPAROSCOPIC SLEEVE GASTRECTOMY (N/A)  1 Day Post-Op  ICD-10: Treatment Diagnosis:    Generalized Muscle Weakness (M62.81)  Difficulty in walking, Not elsewhere classified (R26.2)   Precaution/Allergies:  Amlodipine; Contrave [naltrexone-bupropion]; Lisinopril; and Other medication      ASSESSMENT:     Mr. Logan Sandy presents supine on contact and agreeable to therapy, RN stated it was OK for PT to see him but agreed to hold off on walking until after he gets blood transfusion-pt has been getting dizzy when getting up. RN had pt up in the chair this morning. Pt is s/p above procedure and will benefit from skilled PT interventions to address deficits of weakness and decreased independence with functional mobility.  He moved from supine to sit with SBA, stood with CGA and took a few steps by bed with CGA. Returned supine with SBA. Pt also worked on his HEP with verbal cues. Stayed in supine with needs in reach, hope to progress at next therapy session as he improves medically. This section established at most recent assessment   PROBLEM LIST (Impairments causing functional limitations):  Decreased Strength  Decreased ADL/Functional Activities  Decreased Transfer Abilities  Decreased Ambulation Ability/Technique  Decreased [place] with Home Exercise Program   INTERVENTIONS PLANNED: (Benefits and precautions of physical therapy have been discussed with the patient.)  Bed Mobility  Gait Training  Home Exercise Program (HEP)  Therapeutic Exercise/Strengthening  Transfer Training     TREATMENT PLAN: Frequency/Duration: daily for duration of hospital stay  Rehabilitation Potential For Stated Goals: Good     REHAB RECOMMENDATIONS (at time of discharge pending progress):    Placement: It is my opinion, based on this patient's performance to date, that Mr. Taras Granger may benefit from a few therapy visits and then discharged home with no follow up PT recommended. Equipment:   None at this time              HISTORY:   History of Present Injury/Illness (Reason for Referral):  PER MD NOTE: Vic Wiley [facility] to the [facility] after successful completion of our multidisciplinary surgical prep program.  This is his final consultation preparing him for Sleeve Gastrectomy surgery. He presents with a height of 5' 10\" (1.778 m) and weight of (!) 407 lb (184.6 kg), giving him a Body mass index is 58.4 kg/m². He has an Ideal body weight of 179 lbs, and excess body weight of 228 lbs. He started our program with a weight of 429 lbs, losing 22 lbs since starting our program.     He has completed all aspects of our prep program and has been deemed an acceptable candidate for bariatric surgery.     Past Medical History/Comorbidities:   Mr. Taras Granger  has a past medical history of Arthritis, Borderline diabetes, Current use of long term anticoagulation, History of depression, History of DVT (deep vein thrombosis), History of narcotic addiction ([facility] History of pulmonary embolism (2015), HTN (hypertension), Morbid obesity ([facility] Persistent atrial fibrillation ([facility] Sleep apnea, and Venous stasis. Mr. Kelley Valle  has a past surgical history that includes hx colonoscopy (10/20/2015); hx refractive surgery (Bilateral); hx tonsillectomy (1965); and ir ivc filter (05/08/2019). Social History/Living Environment:   Home Environment: Private residence  # Steps to Enter: 3  One/Two Story Residence: One story  Living Alone: Yes  Support Systems: Family member(s)  Patient Expects to be Discharged to[de-identified] Private residence  Current DME Used/Available at Home: CPAP, Glucometer, Blood pressure cuff  Prior Level of Function/Work/Activity:  Pt living at home, independent with gait and ADLs without assistive devices     Number of Personal Factors/Comorbidities that affect the Plan of Care: 0: LOW COMPLEXITY   EXAMINATION:   Most Recent Physical Functioning:   Gross Assessment:  AROM: Within functional limits  Strength: Generally decreased, functional               Posture:  Posture (WDL): Within defined limits  Balance:  Sitting: Intact  Standing: Intact  Standing - Static: Good  Standing - Dynamic : Fair Bed Mobility:  Supine to Sit: Stand-by assistance; Additional time  Sit to Supine: Stand-by assistance; Additional time  Wheelchair Mobility:     Transfers:  Sit to Stand: Contact guard assistance  Stand to Sit: Contact guard assistance  Gait:     Distance (ft): (step or two at bedside)  Ambulation - Level of Assistance: Contact guard assistance;Minimal assistance      Body Structures Involved:  Muscles Body Functions Affected: Movement Related Activities and Participation Affected:   Mobility   Number of elements that affect the Plan of Care: 4+: HIGH COMPLEXITY   CLINICAL PRESENTATION:   Presentation: Stable and uncomplicated: LOW COMPLEXITY   CLINICAL DECISION MAKIN [facility]? ?6 Clicks? Basic Mobility Inpatient Short Form  How much difficulty does the patient currently have. .. Unable A Lot A Little None   1. Turning over in bed (including adjusting bedclothes, sheets and blankets)? ? 1   ? 2   ? 3   ? 4   2. Sitting down on and standing up from a chair with arms ( e.g., wheelchair, bedside commode, etc.)   ? 1   ? 2   ? 3   ? 4   3. Moving from lying on back to sitting on the side of the bed?   ? 1   ? 2   ? 3   ? 4   How much help from another person does the patient currently need. .. Total A Lot A Little None   4. Moving to and from a bed to a chair (including a wheelchair)? ? 1   ? 2   ? 3   ? 4   5. Need to walk in hospital room? ? 1   ? 2   ? 3   ? 4   6. Climbing 3-5 steps with a railing? ? 1   ? 2   ? 3   ? 4   © , Trustees of [facility] [employer], under license to [employer]. All rights reserved      Score:  Initial: 20 Most Recent: X (Date: -- )    Interpretation of Tool:  Represents activities that are increasingly more difficult (i.e. Bed mobility, Transfers, Gait). Medical Necessity:     Patient is expected to demonstrate progress in strength and functional technique   to decrease assistance required with functional mobility and HEP   . Reason for Services/Other Comments:  Patient continues to require skilled intervention due to inability to complete functional mobility and HEP independently   . Use of outcome tool(s) and clinical judgement create a POC that gives a: Clear prediction of patient's progress: LOW COMPLEXITY            TREATMENT:   (In addition to Assessment/Re-Assessment sessions the following treatments were rendered)   Pre-treatment Symptoms/Complaints:  none  Pain: Initial:   Pain Intensity 1: 0  Post Session:  0/10     Therapeutic Exercise: (8 Minutes):  Exercises per grid below to improve mobility and strength.   Required minimal verbal cues to promote proper body alignment and promote proper body posture. Progressed repetitions as indicated. Date:  5/21/19 Date:   Date:     Activity/Exercise Parameters Parameters Parameters   Ankle pumps 10     Heel slides 10     Straight leg raises 10     Elbow flexion 10     Shoulder raises 10                     Braces/Orthotics/Lines/Etc:   IV  espino catheter  Telemetry monitoring   O2 Device: Room air  Treatment/Session Assessment:    Response to Treatment:  tolerated well  Interdisciplinary Collaboration:   Registered Nurse  After treatment position/precautions:   Supine in bed  Bed/Chair-wheels locked  Bed in low position  Call light within reach  RN notified   Compliance with Program/Exercises: Compliant all of the time  Recommendations/Intent for next treatment session: \"Next visit will focus on advancements to more challenging activities and reduction in assistance provided\".   Total Treatment Duration:  PT Patient Time In/Time Out  Time In: 1057  Time Out: [address] Perla PT

## 2019-05-21 NOTE — PROGRESS NOTES
Patient has completed another fluid bolus but remains hypotensive. No clear cut explanation. Will transfer to ICU and start levophed if necessary to maintain MAP greater than 65 while investigate further. Will check another CBC, check BNP and D dimer. Although he has an IVC filter which should protect him from PE could still be possible to have one. If D dimer is high will send for CT chest and abd/pelvis as well. Still awaiting UA result. Lactic acid is normal so doubt sepsis.  Will also obtain echocardiogram in am.

## 2019-05-21 NOTE — PROGRESS NOTES
Surgery  Gastrograffin swallow study shows no evidence of a leak or obstruction. See orders.   Terrell Sebastian MD.

## 2019-05-21 NOTE — PROGRESS NOTES
Shift assessment complete. A&OX4. Lungs clear on auscultation. Respirations present. Even and unlabored. Apical Hr is irregular. No s/s of distress. No c/o pain at this time. Call light within reach. Encouraged patient to call for assistance. Patient verbalizes understanding. See Doc Flowsheet for assessment details. Patient resting in bed. Sister at the bedside. Continue to monitor.

## 2019-05-21 NOTE — PROGRESS NOTES
Hospitalist Progress Note    2019  Admit Date: 2019  5:49 AM   NAME: Leatha Kingston   :  1960   MRN:  [id number]   Attending: Mariano Lugo MD  PCP:  Brittney Gomez MD    SUBJECTIVE:   61 y/o male with hx morbid obesity who underwent laparoscopic gastric sleeve procedure 19. He has a hx of atrial fibrillation, on warfarin (held before surgery), hx DVT and PE (had recent IVC filter placed), Afib, diabetes, HTN, JALYN. Pt with hypotension on evening of procedure, received total 3L NS boluses with improvement of BP. Pt reports feeling well this AM. Denies cough, SOB, F/C, CP. No N/V.     Review of Systems negative with exception of pertinent positives noted above  PHYSICAL EXAM     Patient Vitals for the past 24 hrs:   Temp Pulse Resp BP SpO2   19 0605  (!) 112 24 131/59 96 %   19 0505 100 °F (37.8 °C) (!) 118 20 131/66 94 %   19 0435  (!) 122 17 96/54 95 %   19 0405  (!) 115 15 114/62 95 %   19 0335 100.4 °F (38 °C) (!) 120 16 92/46 95 %   19 0305  (!) 125 16 93/57 95 %   19 0235  (!) 142 15 (!) 88/49 95 %   19 0145  (!) 154 20 (!) 81/45 95 %   19 0135  (!) 117 18 (!) 84/47 94 %   19 0035  (!) 132 26 (!) 85/46 96 %   19 0021 98.1 °F (36.7 °C) (!) 124 19 105/45 94 %   19 2324 96.9 °F (36.1 °C) 82 16 (!) 66/43 95 %   19 2302  85  (!) 68/46    19 2100 97.4 °F (36.3 °C) 84 15 (!) 87/57 94 %   19 1830  81  (!) 89/56 98 %   19 1758  75  (!) 79/49    19 1750  74  (!) 88/56    19 1600 96.6 °F (35.9 °C) 71 17 98/58 91 %   19 1304 96.1 °F (35.6 °C) 65 17 115/74 92 %   19 1130     95 %   19 1120     98 %   19 1045  (!) 51 16 138/74 97 %   19 1030  (!) 46 16 137/78 97 %   19 1015  (!) 53 16 140/79 98 %   19 0959  (!) 51 16 138/76 97 %   19 0954  62 16 138/77 98 %   19 0949  71 16 134/70 97 %   19 0944 99.1 °F (37.3 °C) 77 16 138/80 96 %     Oxygen Therapy  O2 Sat (%): 96 % (05/21/19 0605)  Pulse via Oximetry: 141 beats per minute (05/21/19 0605)  O2 Device: Room air (05/20/19 1130)  O2 Flow Rate (L/min): 0 l/min (05/20/19 1130)  FIO2 (%): 21 % (05/20/19 1130)    Intake/Output Summary (Last 24 hours) at 5/21/2019 0719  Last data filed at 5/21/2019 0556  Gross per 24 hour   Intake 2650 ml   Output 1710 ml   Net 940 ml      General: No acute distress    Lungs:  CTA Bilaterally  Heart:  Irregularly irregular, rates 90-100s  Abdomen: Soft, Non distended, Non tender, Positive bowel sounds  Extremities: No cyanosis, clubbing or edema  Neurologic:  No focal deficits  Psych:  Calm, cooperative    Recent Results (from the past 24 hour(s))   GLUCOSE, POC    Collection Time: 05/20/19  1:00 PM   Result Value Ref Range    Glucose (POC) 113 (H) 65 - 100 mg/dL   GLUCOSE, POC    Collection Time: 05/20/19  4:43 PM   Result Value Ref Range    Glucose (POC) 136 (H) 65 - 100 mg/dL   HGB & HCT    Collection Time: 05/20/19  6:52 PM   Result Value Ref Range    HGB 13.8 13.6 - 17.2 g/dL    HCT 43.8 41.1 - 50.3 %   LACTIC ACID    Collection Time: 05/20/19  8:34 PM   Result Value Ref Range    Lactic acid 1.4 0.4 - 2.0 MMOL/L   TROPONIN I    Collection Time: 05/20/19  8:34 PM   Result Value Ref Range    Troponin-I, Qt. <0.02 (L) 0.02 - 0.05 NG/ML   TSH 3RD GENERATION    Collection Time: 05/20/19  8:34 PM   Result Value Ref Range    TSH 8.336 uIU/mL   METABOLIC PANEL, COMPREHENSIVE    Collection Time: 05/20/19  8:34 PM   Result Value Ref Range    Sodium 141 136 - 145 mmol/L    Potassium 4.5 3.5 - 5.1 mmol/L    Chloride 108 (H) 98 - 107 mmol/L    CO2 15 (L) 21 - 32 mmol/L    Anion gap 18 (H) 7 - 16 mmol/L    Glucose 130 (H) 65 - 100 mg/dL    BUN 15 6 - 23 MG/DL    Creatinine 0.84 0.8 - 1.5 MG/DL    GFR est AA >60 >60 ml/min/1.73m2    GFR est non-AA >60 >60 ml/min/1.73m2    Calcium 7.6 (L) 8.3 - 10.4 MG/DL    Bilirubin, total 0.9 0.2 - 1.1 MG/DL    ALT (SGPT) 71 (H) 12 - 65 U/L    AST (SGOT) 60 (H) 15 - 37 U/L    Alk.  phosphatase 50 50 - 136 U/L    Protein, total 5.9 (L) 6.3 - 8.2 g/dL    Albumin 3.2 (L) 3.5 - 5.0 g/dL    Globulin 2.7 2.3 - 3.5 g/dL    A-G Ratio 1.2 1.2 - 3.5     MAGNESIUM    Collection Time: 05/20/19  8:34 PM   Result Value Ref Range    Magnesium 1.8 1.8 - 2.4 mg/dL   EKG, 12 LEAD, INITIAL    Collection Time: 05/20/19  9:00 PM   Result Value Ref Range    Ventricular Rate 88 BPM    Atrial Rate 416 BPM    QRS Duration 90 ms    Q-T Interval 386 ms    QTC Calculation (Bezet) 467 ms    Calculated R Axis 37 degrees    Calculated T Axis 31 degrees    Diagnosis       Atrial fibrillation  Prolonged QT  Abnormal ECG  When compared with ECG of 10-MAY-2019 15:06,  No significant change was found  Confirmed by Steph Garcia ([id number]) on 5/21/2019 6:44:42 AM     GLUCOSE, POC    Collection Time: 05/20/19  9:59 PM   Result Value Ref Range    Glucose (POC) 133 (H) 65 - 100 mg/dL   URINALYSIS W/ RFLX MICROSCOPIC    Collection Time: 05/20/19 11:29 PM   Result Value Ref Range    Color YELLOW      Appearance CLEAR      Specific gravity 1.029 (H) 1.001 - 1.023      pH (UA) 6.0 5.0 - 9.0      Protein TRACE (A) NEG mg/dL    Glucose 1,000 (A) NEG mg/dL    Ketone 80 (A) NEG mg/dL    Bilirubin SMALL (A) NEG      Blood NEGATIVE  NEG      Urobilinogen 0.2 0.2 - 1.0 EU/dL    Nitrites NEGATIVE  NEG      Leukocyte Esterase NEGATIVE  NEG      WBC 0-3 0 /hpf    RBC 0-3 0 /hpf    Epithelial cells 0-3 0 /hpf    Bacteria 0 0 /hpf    Casts 3-5 0 /lpf   BNP    Collection Time: 05/20/19 11:30 PM   Result Value Ref Range    BNP 78 pg/mL   D DIMER    Collection Time: 05/21/19 12:12 AM   Result Value Ref Range    D DIMER <0.27 <0.56 ug/ml(FEU)   CBC WITH AUTOMATED DIFF    Collection Time: 05/21/19 12:12 AM   Result Value Ref Range    WBC 6.7 4.3 - 11.1 K/uL    RBC 3.83 (L) 4.23 - 5.6 M/uL    HGB 12.3 (L) 13.6 - 17.2 g/dL    HCT 36.5 (L) 41.1 - 50.3 %    MCV 95.3 79.6 - 97.8 FL    MCH 32.1 26.1 - 32.9 PG    MCHC 33.7 31.4 - 35.0 g/dL    RDW 13.6 11.9 - 14.6 %    PLATELET 554 (L) 748 - 450 K/uL    MPV 11.4 9.4 - 12.3 FL    ABSOLUTE NRBC 0.00 0.0 - 0.2 K/uL    DF AUTOMATED      NEUTROPHILS 82 (H) 43 - 78 %    LYMPHOCYTES 13 13 - 44 %    MONOCYTES 5 4.0 - 12.0 %    EOSINOPHILS 0 (L) 0.5 - 7.8 %    BASOPHILS 0 0.0 - 2.0 %    IMMATURE GRANULOCYTES 0 0.0 - 5.0 %    ABS. NEUTROPHILS 5.5 1.7 - 8.2 K/UL    ABS. LYMPHOCYTES 0.9 0.5 - 4.6 K/UL    ABS. MONOCYTES 0.3 0.1 - 1.3 K/UL    ABS. EOSINOPHILS 0.0 0.0 - 0.8 K/UL    ABS. BASOPHILS 0.0 0.0 - 0.2 K/UL    ABS. IMM. GRANS. 0.0 0.0 - 0.5 K/UL   MAGNESIUM    Collection Time: 05/21/19  3:55 AM   Result Value Ref Range    Magnesium 1.6 (L) 1.8 - 2.4 mg/dL   METABOLIC PANEL, BASIC    Collection Time: 05/21/19  3:55 AM   Result Value Ref Range    Sodium 141 136 - 145 mmol/L    Potassium 4.0 3.5 - 5.1 mmol/L    Chloride 109 (H) 98 - 107 mmol/L    CO2 13 (L) 21 - 32 mmol/L    Anion gap 19 (H) 7 - 16 mmol/L    Glucose 110 (H) 65 - 100 mg/dL    BUN 20 6 - 23 MG/DL    Creatinine 1.53 (H) 0.8 - 1.5 MG/DL    GFR est AA >60 >60 ml/min/1.73m2    GFR est non-AA 50 (L) >60 ml/min/1.73m2    Calcium 7.4 (L) 8.3 - 10.4 MG/DL   CBC W/O DIFF    Collection Time: 05/21/19  3:55 AM   Result Value Ref Range    WBC 7.3 4.3 - 11.1 K/uL    RBC 3.55 (L) 4.23 - 5.6 M/uL    HGB 11.2 (L) 13.6 - 17.2 g/dL    HCT 33.6 (L) 41.1 - 50.3 %    MCV 94.6 79.6 - 97.8 FL    MCH 31.5 26.1 - 32.9 PG    MCHC 33.3 31.4 - 35.0 g/dL    RDW 13.6 11.9 - 14.6 %    PLATELET 472 (L) 990 - 450 K/uL    MPV 11.8 9.4 - 12.3 FL    ABSOLUTE NRBC 0.00 0.0 - 0.2 K/uL       Micro: All Micro Results     None          Other studies last 24 hours:  No results found. All imaging, labs reviewed.     ASSESSMENT      Hospital Problems as of 5/21/2019 Date Reviewed: 5/2/2019          Codes Class Noted - Resolved POA    Hypotension after procedure ICD-10-CM: I95.81  ICD-9-CM: 458.29  5/20/2019 - Present No Controlled type 2 diabetes mellitus without complication, without long-term current use of insulin ([facility] [address] ICD-10-CM: E11.9  ICD-9-CM: 250.00  12/12/2017 - Present Yes        Essential hypertension with goal blood pressure less than 130/80 ICD-10-CM: I10  ICD-9-CM: 401.9  3/8/2017 - Present Yes        Paroxysmal atrial flutter (HCC) ICD-10-CM: J98.92  ICD-9-CM: 427.32  3/15/2016 - Present Yes        * (Principal) Morbid obesity with BMI of 50.0-59.9, adult [facility]) ICD-10-CM: E66.01, Z68.43  ICD-9-CM: 278.01, V85.43  4/3/2015 - Present Yes        History of pulmonary embolism ICD-10-CM: R37.955  ICD-9-CM: V12.55  4/3/2015 - Present Yes            Post operative hypotension  Low grade fever  Tachycardia  Hgb down ~5.5g from baseline. On anticoagulation due to h/o DVT/PE. Also with low grade fever, tachycardia, AG acidosis this AM.  - follow hgb q8h  - repeat LA  - empiric zosyn being starting by surg  - EKG without acute  ST-T wave changes  - Ddimer WNL  - ECHO pending  - CXR  - procalcitonin  - UA negative    Afib with RVR  Pt with h/o PAF. On Coreg at home. HR 90-150s. - resume Coreg  - Metoprolol IV PRN  - if remains elevated, will need to start cardizem  - ECHO pending  - TSH WNL    AG met acidosis  Does have mild VISHNU which could be contributing. LA WNL last night. - repeat LA  - check VBG  - nothing to suggest DKA    VISHNU  - cont IVFs  - follow BMP    H/o DVT/PE  Only one thromboembolic event in 8156, no further issues. IVC filter placed recently in preparation for this surgery. - holding Lovenox    DM2  - holding home Invokana  - [facility]    Thank you for this consult. We will continue to follow along with you.       Signed By: Derrick Collazo MD     May 21, 2019

## 2019-05-21 NOTE — OP NOTES
[place]  OPERATIVE REPORT    Name:  Bladimir Castillo  MR#:  [id number]  :  1960  ACCOUNT #:  [de-identified]  DATE OF SERVICE:  2019    PREOPERATIVE DIAGNOSES:  Morbid obesity with BMI of 58. He also has a hypercoagulable state with history of deep venous thrombosis, pulmonary emboli in the past.    POSTOPERATIVE DIAGNOSES:  Morbid obesity with BMI of 58. He also has a hypercoagulable state with history of deep venous thrombosis, pulmonary emboli in the past.    PROCEDURE PERFORMED:  Laparoscopic sleeve gastrectomy. SURGEON:  Bony Jackson. Terrell Sebastian MD    ASSISTANT:  None. ANESTHESIA:  General endotracheal anesthesia with Dr. Gricel Singleton and Savanna Cruz CRNA. COMPLICATIONS:  None. SPECIMENS REMOVED:  Segment of stomach. IMPLANTS:  None. ESTIMATED BLOOD LOSS:  50 mL. DRAINS:  None. HISTORY:  A 62-year-old male, who went through the [address] [place] [address] [facility] for sleeve gastrectomy. He met with a psychologist, a dietitian. He had clearances from Respiratory and also had an IVC filter placed prior to the procedure because of his history of DVT, pulmonary emboli. It was also recommended he remain on Lovenox bridge prior to and after surgery. Because of his weight, he was given 250 mg subcu of Lovenox for 3 days prior to surgery while off Coumadin, it will be held the day of surgery and then resumed the day after surgery. We went through risks and benefits with the patient going through the risks of bleeding, infection, anesthesia, his risk for bleeding was much higher due to him having to be on Lovenox before and after surgery, which he understands. He is also at higher risk for developing blood clots in his legs and having another pulmonary embolism despite the Lovenox bridge and the IVC filter. Other potential risks, risks of injury to the esophagus, stomach, small bowel, large bowel, pancreas, liver, spleen.   I went through potential leak along the staple line of the sleeve, which could require further surgery or stent placement. That leak could lead to multisystem organ failure and even death. The patient understood, signed a consent form and was scheduled for today. Procedure was done at the [facility].  I arrived for my 8 o'clock start time at 7:30 to check the patient in. I signed the chart in the appropriate space, I talked to the patient and to females who were in the room and then left assuming that we were going to get started at 8 o'clock time. I was there inappropriately and no one told me there was any problems. When I did not hear from anybody at ten after eight, I went back to the room and found that there was no patient in the room. I asked what the problem was. I was then told despite having seen the nursing supervisor for the OR previously that Dr. Ranjan Deshpande case had been moved in for 7:10 start time, that the patient apparently had drank something and they tried to do a tonsillectomy ahead of my case. The procedure went much longer than anticipated. They did not get out of room until ten after eight. I approached the desk and I asked what had happened and why I was not informed because I have other things to do, I could have gone to my office, I could have continued making rounds here in the hospital, but I waited in the dictation area for the case to begin, no one notified me of any case being put ahead of me. Needless to say we got started at 8:45, 45 minutes late after my start time had already passed. PROCEDURE:  The patient was placed into the room, had general endotracheal anesthesia. Sequential compression devices were placed and used throughout the procedure. Abdomen was prepped and draped in usual sterile manner. I did a time-out identifying the patient, surgeon, procedure, and birth date of 1960. We began in the usual fashion.   Made an incision superior into the left of the umbilicus and through this, I placed a 12 mm trocar. There was an optical trocar with a 10 mm 0-degree scope within it. We went through the appropriate layers of the abdominal wall until we entered the peritoneal cavity. Peritoneal cavity was insufflated to 15 mmHg using carbon dioxide gas. I placed a 15 and a 5 mm trocar in the right upper quadrant just to the right of the midline under direct vision of a 10 mm 30-degree scope. I placed a 5 mm trocar in the epigastric region. This was removed and a Becka liver retractor was placed through this to retract the left lobe of the liver throughout the remainder of the procedure. The [facility] liver retractor was attached to an Omni self-retaining retracting device. A 5 mm trocar was placed in the left upper quadrant. I asked the nurse anesthetist to place a 40-[place] ViSiGi tube. Before she could do that, she said the heart rate had dropped to 19 when placing the insufflation. We stopped the insufflation, opened all the ports. I asked Dr. Jb Hogue, the anesthesiologist, be notified. He came in and appropriate medications with Robinul, ephedrine as the blood pressure fell and then atropine were given. After about 10 minutes of waiting for everything to stabilize, Dr. Jb Hogue said it was okay to proceed. We went at a lower insufflation pressure and lower insufflation rate. At this point, everything was stable in terms of the patient's hemodynamics. The nurse anesthetist then placed a 40-[place] ViSiGi tube along the lesser curvature. I went about 6 cm proximal to the pylorus and divided the greater curvature attachments with EnSeal Trio device. I went up all the way to the left hemidiaphragm. With the tube still in place, we then used the [place] 60 stapling device to staple along the tube to create a long hollow tube from the esophagus to the antrum. This was done with a black staple cartridge, green staple cartridge and then four blue staple cartridges.   The segment of stomach containing the greater curvature was then free of all attachments. At this point, I placed some 10 mm clips from a clip applier at the junctions of the staple line and on any area that was bleeding. The nurse anesthetist then notified me that the heart rate was in the 150s and we had to hold off on anything else. He gave esmolol and something for elevated blood pressure. We waited another 5 to 10 minutes and then we continued the procedure. Once the heart rate had come down and the blood pressure stabilized again, we were able to have the ViSiGi tube removed. There was no bleeding along the staple line. I did clip some areas that had some oozing. I placed a Nu-Knit up in the area between the remaining fundus and the spleen as this gentleman will be on high-dose Lovenox starting tomorrow. At this point, I placed figure-of-eight suture in the stomach. It was placed into a [address], which was placed in the abdominal cavity. The segment of stomach was placed into the Endopouch. The Endopouch closed and withdrawn through the 15 mm trocar site. Segment of stomach was removed and sent to Pathology for evaluation. We returned the 15 mm trocar to the site. We dismantled the [facility]. I cauterized all of the trocar sites with the hook and the electrocautery. There was some oozing from the 15 mm trocar, but none from the others, despite this they were all cauterized. Following removal of the 12 mm trocar, the table was flattened. The port sites were closed with subcuticular sutures of 4-0 Monocryl. I injected 30 mL of 0.5% Marcaine in divided doses to  the incision sites. The patient tolerated the procedure well, was extubated, and brought to recovery room in stable condition.         MD AYDEE [id number]  D:  05/20/2019 10:15  T:  05/20/2019 10:21  JOB #:  [id number]

## 2019-05-21 NOTE — CONSULTS
Hospitalist H&P/Consult Note     Admit Date:  2019  5:49 AM   Name:  Naseem Rdz   Age:  60 y.o.  :  1960   MRN:  [id number]   PCP:  Marifer Akers MD  Treatment Team: Attending Provider: Judson Siegel MD    HPI:   mindy is a pleasant 59 y/o male with hx morbid obesity who underwent laparoscopic gastric sleeve procedure today. He has a hx of atrial fibrillation, on warfarin (held before surgery), hx DVT and PE (had recent IVC filter placed), diabetes, HTN, JALYN. Hospitalist service is consulted for post-operative hypotension. Patient seen earlier in evening when consult was called. Patient feels lightheaded when seated upright but denies chest pain, shortness of breath. No fever or chills, post-op pain controlled, no N/V. IV fluid boluses are infusing. Post-op H & H stable at 13.8/43. 8. Stat EKG and labs ordered. BP meds held. He denies hx cardiac disease. 10 systems reviewed and negative except as noted in HPI. Past Medical History:   Diagnosis Date    Arthritis     osteo-knee, toe    Borderline diabetes     oral agent/ AVG BS:/ no s.s of hypoglycemia/ Last A1c: unknown    Current use of long term anticoagulation     Coumadin    History of depression     pt denies any further complications    History of DVT (deep vein thrombosis)     left leg    History of narcotic addiction ([facility] [address]     History of pulmonary embolism     HTN (hypertension)     controlled with meds    Morbid obesity (HCC)     BMI 54.4    Persistent atrial fibrillation (HCC)     Followed by [facility] Card./ rate controlled    Sleep apnea     complaint with CPAP    Venous stasis       Past Surgical History:   Procedure Laterality Date    HX COLONOSCOPY  10/20/2015    HX REFRACTIVE SURGERY Bilateral     HX TONSILLECTOMY  1965    IR IVC FILTER  2019    Dr Ovidio Merritt       Prior to Admission Medications   Prescriptions Last Dose Informant Patient Reported? Taking? Hvsnjjae-Zugq-Zzvvjy-Hyalur Ac [id number] mg cap 2019 at Unknown time  Yes Yes   Sig: Take 1 Tab by mouth daily. INVOKANA 300 mg tablet 2019 at Unknown time  No Yes   Sig: TAKE ONE TABLET BY MOUTH EVERY DAY BEFORE BREAKFAST   carvedilol (COREG) 12.5 mg tablet 2019 at 0500  No Yes   Sig: Take 1 Tab by mouth two (2) times daily (with meals). enoxaparin (LOVENOX) injection 2019 at Unknown time  No Yes   Si mg daily  mg daily. Patient will need 250 mg once per day x 3 days. Indications: Deep Vein Thrombosis Prevention   fluticasone propionate (FLONASE ALLERGY RELIEF) 50 mcg/actuation nasal spray 2019 at Unknown time  Yes Yes   Si Sprays by Both Nostrils route as needed for Rhinitis. losartan (COZAAR) 100 mg tablet 2019 at Unknown time  No Yes   Sig: TAKE 1 TABLET DAILY   multivitamin (ONE A DAY) tablet 2019 at Unknown time  Yes Yes   Sig: Take 1 Tab by mouth daily. warfarin (COUMADIN) 7.5 mg tablet 2019  Yes No   Sig: Take 7.5 mg by mouth. Coumadin 7.5mg on Mon/Wed/Thurs/Sat. Coumadin 3.75mg on Tues/Fri/Sun.   warfarin (COUMADIN) 7.5 mg tablet 2019  Yes No   Sig: Take 3.75 mg by mouth every , Tuesday, Thursday. Coumadin 7.5mg on Mon/Wed/Thurs/Sat. Coumadin 3.75mg on Tues/Fri/Sun.       Facility-Administered Medications: None     Allergies   Allergen Reactions    Amlodipine Other (comments)     edema    Contrave [Naltrexone-Bupropion] Other (comments)     Nausea first and then sweet craving    Lisinopril Cough    Other Medication Other (comments)     HCTZ-gout    NKDA-but do not prescribe narcotics or Mood altering drugs      Social History     Tobacco Use    Smoking status: Never Smoker    Smokeless tobacco: Never Used   Substance Use Topics    Alcohol use: No      Family History   Problem Relation Age of Onset    Hypertension Father     COPD Mother     Heart Disease Paternal Aunt     Diabetes Neg Hx       Immunization History Administered Date(s) Administered    Influenza High Dose Vaccine PF 12/08/2015, 10/19/2018    Influenza Vaccine 11/23/2016, 11/07/2017    Influenza Vaccine (Quad) PF 10/16/2018    Pneumococcal Polysaccharide (PPSV-23) 01/01/2013       Objective:     Patient Vitals for the past 24 hrs:   Temp Pulse Resp BP SpO2   05/20/19 2100 97.4 °F (36.3 °C) 84 15 (!) 87/57 94 %   05/20/19 1830  81  (!) 89/56 98 %   05/20/19 1758  75  (!) 79/49    05/20/19 1750  74  (!) 88/56    05/20/19 1600 96.6 °F (35.9 °C) 71 17 98/58 91 %   05/20/19 1304 96.1 °F (35.6 °C) 65 17 115/74 92 %   05/20/19 1130     95 %   05/20/19 1120     98 %   05/20/19 1045  (!) 51 16 138/74 97 %   05/20/19 1030  (!) 46 16 137/78 97 %   05/20/19 1015  (!) 53 16 140/79 98 %   05/20/19 0959  (!) 51 16 138/76 97 %   05/20/19 0954  62 16 138/77 98 %   05/20/19 0949  71 16 134/70 97 %   05/20/19 0944 99.1 °F (37.3 °C) 77 16 138/80 96 %   05/20/19 0615 99 °F (37.2 °C) 86 18 (!) 154/97 96 %     Oxygen Therapy  O2 Sat (%): 94 % (05/20/19 2100)  Pulse via Oximetry: 60 beats per minute (05/20/19 1130)  O2 Device: Room air (05/20/19 1130)  O2 Flow Rate (L/min): 0 l/min (05/20/19 1130)  FIO2 (%): 21 % (05/20/19 1130)    Intake/Output Summary (Last 24 hours) at 5/20/2019 2200  Last data filed at 5/20/2019 1600  Gross per 24 hour   Intake 1000 ml   Output 610 ml   Net 390 ml       Physical Exam:  General:    Morbidly obese. Alert. Eyes:   Normal sclera. Extraocular movements intact. ENT:  Normocephalic, atraumatic. Moist mucous membranes  CV:   Irregularly irregular. No murmur, rub, or gallop. Lungs:  CTAB. No wheezing, rhonchi, or rales. Abdomen: Soft, nontender, nondistended. Bowel sounds normal.   Dowell catheter in place  Extremities: Warm and dry. No cyanosis or edema. Neurologic: CN II-XII grossly intact. Sensation intact. Skin:     No rashes or jaundice. No wounds. Psych:  Normal mood and affect.     I reviewed the labs, imaging, EKGs, telemetry, and other studies done this admission. Data Review:   Recent Results (from the past 24 hour(s))   GLUCOSE, POC    Collection Time: 05/20/19  6:58 AM   Result Value Ref Range    Glucose (POC) 91 65 - 100 mg/dL   GLUCOSE, POC    Collection Time: 05/20/19  1:00 PM   Result Value Ref Range    Glucose (POC) 113 (H) 65 - 100 mg/dL   GLUCOSE, POC    Collection Time: 05/20/19  4:43 PM   Result Value Ref Range    Glucose (POC) 136 (H) 65 - 100 mg/dL   HGB & HCT    Collection Time: 05/20/19  6:52 PM   Result Value Ref Range    HGB 13.8 13.6 - 17.2 g/dL    HCT 43.8 41.1 - 50.3 %   LACTIC ACID    Collection Time: 05/20/19  8:34 PM   Result Value Ref Range    Lactic acid 1.4 0.4 - 2.0 MMOL/L   TROPONIN I    Collection Time: 05/20/19  8:34 PM   Result Value Ref Range    Troponin-I, Qt. <0.02 (L) 0.02 - 0.05 NG/ML   TSH 3RD GENERATION    Collection Time: 05/20/19  8:34 PM   Result Value Ref Range    TSH 7.649 uIU/mL   METABOLIC PANEL, COMPREHENSIVE    Collection Time: 05/20/19  8:34 PM   Result Value Ref Range    Sodium 141 136 - 145 mmol/L    Potassium 4.5 3.5 - 5.1 mmol/L    Chloride 108 (H) 98 - 107 mmol/L    CO2 15 (L) 21 - 32 mmol/L    Anion gap 18 (H) 7 - 16 mmol/L    Glucose 130 (H) 65 - 100 mg/dL    BUN 15 6 - 23 MG/DL    Creatinine 0.84 0.8 - 1.5 MG/DL    GFR est AA >60 >60 ml/min/1.73m2    GFR est non-AA >60 >60 ml/min/1.73m2    Calcium 7.6 (L) 8.3 - 10.4 MG/DL    Bilirubin, total 0.9 0.2 - 1.1 MG/DL    ALT (SGPT) 71 (H) 12 - 65 U/L    AST (SGOT) 60 (H) 15 - 37 U/L    Alk.  phosphatase 50 50 - 136 U/L    Protein, total 5.9 (L) 6.3 - 8.2 g/dL    Albumin 3.2 (L) 3.5 - 5.0 g/dL    Globulin 2.7 2.3 - 3.5 g/dL    A-G Ratio 1.2 1.2 - 3.5     MAGNESIUM    Collection Time: 05/20/19  8:34 PM   Result Value Ref Range    Magnesium 1.8 1.8 - 2.4 mg/dL   EKG, 12 LEAD, INITIAL    Collection Time: 05/20/19  9:00 PM   Result Value Ref Range    Ventricular Rate 88 BPM    Atrial Rate 416 BPM    QRS Duration 90 ms Q-T Interval 386 ms    QTC Calculation (Bezet) 467 ms    Calculated R Axis 37 degrees    Calculated T Axis 31 degrees    Diagnosis       Atrial fibrillation  Prolonged QT  Abnormal ECG  When compared with ECG of 10-MAY-2019 15:06,  No significant change was found         Imaging Studies:  CXR Results  (Last 48 hours)    None        CT Results  (Last 48 hours)    None          Assessment and Plan:     Hospital Problems as of 5/20/2019 Date Reviewed: 5/2/2019          Codes Class Noted - Resolved POA    Hypotension after procedure ICD-10-CM: I95.81  ICD-9-CM: 458.29  5/20/2019 - Present No        Controlled type 2 diabetes mellitus without complication, without long-term current use of insulin (HCC) ICD-10-CM: E11.9  ICD-9-CM: 250.00  12/12/2017 - Present Yes        Essential hypertension with goal blood pressure less than 130/80 ICD-10-CM: I10  ICD-9-CM: 401.9  3/8/2017 - Present Yes        Paroxysmal atrial flutter (HCC) ICD-10-CM: O31.75  ICD-9-CM: 427.32  3/15/2016 - Present Yes        * (Principal) Morbid obesity with BMI of 50.0-59.9, adult (HCC) ICD-10-CM: E66.01, Z68.43  ICD-9-CM: 278.01, V85.43  4/3/2015 - Present Yes        History of pulmonary embolism ICD-10-CM: R34.214  ICD-9-CM: V12.55  4/3/2015 - Present Yes              PLAN:  · Patient see on floor for post-operative hypotension  · Other than orthostasis and dizziness when seated upright he is asymptomatic  · Continue fluids. His H & H stable  · No acute cardiac event, troponin negative. EKG rate controlled afib  · No obvious infection but am checking UA. Afebrile with normal lactic acid  · Bun/Cr normal. Does not appear dehydrated or volume depleted  · Perhaps hypotension secondary to residual effects of anesthesia and pain medications  · Hold antihypertensives for now  · His anticoagulation held because of surgery. He has protective IVC filter  · He denies chest pain or shortness of breath  · Add sliding scale coverage for diabetes.    · Will follow as necessary      Estimated LOS:  Per primary team    Signed:  Odell Avendano MD

## 2019-05-21 NOTE — PROGRESS NOTES
Care Management Interventions  PCP Verified by CM: Yes  Mode of Transport at Discharge: Other (see comment)  Transition of Care Consult (CM Consult): Other  Current Support Network: Own Home  Confirm Follow Up Transport: Family  Plan discussed with Pt/Family/Caregiver: Yes  Freedom of Choice Offered: Yes  Discharge Location  Discharge Placement: Home with family assistance  Visited with pt regarding plans for discharge, pt lives at home alone, inde with ADL's. Works as a , has 2 local sisters that are going to help him when he goes home. No needs at this time, will continue to follow.

## 2019-05-21 NOTE — PROGRESS NOTES
Will attempt gastrografin swallow later today. Patient unable to tolerate procedure due to the feeling of \"syncope episode\" coming on. Several different methods attempted, but unable to perform safely.

## 2019-05-21 NOTE — PROGRESS NOTES
TRANSFER - OUT REPORT:    Verbal report given to Sourav Powell RN on [address]  being transferred to ICU for urgent transfer       Report consisted of patients Situation, Background, Assessment and   Recommendations(SBAR). Information from the following report(s) SBAR was reviewed with the receiving nurse. Lines:   Peripheral IV 05/20/19 Right Hand (Active)   Site Assessment Clean, dry, & intact 5/20/2019 11:02 PM   Phlebitis Assessment 0 5/20/2019 11:02 PM   Infiltration Assessment 0 5/20/2019 11:02 PM   Dressing Status Clean, dry, & intact 5/20/2019 11:02 PM   Dressing Type Tape;Transparent 5/20/2019 11:02 PM   Hub Color/Line Status Patent 5/20/2019 10:45 AM   Alcohol Cap Used No 5/20/2019 10:45 AM        Opportunity for questions and clarification was provided.       Patient transported with:   Registered Nurse

## 2019-05-21 NOTE — PROGRESS NOTES
Bedside and Verbal shift change report given to Jacek Cruz RN (oncoming nurse) by Reynaldo Mora RN (offgoing nurse). Report included the following information SBAR, Kardex, ED Summary, Procedure Summary, Intake/Output, MAR, Recent Results, Med Rec Status and Cardiac Rhythm A-fib.

## 2019-05-21 NOTE — PROGRESS NOTES
Bariatric Surgery Progress Note    5/21/2019    Admit Date: 5/20/2019    Subjective:     Surgery POD #1  Patient in ICU due to hypotension last night. He was unable to tolerate swallow study due to dizziness. Dr. Jose Luis Poe ordered 2 units blood to be given. Discussed with patient and agreeable with plan of care. Patient states he feels ok, dizziness and nausea when he is upright or tries to stand. States has general muscle pain, no incisional pain, and states is passing flatus. No  vomiting.      Objective:     Visit Vitals  /69   Pulse 95   Temp 99.2 °F (37.3 °C)   Resp 19   Wt (!) 169.2 kg (373 lb)   SpO2 94%   BMI 52.02 kg/m²         Intake/Output Summary (Last 24 hours) at 5/21/2019 1054  Last data filed at 5/21/2019 0556  Gross per 24 hour   Intake 1650 ml   Output 1700 ml   Net -50 ml            Data Review    Recent Results (from the past 24 hour(s))   GLUCOSE, POC    Collection Time: 05/20/19  1:00 PM   Result Value Ref Range    Glucose (POC) 113 (H) 65 - 100 mg/dL   GLUCOSE, POC    Collection Time: 05/20/19  4:43 PM   Result Value Ref Range    Glucose (POC) 136 (H) 65 - 100 mg/dL   HGB & HCT    Collection Time: 05/20/19  6:52 PM   Result Value Ref Range    HGB 13.8 13.6 - 17.2 g/dL    HCT 43.8 41.1 - 50.3 %   LACTIC ACID    Collection Time: 05/20/19  8:34 PM   Result Value Ref Range    Lactic acid 1.4 0.4 - 2.0 MMOL/L   TROPONIN I    Collection Time: 05/20/19  8:34 PM   Result Value Ref Range    Troponin-I, Qt. <0.02 (L) 0.02 - 0.05 NG/ML   TSH 3RD GENERATION    Collection Time: 05/20/19  8:34 PM   Result Value Ref Range    TSH 2.178 uIU/mL   METABOLIC PANEL, COMPREHENSIVE    Collection Time: 05/20/19  8:34 PM   Result Value Ref Range    Sodium 141 136 - 145 mmol/L    Potassium 4.5 3.5 - 5.1 mmol/L    Chloride 108 (H) 98 - 107 mmol/L    CO2 15 (L) 21 - 32 mmol/L    Anion gap 18 (H) 7 - 16 mmol/L    Glucose 130 (H) 65 - 100 mg/dL    BUN 15 6 - 23 MG/DL    Creatinine 0.84 0.8 - 1.5 MG/DL    GFR est AA >60 >60 ml/min/1.73m2    GFR est non-AA >60 >60 ml/min/1.73m2    Calcium 7.6 (L) 8.3 - 10.4 MG/DL    Bilirubin, total 0.9 0.2 - 1.1 MG/DL    ALT (SGPT) 71 (H) 12 - 65 U/L    AST (SGOT) 60 (H) 15 - 37 U/L    Alk.  phosphatase 50 50 - 136 U/L    Protein, total 5.9 (L) 6.3 - 8.2 g/dL    Albumin 3.2 (L) 3.5 - 5.0 g/dL    Globulin 2.7 2.3 - 3.5 g/dL    A-G Ratio 1.2 1.2 - 3.5     MAGNESIUM    Collection Time: 05/20/19  8:34 PM   Result Value Ref Range    Magnesium 1.8 1.8 - 2.4 mg/dL   EKG, 12 LEAD, INITIAL    Collection Time: 05/20/19  9:00 PM   Result Value Ref Range    Ventricular Rate 88 BPM    Atrial Rate 416 BPM    QRS Duration 90 ms    Q-T Interval 386 ms    QTC Calculation (Bezet) 467 ms    Calculated R Axis 37 degrees    Calculated T Axis 31 degrees    Diagnosis       Atrial fibrillation  Prolonged QT  Abnormal ECG  When compared with ECG of 10-MAY-2019 15:06,  No significant change was found  Confirmed by Arturo Melgar ([id number]) on 5/21/2019 6:44:42 AM     GLUCOSE, POC    Collection Time: 05/20/19  9:59 PM   Result Value Ref Range    Glucose (POC) 133 (H) 65 - 100 mg/dL   URINALYSIS W/ RFLX MICROSCOPIC    Collection Time: 05/20/19 11:29 PM   Result Value Ref Range    Color YELLOW      Appearance CLEAR      Specific gravity 1.029 (H) 1.001 - 1.023      pH (UA) 6.0 5.0 - 9.0      Protein TRACE (A) NEG mg/dL    Glucose 1,000 (A) NEG mg/dL    Ketone 80 (A) NEG mg/dL    Bilirubin SMALL (A) NEG      Blood NEGATIVE  NEG      Urobilinogen 0.2 0.2 - 1.0 EU/dL    Nitrites NEGATIVE  NEG      Leukocyte Esterase NEGATIVE  NEG      WBC 0-3 0 /hpf    RBC 0-3 0 /hpf    Epithelial cells 0-3 0 /hpf    Bacteria 0 0 /hpf    Casts 3-5 0 /lpf   BNP    Collection Time: 05/20/19 11:30 PM   Result Value Ref Range    BNP 78 pg/mL   D DIMER    Collection Time: 05/21/19 12:12 AM   Result Value Ref Range    D DIMER <0.27 <0.56 ug/ml(FEU)   CBC WITH AUTOMATED DIFF    Collection Time: 05/21/19 12:12 AM   Result Value Ref Range    WBC 6.7 4.3 - 11.1 K/uL    RBC 3.83 (L) 4.23 - 5.6 M/uL    HGB 12.3 (L) 13.6 - 17.2 g/dL    HCT 36.5 (L) 41.1 - 50.3 %    MCV 95.3 79.6 - 97.8 FL    MCH 32.1 26.1 - 32.9 PG    MCHC 33.7 31.4 - 35.0 g/dL    RDW 13.6 11.9 - 14.6 %    PLATELET 143 (L) 979 - 450 K/uL    MPV 11.4 9.4 - 12.3 FL    ABSOLUTE NRBC 0.00 0.0 - 0.2 K/uL    DF AUTOMATED      NEUTROPHILS 82 (H) 43 - 78 %    LYMPHOCYTES 13 13 - 44 %    MONOCYTES 5 4.0 - 12.0 %    EOSINOPHILS 0 (L) 0.5 - 7.8 %    BASOPHILS 0 0.0 - 2.0 %    IMMATURE GRANULOCYTES 0 0.0 - 5.0 %    ABS. NEUTROPHILS 5.5 1.7 - 8.2 K/UL    ABS. LYMPHOCYTES 0.9 0.5 - 4.6 K/UL    ABS. MONOCYTES 0.3 0.1 - 1.3 K/UL    ABS. EOSINOPHILS 0.0 0.0 - 0.8 K/UL    ABS. BASOPHILS 0.0 0.0 - 0.2 K/UL    ABS. IMM.  GRANS. 0.0 0.0 - 0.5 K/UL   MAGNESIUM    Collection Time: 05/21/19  3:55 AM   Result Value Ref Range    Magnesium 1.6 (L) 1.8 - 2.4 mg/dL   METABOLIC PANEL, BASIC    Collection Time: 05/21/19  3:55 AM   Result Value Ref Range    Sodium 141 136 - 145 mmol/L    Potassium 4.0 3.5 - 5.1 mmol/L    Chloride 109 (H) 98 - 107 mmol/L    CO2 13 (L) 21 - 32 mmol/L    Anion gap 19 (H) 7 - 16 mmol/L    Glucose 110 (H) 65 - 100 mg/dL    BUN 20 6 - 23 MG/DL    Creatinine 1.53 (H) 0.8 - 1.5 MG/DL    GFR est AA >60 >60 ml/min/1.73m2    GFR est non-AA 50 (L) >60 ml/min/1.73m2    Calcium 7.4 (L) 8.3 - 10.4 MG/DL   CBC W/O DIFF    Collection Time: 05/21/19  3:55 AM   Result Value Ref Range    WBC 7.3 4.3 - 11.1 K/uL    RBC 3.55 (L) 4.23 - 5.6 M/uL    HGB 11.2 (L) 13.6 - 17.2 g/dL    HCT 33.6 (L) 41.1 - 50.3 %    MCV 94.6 79.6 - 97.8 FL    MCH 31.5 26.1 - 32.9 PG    MCHC 33.3 31.4 - 35.0 g/dL    RDW 13.6 11.9 - 14.6 %    PLATELET 733 (L) 400 - 450 K/uL    MPV 11.8 9.4 - 12.3 FL    ABSOLUTE NRBC 0.00 0.0 - 0.2 K/uL   GLUCOSE, POC    Collection Time: 05/21/19  7:28 AM   Result Value Ref Range    Glucose (POC) 124 (H) 65 - 100 mg/dL   TROPONIN I    Collection Time: 05/21/19  7:56 AM   Result Value Ref Range    Troponin-I, Qt. <0. 02 (L) 0.02 - 0.05 NG/ML   POC VENOUS BLOOD GAS    Collection Time: 05/21/19  8:06 AM   Result Value Ref Range    Device: ROOM AIR      pH, venous (POC) 7.316 (L) 7.32 - 7.42      pCO2, venous (POC) 27.5 (L) 41 - 51 MMHG    pO2, venous (POC) 39 mmHg    HCO3, venous (POC) 14.1 (L) 23 - 28 MMOL/L    sO2, venous (POC) 71 65 - 88 %    Base deficit, venous (POC) 11 mmol/L    Allens test (POC) NOT APPLICABLE      Site RIGHT BRACHIAL      Patient temp. 98.6      Specimen type (POC) VENOUS BLOOD      Performed by Christine     CO2, POC 15 MMOL/L    Respiratory comment: 1      COLLECT TIME 802     LACTIC ACID    Collection Time: 05/21/19  8:13 AM   Result Value Ref Range    Lactic acid 1.0 0.4 - 2.0 MMOL/L   TYPE & SCREEN    Collection Time: 05/21/19  8:13 AM   Result Value Ref Range    Crossmatch Expiration 05/24/2019     ABO/Rh(D) A NEGATIVE     Antibody screen NEG         Hospital Problems  Date Reviewed: 5/2/2019          Codes Class Noted POA    Hypotension after procedure ICD-10-CM: I95.81  ICD-9-CM: 458.29  5/20/2019 No        Controlled type 2 diabetes mellitus without complication, without long-term current use of insulin (HCC) ICD-10-CM: E11.9  ICD-9-CM: 250.00  12/12/2017 Yes        Essential hypertension with goal blood pressure less than 130/80 ICD-10-CM: I10  ICD-9-CM: 401.9  3/8/2017 Yes        Paroxysmal atrial flutter (HCC) ICD-10-CM: I48.92  ICD-9-CM: 427.32  3/15/2016 Yes        * (Principal) Morbid obesity with BMI of 50.0-59.9, adult (HCC) ICD-10-CM: E66.01, Z68.43  ICD-9-CM: 278.01, V85.43  4/3/2015 Yes        History of pulmonary embolism ICD-10-CM: T19.619  ICD-9-CM: V12.55  4/3/2015 Yes              Continue to monitor and attempt swallow study later today.       Halle Preciado RN

## 2019-05-21 NOTE — PROGRESS NOTES
TRANSFER - IN REPORT:    Verbal report received from [facility] on Reji Amas  being received from [facility] for urgent transfer      Report consisted of patients Situation, Background, Assessment and   Recommendations(SBAR). Information from the following report(s) SBAR, Intake/Output, MAR, Accordion and Recent Results was reviewed with the receiving nurse. Opportunity for questions and clarification was provided. Assessment completed upon patients arrival to unit and care assumed.

## 2019-05-21 NOTE — PROGRESS NOTES
Surgery  Patient developed a \"cold sweat\" then felt lightheaded after standing for the gastrograffin swallow test this morning. Pulse and blood pressure are normal while lying in bed. Will transfuse 2 units PRBC's for presumed bleeding in this man with the hypercoagulable state. Will try to repeat the swallow study after the blood has been given.    Nisreen Sanchez MD.

## 2019-05-21 NOTE — PROGRESS NOTES
Pt ambulated to chair with minimal assistance. BP 93/64. Denies lightheadedness/nausea/vomiting- only minor dizziness. Tolerated well. HR maintained 80s/90s Afib.     Okay per Dr. Yumiko Huntley to transfer to radiology off cardiac monitor

## 2019-05-21 NOTE — PROGRESS NOTES
Patient HR continues to fluctuate between 98 to 155. Paged Dr. Haris Cosme and informed him of HR and BP of 81/45. Orders received to give patient another normal saline bolus and hold on Levophed at this time.

## 2019-05-21 NOTE — INTERDISCIPLINARY ROUNDS
Interdisciplinary team rounds were held 5/21/2019 with the following team members:Care Management, Nursing, Occupational Therapy, Physical Therapy and Physician and the patient. Plan of care discussed. See clinical pathway and/or care plan for interventions and desired outcomes.

## 2019-05-22 LAB
ABO + RH BLD: NORMAL
ANION GAP SERPL CALC-SCNC: 10 MMOL/L (ref 7–16)
BLD PROD TYP BPU: NORMAL
BLD PROD TYP BPU: NORMAL
BLOOD GROUP ANTIBODIES SERPL: NORMAL
BPU ID: NORMAL
BPU ID: NORMAL
BUN SERPL-MCNC: 20 MG/DL (ref 6–23)
CALCIUM SERPL-MCNC: 7.7 MG/DL (ref 8.3–10.4)
CHLORIDE SERPL-SCNC: 112 MMOL/L (ref 98–107)
CO2 SERPL-SCNC: 22 MMOL/L (ref 21–32)
CREAT SERPL-MCNC: 0.95 MG/DL (ref 0.8–1.5)
CROSSMATCH RESULT,%XM: NORMAL
CROSSMATCH RESULT,%XM: NORMAL
ERYTHROCYTE [DISTWIDTH] IN BLOOD BY AUTOMATED COUNT: 15.1 % (ref 11.9–14.6)
GLUCOSE BLD STRIP.AUTO-MCNC: 104 MG/DL (ref 65–100)
GLUCOSE SERPL-MCNC: 108 MG/DL (ref 65–100)
HCT VFR BLD AUTO: 30.9 % (ref 41.1–50.3)
HGB BLD-MCNC: 10.7 G/DL (ref 13.6–17.2)
HGB BLD-MCNC: 11 G/DL (ref 13.6–17.2)
HGB BLD-MCNC: 11.3 G/DL (ref 13.6–17.2)
MAGNESIUM SERPL-MCNC: 2.3 MG/DL (ref 1.8–2.4)
MCH RBC QN AUTO: 31.4 PG (ref 26.1–32.9)
MCHC RBC AUTO-ENTMCNC: 34.6 G/DL (ref 31.4–35)
MCV RBC AUTO: 90.6 FL (ref 79.6–97.8)
NRBC # BLD: 0 K/UL (ref 0–0.2)
PLATELET # BLD AUTO: 126 K/UL (ref 150–450)
PMV BLD AUTO: 11.5 FL (ref 9.4–12.3)
POTASSIUM SERPL-SCNC: 3.7 MMOL/L (ref 3.5–5.1)
RBC # BLD AUTO: 3.41 M/UL (ref 4.23–5.6)
SODIUM SERPL-SCNC: 144 MMOL/L (ref 136–145)
SPECIMEN EXP DATE BLD: NORMAL
STATUS OF UNIT,%ST: NORMAL
STATUS OF UNIT,%ST: NORMAL
UNIT DIVISION, %UDIV: 0
UNIT DIVISION, %UDIV: 0
WBC # BLD AUTO: 7.8 K/UL (ref 4.3–11.1)

## 2019-05-22 PROCEDURE — 65270000029 HC RM PRIVATE

## 2019-05-22 PROCEDURE — 74011250636 HC RX REV CODE- 250/636: Performed by: SURGERY

## 2019-05-22 PROCEDURE — 82962 GLUCOSE BLOOD TEST: CPT

## 2019-05-22 PROCEDURE — 36415 COLL VENOUS BLD VENIPUNCTURE: CPT

## 2019-05-22 PROCEDURE — 97110 THERAPEUTIC EXERCISES: CPT

## 2019-05-22 PROCEDURE — 85018 HEMOGLOBIN: CPT

## 2019-05-22 PROCEDURE — 74011000258 HC RX REV CODE- 258: Performed by: SURGERY

## 2019-05-22 PROCEDURE — 74011250637 HC RX REV CODE- 250/637: Performed by: SURGERY

## 2019-05-22 PROCEDURE — 83735 ASSAY OF MAGNESIUM: CPT

## 2019-05-22 PROCEDURE — 85027 COMPLETE CBC AUTOMATED: CPT

## 2019-05-22 PROCEDURE — 74011250637 HC RX REV CODE- 250/637: Performed by: INTERNAL MEDICINE

## 2019-05-22 PROCEDURE — 97116 GAIT TRAINING THERAPY: CPT

## 2019-05-22 PROCEDURE — 80048 BASIC METABOLIC PNL TOTAL CA: CPT

## 2019-05-22 RX ORDER — NALOXONE HYDROCHLORIDE 0.4 MG/ML
INJECTION, SOLUTION INTRAMUSCULAR; INTRAVENOUS; SUBCUTANEOUS
Status: DISCONTINUED
Start: 2019-05-22 | End: 2019-05-22 | Stop reason: WASHOUT

## 2019-05-22 RX ADMIN — CARVEDILOL 12.5 MG: 6.25 TABLET, FILM COATED ORAL at 08:21

## 2019-05-22 RX ADMIN — Medication 10 ML: at 16:59

## 2019-05-22 RX ADMIN — CARVEDILOL 12.5 MG: 6.25 TABLET, FILM COATED ORAL at 16:58

## 2019-05-22 RX ADMIN — PIPERACILLIN SODIUM,TAZOBACTAM SODIUM 3.38 G: 3; .375 INJECTION, POWDER, FOR SOLUTION INTRAVENOUS at 08:22

## 2019-05-22 RX ADMIN — SODIUM CHLORIDE AND POTASSIUM CHLORIDE 100 ML/HR: 9; 1.49 INJECTION, SOLUTION INTRAVENOUS at 14:14

## 2019-05-22 RX ADMIN — PANTOPRAZOLE SODIUM 40 MG: 40 TABLET, DELAYED RELEASE ORAL at 08:21

## 2019-05-22 RX ADMIN — PIPERACILLIN SODIUM,TAZOBACTAM SODIUM 3.38 G: 3; .375 INJECTION, POWDER, FOR SOLUTION INTRAVENOUS at 16:57

## 2019-05-22 RX ADMIN — SODIUM CHLORIDE AND POTASSIUM CHLORIDE 100 ML/HR: 9; 1.49 INJECTION, SOLUTION INTRAVENOUS at 04:40

## 2019-05-22 RX ADMIN — Medication 10 ML: at 05:35

## 2019-05-22 NOTE — PROGRESS NOTES
Hospitalist Progress Note    2019  Admit Date: 2019  5:49 AM   NAME: Mickeal Closs   :  1960   MRN:  [id number]   Attending: Love Claudio MD  PCP:  Josef Jeffers MD    SUBJECTIVE:   59 y/o male with hx morbid obesity who underwent laparoscopic gastric sleeve procedure 19. He has a hx of atrial fibrillation, on warfarin (held before surgery), hx DVT and PE (had recent IVC filter placed), Afib, diabetes, HTN, JALYN. Pt with hypotension on evening of procedure, received total 3L NS boluses with improvement of BP. Pt reports feeling well this morning. + mild sore throat (which began prior to his admission) with cough productive of minimal clear sputum. Denies SOB, F/C.     Review of Systems negative with exception of pertinent positives noted above  PHYSICAL EXAM     Patient Vitals for the past 24 hrs:   Temp Pulse Resp BP SpO2   19 0700 98.8 °F (37.1 °C) 99 21 131/66 96 %   19 0600  92 15 127/62 91 %   19 0500  84 15 105/64 91 %   19 0400 98.2 °F (36.8 °C) 87 14 116/71 94 %   19 0300  81 12 109/63 90 %   19 0200  77 13 105/61 (!) 89 %   19 0100  86 15 112/66 92 %   19 0000 98.2 °F (36.8 °C) 87 16 119/73 93 %   19 2300  80 14 104/61 90 %   19 2200  82 12 113/58 91 %   19 2105  92 18 114/57 90 %   19 1859 99.3 °F (37.4 °C) 94 21 112/59 94 %   19 1838  87 18 112/59 93 %   19 1703  100 14 112/70 91 %   19 1625 99.1 °F (37.3 °C)       19 1603  (!) 107 18 135/70    19 1414 99 °F (37.2 °C) 90 16     19 1400  88 17 96/64 90 %   19 1326 98.9 °F (37.2 °C) (!) 110 16  93 %   19 1302 98.9 °F (37.2 °C) 88 16 119/59    19 1300  89 22 119/59 93 %   19 1200  78 15 109/62 (!) 88 %   19 1117 98 °F (36.7 °C) 92 20 126/62    19 1002  95 19     19 1001    112/69    19 0838    93/64    19 0836  (!) 112 15 93/64    05/21/19 0813  (!) 105 22 114/65 93 %     Oxygen Therapy  O2 Sat (%): 96 % (05/22/19 0700)  Pulse via Oximetry: 90 beats per minute (05/22/19 0700)  O2 Device: Room air (05/21/19 1859)  O2 Flow Rate (L/min): 0 l/min (05/20/19 1130)  FIO2 (%): 21 % (05/20/19 1130)    Intake/Output Summary (Last 24 hours) at 5/22/2019 0748  Last data filed at 5/22/2019 0540  Gross per 24 hour   Intake 3181 ml   Output 1750 ml   Net 1431 ml      General: No acute distress    Lungs:  CTA Bilaterally  Heart:  Irregularly irregular, rates 90-100s  Abdomen: Soft, Non distended, Non tender, Positive bowel sounds  Extremities: No cyanosis, clubbing or edema  Neurologic:  No focal deficits  Psych:  Calm, cooperative    Recent Results (from the past 24 hour(s))   PROCALCITONIN    Collection Time: 05/21/19  7:56 AM   Result Value Ref Range    Procalcitonin 0.1 ng/mL   TROPONIN I    Collection Time: 05/21/19  7:56 AM   Result Value Ref Range    Troponin-I, Qt. <0.02 (L) 0.02 - 0.05 NG/ML   POC VENOUS BLOOD GAS    Collection Time: 05/21/19  8:06 AM   Result Value Ref Range    Device: ROOM AIR      pH, venous (POC) 7.316 (L) 7.32 - 7.42      pCO2, venous (POC) 27.5 (L) 41 - 51 MMHG    pO2, venous (POC) 39 mmHg    HCO3, venous (POC) 14.1 (L) 23 - 28 MMOL/L    sO2, venous (POC) 71 65 - 88 %    Base deficit, venous (POC) 11 mmol/L    Allens test (POC) NOT APPLICABLE      Site RIGHT BRACHIAL      Patient temp.  98.6      Specimen type (POC) VENOUS BLOOD      Performed by Christine     CO2, POC 15 MMOL/L    Respiratory comment: 1      COLLECT TIME 802     LACTIC ACID    Collection Time: 05/21/19  8:13 AM   Result Value Ref Range    Lactic acid 1.0 0.4 - 2.0 MMOL/L   TYPE & SCREEN    Collection Time: 05/21/19  8:13 AM   Result Value Ref Range    Crossmatch Expiration 05/24/2019     ABO/Rh(D) A NEGATIVE     Antibody screen NEG     Unit number [id number]     Blood component type  LR     Unit division 00     Status of unit TRANSFUSED Crossmatch result Compatible     Unit number [id number]     Blood component type RC LR     Unit division 00     Status of unit TRANSFUSED     Crossmatch result Compatible    GLUCOSE, POC    Collection Time: 05/21/19 12:11 PM   Result Value Ref Range    Glucose (POC) 135 (H) 65 - 100 mg/dL   GLUCOSE, POC    Collection Time: 05/21/19  4:20 PM   Result Value Ref Range    Glucose (POC) 123 (H) 65 - 100 mg/dL   HEMOGLOBIN    Collection Time: 05/21/19  5:43 PM   Result Value Ref Range    HGB 12.1 (L) 13.6 - 17.2 g/dL   GLUCOSE, POC    Collection Time: 05/21/19  9:29 PM   Result Value Ref Range    Glucose (POC) 124 (H) 65 - 563 mg/dL   METABOLIC PANEL, BASIC    Collection Time: 05/22/19  2:19 AM   Result Value Ref Range    Sodium 144 136 - 145 mmol/L    Potassium 3.7 3.5 - 5.1 mmol/L    Chloride 112 (H) 98 - 107 mmol/L    CO2 22 21 - 32 mmol/L    Anion gap 10 7 - 16 mmol/L    Glucose 108 (H) 65 - 100 mg/dL    BUN 20 6 - 23 MG/DL    Creatinine 0.95 0.8 - 1.5 MG/DL    GFR est AA >60 >60 ml/min/1.73m2    GFR est non-AA >60 >60 ml/min/1.73m2    Calcium 7.7 (L) 8.3 - 10.4 MG/DL   CBC W/O DIFF    Collection Time: 05/22/19  2:19 AM   Result Value Ref Range    WBC 7.8 4.3 - 11.1 K/uL    RBC 3.41 (L) 4.23 - 5.6 M/uL    HGB 10.7 (L) 13.6 - 17.2 g/dL    HCT 30.9 (L) 41.1 - 50.3 %    MCV 90.6 79.6 - 97.8 FL    MCH 31.4 26.1 - 32.9 PG    MCHC 34.6 31.4 - 35.0 g/dL    RDW 15.1 (H) 11.9 - 14.6 %    PLATELET 534 (L) 426 - 450 K/uL    MPV 11.5 9.4 - 12.3 FL    ABSOLUTE NRBC 0.00 0.0 - 0.2 K/uL   HEMOGLOBIN    Collection Time: 05/22/19  6:37 AM   Result Value Ref Range    HGB 11.0 (L) 13.6 - 17.2 g/dL       Micro: All Micro Results     None          Other studies last 24 hours:  Xr Chest Sngl V    Result Date: 5/21/2019  IMPRESSION: Unremarkable portable chest x-ray. No acute abnormality visualized. Xr Gastrograffin Swallow    Result Date: 5/21/2019  Post surgical changes in keeping with gastric sleeve procedure.  No leak or obstruction appreciated. Total fluoroscopy time: 0.9 minutes, seven images saved      All imaging, labs reviewed. ASSESSMENT      Hospital Problems as of 5/22/2019 Date Reviewed: 5/2/2019          Codes Class Noted - Resolved POA    Hypotension after procedure ICD-10-CM: I95.81  ICD-9-CM: 458.29  5/20/2019 - Present No        Controlled type 2 diabetes mellitus without complication, without long-term current use of insulin (HCC) ICD-10-CM: E11.9  ICD-9-CM: 250.00  12/12/2017 - Present Yes        Essential hypertension with goal blood pressure less than 130/80 ICD-10-CM: I10  ICD-9-CM: 401.9  3/8/2017 - Present Yes        Paroxysmal atrial flutter (HCC) ICD-10-CM: I48.92  ICD-9-CM: 427.32  3/15/2016 - Present Yes        * (Principal) Morbid obesity with BMI of 50.0-59.9, adult (HCC) ICD-10-CM: E66.01, Z68.43  ICD-9-CM: 278.01, V85.43  4/3/2015 - Present Yes        History of pulmonary embolism ICD-10-CM: I45.364  ICD-9-CM: V12.55  4/3/2015 - Present Yes            Post operative hypotension likely secondary to post op bleeding  Hgb down ~5.5g from baseline. On anticoagulation due to h/o DVT/PE. Also with low grade fever, tachycardia. No evidence of infection. Procalcitonin WNL. CXR, UA clear. Suspect cough is viral related. Bleeding most likely cause of pt's hypotension. Hgb stable. - empiric zosyn started yesterday - ok to d/c zosyn from my perspective - if wanting to be overly cautious, can switch to Augmentin to complete total of 7 days  - EKG without acute  ST-T wave changes  - Ddimer WNL  - ECHO with preserved EF    Afib with RVR  Pt with h/o PAF. HR controlled. - cont Coreg  - TSH WNL  - resume warfarin when ok with gen surg - no need for bridging lovenox    AG met acidosis  Resolved    VISHNU  - ok to d/c IVFs    H/o DVT/PE  Only one thromboembolic event in 9608, no further issues. IVC filter placed recently in preparation for this surgery.   - holding Lovenox - no indication for bridging especially in setting of acute issues    DM2  - holding home Invokana  - SSI    Thank you for this consult. I will sign off. Please do not hesitate to call back with any questions or concerns.       Signed By: Malik Cormier MD     May 22, 2019

## 2019-05-22 NOTE — PROGRESS NOTES
General Surgery Progress Note    5/22/2019    Admit Date: 5/20/2019    Subjective:     Surgery POD #2  The patient remains in the ICU at the [facility]. This morning hie is sitting in a chair. Hemoglobin seems to have stabilized at 11 after 2 units of blood yesterday. Pulse is irregular, but less than 100. BP is better. He had two bowel movements yesterday, likely due to the cathartic effect of gastrograffin. He would like to have the Dowell out. Objective:     Visit Vitals  /66   Pulse 99   Temp 98.8 °F (37.1 °C)   Resp 21   Wt (!) 373 lb (169.2 kg)   SpO2 96%   BMI 52.02 kg/m²         Intake/Output Summary (Last 24 hours) at 5/22/2019 0802  Last data filed at 5/22/2019 0540  Gross per 24 hour   Intake 3181 ml   Output 1750 ml   Net 1431 ml        EXAM:  ABD soft, obese, mild incisional tenderness, active BS'S. Wounds are intact with surrounding eccymosis. Data Review    Recent Results (from the past 24 hour(s))   POC VENOUS BLOOD GAS    Collection Time: 05/21/19  8:06 AM   Result Value Ref Range    Device: ROOM AIR      pH, venous (POC) 7.316 (L) 7.32 - 7.42      pCO2, venous (POC) 27.5 (L) 41 - 51 MMHG    pO2, venous (POC) 39 mmHg    HCO3, venous (POC) 14.1 (L) 23 - 28 MMOL/L    sO2, venous (POC) 71 65 - 88 %    Base deficit, venous (POC) 11 mmol/L    Allens test (POC) NOT APPLICABLE      Site RIGHT BRACHIAL      Patient temp.  98.6      Specimen type (POC) VENOUS BLOOD      Performed by Christine     CO2, POC 15 MMOL/L    Respiratory comment: 1      COLLECT TIME 802     LACTIC ACID    Collection Time: 05/21/19  8:13 AM   Result Value Ref Range    Lactic acid 1.0 0.4 - 2.0 MMOL/L   TYPE & SCREEN    Collection Time: 05/21/19  8:13 AM   Result Value Ref Range    Crossmatch Expiration 05/24/2019     ABO/Rh(D) A NEGATIVE     Antibody screen NEG     Unit number [id number]     Blood component type RC LR     Unit division 00     Status of unit TRANSFUSED     Crossmatch result Compatible Unit number [id number]     Blood component type  LR     Unit division 00     Status of unit TRANSFUSED     Crossmatch result Compatible    GLUCOSE, POC    Collection Time: 05/21/19 12:11 PM   Result Value Ref Range    Glucose (POC) 135 (H) 65 - 100 mg/dL   GLUCOSE, POC    Collection Time: 05/21/19  4:20 PM   Result Value Ref Range    Glucose (POC) 123 (H) 65 - 100 mg/dL   HEMOGLOBIN    Collection Time: 05/21/19  5:43 PM   Result Value Ref Range    HGB 12.1 (L) 13.6 - 17.2 g/dL   GLUCOSE, POC    Collection Time: 05/21/19  9:29 PM   Result Value Ref Range    Glucose (POC) 124 (H) 65 - 436 mg/dL   METABOLIC PANEL, BASIC    Collection Time: 05/22/19  2:19 AM   Result Value Ref Range    Sodium 144 136 - 145 mmol/L    Potassium 3.7 3.5 - 5.1 mmol/L    Chloride 112 (H) 98 - 107 mmol/L    CO2 22 21 - 32 mmol/L    Anion gap 10 7 - 16 mmol/L    Glucose 108 (H) 65 - 100 mg/dL    BUN 20 6 - 23 MG/DL    Creatinine 0.95 0.8 - 1.5 MG/DL    GFR est AA >60 >60 ml/min/1.73m2    GFR est non-AA >60 >60 ml/min/1.73m2    Calcium 7.7 (L) 8.3 - 10.4 MG/DL   CBC W/O DIFF    Collection Time: 05/22/19  2:19 AM   Result Value Ref Range    WBC 7.8 4.3 - 11.1 K/uL    RBC 3.41 (L) 4.23 - 5.6 M/uL    HGB 10.7 (L) 13.6 - 17.2 g/dL    HCT 30.9 (L) 41.1 - 50.3 %    MCV 90.6 79.6 - 97.8 FL    MCH 31.4 26.1 - 32.9 PG    MCHC 34.6 31.4 - 35.0 g/dL    RDW 15.1 (H) 11.9 - 14.6 %    PLATELET 671 (L) 965 - 450 K/uL    MPV 11.5 9.4 - 12.3 FL    ABSOLUTE NRBC 0.00 0.0 - 0.2 K/uL   HEMOGLOBIN    Collection Time: 05/22/19  6:37 AM   Result Value Ref Range    HGB 11.0 (L) 13.6 - 17.2 g/dL        Hospital Problems  Date Reviewed: 5/2/2019          Codes Class Noted POA    Hypotension after procedure ICD-10-CM: I95.81  ICD-9-CM: 458.29  5/20/2019 No        Controlled type 2 diabetes mellitus without complication, without long-term current use of insulin (HCC) ICD-10-CM: E11.9  ICD-9-CM: 250.00  12/12/2017 Yes        Essential hypertension with goal blood pressure less than 130/80 ICD-10-CM: I10  ICD-9-CM: 401.9  3/8/2017 Yes        Paroxysmal atrial flutter (HCC) ICD-10-CM: G57.03  ICD-9-CM: 427.32  3/15/2016 Yes        * (Principal) Morbid obesity with BMI of 50.0-59.9, adult (HCC) ICD-10-CM: E66.01, Z68.43  ICD-9-CM: 278.01, V85.43  4/3/2015 Yes        History of pulmonary embolism ICD-10-CM: X46.684  ICD-9-CM: V12.55  4/3/2015 Yes          1. Remove the Dowell. 2. Zosyn  3. Follow H/H  4. IS/OOB/PT consult  5. Hold Lovenox due to bleeding. 6. Hospitlists help appreciated. 7. Would like to send to floor, if all right with Hospitalists.   Umang Palacios MD.

## 2019-05-22 NOTE — INTERDISCIPLINARY ROUNDS
Interdisciplinary team rounds were held 5/22/2019 with the following team members:Care Management, Nursing, Physical Therapy and Physician and the patient. Plan of care discussed. See clinical pathway and/or care plan for interventions and desired outcomes.

## 2019-05-22 NOTE — PROGRESS NOTES
Bedside, Verbal and Written shift change report given to Barrett Carlos (oncoming nurse) by Meredith Lopez (offgoing nurse).  Report included the following information SBAR, Kardex, OR Summary, Intake/Output, MAR, Recent Results, Med Rec Status and Cardiac Rhythm SR.

## 2019-05-22 NOTE — PROGRESS NOTES
Problem: Mobility Impaired (Adult and Pediatric)  Goal: *Acute Goals and Plan of Care (Insert Text)  Description  STG:  (1.)Mr. Hilarie Seip will move from supine to sit and sit to supine  with INDEPENDENCE within 1-3 treatment day(s). (2.)Mr. Hilarie Seip will transfer from bed to chair and chair to bed with INDEPENDENCE using the least restrictive device within 1-3 treatment day(s). (3.)Mr. Hilarie Seip will ambulate with INDEPENDENCE for 650 feet with the least restrictive device within 1-3 treatment day(s). (4.)Mr. Hilarie Seip will be independent with his HEP within 1-3 treatment days. ________________________________________________________________________________________________     Outcome: Progressing Towards Goal     PHYSICAL THERAPY: Daily Note and PM 5/22/2019  INPATIENT: PT Visit Days : 2  Payor: Abigail Ramirez / Plan: [place] BLUE CROSS [address] / Product Type: PPO /       NAME/AGE/GENDER: Luis Archer is a 60 y.o. male   PRIMARY DIAGNOSIS: Morbid obesity ([facility] [E66.01]  Morbid obesity with BMI of 50.0-59.9, adult ([facility] [E66.01, Z68.43] Morbid obesity with BMI of 50.0-59.9, adult ([facility]   Morbid obesity with BMI of 50.0-59.9, adult ([facility])    Procedure(s) (LRB):  LAPAROSCOPIC SLEEVE GASTRECTOMY (N/A)  2 Days Post-Op  ICD-10: Treatment Diagnosis:    · Generalized Muscle Weakness (M62.81)  · Difficulty in walking, Not elsewhere classified (R26.2)   Precaution/Allergies:  Amlodipine; Contrave [naltrexone-bupropion]; Lisinopril; and Other medication      ASSESSMENT:     Mr. Hilarie Seip reviewed HEP increased reps, pt was not feeling challenged, then progressed gait distance with no major difficulty other than mild SOB the last 100 ft. This section established at most recent assessment   PROBLEM LIST (Impairments causing functional limitations):  1. Decreased Strength  2. Decreased ADL/Functional Activities  3. Decreased Transfer Abilities  4. Decreased Ambulation Ability/Technique  5.  Decreased [place] with Home Exercise Program   INTERVENTIONS PLANNED: (Benefits and precautions of physical therapy have been discussed with the patient.)  1. Bed Mobility  2. Gait Training  3. Home Exercise Program (HEP)  4. Therapeutic Exercise/Strengthening  5. Transfer Training     TREATMENT PLAN: Frequency/Duration: daily for duration of hospital stay  Rehabilitation Potential For Stated Goals: Good     REHAB RECOMMENDATIONS (at time of discharge pending progress):    Placement: It is my opinion, based on this patient's performance to date, that Mr. Kelley Valle may benefit from a few therapy visits and then discharged home with no follow up PT recommended. Equipment:    None at this time              HISTORY:   History of Present Injury/Illness (Reason for Referral):  PER MD NOTE: Rose Plunkett returns to the [facility] after successful completion of our multidisciplinary surgical prep program.  This is his final consultation preparing him for Sleeve Gastrectomy surgery. He presents with a height of 5' 10\" (1.778 m) and weight of (!) 407 lb (184.6 kg), giving him a Body mass index is 58.4 kg/m². He has an Ideal body weight of 179 lbs, and excess body weight of 228 lbs. He started our program with a weight of 429 lbs, losing 22 lbs since starting our program.     He has completed all aspects of our prep program and has been deemed an acceptable candidate for bariatric surgery. Past Medical History/Comorbidities:   Mr. Kelley Valle  has a past medical history of Arthritis, Borderline diabetes, Current use of long term anticoagulation, History of depression, History of DVT (deep vein thrombosis), History of narcotic addiction ([address] History of pulmonary embolism (2015), HTN (hypertension), Morbid obesity ([address] Persistent atrial fibrillation ([address] Sleep apnea, and Venous stasis.   Mr. Kelley Valle  has a past surgical history that includes hx colonoscopy (10/20/2015); hx refractive surgery (Bilateral); hx tonsillectomy (1965); and ir ivc filter (2019). Social History/Living Environment:   Home Environment: Private residence  # Steps to Enter: 3  One/Two Story Residence: One story  Living Alone: Yes  Support Systems: Family member(s)  Patient Expects to be Discharged to[de-identified] Private residence  Current DME Used/Available at Home: CPAP, Glucometer, Blood pressure cuff  Prior Level of Function/Work/Activity:  Pt living at home, independent with gait and ADLs without assistive devices     Number of Personal Factors/Comorbidities that affect the Plan of Care: 0: LOW COMPLEXITY   EXAMINATION:   Most Recent Physical Functioning:   Gross Assessment: 3/5 thtoughout                       Balance:  Sitting: Intact; Without support  Standing: Impaired; Without support  Standing - Static: (fair)  Standing - Dynamic : (fair to fair-) Bed Mobility:  Supine to Sit: (NT)  Sit to Supine: (NT)  Scooting: Stand-by assistance       Transfers:  Sit to Stand: Stand-by assistance  Stand to Sit: Stand-by assistance  Bed to Chair: Stand-by assistance  Gait:     Speed/Vera: Fluctuations  Gait Abnormalities: Decreased step clearance  Distance (ft): 650 Feet (ft)  Assistive Device: (none)  Ambulation - Level of Assistance: Stand-by assistance  Duration: 11 Minutes      Body Structures Involved:  1. Muscles Body Functions Affected:  1. Movement Related Activities and Participation Affected:  1. Mobility   Number of elements that affect the Plan of Care: 4+: HIGH COMPLEXITY   CLINICAL PRESENTATION:   Presentation: Stable and uncomplicated: LOW COMPLEXITY   CLINICAL DECISION MAKIN [facility] [address] [address] AM-PAC 6 Clicks   Basic Mobility Inpatient Short Form  How much difficulty does the patient currently have. .. Unable A Lot A Little None   1. Turning over in bed (including adjusting bedclothes, sheets and blankets)? ? 1   ? 2   ? 3   ? 4   2. Sitting down on and standing up from a chair with arms ( e.g., wheelchair, bedside commode, etc.)   ? 1   ? 2   ? 3   ? 4   3.   Moving from lying on back to sitting on the side of the bed?   ? 1   ? 2   ? 3   ? 4   How much help from another person does the patient currently need. .. Total A Lot A Little None   4. Moving to and from a bed to a chair (including a wheelchair)? ? 1   ? 2   ? 3   ? 4   5. Need to walk in hospital room? ? 1   ? 2   ? 3   ? 4   6. Climbing 3-5 steps with a railing? ? 1   ? 2   ? 3   ? 4   © 2007, Trustees of [address] [place] [address] [address] [id number], under license to [employer]. All rights reserved      Score:  Initial: 20 Most Recent: X (Date: -- )    Interpretation of Tool:  Represents activities that are increasingly more difficult (i.e. Bed mobility, Transfers, Gait). Medical Necessity:     · Patient is expected to demonstrate progress in strength and functional technique  ·  to decrease assistance required with functional mobility and HEP   · .  Reason for Services/Other Comments:  · Patient continues to require skilled intervention due to inability to complete functional mobility and HEP independently   · . Use of outcome tool(s) and clinical judgement create a POC that gives a: Clear prediction of patient's progress: LOW COMPLEXITY            TREATMENT:   (In addition to Assessment/Re-Assessment sessions the following treatments were rendered)   Pre-treatment Symptoms/Complaints:  Pt eager to get up with PT  Pain: Initial: numeric scale  Pain Intensity 1: 0  Post Session:  0/10     Therapeutic Exercise: (12 Minutes):  Exercises per grid below to improve mobility and strength. Required minimal verbal cues to promote proper body alignment and promote proper body posture. Progressed repetitions as indicated. Gait Training (11 Minutes):  Gait training to improve and/or restore physical functioning as related to mobility, strength, balance, coordination and dynamic movement of leg - both to improve functional gait.   Ambulated 650 Feet (ft) with Stand-by assistance using a (none) and minimal cues related to their stride length and heel strike to promote proper body alignment, promote proper body posture, promote proper body mechanics and promote proper body breathing techniques. Date:  5/21/19 Date:  5/22 Date:     Activity/Exercise Parameters Parameters Parameters   Ankle pumps 10 20    Heel slides / marching 10  / 20    Straight leg raises / LAQ's 10  / 20    Elbow flexion / push & pul (rowing) 10  /20    Shoulder raises / overhead press 10  /20                    Braces/Orthotics/Lines/Etc:   · none  Treatment/Session Assessment:    · Response to Treatment:  tolerated well  · Interdisciplinary Collaboration:   o Registered Nurse  · After treatment position/precautions:   o Up in chair  o Bed/Chair-wheels locked  o Caregiver at bedside  o Call light within reach  o RN notified  o Family at bedside   · Compliance with Program/Exercises: Compliant all of the time  · Recommendations/Intent for next treatment session: \"Next visit will focus on advancements to more challenging activities and reduction in assistance provided\".   Total Treatment Duration:  PT Patient Time In/Time Out  Time In: 1323  Time Out: [address] Bhargav Gillespie PT

## 2019-05-22 NOTE — PROGRESS NOTES
Bariatric Surgery Progress Note    5/22/2019    Admit Date: 5/20/2019    Subjective:     Surgery POD # 2  Patient sitting up in chair. States feels much better and stronger today. States tolerating liquids. No nausea or vomiting.      Objective:     Visit Vitals  /73   Pulse 90   Temp 98.8 °F (37.1 °C)   Resp 21   Wt (!) 169.2 kg (373 lb)   SpO2 93%   BMI 52.02 kg/m²         Intake/Output Summary (Last 24 hours) at 5/22/2019 1052  Last data filed at 5/22/2019 0900  Gross per 24 hour   Intake 3281 ml   Output 1950 ml   Net 1331 ml            Data Review    Recent Results (from the past 24 hour(s))   GLUCOSE, POC    Collection Time: 05/21/19 12:11 PM   Result Value Ref Range    Glucose (POC) 135 (H) 65 - 100 mg/dL   GLUCOSE, POC    Collection Time: 05/21/19  4:20 PM   Result Value Ref Range    Glucose (POC) 123 (H) 65 - 100 mg/dL   HEMOGLOBIN    Collection Time: 05/21/19  5:43 PM   Result Value Ref Range    HGB 12.1 (L) 13.6 - 17.2 g/dL   GLUCOSE, POC    Collection Time: 05/21/19  9:29 PM   Result Value Ref Range    Glucose (POC) 124 (H) 65 - 809 mg/dL   METABOLIC PANEL, BASIC    Collection Time: 05/22/19  2:19 AM   Result Value Ref Range    Sodium 144 136 - 145 mmol/L    Potassium 3.7 3.5 - 5.1 mmol/L    Chloride 112 (H) 98 - 107 mmol/L    CO2 22 21 - 32 mmol/L    Anion gap 10 7 - 16 mmol/L    Glucose 108 (H) 65 - 100 mg/dL    BUN 20 6 - 23 MG/DL    Creatinine 0.95 0.8 - 1.5 MG/DL    GFR est AA >60 >60 ml/min/1.73m2    GFR est non-AA >60 >60 ml/min/1.73m2    Calcium 7.7 (L) 8.3 - 10.4 MG/DL   CBC W/O DIFF    Collection Time: 05/22/19  2:19 AM   Result Value Ref Range    WBC 7.8 4.3 - 11.1 K/uL    RBC 3.41 (L) 4.23 - 5.6 M/uL    HGB 10.7 (L) 13.6 - 17.2 g/dL    HCT 30.9 (L) 41.1 - 50.3 %    MCV 90.6 79.6 - 97.8 FL    MCH 31.4 26.1 - 32.9 PG    MCHC 34.6 31.4 - 35.0 g/dL    RDW 15.1 (H) 11.9 - 14.6 %    PLATELET 289 (L) 998 - 450 K/uL    MPV 11.5 9.4 - 12.3 FL    ABSOLUTE NRBC 0.00 0.0 - 0.2 K/uL   HEMOGLOBIN Collection Time: 05/22/19  6:37 AM   Result Value Ref Range    HGB 11.0 (L) 13.6 - 17.2 g/dL   MAGNESIUM    Collection Time: 05/22/19  6:37 AM   Result Value Ref Range    Magnesium 2.3 1.8 - 2.4 mg/dL        Hospital Problems  Date Reviewed: 5/2/2019          Codes Class Noted POA    Hypotension after procedure ICD-10-CM: I95.81  ICD-9-CM: 458.29  5/20/2019 No        Controlled type 2 diabetes mellitus without complication, without long-term current use of insulin ([facility] ICD-10-CM: E11.9  ICD-9-CM: 250.00  12/12/2017 Yes        Essential hypertension with goal blood pressure less than 130/80 ICD-10-CM: I10  ICD-9-CM: 401.9  3/8/2017 Yes        Paroxysmal atrial flutter (HCC) ICD-10-CM: I48.92  ICD-9-CM: 427.32  3/15/2016 Yes        * (Principal) Morbid obesity with BMI of 50.0-59.9, adult ([facility] ICD-10-CM: E66.01, Z68.43  ICD-9-CM: 278.01, V85.43  4/3/2015 Yes        History of pulmonary embolism ICD-10-CM: C79.580  ICD-9-CM: V12.55  4/3/2015 Yes              Awaiting bed on floor. Reviewed home instructions for hydration, protein intake, and activity restrictions. Reviewed rules for PO intake. Has follow up appointment in office next week.       Alba Llanos RN

## 2019-05-23 VITALS
HEART RATE: 76 BPM | RESPIRATION RATE: 16 BRPM | SYSTOLIC BLOOD PRESSURE: 135 MMHG | DIASTOLIC BLOOD PRESSURE: 73 MMHG | OXYGEN SATURATION: 97 % | BODY MASS INDEX: 52.02 KG/M2 | TEMPERATURE: 98.5 F | WEIGHT: 315 LBS

## 2019-05-23 LAB
ANION GAP SERPL CALC-SCNC: 8 MMOL/L (ref 7–16)
BUN SERPL-MCNC: 13 MG/DL (ref 6–23)
CALCIUM SERPL-MCNC: 7.7 MG/DL (ref 8.3–10.4)
CHLORIDE SERPL-SCNC: 112 MMOL/L (ref 98–107)
CO2 SERPL-SCNC: 24 MMOL/L (ref 21–32)
CREAT SERPL-MCNC: 0.69 MG/DL (ref 0.8–1.5)
ERYTHROCYTE [DISTWIDTH] IN BLOOD BY AUTOMATED COUNT: 15.4 % (ref 11.9–14.6)
GLUCOSE BLD STRIP.AUTO-MCNC: 86 MG/DL (ref 65–100)
GLUCOSE SERPL-MCNC: 84 MG/DL (ref 65–100)
HCT VFR BLD AUTO: 29.4 % (ref 41.1–50.3)
HGB BLD-MCNC: 9.7 G/DL (ref 13.6–17.2)
MCH RBC QN AUTO: 31.1 PG (ref 26.1–32.9)
MCHC RBC AUTO-ENTMCNC: 33 G/DL (ref 31.4–35)
MCV RBC AUTO: 94.2 FL (ref 79.6–97.8)
NRBC # BLD: 0 K/UL (ref 0–0.2)
PLATELET # BLD AUTO: 123 K/UL (ref 150–450)
PMV BLD AUTO: 11.8 FL (ref 9.4–12.3)
POTASSIUM SERPL-SCNC: 3.8 MMOL/L (ref 3.5–5.1)
RBC # BLD AUTO: 3.12 M/UL (ref 4.23–5.6)
SODIUM SERPL-SCNC: 144 MMOL/L (ref 136–145)
WBC # BLD AUTO: 4.5 K/UL (ref 4.3–11.1)

## 2019-05-23 PROCEDURE — 97530 THERAPEUTIC ACTIVITIES: CPT

## 2019-05-23 PROCEDURE — 85027 COMPLETE CBC AUTOMATED: CPT

## 2019-05-23 PROCEDURE — 36415 COLL VENOUS BLD VENIPUNCTURE: CPT

## 2019-05-23 PROCEDURE — 97110 THERAPEUTIC EXERCISES: CPT

## 2019-05-23 PROCEDURE — 82962 GLUCOSE BLOOD TEST: CPT

## 2019-05-23 PROCEDURE — 80048 BASIC METABOLIC PNL TOTAL CA: CPT

## 2019-05-23 PROCEDURE — 74011250637 HC RX REV CODE- 250/637: Performed by: INTERNAL MEDICINE

## 2019-05-23 PROCEDURE — 74011000258 HC RX REV CODE- 258: Performed by: SURGERY

## 2019-05-23 PROCEDURE — 74011250637 HC RX REV CODE- 250/637: Performed by: SURGERY

## 2019-05-23 PROCEDURE — 74011250636 HC RX REV CODE- 250/636: Performed by: SURGERY

## 2019-05-23 RX ADMIN — PANTOPRAZOLE SODIUM 40 MG: 40 TABLET, DELAYED RELEASE ORAL at 07:30

## 2019-05-23 RX ADMIN — CARVEDILOL 12.5 MG: 6.25 TABLET, FILM COATED ORAL at 07:28

## 2019-05-23 RX ADMIN — PIPERACILLIN SODIUM,TAZOBACTAM SODIUM 3.38 G: 3; .375 INJECTION, POWDER, FOR SOLUTION INTRAVENOUS at 00:00

## 2019-05-23 RX ADMIN — SODIUM CHLORIDE AND POTASSIUM CHLORIDE 100 ML/HR: 9; 1.49 INJECTION, SOLUTION INTRAVENOUS at 01:21

## 2019-05-23 RX ADMIN — PIPERACILLIN SODIUM,TAZOBACTAM SODIUM 3.38 G: 3; .375 INJECTION, POWDER, FOR SOLUTION INTRAVENOUS at 07:29

## 2019-05-23 NOTE — PROGRESS NOTES
Pt discharge to family member. All belongings with pt including home cpap machine. Pt transported via wheelchair to entrance C and family member accepted responsibility.

## 2019-05-23 NOTE — PROGRESS NOTES
Problem: Mobility Impaired (Adult and Pediatric)  Goal: *Acute Goals and Plan of Care (Insert Text)  Description  STG:  (1.)Mr. Petr Ramos will move from supine to sit and sit to supine  with INDEPENDENCE within 1-3 treatment day(s). (2.)Mr. Petr Ramos will transfer from bed to chair and chair to bed with INDEPENDENCE using the least restrictive device within 1-3 treatment day(s). (3.)Mr. Petr Ramos will ambulate with INDEPENDENCE for 650 feet with the least restrictive device within 1-3 treatment day(s). (4.)Mr. Petr Ramos will be independent with his HEP within 1-3 treatment days. ________________________________________________________________________________________________     Outcome: Progressing Towards Goal     PHYSICAL THERAPY: Daily Note and AM 5/23/2019  INPATIENT: PT Visit Days : 3  Payor: Brandi Benson / Plan: [facility] / Product Type: PPO /       NAME/AGE/GENDER: Marek Castillo is a 60 y.o. male   PRIMARY DIAGNOSIS: Morbid obesity ([facility] [E66.01]  Morbid obesity with BMI of 50.0-59.9, adult ([facility].) [E66.01, Z68.43] Morbid obesity with BMI of 50.0-59.9, adult ([facility].)   Morbid obesity with BMI of 50.0-59.9, adult ([facility])    Procedure(s) (LRB):  LAPAROSCOPIC SLEEVE GASTRECTOMY (N/A)  3 Days Post-Op  ICD-10: Treatment Diagnosis:    · Generalized Muscle Weakness (M62.81)  · Difficulty in walking, Not elsewhere classified (R26.2)   Precaution/Allergies:  Amlodipine; Contrave [naltrexone-bupropion]; Lisinopril; and Other medication      ASSESSMENT:     Mr. Petr Ramos up in chair on contact and ready for therapy. States he is going home today. Took a walk in [place], 650 feet with steady gait and no loss of balance. Practiced going up and down 3 steps with bilateral rail and a reciprocal pattern. In room he was able to demonstrate his home exercises without verbal cues. Pt has made great progress. He stayed up in chair with needs in reach.      This section established at most recent assessment   PROBLEM LIST (Impairments causing functional limitations):  1. Decreased Strength  2. Decreased ADL/Functional Activities  3. Decreased Transfer Abilities  4. Decreased Ambulation Ability/Technique  5. Decreased Glades with Home Exercise Program   INTERVENTIONS PLANNED: (Benefits and precautions of physical therapy have been discussed with the patient.)  1. Bed Mobility  2. Gait Training  3. Home Exercise Program (HEP)  4. Therapeutic Exercise/Strengthening  5. Transfer Training     TREATMENT PLAN: Frequency/Duration: daily for duration of hospital stay  Rehabilitation Potential For Stated Goals: Good     REHAB RECOMMENDATIONS (at time of discharge pending progress):    Placement: It is my opinion, based on this patient's performance to date, that Mr. Nunu Edgar may benefit from a few therapy visits and then discharged home with no follow up PT recommended. Equipment:    None at this time              HISTORY:   History of Present Injury/Illness (Reason for Referral):  PER MD NOTE: Leatha Kingston returns to the [facility] after successful completion of our multidisciplinary surgical prep program.  This is his final consultation preparing him for Sleeve Gastrectomy surgery. He presents with a height of 5' 10\" (1.778 m) and weight of (!) 407 lb (184.6 kg), giving him a Body mass index is 58.4 kg/m². He has an Ideal body weight of 179 lbs, and excess body weight of 228 lbs. He started our program with a weight of 429 lbs, losing 22 lbs since starting our program.     He has completed all aspects of our prep program and has been deemed an acceptable candidate for bariatric surgery.     Past Medical History/Comorbidities:   Mr. Nunu Edgar  has a past medical history of Arthritis, Borderline diabetes, Current use of long term anticoagulation, History of depression, History of DVT (deep vein thrombosis), History of narcotic addiction ([facility] History of pulmonary embolism (2015), HTN (hypertension), Morbid obesity ([facility] Persistent atrial fibrillation ([facility] Sleep apnea, and Venous stasis. Mr. Petr Ramos  has a past surgical history that includes hx colonoscopy (10/20/2015); hx refractive surgery (Bilateral); hx tonsillectomy (1965); and ir ivc filter (05/08/2019). Social History/Living Environment:   Home Environment: Private residence  # Steps to Enter: 3  One/Two Story Residence: One story  Living Alone: Yes  Support Systems: Family member(s)  Patient Expects to be Discharged to[de-identified] Private residence  Current DME Used/Available at Home: CPAP, Glucometer, Blood pressure cuff  Prior Level of Function/Work/Activity:  Pt living at home, independent with gait and ADLs without assistive devices     Number of Personal Factors/Comorbidities that affect the Plan of Care: 0: LOW COMPLEXITY   EXAMINATION:   Most Recent Physical Functioning:   Gross Assessment: 3/5 thtoughout  AROM: Within functional limits  Strength: Generally decreased, functional                 Posture (WDL): Within defined limits  Balance:  Sitting: Intact  Standing: Intact  Standing - Static: Good  Standing - Dynamic : Good Bed Mobility:          Transfers:  Sit to Stand: Independent;Supervision  Stand to Sit: Independent;Supervision  Gait:     Speed/Vera: Fluctuations  Gait Abnormalities: Decreased step clearance  Distance (ft): 650 Feet (ft)  Ambulation - Level of Assistance: Supervision;Stand-by assistance  Number of Stairs Trained: 3  Stairs - Level of Assistance: Stand-by assistance;Contact guard assistance  Rail Use: Both      Body Structures Involved:  1. Muscles Body Functions Affected:  1. Movement Related Activities and Participation Affected:  1. Mobility   Number of elements that affect the Plan of Care: 4+: HIGH COMPLEXITY   CLINICAL PRESENTATION:   Presentation: Stable and uncomplicated: LOW COMPLEXITY   CLINICAL DECISION MAKING:   [employer] AM-PAC 6 Clicks   Basic Mobility Inpatient Short Form  How much difficulty does the patient currently have. ..  Unable A Lot A Little None   1. Turning over in bed (including adjusting bedclothes, sheets and blankets)? ? 1   ? 2   ? 3   ? 4   2. Sitting down on and standing up from a chair with arms ( e.g., wheelchair, bedside commode, etc.)   ? 1   ? 2   ? 3   ? 4   3. Moving from lying on back to sitting on the side of the bed?   ? 1   ? 2   ? 3   ? 4   How much help from another person does the patient currently need. .. Total A Lot A Little None   4. Moving to and from a bed to a chair (including a wheelchair)? ? 1   ? 2   ? 3   ? 4   5. Need to walk in hospital room? ? 1   ? 2   ? 3   ? 4   6. Climbing 3-5 steps with a railing? ? 1   ? 2   ? 3   ? 4   © 2007, Trustees of [address] [place], [place] [id number], under license to [email or web address]. All rights reserved      Score:  Initial: 20 Most Recent: X (Date: -- )    Interpretation of Tool:  Represents activities that are increasingly more difficult (i.e. Bed mobility, Transfers, Gait). Medical Necessity:     · Patient is expected to demonstrate progress in strength and functional technique  ·  to decrease assistance required with functional mobility and HEP   · .  Reason for Services/Other Comments:  · Patient continues to require skilled intervention due to inability to complete functional mobility and HEP independently   · . Use of outcome tool(s) and clinical judgement create a POC that gives a: Clear prediction of patient's progress: LOW COMPLEXITY            TREATMENT:   (In addition to Assessment/Re-Assessment sessions the following treatments were rendered)   Pre-treatment Symptoms/Complaints:  Pt eager to get up with PT  Pain: Initial: numeric scale  Pain Intensity 1: 0  Post Session:  0/10     Therapeutic Exercise: (13 Minutes):  Exercises per grid below to improve mobility and strength. Required minimal verbal cues to promote proper body alignment and promote proper body posture. Progressed repetitions as indicated. Therapeutic Activity: (    10):   Therapeutic activities including Chair transfers, Ambulation on level ground and Stairs to improve mobility, strength, balance and coordination. Required minimal verbal cues   to promote static and dynamic balance in standing. Date:  5/21/19 Date:  5/22 Date:  5/23/19   Activity/Exercise Parameters Parameters Parameters   Ankle pumps 10 20 30   Heel slides / marching 10  / 20 25   Straight leg raises / LAQ's 10  / 20 25   Elbow flexion / push & pul (rowing) 10  /20 20   Shoulder raises / overhead press 10  /20 20                   Braces/Orthotics/Lines/Etc:   · none  Treatment/Session Assessment:    · Response to Treatment:  tolerated well, going home today  · Interdisciplinary Collaboration:   o Registered Nurse  · After treatment position/precautions:   o Up in chair  o Bed/Chair-wheels locked  o Call light within reach  o Family at bedside   · Compliance with Program/Exercises: Compliant all of the time  · Recommendations/Intent for next treatment session: \"Next visit will focus on advancements to more challenging activities and reduction in assistance provided\".   Total Treatment Duration:  PT Patient Time In/Time Out  Time In: 0919  Time Out: [address] PT

## 2019-05-23 NOTE — DISCHARGE INSTRUCTIONS
1. Liquid diet with two protein shakes per day until seen for first post-op visit. Try to get at least 60 grams of protein per day. 2. Showering is allowed, but no tub baths or swimming. 3. Drainage is common from the wounds. Change the dressings as needed. Call our office if the wounds become reddened, tender, feel warm to the touch or pus starts to drain from the wound. 4. Take prescribed pain medication as directed, usually Percocet, Dilaudid, Norco or Ultram. Take over the counter medication for minor pain. 5. Ice may be applied intermittently to the surgical site or sites. 6. Call or office, [phone], if problems arise. 7. Follow up in the office at the assigned time. 8. Resume all medications as taken per surgery, unless specifically instructed not to take certain ones. 9. No lifting more than 25 pounds until told otherwise. 10. Driving is allowed 3 days after surgery as long as you feel comfortable enough to drive and have not taken any prescription pain medication prior to driving. 11. Leave Steri-strips in place until seen in the office. If the strips start to curl up, fall off or begin to have an odor, then gently remove the strips.

## 2019-05-23 NOTE — PROGRESS NOTES
Bedside report received from [facility]. Pt awake and alert, oriented x4. Sitting up in recliner. Lung sounds diminished; 02 sats 90's on room air. Abdomen obese and soft with active bowel sounds. Pt voids with no assistance. Pulses palpable and present all extremities. No complaints of pain. VSS.

## 2019-05-23 NOTE — PROGRESS NOTES
Late entry due to hands on patient care: Report received, chart reviewed. Patient awake and alert. States he is ready to get in bed, assisted to bed. No distress. Said he is going to sleep, preparing home cpap machine. denies pain, no shortness of breath, no nausea. Call bell within reach. Instructed to call nursing for any needs. Bed in low position.

## 2019-05-23 NOTE — PROGRESS NOTES
General Surgery Progress Note    5/23/2019    Admit Date: 5/20/2019    Subjective:     Surgery POD #3  The patient feels \"fine\" and wants to go home. He has ambulated without dizziness, voided, tolerated liquids and moved his bowels. Objective:     Visit Vitals  /73   Pulse 76   Temp 98.5 °F (36.9 °C)   Resp 16   Wt (!) 373 lb (169.2 kg)   SpO2 97%   BMI 52.02 kg/m²         Intake/Output Summary (Last 24 hours) at 5/23/2019 0747  Last data filed at 5/23/2019 0516  Gross per 24 hour   Intake 2582 ml   Output 450 ml   Net 2132 ml        EXAM:  ABD soft, eccymosis around incision sites, active BS'S. Wounds intact without drainage or signs of infection.         Data Review    Recent Results (from the past 24 hour(s))   HEMOGLOBIN    Collection Time: 05/22/19  4:36 PM   Result Value Ref Range    HGB 11.3 (L) 13.6 - 17.2 g/dL   GLUCOSE, POC    Collection Time: 05/22/19  4:55 PM   Result Value Ref Range    Glucose (POC) 104 (H) 65 - 100 mg/dL   CBC W/O DIFF    Collection Time: 05/23/19  3:46 AM   Result Value Ref Range    WBC 4.5 4.3 - 11.1 K/uL    RBC 3.12 (L) 4.23 - 5.6 M/uL    HGB 9.7 (L) 13.6 - 17.2 g/dL    HCT 29.4 (L) 41.1 - 50.3 %    MCV 94.2 79.6 - 97.8 FL    MCH 31.1 26.1 - 32.9 PG    MCHC 33.0 31.4 - 35.0 g/dL    RDW 15.4 (H) 11.9 - 14.6 %    PLATELET 609 (L) 073 - 450 K/uL    MPV 11.8 9.4 - 12.3 FL    ABSOLUTE NRBC 0.00 0.0 - 0.2 K/uL   METABOLIC PANEL, BASIC    Collection Time: 05/23/19  3:46 AM   Result Value Ref Range    Sodium 144 136 - 145 mmol/L    Potassium 3.8 3.5 - 5.1 mmol/L    Chloride 112 (H) 98 - 107 mmol/L    CO2 24 21 - 32 mmol/L    Anion gap 8 7 - 16 mmol/L    Glucose 84 65 - 100 mg/dL    BUN 13 6 - 23 MG/DL    Creatinine 0.69 (L) 0.8 - 1.5 MG/DL    GFR est AA >60 >60 ml/min/1.73m2    GFR est non-AA >60 >60 ml/min/1.73m2    Calcium 7.7 (L) 8.3 - 10.4 MG/DL   GLUCOSE, POC    Collection Time: 05/23/19  7:22 AM   Result Value Ref Range    Glucose (POC) 86 65 - 100 mg/dL        [facility]  Date Reviewed: 5/2/2019          Codes Class Noted POA    Hypotension after procedure ICD-10-CM: I95.81  ICD-9-CM: 458.29  5/20/2019 No        Controlled type 2 diabetes mellitus without complication, without long-term current use of insulin (HCC) ICD-10-CM: E11.9  ICD-9-CM: 250.00  12/12/2017 Yes        Essential hypertension with goal blood pressure less than 130/80 ICD-10-CM: I10  ICD-9-CM: 401.9  3/8/2017 Yes        Paroxysmal atrial flutter (HCC) ICD-10-CM: C09.99  ICD-9-CM: 427.32  3/15/2016 Yes        * (Principal) Morbid obesity with BMI of 50.0-59.9, adult (HCC) ICD-10-CM: E66.01, Z68.43  ICD-9-CM: 278.01, V85.43  4/3/2015 Yes        History of pulmonary embolism ICD-10-CM: W83.573  ICD-9-CM: V12.55  4/3/2015 Yes          1. Discharge to home. 2. F/u with me on 5/30/19.   Greg Estrada MD.

## 2019-05-24 NOTE — DISCHARGE SUMMARY
[facility] SUMMARY    Name:  Parker Chiu  MR#:  [id number]  :  1960  ACCOUNT #:  [de-identified]  ADMIT DATE:  2019  DISCHARGE DATE:  2019    ADMITTING DIAGNOSIS:  Morbid obesity with a body mass index of 58. DISCHARGE DIAGNOSIS:  Morbid obesity with a body mass index of 58 as well as postoperative hemorrhage. PROCEDURE PERFORMED:  He had a laparoscopic sleeve gastrectomy, transfusion of 2 units of blood, and a Gastrografin swallow. HISTORY:  This is a very pleasant 26-year-old male who came to [facility] for consideration of a weight loss procedure. He chose a sleeve gastrectomy, which was done on 2019. He has a history of DVT, pulmonary emboli with an IVC filter in place. He had been off his Coumadin for 3 days and we had planned to give him high doses of Lovenox after surgery. HOSPITAL COURSE:  The surgery went without problems, minimal blood loss and after surgery, he went to stand up about 12 hours later and was found to be dizzy. He dropped his blood pressure. He was bolused fluids, transferred to the ICU and over the next 12 hours, it was found that he was bleeding, we had to give him 2 units of blood. He did eventually have a swallow study done on postoperative day #1 in the afternoon once we had given him 2 units of blood and his blood pressure had stabilized, this showed no evidence of a leak. Since that time, he has remained in the ICU because there were no beds on the floor. He tolerated the diet, ambulated without further dizziness, voided without a Dowell catheter, had two bowel movements and at this point, he is ready to go home. His hemoglobin is 9.7. I told him that is low. I offered to keep him another day to watch it. He said he wanted to go home. He feels fine. All of his vital signs are stable.   All of his other blood work looks normal.  His creatinine went up to 1.53 the day after surgery and is back down to 0.69 at this point. His heart rate is in the 70s. His blood pressure is stable. DISCHARGE CONDITION:  Stable. DISPOSITION:  To go home. DISCHARGE MEDICATIONS:  He will resume all of his medications except the Coumadin and the Invokana. The medication reconciliation was done in the computer. He was given prescriptions for Percocet, Zofran, and Protonix as per our protocol and these were filled before he had a surgery. DISCHARGE INSTRUCTIONS:  He has been told he can walk, climb stairs, shower. No tub baths, swimming, heavy lifting. He has been told to call if he has problems. His followup is with me on 05/30/2019.       MD AYDEE [email or web address]  D:  05/23/2019 7:56  T:  05/23/2019 8:04  JOB #:  [id number]

## 2019-05-30 ENCOUNTER — HOSPITAL ENCOUNTER (OUTPATIENT)
Dept: LAB | Age: 59
Discharge: HOME | End: 2019-05-30
Attending: SURGERY
Payer: COMMERCIAL

## 2019-05-30 DIAGNOSIS — R71.0 HEMOGLOBIN DECREASED: ICD-10-CM

## 2019-05-30 LAB
BASOPHILS # BLD: 0 K/UL (ref 0–0.2)
BASOPHILS NFR BLD: 1 % (ref 0–2)
DIFFERENTIAL METHOD BLD: ABNORMAL
EOSINOPHIL # BLD: 0.2 K/UL (ref 0–0.8)
EOSINOPHIL NFR BLD: 3 % (ref 0.5–7.8)
ERYTHROCYTE [DISTWIDTH] IN BLOOD BY AUTOMATED COUNT: 15.6 % (ref 11.9–14.6)
HCT VFR BLD AUTO: 36.1 % (ref 41.1–50.3)
HGB BLD-MCNC: 11.9 G/DL (ref 13.6–17.2)
IMM GRANULOCYTES # BLD AUTO: 0 K/UL (ref 0–0.5)
IMM GRANULOCYTES NFR BLD AUTO: 1 % (ref 0–5)
LYMPHOCYTES # BLD: 1.1 K/UL (ref 0.5–4.6)
LYMPHOCYTES NFR BLD: 21 % (ref 13–44)
MCH RBC QN AUTO: 31.3 PG (ref 26.1–32.9)
MCHC RBC AUTO-ENTMCNC: 33 G/DL (ref 31.4–35)
MCV RBC AUTO: 95 FL (ref 79.6–97.8)
MONOCYTES # BLD: 0.7 K/UL (ref 0.1–1.3)
MONOCYTES NFR BLD: 13 % (ref 4–12)
NEUTS SEG # BLD: 3.4 K/UL (ref 1.7–8.2)
NEUTS SEG NFR BLD: 62 % (ref 43–78)
NRBC # BLD: 0 K/UL (ref 0–0.2)
PLATELET # BLD AUTO: 226 K/UL (ref 150–450)
PMV BLD AUTO: 10 FL (ref 9.4–12.3)
RBC # BLD AUTO: 3.8 M/UL (ref 4.23–5.6)
WBC # BLD AUTO: 5.4 K/UL (ref 4.3–11.1)

## 2019-05-30 PROCEDURE — 36415 COLL VENOUS BLD VENIPUNCTURE: CPT

## 2019-05-30 PROCEDURE — 85025 COMPLETE CBC W/AUTO DIFF WBC: CPT

## 2019-06-17 RX ORDER — SODIUM CHLORIDE 0.9 % (FLUSH) 0.9 %
5-40 SYRINGE (ML) INJECTION EVERY 8 HOURS
Status: CANCELLED | OUTPATIENT
Start: 2019-06-17

## 2019-06-17 RX ORDER — CEFAZOLIN SODIUM/WATER 2 G/20 ML
2 SYRINGE (ML) INTRAVENOUS ONCE
Status: CANCELLED | OUTPATIENT
Start: 2019-06-17 | End: 2019-06-17

## 2019-06-17 RX ORDER — SODIUM CHLORIDE 0.9 % (FLUSH) 0.9 %
5-40 SYRINGE (ML) INJECTION AS NEEDED
Status: CANCELLED | OUTPATIENT
Start: 2019-06-17

## 2019-06-21 ENCOUNTER — APPOINTMENT (OUTPATIENT)
Dept: INTERVENTIONAL RADIOLOGY/VASCULAR | Age: 59
End: 2019-06-21
Attending: SURGERY
Payer: COMMERCIAL

## 2019-06-21 ENCOUNTER — ANESTHESIA (OUTPATIENT)
Dept: SURGERY | Age: 59
End: 2019-06-21
Payer: COMMERCIAL

## 2019-06-21 ENCOUNTER — HOSPITAL ENCOUNTER (OUTPATIENT)
Age: 59
Setting detail: OUTPATIENT SURGERY
Discharge: HOME | End: 2019-06-21
Attending: SURGERY | Admitting: SURGERY
Payer: COMMERCIAL

## 2019-06-21 VITALS
TEMPERATURE: 98.2 F | RESPIRATION RATE: 18 BRPM | HEART RATE: 74 BPM | WEIGHT: 315 LBS | DIASTOLIC BLOOD PRESSURE: 63 MMHG | BODY MASS INDEX: 45.1 KG/M2 | OXYGEN SATURATION: 91 % | SYSTOLIC BLOOD PRESSURE: 121 MMHG | HEIGHT: 70 IN

## 2019-06-21 LAB — GLUCOSE BLD STRIP.AUTO-MCNC: 102 MG/DL (ref 65–100)

## 2019-06-21 PROCEDURE — 82962 GLUCOSE BLOOD TEST: CPT

## 2019-06-21 PROCEDURE — 76210000006 HC OR PH I REC 0.5 TO 1 HR: Performed by: SURGERY

## 2019-06-21 PROCEDURE — 74011250636 HC RX REV CODE- 250/636: Performed by: ANESTHESIOLOGY

## 2019-06-21 PROCEDURE — C1894 INTRO/SHEATH, NON-LASER: HCPCS

## 2019-06-21 PROCEDURE — 77030039425 HC BLD LARYNG TRULITE DISP TELE -A: Performed by: ANESTHESIOLOGY

## 2019-06-21 PROCEDURE — 76937 US GUIDE VASCULAR ACCESS: CPT

## 2019-06-21 PROCEDURE — 77030019908 HC STETH ESOPH SIMS -A: Performed by: ANESTHESIOLOGY

## 2019-06-21 PROCEDURE — 74011250636 HC RX REV CODE- 250/636

## 2019-06-21 PROCEDURE — 74011250636 HC RX REV CODE- 250/636: Performed by: SURGERY

## 2019-06-21 PROCEDURE — 76210000020 HC REC RM PH II FIRST 0.5 HR: Performed by: SURGERY

## 2019-06-21 PROCEDURE — C1769 GUIDE WIRE: HCPCS

## 2019-06-21 PROCEDURE — C1773 RET DEV, INSERTABLE: HCPCS

## 2019-06-21 PROCEDURE — 77030037088 HC TUBE ENDOTRACH ORAL NSL COVD-A: Performed by: ANESTHESIOLOGY

## 2019-06-21 PROCEDURE — 76010000138 HC OR TIME 0.5 TO 1 HR: Performed by: SURGERY

## 2019-06-21 PROCEDURE — 76060000032 HC ANESTHESIA 0.5 TO 1 HR: Performed by: SURGERY

## 2019-06-21 PROCEDURE — C1887 CATHETER, GUIDING: HCPCS

## 2019-06-21 PROCEDURE — 74011250637 HC RX REV CODE- 250/637: Performed by: ANESTHESIOLOGY

## 2019-06-21 PROCEDURE — 74011000250 HC RX REV CODE- 250

## 2019-06-21 RX ORDER — HYDROMORPHONE HYDROCHLORIDE 2 MG/ML
0.5 INJECTION, SOLUTION INTRAMUSCULAR; INTRAVENOUS; SUBCUTANEOUS
Status: DISCONTINUED | OUTPATIENT
Start: 2019-06-21 | End: 2019-06-21 | Stop reason: HOSPADM

## 2019-06-21 RX ORDER — MIDAZOLAM HYDROCHLORIDE 1 MG/ML
2 INJECTION, SOLUTION INTRAMUSCULAR; INTRAVENOUS
Status: DISCONTINUED | OUTPATIENT
Start: 2019-06-21 | End: 2019-06-21 | Stop reason: HOSPADM

## 2019-06-21 RX ORDER — SODIUM CHLORIDE, SODIUM LACTATE, POTASSIUM CHLORIDE, CALCIUM CHLORIDE 600; 310; 30; 20 MG/100ML; MG/100ML; MG/100ML; MG/100ML
1000 INJECTION, SOLUTION INTRAVENOUS CONTINUOUS
Status: DISCONTINUED | OUTPATIENT
Start: 2019-06-21 | End: 2019-06-21 | Stop reason: HOSPADM

## 2019-06-21 RX ORDER — LIDOCAINE HYDROCHLORIDE 20 MG/ML
INJECTION, SOLUTION EPIDURAL; INFILTRATION; INTRACAUDAL; PERINEURAL AS NEEDED
Status: DISCONTINUED | OUTPATIENT
Start: 2019-06-21 | End: 2019-06-21 | Stop reason: HOSPADM

## 2019-06-21 RX ORDER — SODIUM CHLORIDE 0.9 % (FLUSH) 0.9 %
5-40 SYRINGE (ML) INJECTION AS NEEDED
Status: DISCONTINUED | OUTPATIENT
Start: 2019-06-21 | End: 2019-06-21 | Stop reason: HOSPADM

## 2019-06-21 RX ORDER — ONDANSETRON 2 MG/ML
4 INJECTION INTRAMUSCULAR; INTRAVENOUS
Status: DISCONTINUED | OUTPATIENT
Start: 2019-06-21 | End: 2019-06-21 | Stop reason: HOSPADM

## 2019-06-21 RX ORDER — DEXAMETHASONE SODIUM PHOSPHATE 4 MG/ML
INJECTION, SOLUTION INTRA-ARTICULAR; INTRALESIONAL; INTRAMUSCULAR; INTRAVENOUS; SOFT TISSUE AS NEEDED
Status: DISCONTINUED | OUTPATIENT
Start: 2019-06-21 | End: 2019-06-21 | Stop reason: HOSPADM

## 2019-06-21 RX ORDER — ACETAMINOPHEN 500 MG
1000 TABLET ORAL ONCE
Status: COMPLETED | OUTPATIENT
Start: 2019-06-21 | End: 2019-06-21

## 2019-06-21 RX ORDER — SODIUM CHLORIDE 0.9 % (FLUSH) 0.9 %
5-40 SYRINGE (ML) INJECTION EVERY 8 HOURS
Status: DISCONTINUED | OUTPATIENT
Start: 2019-06-21 | End: 2019-06-21 | Stop reason: HOSPADM

## 2019-06-21 RX ORDER — SUCCINYLCHOLINE CHLORIDE 20 MG/ML
INJECTION INTRAMUSCULAR; INTRAVENOUS AS NEEDED
Status: DISCONTINUED | OUTPATIENT
Start: 2019-06-21 | End: 2019-06-21 | Stop reason: HOSPADM

## 2019-06-21 RX ORDER — PROPOFOL 10 MG/ML
INJECTION, EMULSION INTRAVENOUS AS NEEDED
Status: DISCONTINUED | OUTPATIENT
Start: 2019-06-21 | End: 2019-06-21 | Stop reason: HOSPADM

## 2019-06-21 RX ORDER — LIDOCAINE HYDROCHLORIDE 10 MG/ML
0.1 INJECTION INFILTRATION; PERINEURAL AS NEEDED
Status: DISCONTINUED | OUTPATIENT
Start: 2019-06-21 | End: 2019-06-21 | Stop reason: HOSPADM

## 2019-06-21 RX ORDER — ROCURONIUM BROMIDE 10 MG/ML
INJECTION, SOLUTION INTRAVENOUS AS NEEDED
Status: DISCONTINUED | OUTPATIENT
Start: 2019-06-21 | End: 2019-06-21 | Stop reason: HOSPADM

## 2019-06-21 RX ORDER — ALBUTEROL SULFATE 0.83 MG/ML
2.5 SOLUTION RESPIRATORY (INHALATION) AS NEEDED
Status: DISCONTINUED | OUTPATIENT
Start: 2019-06-21 | End: 2019-06-21 | Stop reason: HOSPADM

## 2019-06-21 RX ORDER — ONDANSETRON 2 MG/ML
INJECTION INTRAMUSCULAR; INTRAVENOUS AS NEEDED
Status: DISCONTINUED | OUTPATIENT
Start: 2019-06-21 | End: 2019-06-21 | Stop reason: HOSPADM

## 2019-06-21 RX ADMIN — PROPOFOL 350 MG: 10 INJECTION, EMULSION INTRAVENOUS at 10:18

## 2019-06-21 RX ADMIN — ROCURONIUM BROMIDE 5 MG: 10 INJECTION, SOLUTION INTRAVENOUS at 10:18

## 2019-06-21 RX ADMIN — SUCCINYLCHOLINE CHLORIDE 200 MG: 20 INJECTION INTRAMUSCULAR; INTRAVENOUS at 10:18

## 2019-06-21 RX ADMIN — PROPOFOL 70 MG: 10 INJECTION, EMULSION INTRAVENOUS at 10:28

## 2019-06-21 RX ADMIN — ACETAMINOPHEN 1000 MG: 500 TABLET, FILM COATED ORAL at 09:33

## 2019-06-21 RX ADMIN — ONDANSETRON 4 MG: 2 INJECTION INTRAMUSCULAR; INTRAVENOUS at 10:51

## 2019-06-21 RX ADMIN — LIDOCAINE HYDROCHLORIDE 60 MG: 20 INJECTION, SOLUTION EPIDURAL; INFILTRATION; INTRACAUDAL; PERINEURAL at 10:18

## 2019-06-21 RX ADMIN — Medication 3 G: at 10:27

## 2019-06-21 RX ADMIN — SODIUM CHLORIDE, SODIUM LACTATE, POTASSIUM CHLORIDE, AND CALCIUM CHLORIDE 1000 ML: 600; 310; 30; 20 INJECTION, SOLUTION INTRAVENOUS at 09:24

## 2019-06-21 RX ADMIN — DEXAMETHASONE SODIUM PHOSPHATE 4 MG: 4 INJECTION, SOLUTION INTRA-ARTICULAR; INTRALESIONAL; INTRAMUSCULAR; INTRAVENOUS; SOFT TISSUE at 10:51

## 2019-06-21 NOTE — ANESTHESIA POSTPROCEDURE EVALUATION
Procedure(s):  VENA CAVA FILTER  REMOVAL. general    Anesthesia Post Evaluation      Multimodal analgesia: multimodal analgesia used between 6 hours prior to anesthesia start to PACU discharge  Patient location during evaluation: bedside  Patient participation: complete - patient participated  Level of consciousness: awake and responsive to light touch  Pain management: adequate  Airway patency: patent  Anesthetic complications: no  Cardiovascular status: acceptable, hemodynamically stable, blood pressure returned to baseline and stable  Respiratory status: acceptable, unassisted, spontaneous ventilation and nonlabored ventilation  Hydration status: acceptable  Post anesthesia nausea and vomiting:  controlled      Vitals Value Taken Time   /58 6/21/2019 11:52 AM   Temp 36.7 °C (98 °F) 6/21/2019 11:08 AM   Pulse 73 6/21/2019 11:54 AM   Resp 16 6/21/2019 11:38 AM   SpO2 87 % 6/21/2019 11:54 AM   Vitals shown include unvalidated device data.

## 2019-06-21 NOTE — H&P
[address] [facility]  [phone]    History and Physical / Surgical Consultation   Danilo Landaverde    Admit date: 2019    MRN: [id number]     : 1960     Age: 60 y.o.          2019 9:31 AM    Subjective/HPI:   This patient is a 60 y.o. white male seen pre-operatively before inferior vena cava filter removal. This was placed prophylactically by Glendy Deng MD 2019 in anticipation of laparoscopic gastric sleeve shortly thereafter. The patient underwent successful VSG 2019 and presents today for IVC filter removal.    He has a history of DVT and pulmonary embolus, as well as atrial fibrillation and was felt to be at elevated risk for perioperative DVT/VTE. He admits to minimal weight loss since sleeve gastrectomy but otherwise denies changes in health. He denies recent fever, chills, N/V, chest pain, palpitations or dyspnea. Patient's PCP Ofe Freeman MD has prescribed Eliquis for DVT / PE history, first dose scheduled for tomorrow. Patient's past medical, surgical, family and social histories were reviewed as noted here and below. Review of Systems  Pertinent items are noted in HPI. Past Medical History:   Diagnosis Date    Adverse effect of anesthesia     6-8 hours post-op B/P dropped and pt was not able to urinate- had to be cath.     Arthritis     osteo-knee, toe    Borderline diabetes     AVG BS:85-90/ no s.s of hypoglycemia/ Last A1c: unknown- resolving with weight loss    Current use of long term anticoagulation     no longer on Coumadin or Lovenox injections    History of depression     pt denies any further complications    History of narcotic addiction ([address]     History of pulmonary embolism     Treated with Coumadin- then lovenox bridge- IVC filter placed    HTN (hypertension)     controlled with meds    Persistent atrial fibrillation ([address]     Followed by [address] Card./ rate controlled    Presence of IVC filter     placed prior to gastric sleeve.  Status following surgery for weight loss 2019    gastric sleeve    Venous stasis       Past Surgical History:   Procedure Laterality Date    HX COLONOSCOPY  10/20/2015    HX OTHER SURGICAL  2019    gastric sleeve    HX REFRACTIVE SURGERY Bilateral     HX TONSILLECTOMY  1965    IR IVC FILTER  2019    Dr Rony Fowler       Allergies   Allergen Reactions    Amlodipine Other (comments)     edema    Contrave [Naltrexone-Bupropion] Other (comments)     Nausea first and then sweet craving    Hydrochlorothiazide Other (comments)     gout    Lisinopril Cough    Other Medication Other (comments)     NKDA-but do not prescribe narcotics or Mood altering drugs      Social History     Tobacco Use    Smoking status: Never Smoker    Smokeless tobacco: Never Used   Substance Use Topics    Alcohol use: No      Social History     Social History Narrative    Lives alone, working     Family History   Problem Relation Age of Onset    Hypertension Father     COPD Mother     Heart Disease Paternal Aunt     Diabetes Neg Hx       Prior to Admission Medications   Prescriptions Last Dose Informant Patient Reported? Taking? Nygrtiht-Atrn-Rfepek-Hyalur Ac 128-257-81-2 mg cap 2019 at Unknown time  Yes Yes   Sig: Take 1 Tab by mouth two (2) times a day. apixaban (ELIQUIS) 5 mg tablet Not Taking at Unknown time  No No   Sig: Take 1 Tab by mouth two (2) times a day. calcium-cholecalciferol, d3, (CALCIUM 600 + D) 600-125 mg-unit tab 2019 at Unknown time  Yes Yes   Sig: Take 1 Tab by mouth daily. carvedilol (COREG) 12.5 mg tablet 2019 at 0630  No Yes   Sig: Take 1 Tab by mouth two (2) times daily (with meals). cyanocobalamin, vitamin B-12, (VITAMIN B12 PO) 2019 at Unknown time  Yes Yes   Sig: Take  by mouth daily.    fluticasone propionate (FLONASE ALLERGY RELIEF) 50 mcg/actuation nasal spray Not Taking at Unknown time  Yes No   Si Sprays by Both Nostrils route as needed for Rhinitis. multivitamin (ONE A DAY) tablet 6/14/2019 at Unknown time  Yes Yes   Sig: Take 1 Tab by mouth daily. pantoprazole (PROTONIX) 40 mg tablet 6/21/2019 at 0630  Yes Yes   Sig: Take 40 mg by mouth daily. Facility-Administered Medications: None     Current Facility-Administered Medications   Medication Dose Route Frequency    ceFAZolin (ANCEF) 3 g/30 mL in sterile water IV syringe  3 g IntraVENous ON CALL TO OR    lidocaine (XYLOCAINE) 10 mg/mL (1 %) injection 0.1 mL  0.1 mL SubCUTAneous PRN    lactated Ringers infusion 1,000 mL  1,000 mL IntraVENous CONTINUOUS    acetaminophen (TYLENOL) tablet 1,000 mg  1,000 mg Oral ONCE    midazolam (VERSED) injection 2 mg  2 mg IntraVENous ONCE PRN    sodium chloride (NS) flush 5-40 mL  5-40 mL IntraVENous Q8H    sodium chloride (NS) flush 5-40 mL  5-40 mL IntraVENous PRN     Objective:     Vitals:    06/21/19 0855   BP: 105/60   Pulse: 83   Resp: 18   Temp: 98.3 °F (36.8 °C)   SpO2: 97%   Weight: (!) 354 lb (160.6 kg)   Height: 5' 10\" (1.778 m)     No intake/output data recorded. No intake/output data recorded.     Physical Exam:   General well-developed, morbidly obese, in no acute distress  Skin  warm, moist with texture appropriate for age, no jaundice or rashes  HEENT normocephalic, no lesions to the scalp; extraocular muscles intact, nares patent, mouth with adequate dentition, oral mucosa moist  Neck  supple without JVD or bruits; trachea midline  Lungs  respirations unlabored, lungs clear to auscultation bilaterally  Heart  regular rate, some irregularity to rhythm, no appreciable murmurs  Abdomen soft, non-tender, obese and protuberant but non-distended, bowel sounds rare but of normal pitch and character; well-healed fresh incisions from recent VSG  Extremities warm without cyanosis; mild bilateral pedal edema; hemosiderin staining of chronic venous stasis disease at B LE  Pulses  2+ palpable and symmetric at radial, brachial and DP  Neurological alert and oriented; no gross sensorimotor deficits       Assessment / Plan / Recommendations / Medical Decision Making:     Hospital Problems  Date Reviewed: 5/2/2019    None          Patient Active Problem List    Diagnosis Date Noted    Heel pain, chronic, right 03/13/2019    Persistent atrial fibrillation ([facility] 07/18/2018    Pre-op chest exam 12/12/2017    Controlled type 2 diabetes mellitus without complication, without long-term current use of insulin ([facility] 12/12/2017    Essential hypertension with goal blood pressure less than 130/80 03/08/2017    Abnormal EKG 03/08/2017    Paroxysmal atrial flutter ([facility] 03/15/2016    History of [facility] addiction [facility] 12/22/2015    History of gout 12/22/2015    Venous insufficiency 10/27/2015    Sleep apnea, obstructive 04/03/2015    Morbid obesity with BMI of 50.0-59.9, adult ([facility] 04/03/2015    History of pulmonary embolism 04/03/2015    History of deep vein thrombosis (DVT) of lower extremity 01/01/2015         Brandi Garcia is a 60 y.o. white male with inferior vena cava filter placed prior to bariatric surgery, now appropriate for removal by Dr. Criselda Warner. To OR today for IVC filter removal  - remains NPO  - consent  - IV ABX on-call to [address] [facility], [place]  Physician Assistant with [facility]  [employer].  Genevieve Peace MD / Aparna Bennett MD

## 2019-06-21 NOTE — BRIEF OP NOTE
BRIEF OPERATIVE NOTE    Date of Procedure: 6/21/2019   Preoperative Diagnosis: Body mass index (BMI) of 50-59.9 in [facility]) [Z68.43]  Postoperative Diagnosis: Body mass index (BMI) of 50-59.9 in [facility]) [Z68.43]    Procedure(s):  VENA CAVA FILTER  REMOVAL  Surgeon(s) and Role:     * Kelsie Morton MD - Primary         Surgical Assistant:     Surgical Staff:  Circ-1: Yoselyn Cornell RN  Radiology Technician: Ankur Davila, RT, R, CT  Event Time In Time Out   Incision Start 1014    Incision Close       Anesthesia: General   Estimated Blood Loss: 10cc  Specimens: * No specimens in log *   Findings:    Complications: None  Implants: * No implants in log *

## 2019-06-21 NOTE — ANESTHESIA PREPROCEDURE EVALUATION
Relevant Problems   No relevant active problems       Anesthetic History   No history of anesthetic complications            Review of Systems / Medical History  Patient summary reviewed, nursing notes reviewed and pertinent labs reviewed    Pulmonary        Sleep apnea        Comments: PE 4 years ago.  Now with IVC filter and Coumadin - Lovenox Bridge   Neuro/Psych   Within defined limits           Cardiovascular    Hypertension        Dysrhythmias : atrial fibrillation      Exercise tolerance: >4 METS     GI/Hepatic/Renal                Endo/Other    Diabetes    Morbid obesity and arthritis     Other Findings              Physical Exam    Airway  Mallampati: II  TM Distance: 4 - 6 cm  Neck ROM: normal range of motion   Mouth opening: Normal     Cardiovascular    Rhythm: regular  Rate: normal      Pertinent negatives: No murmur   Dental  No notable dental hx       Pulmonary  Breath sounds clear to auscultation               Abdominal  GI exam deferred       Other Findings            Anesthetic Plan    ASA: 3  Anesthesia type: general          Induction: Intravenous  Anesthetic plan and risks discussed with: Patient      CATA

## 2019-06-21 NOTE — DISCHARGE INSTRUCTIONS
Vena Cava Filter Removal: What to Expect at [facility] doctor removed your vena cava filter using a thin, flexible tube (catheter) that was put into your vein. A filter is removed to avoid problems that can happen if the filter is left in your vein for a long time. You will probably be able to return to work or your normal routine in a day or two. After having a vena cava filter removed, you may feel tired and have some pain for several days. You may have a small bandage where the catheter was placed. This care sheet gives you a general idea about how long it will take for you to recover. But each person recovers at a different pace. Follow the steps below to feel better as quickly as possible. How can you care for yourself at home? Activity    · Take it easy for a day or two. Avoid strenuous activities, such as bicycle riding, jogging, weight lifting, or aerobic exercise, until your doctor says it is okay. Diet    · You can eat your normal diet. If your stomach is upset, try bland, low-fat foods like plain rice, broiled chicken, toast, and yogurt.     · Drink plenty of fluids to avoid becoming dehydrated. Medicines    · Your doctor will tell you if and when you can restart your medicines. He or she will also give you instructions about taking any new medicines.     · If you take blood thinners, such as warfarin (Coumadin), clopidogrel (Plavix), or aspirin, be sure to talk to your doctor. He or she will tell you if and when to start taking those medicines again. Make sure that you understand exactly what your doctor wants you to do.     · Be safe with medicines. Take pain medicines exactly as directed. ? If the doctor gave you a prescription medicine for pain, take it as prescribed.   ? If you are not taking a prescription pain medicine, ask your doctor if you can take an over-the-counter medicine.     · If you think your pain medicine is making you sick to your stomach:  ? Take your medicine after meals (unless your doctor has told you not to). ? Ask your doctor for a different pain medicine.    Care of the catheter site    · Keep a bandage over the spot where the catheter was inserted for the first day, or for as long as your doctor recommends.     · Put ice or a cold pack on the area for 10 to 20 minutes at a time to help with soreness or swelling. Do this every few hours. Put a thin cloth between the ice and your skin. Follow-up care is a key part of your treatment and safety. Be sure to make and go to all appointments, and call your doctor if you are having problems. It's also a good idea to know your test results and keep a list of the medicines you take. When should you call for help? Call 911 anytime you think you may need emergency care. For example, call if:    · You passed out (lost consciousness).     · You have severe trouble breathing.     · You have sudden chest pain and shortness of breath, or you cough up blood.    Call your doctor now or seek immediate medical care if:    · You have pain that does not get better after you take pain medicine.     · You are bleeding through your dressing. A small amount of bleeding is normal.     · You have a fast-growing, painful lump at the catheter site.     · You have signs of infection, such as:  ? Increased pain, swelling, warmth, or redness. ? Red streaks leading from the incision. ? Pus draining from the incision. ? A fever.     · You have symptoms of a blood clot in your leg, such as:  ? Pain in the calf, back of the knee, thigh, or groin. ? Redness and swelling in your leg or groin.    Watch closely for any changes in your health, and be sure to contact your doctor if you have any problems.   After general anesthesia or intravenous sedation, for 24 hours or while taking prescription Narcotics:  · Limit your activities  · A responsible adult needs to be with you for the next 24 hours  · Do not drive and operate hazardous machinery  · Do not make important personal or business decisions  · Do  not drink alcoholic beverages  · If you have not urinated within 8 hours after discharge, please contact your surgeon on call. *  Please give a list of your current medications to your Primary Care Provider. *  Please update this list whenever your medications are discontinued, doses are      changed, or new medications (including over-the-counter products) are added. *  Please carry medication information at all times in case of emergency situations. These are general instructions for a healthy lifestyle:  No smoking/ No tobacco products/ Avoid exposure to second hand smoke  Surgeon General's Warning:  Quitting smoking now greatly reduces serious risk to your health. Obesity, smoking, and sedentary lifestyle greatly increases your risk for illness  A healthy diet, regular physical exercise & weight monitoring are important for maintaining a healthy lifestyle    You may be retaining fluid if you have a history of heart failure or if you experience any of the following symptoms:  Weight gain of 3 pounds or more overnight or 5 pounds in a week, increased swelling in our hands or feet or shortness of breath while lying flat in bed. Please call your doctor as soon as you notice any of these symptoms; do not wait until your next office visit. Recognize signs and symptoms of STROKE:  F-face looks uneven  A-arms unable to move or move unevenly  S-speech slurred or non-existent  T-time-call 911 as soon as signs and symptoms begin-DO NOT go       Back to bed or wait to see if you get better-TIME IS BRAIN.

## 2019-06-24 NOTE — PROCEDURES
[address] [facility]  PROCEDURE NOTE    Name:  Mireya Mohan  MR#:  [id number]  :  1960  ACCOUNT #:  [de-identified]  DATE OF SERVICE:  2019      CORRECTED REPORT - dictated on wrong patient, corrected 19 - dv      PREOPERATIVE DIAGNOSIS:  Morbid obesity status post bariatric surgery and temporary inferior vena cava filter placement. POSTOPERATIVE DIAGNOSIS:  Morbid obesity status post bariatric surgery and temporary inferior vena cava filter placement. PROCEDURE PERFORMED:  Retrieval of IVC filter, venacavogram, ultrasound-guided vascular access. SURGEON:  Yin Guerra MD    ASSISTANT:      ANESTHESIA:  General endotracheal.    ESTIMATED BLOOD LOSS:  20 mL. DRAINS:  None. SPECIMENS REMOVED:  None. COMPLICATIONS:  None. IMPLANTS:  None. DESCRIPTION OF THE PROCEDURE:  The patient was brought to the operating room and placed on the operating table on supine position. Following adequate general endotracheal anesthesia and timeout procedure, the right neck was draped and prepped and sterile fashion. The right internal jugular vein was then percutaneously punctured under direct vision using ultrasound. An 0.035 guidewire was advanced followed by 5-Cameroonian pigtail catheter. The catheter was advanced to the level of the proximal IVC just above the confluence of the iliac veins. Inferior venacavogram was performed from this position. Next, an Amplatz Super Stiff wire was placed followed by the 8-Cameroonian IVC filter retrieval sheath. The CloverSnare was then advanced through the sheath and used to grasp the apical hook of the IVC filter. The sheath was advanced over the wire with traction on the snare, collapsing the IVC filter and removing it from its position in the IVC. The sheath and filter were removed together and direct pressure was held. Hemostasis was achieved. The filter was inspected on the back table. There was no thrombus or missing struts.   Once hemostasis was achieved, sterile dry dressing was applied. The patient was awakened from anesthesia and transported to the recovery room in stable condition. The patient tolerated the procedure well. No complication. All needle and sponge counts were correct.         MD JACKIE Piña/[id number]/  D:  06/21/2019 12:31  T:  06/21/2019 20:23  JOB #:  [id number]

## 2019-07-10 ENCOUNTER — HOSPITAL ENCOUNTER (OUTPATIENT)
Dept: CT IMAGING | Age: 59
Discharge: HOME | End: 2019-07-10
Attending: INTERNAL MEDICINE

## 2019-07-10 ENCOUNTER — HOSPITAL ENCOUNTER (OUTPATIENT)
Dept: GENERAL RADIOLOGY | Age: 59
Discharge: HOME | End: 2019-07-10

## 2019-07-10 DIAGNOSIS — R07.89 OTHER CHEST PAIN: ICD-10-CM

## 2019-07-10 DIAGNOSIS — J90 PLEURAL EFFUSION, RIGHT: ICD-10-CM

## 2019-07-10 DIAGNOSIS — Z86.711 HISTORY OF PULMONARY EMBOLISM: ICD-10-CM

## 2019-07-10 PROBLEM — R06.02 SHORTNESS OF BREATH: Status: ACTIVE | Noted: 2019-07-10

## 2019-07-10 PROBLEM — Z98.84 S/P BARIATRIC SURGERY: Status: ACTIVE | Noted: 2019-07-10

## 2019-07-10 RX ORDER — SODIUM CHLORIDE 0.9 % (FLUSH) 0.9 %
10 SYRINGE (ML) INJECTION
Status: COMPLETED | OUTPATIENT
Start: 2019-07-10 | End: 2019-07-10

## 2019-07-10 RX ADMIN — Medication 10 ML: at 11:37

## 2019-07-12 ENCOUNTER — HOSPITAL ENCOUNTER (OUTPATIENT)
Dept: LAB | Age: 59
Discharge: HOME | End: 2019-07-12
Attending: INTERNAL MEDICINE
Payer: COMMERCIAL

## 2019-07-12 DIAGNOSIS — J90 PLEURAL EFFUSION, RIGHT: ICD-10-CM

## 2019-07-12 LAB
ANION GAP SERPL CALC-SCNC: 10 MMOL/L (ref 7–16)
BUN SERPL-MCNC: 13 MG/DL (ref 6–23)
CALCIUM SERPL-MCNC: 9.1 MG/DL (ref 8.3–10.4)
CHLORIDE SERPL-SCNC: 107 MMOL/L (ref 98–107)
CO2 SERPL-SCNC: 22 MMOL/L (ref 21–32)
CREAT SERPL-MCNC: 0.8 MG/DL (ref 0.8–1.5)
GLUCOSE SERPL-MCNC: 106 MG/DL (ref 65–100)
HGB BLD-MCNC: 14.1 G/DL (ref 13.6–17.2)
POTASSIUM SERPL-SCNC: 3.7 MMOL/L (ref 3.5–5.1)
SODIUM SERPL-SCNC: 139 MMOL/L (ref 136–145)

## 2019-07-12 PROCEDURE — 36415 COLL VENOUS BLD VENIPUNCTURE: CPT

## 2019-07-12 PROCEDURE — 80048 BASIC METABOLIC PNL TOTAL CA: CPT

## 2019-07-12 PROCEDURE — 85018 HEMOGLOBIN: CPT

## 2019-07-12 PROCEDURE — 83880 ASSAY OF NATRIURETIC PEPTIDE: CPT

## 2019-07-12 NOTE — PROGRESS NOTES
Patient notified of labs and Rx's called to [facility] and order placed for repeat CXR/TD

## 2019-07-13 LAB — NT-PROBNP SERPL-MCNC: 277 PG/ML (ref 0–210)

## 2019-07-16 ENCOUNTER — HOSPITAL ENCOUNTER (OUTPATIENT)
Dept: GENERAL RADIOLOGY | Age: 59
Discharge: HOME | End: 2019-07-16

## 2019-07-16 DIAGNOSIS — J90 PLEURAL EFFUSION, RIGHT: ICD-10-CM

## 2019-07-16 DIAGNOSIS — Z86.711 HISTORY OF PULMONARY EMBOLISM: ICD-10-CM

## 2019-07-18 ENCOUNTER — HOSPITAL ENCOUNTER (OUTPATIENT)
Age: 59
Setting detail: OUTPATIENT SURGERY
Discharge: HOME | End: 2019-07-18
Attending: INTERNAL MEDICINE | Admitting: INTERNAL MEDICINE
Payer: COMMERCIAL

## 2019-07-18 VITALS
OXYGEN SATURATION: 94 % | DIASTOLIC BLOOD PRESSURE: 65 MMHG | HEART RATE: 91 BPM | RESPIRATION RATE: 16 BRPM | SYSTOLIC BLOOD PRESSURE: 105 MMHG

## 2019-07-18 DIAGNOSIS — J90 PLEURAL EFFUSION, RIGHT: ICD-10-CM

## 2019-07-18 DIAGNOSIS — R06.02 SHORTNESS OF BREATH: Primary | ICD-10-CM

## 2019-07-18 PROBLEM — E11.9 CONTROLLED TYPE 2 DIABETES MELLITUS WITHOUT COMPLICATION, WITHOUT LONG-TERM CURRENT USE OF INSULIN (HCC): Chronic | Status: ACTIVE | Noted: 2017-12-12

## 2019-07-18 PROBLEM — I10 ESSENTIAL HYPERTENSION WITH GOAL BLOOD PRESSURE LESS THAN 130/80: Chronic | Status: ACTIVE | Noted: 2017-03-08

## 2019-07-18 PROBLEM — Z01.811 PRE-OP CHEST EXAM: Status: RESOLVED | Noted: 2017-12-12 | Resolved: 2019-07-18

## 2019-07-18 PROBLEM — G89.29 HEEL PAIN, CHRONIC, RIGHT: Chronic | Status: ACTIVE | Noted: 2019-03-13

## 2019-07-18 PROBLEM — M79.671 HEEL PAIN, CHRONIC, RIGHT: Chronic | Status: ACTIVE | Noted: 2019-03-13

## 2019-07-18 PROBLEM — Z98.84 S/P BARIATRIC SURGERY: Chronic | Status: ACTIVE | Noted: 2019-07-10

## 2019-07-18 PROBLEM — I48.19 PERSISTENT ATRIAL FIBRILLATION (HCC): Chronic | Status: ACTIVE | Noted: 2018-07-18

## 2019-07-18 PROBLEM — R07.89 OTHER CHEST PAIN: Status: RESOLVED | Noted: 2019-07-10 | Resolved: 2019-07-18

## 2019-07-18 LAB
APPEARANCE FLD: NORMAL
COLOR FLD: NORMAL
MACROPHAGES NFR BRONCH MANUAL: 7 %
NEUTROPHILS NFR BRONCH MANUAL: 93 %
NUC CELL # FLD: 2725 /CU MM
RBC # FLD: NORMAL /CU MM
SPECIMEN SOURCE FLD: NORMAL

## 2019-07-18 PROCEDURE — 87070 CULTURE OTHR SPECIMN AEROBIC: CPT

## 2019-07-18 PROCEDURE — 87116 MYCOBACTERIA CULTURE: CPT

## 2019-07-18 PROCEDURE — 88112 CYTOPATH CELL ENHANCE TECH: CPT

## 2019-07-18 PROCEDURE — 84157 ASSAY OF PROTEIN OTHER: CPT

## 2019-07-18 PROCEDURE — 87102 FUNGUS ISOLATION CULTURE: CPT

## 2019-07-18 PROCEDURE — 83615 LACTATE (LD) (LDH) ENZYME: CPT

## 2019-07-18 PROCEDURE — 77030014147 HC TY THORCENT PARA TELE -B: Performed by: INTERNAL MEDICINE

## 2019-07-18 PROCEDURE — 88305 TISSUE EXAM BY PATHOLOGIST: CPT

## 2019-07-18 PROCEDURE — 76040000019: Performed by: INTERNAL MEDICINE

## 2019-07-18 PROCEDURE — 89050 BODY FLUID CELL COUNT: CPT

## 2019-07-18 PROCEDURE — 32555 ASPIRATE PLEURA W/ IMAGING: CPT | Performed by: INTERNAL MEDICINE

## 2019-07-18 PROCEDURE — 82945 GLUCOSE OTHER FLUID: CPT

## 2019-07-18 PROCEDURE — 76604 US EXAM CHEST: CPT | Performed by: INTERNAL MEDICINE

## 2019-07-18 NOTE — DISCHARGE INSTRUCTIONS
Thoracentesis: What to Expect at Home  Your Recovery  Thoracentesis (say \"qcij-ey-gnn-MELODIE-sis\") is a procedure to remove fluid from the space between the lungs and the chest wall (pleural space). This procedure may also be called a \"chest tap. \" It is normal to have a small amount of fluid in the pleural space. But too much fluid can build up because of problems such as infection, heart failure, or lung cancer. The procedure may have been done to help with shortness of breath and pain caused by the fluid buildup. Or you may have had this procedure so the doctor could test the fluid to find the cause of the buildup. Your chest may be sore where the doctor put the needle or catheter into your skin (the puncture site). This usually gets better after a day or two. You can go back to work or your normal activities as soon as you feel up to it. If the doctor sent the fluid to a lab for testing, it may take several days to get the results. The doctor or nurse will discuss the results with you. This care sheet gives you a general idea about how long it will take for you to recover. But each person recovers at a different pace. Follow the steps below to feel better as quickly as possible. How can you care for yourself at home? Activity    · Rest when you feel tired. Getting enough sleep will help you recover.     · Avoid strenuous activities, such as bicycle riding, jogging, weight lifting, or aerobic exercise, until your doctor says it is okay.     · You may shower. Do not take a bath until the puncture site has healed, or until your doctor tells you it is okay.     · Ask your doctor when you can drive again.     · You may need to take 1 or 2 days off from work. It depends on the type of work you do and how you feel. Diet    · You can eat your normal diet.     · Drink plenty of fluids (unless your doctor tells you not to). Medicines    · Your doctor will tell you if and when you can restart your medicines.  He or she will also give you instructions about taking any new medicines.     · If you take blood thinners, such as warfarin (Coumadin), clopidogrel (Plavix), or aspirin, be sure to talk to your doctor. He or she will tell you if and when to start taking those medicines again. Make sure that you understand exactly what your doctor wants you to do.     · Be safe with medicines. Take pain medicines exactly as directed. ? If the doctor gave you a prescription medicine for pain, take it as prescribed. ? If you are not taking a prescription pain medicine, ask your doctor if you can take an over-the-counter medicine. ? Do not take two or more pain medicines at the same time unless the doctor told you to. Many pain medicines have acetaminophen, which is Tylenol. Too much acetaminophen (Tylenol) can be harmful.     · If you think your pain medicine is making you sick to your stomach:  ? Take your medicine after meals (unless your doctor has told you not to). ? Ask your doctor for a different pain medicine.     · If your doctor prescribed antibiotics, take them as directed. Do not stop taking them just because you feel better. You need to take the full course of antibiotics.    Care of the puncture site    · Wash the area daily with warm, soapy water, and pat it dry. Don't use hydrogen peroxide or alcohol, which may delay healing. You may cover the area with a gauze bandage if it weeps or rubs against clothing. Change the bandage every day.     · Keep the area clean and dry. Follow-up care is a key part of your treatment and safety. Be sure to make and go to all appointments, and call your doctor if you are having problems. It's also a good idea to know your test results and keep a list of the medicines you take. When should you call for help? Call 911 anytime you think you may need emergency care.  For example, call if:    · You passed out (lost consciousness).     · You have severe trouble breathing.     · You have sudden chest pain and shortness of breath, or you cough up blood.    Call your doctor now or seek immediate medical care if:    · You have shortness of breath that is new or getting worse.     · You have new or worse pain in your chest, especially when you take a deep breath.     · You are sick to your stomach or cannot keep fluids down.     · You have a fever over 100°F.     · Bright red blood has soaked through the bandage over your puncture site.     · You have signs of infection, such as:  ? Increased pain, swelling, warmth, or redness. ? Red streaks leading from the puncture site. ? Pus draining from the puncture site. ? Swollen lymph nodes in your neck, armpits, or groin. ? A fever.     · You cough up a lot more mucus than normal, or your mucus changes color.    Watch closely for changes in your health, and be sure to contact your doctor if you have any problems. Where can you learn more? Go to http://kaylee-corbin.info/. Enter T588 in the search box to learn more about \"Thoracentesis: What to Expect at Home. \"  Current as of: September 5, 2018  Content Version: 11.9  © 7506-5993 [employer]. Care instructions adapted under license by [facility] (which disclaims liability or warranty for this information). If you have questions about a medical condition or this instruction, always ask your healthcare professional. [address] any warranty or liability for your use of this information. Call [facility] Pulmonary at [phone] for any questions or problems that may occur. Resume all medication as before procedure. We will see you July 23, 2019 at 12 noon for a 1 pm procedure.

## 2019-07-18 NOTE — CONSULTS
CONSULT NOTE    Josue English    7/18/2019    Date of Admission:  7/18/2019    The patient's chart is reviewed and the patient is discussed with the staff. Subjective:     Patient is a 60 y.o.  male seen and evaluated at the request of Dr. Jamal Zaidi. He has been seeing his PCP, Dr. Jamal Zaidi, recently with complaints of worsening shortness of breath. He underwent bariatric surgery (sleeve gastrectomy) on 5/20/19. Postoperatively, he was hypotensive and required a brief stay in the ICU for IV fluid and vasopressor support. He was discharged home several days later and initially felt well but then several weeks later, when he was allowed to starting exercising, he noticed that he was short of breath. He thought this was due to his deconditioning but as the shortness of breath worsened, he worried that there was more to it. CXR shows a large right effusion. He has chronic a flutter and is followed by [facility]. He has chronic lower extremity edema but this has been stable. His heartrate has been controlled. He takes Eliquis for the a flutter (and a history of DVT/PE in 2015) and this has been on hold for the past 48 hours. He has JALYN and uses CPAP every night. He has been using this for 20 years or so. He does not know the pressure and he does not use supplemental oxygen. He has lost about 80 lbs since his bariatric surgery. He overall feels well with the exception of the dyspnea.      Review of Systems  A comprehensive review of systems was negative except for: Constitutional: positive for weight loss  Eyes: positive for contacts/glasses  Ears, nose, mouth, throat, and face: positive for none  Respiratory: positive for cough or dyspnea on exertion  Cardiovascular: positive for lower extremity edema  Gastrointestinal: positive for none  Genitourinary: positive for none  Integument/breast: positive for none  Hematologic/lymphatic: positive for none  Musculoskeletal: positive for none  Neurological: positive for none  Behvioral/Psych: positive for sleep disturbance  Endocrine: positive for diet controlled   Allergic/Immunologic: positive for none    Patient Active Problem List   Diagnosis Code    Sleep apnea, obstructive G47.33    BMI 45.0-49.9, adult ([facility]) Z68.42    History of pulmonary embolism Z86.711    Venous insufficiency I87.2    History of narcotic addiction ([facility] [address] F11.21    History of gout Z87.39    Paroxysmal atrial flutter ([facility]) I48.92    Essential hypertension with goal blood pressure less than 130/80 I10    Controlled type 2 diabetes mellitus without complication, without long-term current use of insulin ([facility]) E11.9    Persistent atrial fibrillation ([facility]) I48.1    Heel pain, chronic, right M79.671, G89.29    History of deep vein thrombosis (DVT) of lower extremity Z86.718    S/P bariatric surgery Z98.84    Shortness of breath R06.02    Pleural effusion, right J90     Prior to Admission Medications   Prescriptions Last Dose Informant Patient Reported? Taking? Cjgxexac-Lker-Bqzbci-Hyalur Ac 572-634-35-2 mg cap 2019 at Unknown time  Yes Yes   Sig: Take 1 Tab by mouth two (2) times a day. apixaban (ELIQUIS) 5 mg tablet 2019  No No   Sig: Take 1 Tab by mouth two (2) times a day. calcium-cholecalciferol, d3, (CALCIUM 600 + D) 600-125 mg-unit tab 2019 at Unknown time  Yes Yes   Sig: Take 1 Tab by mouth daily. carvedilol (COREG) 12.5 mg tablet 2019 at Unknown time  No Yes   Sig: Take 1 Tab by mouth two (2) times daily (with meals). cpap machine kit 2019 at Unknown time  Yes Yes   Sig: by Does Not Apply route. cyanocobalamin, vitamin B-12, (VITAMIN B12 PO) 2019 at Unknown time  Yes Yes   Sig: Take  by mouth daily. fluticasone propionate (FLONASE ALLERGY RELIEF) 50 mcg/actuation nasal spray Unknown at Unknown time  Yes No   Si Sprays by Both Nostrils route as needed for Rhinitis.    furosemide (LASIX) 40 mg tablet 7/17/2019 at Unknown time  No Yes   Sig: Take 1 Tab by mouth daily. multivitamin (ONE A DAY) tablet 7/18/2019 at Unknown time  Yes Yes   Sig: Take 1 Tab by mouth daily. pantoprazole (PROTONIX) 40 mg tablet 7/18/2019 at Unknown time  No Yes   Sig: Take 1 Tab by mouth two (2) times a day. potassium chloride (K-DUR, KLOR-CON) 20 mEq tablet 7/17/2019 at Unknown time  No Yes   Sig: Take 1 Tab by mouth daily. Facility-Administered Medications: None     Past Medical History:   Diagnosis Date    Abnormal EKG 3/8/2017    Adverse effect of anesthesia     6-8 hours post-op B/P dropped and pt was not able to urinate- had to be cath.  Arthritis     osteo-knee, toe    Borderline diabetes     AVG BS:85-90/ no s.s of hypoglycemia/ Last A1c: unknown- resolving with weight loss    Current use of long term anticoagulation     no longer on Coumadin or Lovenox injections    History of depression     pt denies any further complications    History of narcotic addiction ([address]     History of pulmonary embolism 2015    Treated with Coumadin- then lovenox bridge- IVC filter placed    HTN (hypertension)     controlled with meds    Persistent atrial fibrillation (HCC)     Followed by [facility] rate controlled    Presence of IVC filter     placed prior to gastric sleeve.     Status following surgery for weight loss 05/2019    gastric sleeve    Venous stasis      Active Ambulatory Problems     Diagnosis Date Noted    Sleep apnea, obstructive 04/03/2015    BMI 45.0-49.9, adult ([address] 04/03/2015    History of pulmonary embolism 04/03/2015    Venous insufficiency 10/27/2015    History of narcotic addiction ([address] 12/22/2015    History of gout 12/22/2015    Paroxysmal atrial flutter ([address] 03/15/2016    Essential hypertension with goal blood pressure less than 130/80 03/08/2017    Controlled type 2 diabetes mellitus without complication, without long-term current use of insulin ([address] 12/12/2017    Persistent atrial fibrillation ([facility] [address] 07/18/2018    Heel pain, chronic, right 03/13/2019    History of deep vein thrombosis (DVT) of lower extremity 01/01/2015    S/P bariatric surgery 07/10/2019    Shortness of breath 07/10/2019    Pleural effusion, right 07/10/2019     Resolved Ambulatory Problems     Diagnosis Date Noted    Pre-op chest exam 12/12/2017    Other chest pain 07/10/2019     Past Medical History:   Diagnosis Date    Abnormal EKG 3/8/2017    Adverse effect of anesthesia     Arthritis     Borderline diabetes     Current use of long term anticoagulation     History of depression     HTN (hypertension)     Presence of IVC filter     Status following surgery for weight loss 05/2019    Venous stasis      Past Surgical History:   Procedure Laterality Date    HX COLONOSCOPY  10/20/2015    HX GASTRECTOMY  05/20/2010    sleeve    HX OTHER SURGICAL  05/2019    gastric sleeve    HX REFRACTIVE SURGERY Bilateral     HX TONSILLECTOMY  1965    IR IVC FILTER  05/08/2019    Dr Leanne Daniel IR REMOVE IVC FILTER [address]  6/21/2019     Social History     Socioeconomic History    Marital status:      Spouse name: Not on file    Number of children: Not on file    Years of education: Not on file    Highest education level: Not on file   Occupational History    Not on file   Social Needs    Financial resource strain: Not on file    Food insecurity:     Worry: Not on file     Inability: Not on file    Transportation needs:     Medical: Not on file     Non-medical: Not on file   Tobacco Use    Smoking status: Never Smoker    Smokeless tobacco: Never Used   Substance and Sexual Activity    Alcohol use: No    Drug use: No     Comment: prior hx narcotic abuse at young age- none for the past 27 Years    Sexual activity: Not on file   Lifestyle    Physical activity:     Days per week: Not on file     Minutes per session: Not on file    Stress: Not on file   Relationships    Social connections:     Talks on phone: Not on file     Gets together: Not on file     Attends [employer] service: Not on file     Active member of club or organization: Not on file     Attends meetings of clubs or organizations: Not on file     Relationship status: Not on file    Intimate partner violence:     Fear of current or ex partner: Not on file     Emotionally abused: Not on file     Physically abused: Not on file     Forced sexual activity: Not on file   Other Topics Concern     Service Not Asked    Blood Transfusions Not Asked    Caffeine Concern Not Asked    Occupational Exposure Not Asked   Percilla [employer]ang Hazards Not Asked    Sleep Concern Not Asked    Stress Concern Not Asked    Weight Concern Not Asked    Special Diet Not Asked    Back Care Not Asked    Exercise No     Comment: using rowing machine some    Bike Helmet Not Asked   [address] Not Asked    Self-Exams Not Asked   Social History Narrative    Lives alone, working     Family History   Problem Relation Age of Onset    Hypertension Father     COPD Mother     Heart Disease Paternal Aunt     Diabetes Neg Hx      Allergies   Allergen Reactions    Amlodipine Other (comments)     edema    Contrave [Naltrexone-Bupropion] Other (comments)     Nausea first and then sweet craving    Hydrochlorothiazide Other (comments)     gout    Lisinopril Cough    Other Medication Other (comments)     NKDA-but do not prescribe narcotics or Mood altering drugs     No current facility-administered medications for this encounter. Objective: There were no vitals filed for this visit. PHYSICAL EXAM     Constitutional:  the patient is well developed and in no acute distress  HEENT:  Sclera clear, pupils equal, oral mucosa moist  Lungs: clear bilaterally but with with dullness and decreased breath sounds on the right  Cardiovascular:  RRR without M,G,R  Abd/GI: soft and non-tender; with positive bowel sounds. Ext: warm without cyanosis.  There is 1+ lower leg edema.  Skin:  no jaundice or rashes, no wounds   Neuro: no gross neuro deficits. Alert and oriented  Musculoskeletal: moves all four extremities. No deformities. Psychiatric: calm. Does not appear anxious or depressed    Chest X-ray:         Lab Results   Component Value Date/Time    Sodium 139 07/12/2019 08:09 AM    Potassium 3.7 07/12/2019 08:09 AM    Chloride 107 07/12/2019 08:09 AM    CO2 22 07/12/2019 08:09 AM    Anion gap 10 07/12/2019 08:09 AM    Glucose 106 (H) 07/12/2019 08:09 AM    BUN 13 07/12/2019 08:09 AM    Creatinine 0.80 07/12/2019 08:09 AM    BUN/Creatinine ratio 14 03/08/2019 10:38 AM    GFR est AA >60 07/12/2019 08:09 AM    GFR est non-AA >60 07/12/2019 08:09 AM    Calcium 9.1 07/12/2019 08:09 AM     Lab Results   Component Value Date/Time    WBC 5.4 05/30/2019 01:14 PM    HGB 14.1 07/12/2019 08:09 AM    HCT 36.1 (L) 05/30/2019 01:14 PM    PLATELET 414 57/51/6636 01:14 PM    MCV 95.0 05/30/2019 01:14 PM     Lab Results   Component Value Date/Time    BNP 78 05/20/2019 11:30 PM     Assessment:  (Medical Decision Making)     Hospital Problems  Date Reviewed: 7/10/2019          Codes Class Noted POA    Shortness of breath ICD-10-CM: R06.02  ICD-9-CM: 786.05  7/10/2019 Yes        * (Principal) Pleural effusion, right ICD-10-CM: J90  ICD-9-CM: 511.9  7/10/2019 Yes              Plan:  (Medical Decision Making)   1. Will do thoracentesis today with fluid for study. Kimberley Pagan NP    More than 50% of time documented was spent face-to-face contact with the patient and in the care of the patient on the floor/unit where the patient is located    Lungs:  Diminished right, clear left, dullness on right to percussion  Heart:  RRR with no Murmur/Rubs/Gallops    Additional Comments:  Could be post op complication, volume overload. Will send for full assessment. Pending how much can be drained, may need repeat procedure and may even consider repeating CT scan when fully drained. No fevers.  Recent Hg is improved to 14.1. Renal function normal, . I have spoken with and examined the patient. I agree with the above assessment and plan as documented.     Fabienne Garsia MD

## 2019-07-18 NOTE — INTERVAL H&P NOTE
H&P Update:  Miranda Harman was seen and examined. History and physical has been reviewed. The patient has been examined. There have been no significant clinical changes since the completion of the originally dated History and Physical. Risks, benefits and alternatives to planned procedure were discussed in detail with patient. All questions were answered to patient's satisfaction.     Dasha Gandhi MD

## 2019-07-18 NOTE — H&P (VIEW-ONLY)
CONSULT NOTE    Josue English    7/18/2019    Date of Admission:  7/18/2019    The patient's chart is reviewed and the patient is discussed with the staff. Subjective:     Patient is a 60 y.o.  male seen and evaluated at the request of Dr. Jamal Zaidi. He has been seeing his PCP, Dr. Jamal Zaidi, recently with complaints of worsening shortness of breath. He underwent bariatric surgery (sleeve gastrectomy) on 5/20/19. Postoperatively, he was hypotensive and required a brief stay in the ICU for IV fluid and vasopressor support. He was discharged home several days later and initially felt well but then several weeks later, when he was allowed to starting exercising, he noticed that he was short of breath. He thought this was due to his deconditioning but as the shortness of breath worsened, he worried that there was more to it. CXR shows a large right effusion. He has chronic a flutter and is followed by [facility]. He has chronic lower extremity edema but this has been stable. His heartrate has been controlled. He takes Eliquis for the a flutter (and a history of DVT/PE in 2015) and this has been on hold for the past 48 hours. He has JALYN and uses CPAP every night. He has been using this for 20 years or so. He does not know the pressure and he does not use supplemental oxygen. He has lost about 80 lbs since his bariatric surgery. He overall feels well with the exception of the dyspnea.      Review of Systems  A comprehensive review of systems was negative except for: Constitutional: positive for weight loss  Eyes: positive for contacts/glasses  Ears, nose, mouth, throat, and face: positive for none  Respiratory: positive for cough or dyspnea on exertion  Cardiovascular: positive for lower extremity edema  Gastrointestinal: positive for none  Genitourinary: positive for none  Integument/breast: positive for none  Hematologic/lymphatic: positive for none  Musculoskeletal: positive for none  Neurological: positive for none  Behvioral/Psych: positive for sleep disturbance  Endocrine: positive for diet controlled   Allergic/Immunologic: positive for none    Patient Active Problem List   Diagnosis Code    Sleep apnea, obstructive G47.33    BMI 45.0-49.9, adult ([facility]) Z68.42    History of pulmonary embolism Z86.711    Venous insufficiency I87.2    History of narcotic addiction ([facility] [address] F11.21    History of gout Z87.39    Paroxysmal atrial flutter ([facility]) I48.92    Essential hypertension with goal blood pressure less than 130/80 I10    Controlled type 2 diabetes mellitus without complication, without long-term current use of insulin ([facility]) E11.9    Persistent atrial fibrillation ([facility]) I48.1    Heel pain, chronic, right M79.671, G89.29    History of deep vein thrombosis (DVT) of lower extremity Z86.718    S/P bariatric surgery Z98.84    Shortness of breath R06.02    Pleural effusion, right J90     Prior to Admission Medications   Prescriptions Last Dose Informant Patient Reported? Taking? Pylcziov-Grqb-Xzuatb-Hyalur Ac 535-070-30-2 mg cap 2019 at Unknown time  Yes Yes   Sig: Take 1 Tab by mouth two (2) times a day. apixaban (ELIQUIS) 5 mg tablet 2019  No No   Sig: Take 1 Tab by mouth two (2) times a day. calcium-cholecalciferol, d3, (CALCIUM 600 + D) 600-125 mg-unit tab 2019 at Unknown time  Yes Yes   Sig: Take 1 Tab by mouth daily. carvedilol (COREG) 12.5 mg tablet 2019 at Unknown time  No Yes   Sig: Take 1 Tab by mouth two (2) times daily (with meals). cpap machine kit 2019 at Unknown time  Yes Yes   Sig: by Does Not Apply route. cyanocobalamin, vitamin B-12, (VITAMIN B12 PO) 2019 at Unknown time  Yes Yes   Sig: Take  by mouth daily. fluticasone propionate (FLONASE ALLERGY RELIEF) 50 mcg/actuation nasal spray Unknown at Unknown time  Yes No   Si Sprays by Both Nostrils route as needed for Rhinitis.    furosemide (LASIX) 40 mg tablet 7/17/2019 at Unknown time  No Yes   Sig: Take 1 Tab by mouth daily. multivitamin (ONE A DAY) tablet 7/18/2019 at Unknown time  Yes Yes   Sig: Take 1 Tab by mouth daily. pantoprazole (PROTONIX) 40 mg tablet 7/18/2019 at Unknown time  No Yes   Sig: Take 1 Tab by mouth two (2) times a day. potassium chloride (K-DUR, KLOR-CON) 20 mEq tablet 7/17/2019 at Unknown time  No Yes   Sig: Take 1 Tab by mouth daily. Facility-Administered Medications: None     Past Medical History:   Diagnosis Date    Abnormal EKG 3/8/2017    Adverse effect of anesthesia     6-8 hours post-op B/P dropped and pt was not able to urinate- had to be cath.  Arthritis     osteo-knee, toe    Borderline diabetes     AVG BS:85-90/ no s.s of hypoglycemia/ Last A1c: unknown- resolving with weight loss    Current use of long term anticoagulation     no longer on Coumadin or Lovenox injections    History of depression     pt denies any further complications    History of narcotic addiction ([facility] [address]     History of pulmonary embolism 2015    Treated with Coumadin- then lovenox bridge- IVC filter placed    HTN (hypertension)     controlled with meds    Persistent atrial fibrillation (HCC)     Followed by [address] rate controlled    Presence of IVC filter     placed prior to gastric sleeve.     Status following surgery for weight loss 05/2019    gastric sleeve    Venous stasis      Active Ambulatory Problems     Diagnosis Date Noted    Sleep apnea, obstructive 04/03/2015    BMI 45.0-49.9, adult ([address] 04/03/2015    History of pulmonary embolism 04/03/2015    Venous insufficiency 10/27/2015    History of narcotic addiction ([address] 12/22/2015    History of gout 12/22/2015    Paroxysmal atrial flutter ([address] 03/15/2016    Essential hypertension with goal blood pressure less than 130/80 03/08/2017    Controlled type 2 diabetes mellitus without complication, without long-term current use of insulin ([address] 12/12/2017    Persistent atrial fibrillation ([facility] [address] 07/18/2018    Heel pain, chronic, right 03/13/2019    History of deep vein thrombosis (DVT) of lower extremity 01/01/2015    S/P bariatric surgery 07/10/2019    Shortness of breath 07/10/2019    Pleural effusion, right 07/10/2019     Resolved Ambulatory Problems     Diagnosis Date Noted    Pre-op chest exam 12/12/2017    Other chest pain 07/10/2019     Past Medical History:   Diagnosis Date    Abnormal EKG 3/8/2017    Adverse effect of anesthesia     Arthritis     Borderline diabetes     Current use of long term anticoagulation     History of depression     HTN (hypertension)     Presence of IVC filter     Status following surgery for weight loss 05/2019    Venous stasis      Past Surgical History:   Procedure Laterality Date    HX COLONOSCOPY  10/20/2015    HX GASTRECTOMY  05/20/2010    sleeve    HX OTHER SURGICAL  05/2019    gastric sleeve    HX REFRACTIVE SURGERY Bilateral     HX TONSILLECTOMY  1965    IR IVC FILTER  05/08/2019    Dr Merced Murry IR REMOVE IVC FILTER [address]  6/21/2019     Social History     Socioeconomic History    Marital status:      Spouse name: Not on file    Number of children: Not on file    Years of education: Not on file    Highest education level: Not on file   Occupational History    Not on file   Social Needs    Financial resource strain: Not on file    Food insecurity:     Worry: Not on file     Inability: Not on file    Transportation needs:     Medical: Not on file     Non-medical: Not on file   Tobacco Use    Smoking status: Never Smoker    Smokeless tobacco: Never Used   Substance and Sexual Activity    Alcohol use: No    Drug use: No     Comment: prior hx narcotic abuse at young age- none for the past 27 Years    Sexual activity: Not on file   Lifestyle    Physical activity:     Days per week: Not on file     Minutes per session: Not on file    Stress: Not on file   Relationships    Social connections:     Talks on phone: Not on file     Gets together: Not on file     Attends Jew service: Not on file     Active member of club or organization: Not on file     Attends meetings of clubs or organizations: Not on file     Relationship status: Not on file    Intimate partner violence:     Fear of current or ex partner: Not on file     Emotionally abused: Not on file     Physically abused: Not on file     Forced sexual activity: Not on file   Other Topics Concern     Service Not Asked    Blood Transfusions Not Asked    Caffeine Concern Not Asked    Occupational Exposure Not Asked   Susana Tanya Hazards Not Asked    Sleep Concern Not Asked    Stress Concern Not Asked    Weight Concern Not Asked    Special Diet Not Asked    Back Care Not Asked    Exercise No     Comment: using rowing machine some    Bike Helmet Not Asked   [address] Not Asked    Self-Exams Not Asked   Social History Narrative    Lives alone, working     Family History   Problem Relation Age of Onset    Hypertension Father     COPD Mother     Heart Disease Paternal Aunt     Diabetes Neg Hx      Allergies   Allergen Reactions    Amlodipine Other (comments)     edema    Contrave [Naltrexone-Bupropion] Other (comments)     Nausea first and then sweet craving    Hydrochlorothiazide Other (comments)     gout    Lisinopril Cough    Other Medication Other (comments)     NKDA-but do not prescribe narcotics or Mood altering drugs     No current facility-administered medications for this encounter. Objective: There were no vitals filed for this visit. PHYSICAL EXAM     Constitutional:  the patient is well developed and in no acute distress  HEENT:  Sclera clear, pupils equal, oral mucosa moist  Lungs: clear bilaterally but with with dullness and decreased breath sounds on the right  Cardiovascular:  RRR without M,G,R  Abd/GI: soft and non-tender; with positive bowel sounds. Ext: warm without cyanosis.  There is 1+ lower leg edema.  Skin:  no jaundice or rashes, no wounds   Neuro: no gross neuro deficits. Alert and oriented  Musculoskeletal: moves all four extremities. No deformities. Psychiatric: calm. Does not appear anxious or depressed    Chest X-ray:         Lab Results   Component Value Date/Time    Sodium 139 07/12/2019 08:09 AM    Potassium 3.7 07/12/2019 08:09 AM    Chloride 107 07/12/2019 08:09 AM    CO2 22 07/12/2019 08:09 AM    Anion gap 10 07/12/2019 08:09 AM    Glucose 106 (H) 07/12/2019 08:09 AM    BUN 13 07/12/2019 08:09 AM    Creatinine 0.80 07/12/2019 08:09 AM    BUN/Creatinine ratio 14 03/08/2019 10:38 AM    GFR est AA >60 07/12/2019 08:09 AM    GFR est non-AA >60 07/12/2019 08:09 AM    Calcium 9.1 07/12/2019 08:09 AM     Lab Results   Component Value Date/Time    WBC 5.4 05/30/2019 01:14 PM    HGB 14.1 07/12/2019 08:09 AM    HCT 36.1 (L) 05/30/2019 01:14 PM    PLATELET 615 91/02/1393 01:14 PM    MCV 95.0 05/30/2019 01:14 PM     Lab Results   Component Value Date/Time    BNP 78 05/20/2019 11:30 PM     Assessment:  (Medical Decision Making)     Hospital Problems  Date Reviewed: 7/10/2019          Codes Class Noted POA    Shortness of breath ICD-10-CM: R06.02  ICD-9-CM: 786.05  7/10/2019 Yes        * (Principal) Pleural effusion, right ICD-10-CM: J90  ICD-9-CM: 511.9  7/10/2019 Yes              Plan:  (Medical Decision Making)   1. Will do thoracentesis today with fluid for study. Bailey Cummings NP    More than 50% of time documented was spent face-to-face contact with the patient and in the care of the patient on the floor/unit where the patient is located    Lungs:  Diminished right, clear left, dullness on right to percussion  Heart:  RRR with no Murmur/Rubs/Gallops    Additional Comments:  Could be post op complication, volume overload. Will send for full assessment. Pending how much can be drained, may need repeat procedure and may even consider repeating CT scan when fully drained. No fevers.  Recent Hg is improved to 14.1. Renal function normal, . I have spoken with and examined the patient. I agree with the above assessment and plan as documented.     Momo Domingo MD

## 2019-07-18 NOTE — PROGRESS NOTES
Pt sat up on side of bed for thoracentesis. Consent obtained. Time out performed. Pts vitals monitored throughout procedure. Right ultrasound done and pic taken of pleural fluid. ~2900 ml yellow pleural fluid from R.  Pt tolerated procedure well with no adverse rxn. Specimens sent to the lab x 3 and labeled appropriately. Site dressed appropriately and discharge instructions reviewed with pt and family. R Lung sliding done and ultrasound findings reviewed by MD.  Pt given written discharge instructions including follow-up appointment,  potential side effects and physical changes to be aware of, and physician contact number. Pt d/c'ed via ambulatory to family's care. Pt and family verbalized understanding of discharge instructions. MD spoke with pt and family.

## 2019-07-18 NOTE — H&P (VIEW-ONLY)
CONSULT NOTE    Elise Bee    7/18/2019    Date of Admission:  7/18/2019    The patient's chart is reviewed and the patient is discussed with the staff. Subjective:     Patient is a 60 y.o.  male seen and evaluated at the request of Dr. Pattie Ta. He has been seeing his PCP, Dr. Pattie Ta, recently with complaints of worsening shortness of breath. He underwent bariatric surgery (sleeve gastrectomy) on 5/20/19. Postoperatively, he was hypotensive and required a brief stay in the ICU for IV fluid and vasopressor support. He was discharged home several days later and initially felt well but then several weeks later, when he was allowed to starting exercising, he noticed that he was short of breath. He thought this was due to his deconditioning but as the shortness of breath worsened, he worried that there was more to it. CXR shows a large right effusion. He has chronic a flutter and is followed by [facility]. He has chronic lower extremity edema but this has been stable. His heartrate has been controlled. He takes Eliquis for the a flutter (and a history of DVT/PE in 2015) and this has been on hold for the past 48 hours. He has JALYN and uses CPAP every night. He has been using this for 20 years or so. He does not know the pressure and he does not use supplemental oxygen. He has lost about 80 lbs since his bariatric surgery. He overall feels well with the exception of the dyspnea.      Review of Systems  A comprehensive review of systems was negative except for: Constitutional: positive for weight loss  Eyes: positive for contacts/glasses  Ears, nose, mouth, throat, and face: positive for none  Respiratory: positive for cough or dyspnea on exertion  Cardiovascular: positive for lower extremity edema  Gastrointestinal: positive for none  Genitourinary: positive for none  Integument/breast: positive for none  Hematologic/lymphatic: positive for none  Musculoskeletal: positive for none  Neurological: positive for none  Behvioral/Psych: positive for sleep disturbance  Endocrine: positive for diet controlled   Allergic/Immunologic: positive for none    Patient Active Problem List   Diagnosis Code    Sleep apnea, obstructive G47.33    BMI 45.0-49.9, adult ([facility]) Z68.42    History of pulmonary embolism Z86.711    Venous insufficiency I87.2    History of narcotic addiction ([facility] [address] F11.21    History of gout Z87.39    Paroxysmal atrial flutter ([facility]) I48.92    Essential hypertension with goal blood pressure less than 130/80 I10    Controlled type 2 diabetes mellitus without complication, without long-term current use of insulin ([facility]) E11.9    Persistent atrial fibrillation ([facility]) I48.1    Heel pain, chronic, right M79.671, G89.29    History of deep vein thrombosis (DVT) of lower extremity Z86.718    S/P bariatric surgery Z98.84    Shortness of breath R06.02    Pleural effusion, right J90     Prior to Admission Medications   Prescriptions Last Dose Informant Patient Reported? Taking? Tmlwnmda-Iing-Uirsqd-Hyalur Ac [id number] mg cap 2019 at Unknown time  Yes Yes   Sig: Take 1 Tab by mouth two (2) times a day. apixaban (ELIQUIS) 5 mg tablet 2019  No No   Sig: Take 1 Tab by mouth two (2) times a day. calcium-cholecalciferol, d3, (CALCIUM 600 + D) 600-125 mg-unit tab 2019 at Unknown time  Yes Yes   Sig: Take 1 Tab by mouth daily. carvedilol (COREG) 12.5 mg tablet 2019 at Unknown time  No Yes   Sig: Take 1 Tab by mouth two (2) times daily (with meals). cpap machine kit 2019 at Unknown time  Yes Yes   Sig: by Does Not Apply route. cyanocobalamin, vitamin B-12, (VITAMIN B12 PO) 2019 at Unknown time  Yes Yes   Sig: Take  by mouth daily. fluticasone propionate (FLONASE ALLERGY RELIEF) 50 mcg/actuation nasal spray Unknown at Unknown time  Yes No   Si Sprays by Both Nostrils route as needed for Rhinitis.    furosemide (LASIX) 40 mg tablet 7/17/2019 at Unknown time  No Yes   Sig: Take 1 Tab by mouth daily. multivitamin (ONE A DAY) tablet 7/18/2019 at Unknown time  Yes Yes   Sig: Take 1 Tab by mouth daily. pantoprazole (PROTONIX) 40 mg tablet 7/18/2019 at Unknown time  No Yes   Sig: Take 1 Tab by mouth two (2) times a day. potassium chloride (K-DUR, KLOR-CON) 20 mEq tablet 7/17/2019 at Unknown time  No Yes   Sig: Take 1 Tab by mouth daily. Facility-Administered Medications: None     Past Medical History:   Diagnosis Date    Abnormal EKG 3/8/2017    Adverse effect of anesthesia     6-8 hours post-op B/P dropped and pt was not able to urinate- had to be cath.  Arthritis     osteo-knee, toe    Borderline diabetes     AVG BS:85-90/ no s.s of hypoglycemia/ Last A1c: unknown- resolving with weight loss    Current use of long term anticoagulation     no longer on Coumadin or Lovenox injections    History of depression     pt denies any further complications    History of narcotic addiction ([address]     History of pulmonary embolism 2015    Treated with Coumadin- then lovenox bridge- IVC filter placed    HTN (hypertension)     controlled with meds    Persistent atrial fibrillation (HCC)     Followed by [facility] rate controlled    Presence of IVC filter     placed prior to gastric sleeve.     Status following surgery for weight loss 05/2019    gastric sleeve    Venous stasis      Active Ambulatory Problems     Diagnosis Date Noted    Sleep apnea, obstructive 04/03/2015    BMI 45.0-49.9, adult ([address] 04/03/2015    History of pulmonary embolism 04/03/2015    Venous insufficiency 10/27/2015    History of narcotic addiction ([address] 12/22/2015    History of gout 12/22/2015    Paroxysmal atrial flutter ([address] 03/15/2016    Essential hypertension with goal blood pressure less than 130/80 03/08/2017    Controlled type 2 diabetes mellitus without complication, without long-term current use of insulin ([address] 12/12/2017    Persistent atrial fibrillation ([facility] [address] 07/18/2018    Heel pain, chronic, right 03/13/2019    History of deep vein thrombosis (DVT) of lower extremity 01/01/2015    S/P bariatric surgery 07/10/2019    Shortness of breath 07/10/2019    Pleural effusion, right 07/10/2019     Resolved Ambulatory Problems     Diagnosis Date Noted    Pre-op chest exam 12/12/2017    Other chest pain 07/10/2019     Past Medical History:   Diagnosis Date    Abnormal EKG 3/8/2017    Adverse effect of anesthesia     Arthritis     Borderline diabetes     Current use of long term anticoagulation     History of depression     HTN (hypertension)     Presence of IVC filter     Status following surgery for weight loss 05/2019    Venous stasis      Past Surgical History:   Procedure Laterality Date    HX COLONOSCOPY  10/20/2015    HX GASTRECTOMY  05/20/2010    sleeve    HX OTHER SURGICAL  05/2019    gastric sleeve    HX REFRACTIVE SURGERY Bilateral     HX TONSILLECTOMY  1965    IR IVC FILTER  05/08/2019    Dr Nancy Rider IR REMOVE IVC FILTER [address]  6/21/2019     Social History     Socioeconomic History    Marital status:      Spouse name: Not on file    Number of children: Not on file    Years of education: Not on file    Highest education level: Not on file   Occupational History    Not on file   Social Needs    Financial resource strain: Not on file    Food insecurity:     Worry: Not on file     Inability: Not on file    Transportation needs:     Medical: Not on file     Non-medical: Not on file   Tobacco Use    Smoking status: Never Smoker    Smokeless tobacco: Never Used   Substance and Sexual Activity    Alcohol use: No    Drug use: No     Comment: prior hx narcotic abuse at young age- none for the past 27 Years    Sexual activity: Not on file   Lifestyle    Physical activity:     Days per week: Not on file     Minutes per session: Not on file    Stress: Not on file   Relationships    Social connections:     Talks on phone: Not on file     Gets together: Not on file     Attends Latter-day service: Not on file     Active member of club or organization: Not on file     Attends meetings of clubs or organizations: Not on file     Relationship status: Not on file    Intimate partner violence:     Fear of current or ex partner: Not on file     Emotionally abused: Not on file     Physically abused: Not on file     Forced sexual activity: Not on file   Other Topics Concern     Service Not Asked    Blood Transfusions Not Asked    Caffeine Concern Not Asked    Occupational Exposure Not Asked   Leoncio Pat Hazards Not Asked    Sleep Concern Not Asked    Stress Concern Not Asked    Weight Concern Not Asked    Special Diet Not Asked    Back Care Not Asked    Exercise No     Comment: using rowing machine some    Bike Helmet Not Asked   [address] Not Asked    Self-Exams Not Asked   Social History Narrative    Lives alone, working     Family History   Problem Relation Age of Onset    Hypertension Father     COPD Mother     Heart Disease Paternal Aunt     Diabetes Neg Hx      Allergies   Allergen Reactions    Amlodipine Other (comments)     edema    Contrave [Naltrexone-Bupropion] Other (comments)     Nausea first and then sweet craving    Hydrochlorothiazide Other (comments)     gout    Lisinopril Cough    Other Medication Other (comments)     NKDA-but do not prescribe narcotics or Mood altering drugs     No current facility-administered medications for this encounter. Objective: There were no vitals filed for this visit. PHYSICAL EXAM     Constitutional:  the patient is well developed and in no acute distress  HEENT:  Sclera clear, pupils equal, oral mucosa moist  Lungs: clear bilaterally but with with dullness and decreased breath sounds on the right  Cardiovascular:  RRR without M,G,R  Abd/GI: soft and non-tender; with positive bowel sounds. Ext: warm without cyanosis.  There is 1+ lower leg edema.  Skin:  no jaundice or rashes, no wounds   Neuro: no gross neuro deficits. Alert and oriented  Musculoskeletal: moves all four extremities. No deformities. Psychiatric: calm. Does not appear anxious or depressed    Chest X-ray:         Lab Results   Component Value Date/Time    Sodium 139 07/12/2019 08:09 AM    Potassium 3.7 07/12/2019 08:09 AM    Chloride 107 07/12/2019 08:09 AM    CO2 22 07/12/2019 08:09 AM    Anion gap 10 07/12/2019 08:09 AM    Glucose 106 (H) 07/12/2019 08:09 AM    BUN 13 07/12/2019 08:09 AM    Creatinine 0.80 07/12/2019 08:09 AM    BUN/Creatinine ratio 14 03/08/2019 10:38 AM    GFR est AA >60 07/12/2019 08:09 AM    GFR est non-AA >60 07/12/2019 08:09 AM    Calcium 9.1 07/12/2019 08:09 AM     Lab Results   Component Value Date/Time    WBC 5.4 05/30/2019 01:14 PM    HGB 14.1 07/12/2019 08:09 AM    HCT 36.1 (L) 05/30/2019 01:14 PM    PLATELET 344 33/27/6059 01:14 PM    MCV 95.0 05/30/2019 01:14 PM     Lab Results   Component Value Date/Time    BNP 78 05/20/2019 11:30 PM     Assessment:  (Medical Decision Making)     Hospital Problems  Date Reviewed: 7/10/2019          Codes Class Noted POA    Shortness of breath ICD-10-CM: R06.02  ICD-9-CM: 786.05  7/10/2019 Yes        * (Principal) Pleural effusion, right ICD-10-CM: J90  ICD-9-CM: 511.9  7/10/2019 Yes              Plan:  (Medical Decision Making)   1. Will do thoracentesis today with fluid for study. Diann Parekh NP    More than 50% of time documented was spent face-to-face contact with the patient and in the care of the patient on the floor/unit where the patient is located    Lungs:  Diminished right, clear left, dullness on right to percussion  Heart:  RRR with no Murmur/Rubs/Gallops    Additional Comments:  Could be post op complication, volume overload. Will send for full assessment. Pending how much can be drained, may need repeat procedure and may even consider repeating CT scan when fully drained. No fevers.  Recent Hg is improved to 14.1. Renal function normal, . I have spoken with and examined the patient. I agree with the above assessment and plan as documented.     Shanti Griffin MD

## 2019-07-18 NOTE — PROCEDURES
PROCEDURE:  DIAGNOSTIC THORACENTESIS, THERAPEUTIC THORACENTESIS       PRE-OP DIAGNOSIS:  Right PLEURAL EFFUSION    POST-OP DIAGNOSIS:  Right PLEURAL EFFUSION    VOLUME REMOVED:    2900cc    ANESTHESIA:    LOCAL ANESTHESIA WITH 1% LIDOCAINE 10 CC TOTAL. CHEST ULTRASOUND FINDINGS:    A Turbo-M, Sonosite ultrasound with a 5-16 mHz probe was used to image the chest and localize the pleural effusion on the right chest.    A large anechoic space was seen on the right consistent with an uncomplicated pleural effusion. DESCRIPTION OF PROCEDURE:    After obtaining informed consent and localizing the safest location for thoracentesis, the  8th intercostal space was marked with a blunt, plastic needle cap in the mid scapular line. An [employer] AK-0100 Pleral-Seal thoracentesis kit was used to perform the procedure. The skin was cleansed with the supplied chlorhexidine swab and then draped in the usual fashion. Using the previously marked location as a guide, a 22 G 1.5 inch needle was used to inject 1% lidocaine into the skin and subcutaneous tissue, as well as onto the underlying rib and inter-costal muscles. Pleural fluid was aspirated to assure proper location and additional lidocaine was injected into the pleural space prior to removing the anesthesia needle. A 3mm incision was then made with the supplied scalpel in the usual fashion to facilitate the insertion of the thoracentesis needle. The needle with an 8 Vietnamese thoracentesis catheter was then introduced into the chest through the previously made incision in the usual fashion, the rib localized with the needle, and the catheter then marched over the rib into the pleural space. After aspirating fluid, the thoracentesis catheter was then placed into the chest using the needle itself as a trocar. The needle was then removed and the catheter was attached to the supplied tubing without complication.     2900 cc of dark yellow fluid was aspirated and sent for analysis. Fluid was sent for the following tests:    LDH  Total Protein  Glucose  Cell count with differential  Routine culture and Gram stain  Cytology  AFB  Fungus    Post procedure US confirmed incomplete drainage of the effusion and presence of lung sliding, ruling out pneumothorax. (00801)    EBL:     1cc    COMPLICATIONS:    None    RECS:  Stopped drainage at first symptom of chest pressure. Incomplete after 2900cc drained. Plan for repeat thora next week with CT chest afterwards to fully assess. Should have most of pleural fluid labs returned by then. Will hold Eliquis for 48 hours prior to thora again.        Tarik Goins MD

## 2019-07-21 LAB
BACTERIA SPEC CULT: NORMAL
GLUCOSE FLD-MCNC: 95 MG/DL
GRAM STN SPEC: NORMAL
GRAM STN SPEC: NORMAL
LDH FLD L TO P-CCNC: 222 U/L
PROT FLD-MCNC: 5.1 G/DL
SERVICE CMNT-IMP: NORMAL
SPECIMEN SOURCE FLD: NORMAL

## 2019-07-23 ENCOUNTER — HOSPITAL ENCOUNTER (OUTPATIENT)
Age: 59
Setting detail: OUTPATIENT SURGERY
Discharge: HOME | End: 2019-07-23
Attending: INTERNAL MEDICINE | Admitting: INTERNAL MEDICINE
Payer: COMMERCIAL

## 2019-07-23 ENCOUNTER — APPOINTMENT (OUTPATIENT)
Dept: CT IMAGING | Age: 59
End: 2019-07-23
Attending: INTERNAL MEDICINE
Payer: COMMERCIAL

## 2019-07-23 VITALS
RESPIRATION RATE: 14 BRPM | HEART RATE: 93 BPM | TEMPERATURE: 98.7 F | DIASTOLIC BLOOD PRESSURE: 62 MMHG | HEIGHT: 70 IN | BODY MASS INDEX: 45.1 KG/M2 | SYSTOLIC BLOOD PRESSURE: 131 MMHG | OXYGEN SATURATION: 99 % | WEIGHT: 315 LBS

## 2019-07-23 DIAGNOSIS — J90 PLEURAL EFFUSION, RIGHT: ICD-10-CM

## 2019-07-23 PROCEDURE — 88185 FLOWCYTOMETRY/TC ADD-ON: CPT

## 2019-07-23 PROCEDURE — 77030014147 HC TY THORCENT PARA TELE -B: Performed by: INTERNAL MEDICINE

## 2019-07-23 PROCEDURE — 32555 ASPIRATE PLEURA W/ IMAGING: CPT | Performed by: INTERNAL MEDICINE

## 2019-07-23 PROCEDURE — 71250 CT THORAX DX C-: CPT

## 2019-07-23 PROCEDURE — 88305 TISSUE EXAM BY PATHOLOGIST: CPT

## 2019-07-23 PROCEDURE — 88112 CYTOPATH CELL ENHANCE TECH: CPT

## 2019-07-23 PROCEDURE — 88184 FLOWCYTOMETRY/ TC 1 MARKER: CPT

## 2019-07-23 PROCEDURE — 76040000019: Performed by: INTERNAL MEDICINE

## 2019-07-23 NOTE — PROGRESS NOTES
RN at bedside to assist  with ultrasound guided thoracentesis. Consent obtained. Patient sitting up on side of the bed. Procedure explained by MD to the patient and patient verbalized understanding. Timeout completed with patient identification. Ultrasound completed bilaterally. ~1400 cc of fluid drained from RIGHT lung. Post-thoracentesis ultrasound performed and draingage complete. Pictures obtained. Specimens sent to lab as ordered by MD.     Vital Signs remain stable through out procedure. See flow sheet. Patient ambulated to CT with family at this time.

## 2019-07-23 NOTE — INTERVAL H&P NOTE
H&P Update:  Lonza Apo was seen and examined. History and physical has been reviewed. The patient has been examined. There have been no significant clinical changes since the completion of the originally dated History and Physical.  For completion thoracentesis today.

## 2019-07-23 NOTE — DISCHARGE INSTRUCTIONS
Patient Education     [facility] will call you with the results from the CT scan and the results from the fluid taken today. Call their office at [phone] for any questions or concerns. Thoracentesis: What to Expect at Home  Your Recovery  Thoracentesis (say \"oxht-nf-ngl-MELODIE-sis\") is a procedure to remove fluid from the space between the lungs and the chest wall (pleural space). This procedure may also be called a \"chest tap. \" It is normal to have a small amount of fluid in the pleural space. But too much fluid can build up because of problems such as infection, heart failure, or lung cancer. The procedure may have been done to help with shortness of breath and pain caused by the fluid buildup. Or you may have had this procedure so the doctor could test the fluid to find the cause of the buildup. Your chest may be sore where the doctor put the needle or catheter into your skin (the puncture site). This usually gets better after a day or two. You can go back to work or your normal activities as soon as you feel up to it. If the doctor sent the fluid to a lab for testing, it may take several days to get the results. The doctor or nurse will discuss the results with you. This care sheet gives you a general idea about how long it will take for you to recover. But each person recovers at a different pace. Follow the steps below to feel better as quickly as possible. How can you care for yourself at home? Activity    · Rest when you feel tired. Getting enough sleep will help you recover.     · Avoid strenuous activities, such as bicycle riding, jogging, weight lifting, or aerobic exercise, until your doctor says it is okay.     · You may shower. Do not take a bath until the puncture site has healed, or until your doctor tells you it is okay.     · Ask your doctor when you can drive again.     · You may need to take 1 or 2 days off from work. It depends on the type of work you do and how you feel. Diet    · You can eat your normal diet.     · Drink plenty of fluids (unless your doctor tells you not to). Medicines    · Your doctor will tell you if and when you can restart your medicines. He or she will also give you instructions about taking any new medicines.     · If you take blood thinners, such as warfarin (Coumadin), clopidogrel (Plavix), or aspirin, be sure to talk to your doctor. He or she will tell you if and when to start taking those medicines again. Make sure that you understand exactly what your doctor wants you to do.     · Be safe with medicines. Take pain medicines exactly as directed. ? If the doctor gave you a prescription medicine for pain, take it as prescribed. ? If you are not taking a prescription pain medicine, ask your doctor if you can take an over-the-counter medicine. ? Do not take two or more pain medicines at the same time unless the doctor told you to. Many pain medicines have acetaminophen, which is Tylenol. Too much acetaminophen (Tylenol) can be harmful.     · If you think your pain medicine is making you sick to your stomach:  ? Take your medicine after meals (unless your doctor has told you not to). ? Ask your doctor for a different pain medicine.     · If your doctor prescribed antibiotics, take them as directed. Do not stop taking them just because you feel better. You need to take the full course of antibiotics.    Care of the puncture site    · Wash the area daily with warm, soapy water, and pat it dry. Don't use hydrogen peroxide or alcohol, which may delay healing. You may cover the area with a gauze bandage if it weeps or rubs against clothing. Change the bandage every day.     · Keep the area clean and dry. Follow-up care is a key part of your treatment and safety. Be sure to make and go to all appointments, and call your doctor if you are having problems. It's also a good idea to know your test results and keep a list of the medicines you take.   When should you call for help? Call 911 anytime you think you may need emergency care. For example, call if:    · You passed out (lost consciousness).     · You have severe trouble breathing.     · You have sudden chest pain and shortness of breath, or you cough up blood.    Call your doctor now or seek immediate medical care if:    · You have shortness of breath that is new or getting worse.     · You have new or worse pain in your chest, especially when you take a deep breath.     · You are sick to your stomach or cannot keep fluids down.     · You have a fever over 100°F.     · Bright red blood has soaked through the bandage over your puncture site.     · You have signs of infection, such as:  ? Increased pain, swelling, warmth, or redness. ? Red streaks leading from the puncture site. ? Pus draining from the puncture site. ? Swollen lymph nodes in your neck, armpits, or groin. ? A fever.     · You cough up a lot more mucus than normal, or your mucus changes color.    Watch closely for changes in your health, and be sure to contact your doctor if you have any problems. Where can you learn more? Go to http://kaylee-corbin.info/. Enter J027 in the search box to learn more about \"Thoracentesis: What to Expect at Home. \"  Current as of: September 5, 2018  Content Version: 12.1  © 1212-9591 [employer]. Care instructions adapted under license by [employer] (which disclaims liability or warranty for this information). If you have questions about a medical condition or this instruction, always ask your healthcare professional. [place] [address] any warranty or liability for your use of this information.

## 2019-07-24 NOTE — PROCEDURES
PROCEDURE:    DIAGNOSTIC/THERAPEUTIC THORACENTESIS. PRE-OP DIAGNOSIS:    R PLEURAL EFFUSION    POST-OP DIAGNOSIS:    R PLEURAL EFFUSION    ASSISTANT:    None    ANESTHESIA:    LOCAL ANESTHESIA WITH 1% LIDOCAINE 10 CC TOTAL. CHEST ULTRASOUND FINDINGS:    A Turbo-M, Sonosite ultrasound with a 5-16 mHz probe was used to image the chest and localize the pleural effusion on the Right chest.    A large anechoic space was seen on the Left/Right consistent with an uncomplicated pleural effusion. DESCRIPTION OF PROCEDURE:    After obtaining informed consent and localizing the safest location for thoracentesis, the  9th intercostal space was marked with a blunt, plastic needle cap in the mid scapular line. An [employer] AK-0100 Pleral-Seal thoracentesis kit was used to perform the procedure. The skin was  cleansed with the supplied  chlorhexididne swab and then draped in the usual fasion. Using the previously marked location as a giude, a 22 G 1.5 inch needle was used to inject 10 cc of 1% lidocaine into the skin and subcutaneous tissue, as well as onto the underlying rib and inter-costal muscles, pleural fluid was aspirated to assure proper location, prior to removing the anesthesia needle. A 3mm  incision was then made, with the supplied scalpel in the usual fashion to facilitate the insertiopn of the thoracentesis needle. The needle with an 8French thoracentesis catheter was then introduced into the chest through the previously made incision in the usual fashion, the rib localized with the needle, and the catheter then marched over the rib into the pleural space. After aspirating fluid, the thoracentesis catheter was then placed into the chest using the needle itself as a trocar. The needle was then removed and the catheter was attached to the supplied tubing without complication.     Following this a total of 1400 cc of dark yellow clear fluid was removed from the Right chest, without complication, with the patient experiencing coughing during the procedure. Fluid was sent for the following tests:  Cytology  Flow cytometry    Post procedure US confirmed complete drainage of the effusion.     EBL:     1 drop      COMPLICATIONS:  None      Jacy Friend MD.

## 2019-07-25 LAB
FLOW CYTOMETRY, FBTC1: NORMAL
SPECIMEN SOURCE: NORMAL
TEST ORDERED:: NORMAL

## 2019-07-25 NOTE — PROGRESS NOTES
Pleural fluid results suggest some inflammation, but cyto and cultures are thus far negative which is good. CT didn't show any type of mass. It looks like the lung was just still opening up. I would like him to get a follow up in 4 weeks in the clinic with a repeat CXR. We may need to repeat his CT in 3 months, but would like to first assess whether the fluid is returning in a few weeks.      Sine

## 2019-08-15 LAB
FUNGUS CULTURE, RFCO2T: NEGATIVE
FUNGUS SMEAR, RFCO1T: NORMAL
FUNGUS SPEC CULT: NORMAL
FUNGUS STAIN, 188244: NORMAL
REFLEX TO ID, RFCO3T: NORMAL
SPECIMEN SOURCE: NORMAL
SPECIMEN SOURCE: NORMAL

## 2019-08-22 ENCOUNTER — HOSPITAL ENCOUNTER (OUTPATIENT)
Dept: GENERAL RADIOLOGY | Age: 59
Discharge: HOME | End: 2019-08-22
Attending: INTERNAL MEDICINE
Payer: COMMERCIAL

## 2019-08-22 DIAGNOSIS — J90 PLEURAL EFFUSION: ICD-10-CM

## 2019-08-22 PROBLEM — I15.8 OTHER SECONDARY HYPERTENSION: Status: ACTIVE | Noted: 2019-08-22

## 2019-08-22 PROBLEM — R42 DIZZINESS: Status: ACTIVE | Noted: 2019-08-22

## 2019-08-22 PROCEDURE — 71046 X-RAY EXAM CHEST 2 VIEWS: CPT

## 2019-08-30 PROBLEM — R31.21 ASYMPTOMATIC MICROSCOPIC HEMATURIA: Status: ACTIVE | Noted: 2019-08-30

## 2019-08-30 PROBLEM — Z23 ENCOUNTER FOR IMMUNIZATION: Status: ACTIVE | Noted: 2019-08-30

## 2019-08-30 PROBLEM — D70.9 NEUTROPENIA (HCC): Status: ACTIVE | Noted: 2019-08-30

## 2019-09-03 LAB
ACID FAST STN SPEC: NEGATIVE
MYCOBACTERIUM SPEC QL CULT: NEGATIVE
SPECIMEN PREPARATION: NORMAL
SPECIMEN SOURCE: NORMAL

## 2019-09-19 PROBLEM — Z23 ENCOUNTER FOR IMMUNIZATION: Status: RESOLVED | Noted: 2019-08-30 | Resolved: 2019-09-19

## 2019-09-19 PROBLEM — Z00.00 ROUTINE GENERAL MEDICAL EXAMINATION AT A HEALTH CARE FACILITY: Status: RESOLVED | Noted: 2017-12-12 | Resolved: 2019-09-19

## 2019-10-22 ENCOUNTER — HOSPITAL ENCOUNTER (OUTPATIENT)
Dept: CT IMAGING | Age: 59
Discharge: HOME | End: 2019-10-22
Attending: INTERNAL MEDICINE
Payer: COMMERCIAL

## 2019-10-22 DIAGNOSIS — J90 PLEURAL EFFUSION, RIGHT: ICD-10-CM

## 2019-10-22 PROCEDURE — 71250 CT THORAX DX C-: CPT

## 2019-10-22 NOTE — PROGRESS NOTES
CT looks really good. Lungs look well expanded and there is just a very tiny bit of fluid remaining, better than in July after drainage. I think we should keep the follow up in November, but probably this looks like a resolved problem.     Sine

## 2019-10-24 NOTE — PROGRESS NOTES
Spoke with the patient in regards to their CT scan results, explained to the patient per Dr. Mehnaz Sheets that the CT looks really good and that the lungs look well expanded and there is just a very tiny bit of fluid remaining but it is much better than what it looked like before the July drainage. I also explained that we should keep the follow up in November but so for the problem seems to be resolved. Patient understood results and did not have any further questions or concerns at this time. // Hakeem ARVIZU

## 2019-10-24 NOTE — PROGRESS NOTES
Left patient message via voicemail to please give me a call as soon as they are available. // [place]. A.

## 2020-09-02 PROBLEM — D70.9 NEUTROPENIA (HCC): Status: RESOLVED | Noted: 2019-08-30 | Resolved: 2020-09-02

## 2020-09-02 PROBLEM — I15.8 OTHER SECONDARY HYPERTENSION: Status: RESOLVED | Noted: 2019-08-22 | Resolved: 2020-09-02

## 2020-09-02 PROBLEM — J90 PLEURAL EFFUSION, RIGHT: Status: RESOLVED | Noted: 2019-07-10 | Resolved: 2020-09-02

## 2020-09-02 PROBLEM — E11.9 CONTROLLED TYPE 2 DIABETES MELLITUS WITHOUT COMPLICATION, WITHOUT LONG-TERM CURRENT USE OF INSULIN (HCC): Chronic | Status: RESOLVED | Noted: 2017-12-12 | Resolved: 2020-09-02

## 2020-09-02 PROBLEM — I48.19 PERSISTENT ATRIAL FIBRILLATION (HCC): Chronic | Status: RESOLVED | Noted: 2018-07-18 | Resolved: 2020-09-02

## 2020-10-02 PROBLEM — Z00.00 ROUTINE GENERAL MEDICAL EXAMINATION AT A HEALTH CARE FACILITY: Status: RESOLVED | Noted: 2017-12-12 | Resolved: 2020-10-02

## 2020-11-04 PROBLEM — Z76.89 ENCOUNTER FOR WEIGHT MANAGEMENT: Status: ACTIVE | Noted: 2019-08-30

## 2020-12-29 NOTE — PROGRESS NOTES
Surgery  No vital signs since 6:30 PM. Have an early case in AM.  Have consulted hospitialists for help with management. Saul Miller MD. no

## 2021-02-10 PROBLEM — R36.1 HEMATOSPERMIA: Status: ACTIVE | Noted: 2021-02-10

## 2021-02-10 PROBLEM — Z92.29 HX OF LONG TERM USE OF BLOOD THINNERS: Status: ACTIVE | Noted: 2021-02-10

## 2021-09-10 ENCOUNTER — HOSPITAL ENCOUNTER (OUTPATIENT)
Dept: CT IMAGING | Age: 61
Discharge: HOME | End: 2021-09-10
Attending: UROLOGY

## 2021-09-10 DIAGNOSIS — R31.0 GROSS HEMATURIA: ICD-10-CM

## 2021-09-10 RX ORDER — SODIUM CHLORIDE 0.9 % (FLUSH) 0.9 %
10 SYRINGE (ML) INJECTION
Status: COMPLETED | OUTPATIENT
Start: 2021-09-10 | End: 2021-09-10

## 2021-09-10 RX ADMIN — Medication 10 ML: at 09:05

## 2021-09-10 NOTE — PROGRESS NOTES
Hope,  pls call pt today and let him know his CT shows a stone partially blocking his right ureter. Pls tell him that if he has fever, chills or severe right side pain this weekend, he will need to go to the ED. If not, then cancel his appt with me for next Friday and I need to see him this coming Monday for a reg appt ( not a cysto).   Thx, will

## 2021-09-13 NOTE — PROGRESS NOTES
I called patient and discussed the findings of the stone partially obstructing his ureter. He has no pain or hematuria recently. He is going to see me this coming Friday for additional discussion. He and I both of the mindset to observe this and see if he passes it. If not we will talk about potential for ESWL or ureteroscopy with laser litho-. Of note he is on Eliquis and would have to hold that. He wants to stay out of the hospital with the Covid surge now if possible.

## 2021-10-28 ENCOUNTER — HOSPITAL ENCOUNTER (OUTPATIENT)
Dept: GENERAL RADIOLOGY | Age: 61
Discharge: HOME | End: 2021-10-28

## 2021-10-28 DIAGNOSIS — R31.0 GROSS HEMATURIA: ICD-10-CM

## 2021-10-29 ENCOUNTER — HOSPITAL ENCOUNTER (OUTPATIENT)
Dept: LAB | Age: 61
Discharge: HOME | End: 2021-10-29
Payer: COMMERCIAL

## 2021-10-29 DIAGNOSIS — R31.0 HEMATURIA, GROSS: ICD-10-CM

## 2021-10-29 LAB
ANION GAP SERPL CALC-SCNC: 4 MMOL/L (ref 7–16)
BUN SERPL-MCNC: 19 MG/DL (ref 8–23)
CALCIUM SERPL-MCNC: 9.1 MG/DL (ref 8.3–10.4)
CHLORIDE SERPL-SCNC: 109 MMOL/L (ref 98–107)
CO2 SERPL-SCNC: 28 MMOL/L (ref 21–32)
CREAT SERPL-MCNC: 1 MG/DL (ref 0.8–1.5)
GLUCOSE SERPL-MCNC: 101 MG/DL (ref 65–100)
POTASSIUM SERPL-SCNC: 4 MMOL/L (ref 3.5–5.1)
SODIUM SERPL-SCNC: 141 MMOL/L (ref 136–145)

## 2021-10-29 PROCEDURE — 80048 BASIC METABOLIC PNL TOTAL CA: CPT

## 2021-10-29 PROCEDURE — 36415 COLL VENOUS BLD VENIPUNCTURE: CPT

## 2021-11-21 NOTE — PROGRESS NOTES
General Surgery Progress Note    5/21/2019    Admit Date: 5/20/2019    Subjective:     Surgery POD #1  The patient was found to be hypotensive last night, was seen by the hospitalists and transferred to the ICU. He had no problems with bleeding at surgery, but this appears to be the problem. His H/H has dropped overnight. Will have to hold the large dose of Lovenox due to bleeding. His blood pressure is better now, but he is in atrial fibrillation with occasional rapid ventricular response. Clinically he says he is \"sore,\" but has no nausea or vomiting. Urine output has been good. Objective:     Visit Vitals  /59   Pulse (!) 112   Temp 100 °F (37.8 °C)   Resp 24   Wt (!) 373 lb (169.2 kg)   SpO2 96%   BMI 52.02 kg/m²         Intake/Output Summary (Last 24 hours) at 5/21/2019 0703  Last data filed at 5/21/2019 0556  Gross per 24 hour   Intake 2650 ml   Output 1710 ml   Net 940 ml        EXAM:  ABD soft, eccymosis around all of the incision sites, active BS's. Wounds intact without signs of infection.         Data Review    Recent Results (from the past 24 hour(s))   GLUCOSE, POC    Collection Time: 05/20/19  1:00 PM   Result Value Ref Range    Glucose (POC) 113 (H) 65 - 100 mg/dL   GLUCOSE, POC    Collection Time: 05/20/19  4:43 PM   Result Value Ref Range    Glucose (POC) 136 (H) 65 - 100 mg/dL   HGB & HCT    Collection Time: 05/20/19  6:52 PM   Result Value Ref Range    HGB 13.8 13.6 - 17.2 g/dL    HCT 43.8 41.1 - 50.3 %   LACTIC ACID    Collection Time: 05/20/19  8:34 PM   Result Value Ref Range    Lactic acid 1.4 0.4 - 2.0 MMOL/L   TROPONIN I    Collection Time: 05/20/19  8:34 PM   Result Value Ref Range    Troponin-I, Qt. <0.02 (L) 0.02 - 0.05 NG/ML   TSH 3RD GENERATION    Collection Time: 05/20/19  8:34 PM   Result Value Ref Range    TSH 3.553 uIU/mL   METABOLIC PANEL, COMPREHENSIVE    Collection Time: 05/20/19  8:34 PM   Result Value Ref Range    Sodium 141 136 - 145 mmol/L    Potassium 4.5 3.5 - 5.1 mmol/L    Chloride 108 (H) 98 - 107 mmol/L    CO2 15 (L) 21 - 32 mmol/L    Anion gap 18 (H) 7 - 16 mmol/L    Glucose 130 (H) 65 - 100 mg/dL    BUN 15 6 - 23 MG/DL    Creatinine 0.84 0.8 - 1.5 MG/DL    GFR est AA >60 >60 ml/min/1.73m2    GFR est non-AA >60 >60 ml/min/1.73m2    Calcium 7.6 (L) 8.3 - 10.4 MG/DL    Bilirubin, total 0.9 0.2 - 1.1 MG/DL    ALT (SGPT) 71 (H) 12 - 65 U/L    AST (SGOT) 60 (H) 15 - 37 U/L    Alk.  phosphatase 50 50 - 136 U/L    Protein, total 5.9 (L) 6.3 - 8.2 g/dL    Albumin 3.2 (L) 3.5 - 5.0 g/dL    Globulin 2.7 2.3 - 3.5 g/dL    A-G Ratio 1.2 1.2 - 3.5     MAGNESIUM    Collection Time: 05/20/19  8:34 PM   Result Value Ref Range    Magnesium 1.8 1.8 - 2.4 mg/dL   EKG, 12 LEAD, INITIAL    Collection Time: 05/20/19  9:00 PM   Result Value Ref Range    Ventricular Rate 88 BPM    Atrial Rate 416 BPM    QRS Duration 90 ms    Q-T Interval 386 ms    QTC Calculation (Bezet) 467 ms    Calculated R Axis 37 degrees    Calculated T Axis 31 degrees    Diagnosis       Atrial fibrillation  Prolonged QT  Abnormal ECG  When compared with ECG of 10-MAY-2019 15:06,  No significant change was found  Confirmed by Shane Maier ([id number]) on 5/21/2019 6:44:42 AM     GLUCOSE, POC    Collection Time: 05/20/19  9:59 PM   Result Value Ref Range    Glucose (POC) 133 (H) 65 - 100 mg/dL   URINALYSIS W/ RFLX MICROSCOPIC    Collection Time: 05/20/19 11:29 PM   Result Value Ref Range    Color YELLOW      Appearance CLEAR      Specific gravity 1.029 (H) 1.001 - 1.023      pH (UA) 6.0 5.0 - 9.0      Protein TRACE (A) NEG mg/dL    Glucose 1,000 (A) NEG mg/dL    Ketone 80 (A) NEG mg/dL    Bilirubin SMALL (A) NEG      Blood NEGATIVE  NEG      Urobilinogen 0.2 0.2 - 1.0 EU/dL    Nitrites NEGATIVE  NEG      Leukocyte Esterase NEGATIVE  NEG      WBC 0-3 0 /hpf    RBC 0-3 0 /hpf    Epithelial cells 0-3 0 /hpf    Bacteria 0 0 /hpf    Casts 3-5 0 /lpf   BNP    Collection Time: 05/20/19 11:30 PM   Result Value Ref Range    BNP 78 pg/mL D DIMER    Collection Time: 05/21/19 12:12 AM   Result Value Ref Range    D DIMER <0.27 <0.56 ug/ml(FEU)   CBC WITH AUTOMATED DIFF    Collection Time: 05/21/19 12:12 AM   Result Value Ref Range    WBC 6.7 4.3 - 11.1 K/uL    RBC 3.83 (L) 4.23 - 5.6 M/uL    HGB 12.3 (L) 13.6 - 17.2 g/dL    HCT 36.5 (L) 41.1 - 50.3 %    MCV 95.3 79.6 - 97.8 FL    MCH 32.1 26.1 - 32.9 PG    MCHC 33.7 31.4 - 35.0 g/dL    RDW 13.6 11.9 - 14.6 %    PLATELET 801 (L) 691 - 450 K/uL    MPV 11.4 9.4 - 12.3 FL    ABSOLUTE NRBC 0.00 0.0 - 0.2 K/uL    DF AUTOMATED      NEUTROPHILS 82 (H) 43 - 78 %    LYMPHOCYTES 13 13 - 44 %    MONOCYTES 5 4.0 - 12.0 %    EOSINOPHILS 0 (L) 0.5 - 7.8 %    BASOPHILS 0 0.0 - 2.0 %    IMMATURE GRANULOCYTES 0 0.0 - 5.0 %    ABS. NEUTROPHILS 5.5 1.7 - 8.2 K/UL    ABS. LYMPHOCYTES 0.9 0.5 - 4.6 K/UL    ABS. MONOCYTES 0.3 0.1 - 1.3 K/UL    ABS. EOSINOPHILS 0.0 0.0 - 0.8 K/UL    ABS. BASOPHILS 0.0 0.0 - 0.2 K/UL    ABS. IMM.  GRANS. 0.0 0.0 - 0.5 K/UL   MAGNESIUM    Collection Time: 05/21/19  3:55 AM   Result Value Ref Range    Magnesium 1.6 (L) 1.8 - 2.4 mg/dL   METABOLIC PANEL, BASIC    Collection Time: 05/21/19  3:55 AM   Result Value Ref Range    Sodium 141 136 - 145 mmol/L    Potassium 4.0 3.5 - 5.1 mmol/L    Chloride 109 (H) 98 - 107 mmol/L    CO2 13 (L) 21 - 32 mmol/L    Anion gap 19 (H) 7 - 16 mmol/L    Glucose 110 (H) 65 - 100 mg/dL    BUN 20 6 - 23 MG/DL    Creatinine 1.53 (H) 0.8 - 1.5 MG/DL    GFR est AA >60 >60 ml/min/1.73m2    GFR est non-AA 50 (L) >60 ml/min/1.73m2    Calcium 7.4 (L) 8.3 - 10.4 MG/DL   CBC W/O DIFF    Collection Time: 05/21/19  3:55 AM   Result Value Ref Range    WBC 7.3 4.3 - 11.1 K/uL    RBC 3.55 (L) 4.23 - 5.6 M/uL    HGB 11.2 (L) 13.6 - 17.2 g/dL    HCT 33.6 (L) 41.1 - 50.3 %    MCV 94.6 79.6 - 97.8 FL    MCH 31.5 26.1 - 32.9 PG    MCHC 33.3 31.4 - 35.0 g/dL    RDW 13.6 11.9 - 14.6 %    PLATELET 956 (L) 065 - 450 K/uL    MPV 11.8 9.4 - 12.3 FL    ABSOLUTE NRBC 0.00 0.0 - 0.2 K/uL Hospital Problems  Date Reviewed: 5/2/2019          Codes Class Noted POA    Hypotension after procedure ICD-10-CM: I95.81  ICD-9-CM: 458.29  5/20/2019 No        Controlled type 2 diabetes mellitus without complication, without long-term current use of insulin ([facility] ICD-10-CM: E11.9  ICD-9-CM: 250.00  12/12/2017 Yes        Essential hypertension with goal blood pressure less than 130/80 ICD-10-CM: I10  ICD-9-CM: 401.9  3/8/2017 Yes        Paroxysmal atrial flutter (HCC) ICD-10-CM: I48.92  ICD-9-CM: 427.32  3/15/2016 Yes        * (Principal) Morbid obesity with BMI of 50.0-59.9, adult (HCC) ICD-10-CM: E66.01, Z68.43  ICD-9-CM: 278.01, V85.43  4/3/2015 Yes        History of pulmonary embolism ICD-10-CM: K27.892  ICD-9-CM: V12.55  4/3/2015 Yes          1. Will proceed with swallow study. 2. Hold Lovenox due to bleeding. 3. Start Zosyn empircally  4. Restart Coreg  5. Hospitalists following. Their help appreciated. 6. May get OOB, if not dizzy. Incentive spirometry. 7. Leave in ICU  8. Leave [place] for accurate I's/O's.   Li Huang MD 20-Nov-2021 21:15

## 2021-12-02 ENCOUNTER — ANESTHESIA EVENT (OUTPATIENT)
Dept: SURGERY | Age: 61
End: 2021-12-02
Payer: COMMERCIAL

## 2021-12-03 ENCOUNTER — HOSPITAL ENCOUNTER (OUTPATIENT)
Age: 61
Setting detail: OUTPATIENT SURGERY
Discharge: HOME | End: 2021-12-03
Attending: UROLOGY | Admitting: UROLOGY
Payer: COMMERCIAL

## 2021-12-03 ENCOUNTER — APPOINTMENT (OUTPATIENT)
Dept: GENERAL RADIOLOGY | Age: 61
End: 2021-12-03
Attending: UROLOGY
Payer: COMMERCIAL

## 2021-12-03 ENCOUNTER — ANESTHESIA (OUTPATIENT)
Dept: SURGERY | Age: 61
End: 2021-12-03
Payer: COMMERCIAL

## 2021-12-03 VITALS
OXYGEN SATURATION: 96 % | BODY MASS INDEX: 45.1 KG/M2 | HEART RATE: 72 BPM | TEMPERATURE: 97.5 F | SYSTOLIC BLOOD PRESSURE: 144 MMHG | HEIGHT: 70 IN | WEIGHT: 315 LBS | RESPIRATION RATE: 14 BRPM | DIASTOLIC BLOOD PRESSURE: 84 MMHG

## 2021-12-03 DIAGNOSIS — N20.0 RIGHT KIDNEY STONE: ICD-10-CM

## 2021-12-03 LAB
ANION GAP SERPL CALC-SCNC: 7 MMOL/L (ref 7–16)
APPEARANCE UR: CLEAR
BACTERIA URNS QL MICRO: 0 /HPF
BILIRUB UR QL: NEGATIVE
BUN SERPL-MCNC: 18 MG/DL (ref 8–23)
CALCIUM SERPL-MCNC: 9.3 MG/DL (ref 8.3–10.4)
CASTS URNS QL MICRO: ABNORMAL /LPF
CHLORIDE SERPL-SCNC: 111 MMOL/L (ref 98–107)
CO2 SERPL-SCNC: 24 MMOL/L (ref 21–32)
COLOR UR: YELLOW
CREAT SERPL-MCNC: 1.05 MG/DL (ref 0.8–1.5)
EPI CELLS #/AREA URNS HPF: ABNORMAL /HPF
ERYTHROCYTE [DISTWIDTH] IN BLOOD BY AUTOMATED COUNT: 12.4 % (ref 11.9–14.6)
GLUCOSE SERPL-MCNC: 114 MG/DL (ref 65–100)
GLUCOSE UR STRIP.AUTO-MCNC: NEGATIVE MG/DL
HCT VFR BLD AUTO: 43.9 % (ref 41.1–50.3)
HGB BLD-MCNC: 15 G/DL (ref 13.6–17.2)
HGB UR QL STRIP: ABNORMAL
KETONES UR QL STRIP.AUTO: NEGATIVE MG/DL
LEUKOCYTE ESTERASE UR QL STRIP.AUTO: NEGATIVE
MCH RBC QN AUTO: 32.1 PG (ref 26.1–32.9)
MCHC RBC AUTO-ENTMCNC: 34.2 G/DL (ref 31.4–35)
MCV RBC AUTO: 93.8 FL (ref 79.6–97.8)
NITRITE UR QL STRIP.AUTO: NEGATIVE
NRBC # BLD: 0 K/UL (ref 0–0.2)
PH UR STRIP: 6 [PH] (ref 5–9)
PLATELET # BLD AUTO: 181 K/UL (ref 150–450)
PMV BLD AUTO: 11.1 FL (ref 9.4–12.3)
POTASSIUM SERPL-SCNC: 3.8 MMOL/L (ref 3.5–5.1)
PROT UR STRIP-MCNC: NEGATIVE MG/DL
RBC # BLD AUTO: 4.68 M/UL (ref 4.23–5.6)
RBC #/AREA URNS HPF: ABNORMAL /HPF
SODIUM SERPL-SCNC: 142 MMOL/L (ref 138–145)
SP GR UR REFRACTOMETRY: 1.02 (ref 1–1.02)
UROBILINOGEN UR QL STRIP.AUTO: 0.2 EU/DL (ref 0.2–1)
WBC # BLD AUTO: 4.2 K/UL (ref 4.3–11.1)
WBC URNS QL MICRO: ABNORMAL /HPF

## 2021-12-03 PROCEDURE — 77030037088 HC TUBE ENDOTRACH ORAL NSL COVD-A: Performed by: ANESTHESIOLOGY

## 2021-12-03 PROCEDURE — 80048 BASIC METABOLIC PNL TOTAL CA: CPT

## 2021-12-03 PROCEDURE — 74011250636 HC RX REV CODE- 250/636: Performed by: ANESTHESIOLOGY

## 2021-12-03 PROCEDURE — 76010000153 HC OR TIME 1.5 TO 2 HR: Performed by: UROLOGY

## 2021-12-03 PROCEDURE — 76060000034 HC ANESTHESIA 1.5 TO 2 HR: Performed by: UROLOGY

## 2021-12-03 PROCEDURE — 82355 CALCULUS ANALYSIS QUAL: CPT

## 2021-12-03 PROCEDURE — 74011250636 HC RX REV CODE- 250/636: Performed by: NURSE ANESTHETIST, CERTIFIED REGISTERED

## 2021-12-03 PROCEDURE — C1894 INTRO/SHEATH, NON-LASER: HCPCS | Performed by: UROLOGY

## 2021-12-03 PROCEDURE — C2617 STENT, NON-COR, TEM W/O DEL: HCPCS | Performed by: UROLOGY

## 2021-12-03 PROCEDURE — 2709999900 HC NON-CHARGEABLE SUPPLY: Performed by: UROLOGY

## 2021-12-03 PROCEDURE — 77030040361 HC SLV COMPR DVT MDII -B: Performed by: UROLOGY

## 2021-12-03 PROCEDURE — 77030040922 HC BLNKT HYPOTHRM STRY -A: Performed by: ANESTHESIOLOGY

## 2021-12-03 PROCEDURE — 77030038846 HC SCPE URETSCP LITHOVUE DISP BSC -H: Performed by: UROLOGY

## 2021-12-03 PROCEDURE — 77030006974 HC BSKT URET RTVR BSC -C: Performed by: UROLOGY

## 2021-12-03 PROCEDURE — 77030019908 HC STETH ESOPH SIMS -A: Performed by: ANESTHESIOLOGY

## 2021-12-03 PROCEDURE — C1769 GUIDE WIRE: HCPCS | Performed by: UROLOGY

## 2021-12-03 PROCEDURE — 85027 COMPLETE CBC AUTOMATED: CPT

## 2021-12-03 PROCEDURE — 77030039425 HC BLD LARYNG TRULITE DISP TELE -A: Performed by: ANESTHESIOLOGY

## 2021-12-03 PROCEDURE — 88300 SURGICAL PATH GROSS: CPT

## 2021-12-03 PROCEDURE — 77030019927 HC TBNG IRR CYSTO BAXT -A: Performed by: UROLOGY

## 2021-12-03 PROCEDURE — 76210000020 HC REC RM PH II FIRST 0.5 HR: Performed by: UROLOGY

## 2021-12-03 PROCEDURE — 81001 URINALYSIS AUTO W/SCOPE: CPT

## 2021-12-03 PROCEDURE — 76210000006 HC OR PH I REC 0.5 TO 1 HR: Performed by: UROLOGY

## 2021-12-03 PROCEDURE — 52356 CYSTO/URETERO W/LITHOTRIPSY: CPT | Performed by: UROLOGY

## 2021-12-03 PROCEDURE — 74011000250 HC RX REV CODE- 250: Performed by: ANESTHESIOLOGY

## 2021-12-03 PROCEDURE — 74011000250 HC RX REV CODE- 250: Performed by: NURSE ANESTHETIST, CERTIFIED REGISTERED

## 2021-12-03 DEVICE — URETERAL STENT
Type: IMPLANTABLE DEVICE | Site: URETER | Status: FUNCTIONAL
Brand: PERCUFLEX™ PLUS

## 2021-12-03 RX ORDER — FENTANYL CITRATE 50 UG/ML
INJECTION, SOLUTION INTRAMUSCULAR; INTRAVENOUS AS NEEDED
Status: DISCONTINUED | OUTPATIENT
Start: 2021-12-03 | End: 2021-12-03 | Stop reason: HOSPADM

## 2021-12-03 RX ORDER — HYDROCODONE BITARTRATE AND ACETAMINOPHEN 5; 325 MG/1; MG/1
1 TABLET ORAL
Qty: 20 TABLET | Refills: 0 | Status: SHIPPED | OUTPATIENT
Start: 2021-12-03 | End: 2021-12-10

## 2021-12-03 RX ORDER — OXYCODONE HYDROCHLORIDE 5 MG/1
5 TABLET ORAL
Status: DISCONTINUED | OUTPATIENT
Start: 2021-12-03 | End: 2021-12-03 | Stop reason: HOSPADM

## 2021-12-03 RX ORDER — ROCURONIUM BROMIDE 10 MG/ML
INJECTION, SOLUTION INTRAVENOUS AS NEEDED
Status: DISCONTINUED | OUTPATIENT
Start: 2021-12-03 | End: 2021-12-03 | Stop reason: HOSPADM

## 2021-12-03 RX ORDER — LIDOCAINE HYDROCHLORIDE 20 MG/ML
INJECTION, SOLUTION EPIDURAL; INFILTRATION; INTRACAUDAL; PERINEURAL AS NEEDED
Status: DISCONTINUED | OUTPATIENT
Start: 2021-12-03 | End: 2021-12-03 | Stop reason: HOSPADM

## 2021-12-03 RX ORDER — PROPOFOL 10 MG/ML
INJECTION, EMULSION INTRAVENOUS AS NEEDED
Status: DISCONTINUED | OUTPATIENT
Start: 2021-12-03 | End: 2021-12-03 | Stop reason: HOSPADM

## 2021-12-03 RX ORDER — NEOSTIGMINE METHYLSULFATE 1 MG/ML
INJECTION, SOLUTION INTRAVENOUS AS NEEDED
Status: DISCONTINUED | OUTPATIENT
Start: 2021-12-03 | End: 2021-12-03 | Stop reason: HOSPADM

## 2021-12-03 RX ORDER — MIDAZOLAM HYDROCHLORIDE 1 MG/ML
2 INJECTION, SOLUTION INTRAMUSCULAR; INTRAVENOUS
Status: DISCONTINUED | OUTPATIENT
Start: 2021-12-03 | End: 2021-12-03 | Stop reason: HOSPADM

## 2021-12-03 RX ORDER — ONDANSETRON 2 MG/ML
INJECTION INTRAMUSCULAR; INTRAVENOUS AS NEEDED
Status: DISCONTINUED | OUTPATIENT
Start: 2021-12-03 | End: 2021-12-03 | Stop reason: HOSPADM

## 2021-12-03 RX ORDER — METOPROLOL TARTRATE 5 MG/5ML
2.5 INJECTION INTRAVENOUS ONCE
Status: COMPLETED | OUTPATIENT
Start: 2021-12-03 | End: 2021-12-03

## 2021-12-03 RX ORDER — FENTANYL CITRATE 50 UG/ML
100 INJECTION, SOLUTION INTRAMUSCULAR; INTRAVENOUS ONCE
Status: DISCONTINUED | OUTPATIENT
Start: 2021-12-03 | End: 2021-12-03 | Stop reason: HOSPADM

## 2021-12-03 RX ORDER — SODIUM CHLORIDE, SODIUM LACTATE, POTASSIUM CHLORIDE, CALCIUM CHLORIDE 600; 310; 30; 20 MG/100ML; MG/100ML; MG/100ML; MG/100ML
75 INJECTION, SOLUTION INTRAVENOUS CONTINUOUS
Status: DISCONTINUED | OUTPATIENT
Start: 2021-12-03 | End: 2021-12-03 | Stop reason: HOSPADM

## 2021-12-03 RX ORDER — MIDAZOLAM HYDROCHLORIDE 1 MG/ML
2 INJECTION, SOLUTION INTRAMUSCULAR; INTRAVENOUS ONCE
Status: DISCONTINUED | OUTPATIENT
Start: 2021-12-03 | End: 2021-12-03 | Stop reason: HOSPADM

## 2021-12-03 RX ORDER — ALBUTEROL SULFATE 0.83 MG/ML
2.5 SOLUTION RESPIRATORY (INHALATION) AS NEEDED
Status: DISCONTINUED | OUTPATIENT
Start: 2021-12-03 | End: 2021-12-03 | Stop reason: HOSPADM

## 2021-12-03 RX ORDER — GLYCOPYRROLATE 0.2 MG/ML
INJECTION INTRAMUSCULAR; INTRAVENOUS AS NEEDED
Status: DISCONTINUED | OUTPATIENT
Start: 2021-12-03 | End: 2021-12-03 | Stop reason: HOSPADM

## 2021-12-03 RX ORDER — LIDOCAINE HYDROCHLORIDE 10 MG/ML
0.1 INJECTION INFILTRATION; PERINEURAL AS NEEDED
Status: DISCONTINUED | OUTPATIENT
Start: 2021-12-03 | End: 2021-12-03 | Stop reason: HOSPADM

## 2021-12-03 RX ORDER — PHENAZOPYRIDINE HYDROCHLORIDE 200 MG/1
200 TABLET, FILM COATED ORAL
Qty: 9 TABLET | Refills: 1 | Status: SHIPPED | OUTPATIENT
Start: 2021-12-03 | End: 2021-12-06

## 2021-12-03 RX ORDER — SUCCINYLCHOLINE CHLORIDE 20 MG/ML
INJECTION INTRAMUSCULAR; INTRAVENOUS AS NEEDED
Status: DISCONTINUED | OUTPATIENT
Start: 2021-12-03 | End: 2021-12-03 | Stop reason: HOSPADM

## 2021-12-03 RX ORDER — CEPHALEXIN 500 MG/1
500 CAPSULE ORAL 2 TIMES DAILY
Qty: 10 CAPSULE | Refills: 0 | Status: SHIPPED | OUTPATIENT
Start: 2021-12-03 | End: 2021-12-08

## 2021-12-03 RX ORDER — HYOSCYAMINE SULFATE 0.12 MG/1
0.12 TABLET SUBLINGUAL
Qty: 30 TABLET | Refills: 1 | Status: SHIPPED | OUTPATIENT
Start: 2021-12-03

## 2021-12-03 RX ORDER — HYDROMORPHONE HYDROCHLORIDE 2 MG/ML
0.5 INJECTION, SOLUTION INTRAMUSCULAR; INTRAVENOUS; SUBCUTANEOUS
Status: DISCONTINUED | OUTPATIENT
Start: 2021-12-03 | End: 2021-12-03 | Stop reason: HOSPADM

## 2021-12-03 RX ORDER — ACETAMINOPHEN 500 MG
1000 TABLET ORAL ONCE
Status: DISCONTINUED | OUTPATIENT
Start: 2021-12-03 | End: 2021-12-03 | Stop reason: HOSPADM

## 2021-12-03 RX ORDER — SODIUM CHLORIDE, SODIUM LACTATE, POTASSIUM CHLORIDE, CALCIUM CHLORIDE 600; 310; 30; 20 MG/100ML; MG/100ML; MG/100ML; MG/100ML
50 INJECTION, SOLUTION INTRAVENOUS CONTINUOUS
Status: DISCONTINUED | OUTPATIENT
Start: 2021-12-03 | End: 2021-12-03 | Stop reason: HOSPADM

## 2021-12-03 RX ADMIN — GLYCOPYRROLATE 0.4 MG: 0.2 INJECTION, SOLUTION INTRAMUSCULAR; INTRAVENOUS at 13:51

## 2021-12-03 RX ADMIN — ONDANSETRON 4 MG: 2 INJECTION INTRAMUSCULAR; INTRAVENOUS at 12:55

## 2021-12-03 RX ADMIN — ROCURONIUM BROMIDE 10 MG: 10 INJECTION, SOLUTION INTRAVENOUS at 13:00

## 2021-12-03 RX ADMIN — Medication 3 MG: at 13:51

## 2021-12-03 RX ADMIN — FENTANYL CITRATE 100 MCG: 50 INJECTION INTRAMUSCULAR; INTRAVENOUS at 12:45

## 2021-12-03 RX ADMIN — SUCCINYLCHOLINE CHLORIDE 180 MG: 20 INJECTION, SOLUTION INTRAMUSCULAR; INTRAVENOUS at 12:46

## 2021-12-03 RX ADMIN — METOPROLOL TARTRATE 2.5 MG: 5 INJECTION INTRAVENOUS at 14:29

## 2021-12-03 RX ADMIN — CEFAZOLIN 3 G: 1 INJECTION, POWDER, FOR SOLUTION INTRAVENOUS at 13:00

## 2021-12-03 RX ADMIN — PROPOFOL 200 MG: 10 INJECTION, EMULSION INTRAVENOUS at 12:45

## 2021-12-03 RX ADMIN — LIDOCAINE HYDROCHLORIDE 80 MG: 20 INJECTION, SOLUTION EPIDURAL; INFILTRATION; INTRACAUDAL; PERINEURAL at 12:45

## 2021-12-03 RX ADMIN — SODIUM CHLORIDE, SODIUM LACTATE, POTASSIUM CHLORIDE, AND CALCIUM CHLORIDE 75 ML/HR: 600; 310; 30; 20 INJECTION, SOLUTION INTRAVENOUS at 11:57

## 2021-12-03 RX ADMIN — PROPOFOL 40 MG: 10 INJECTION, EMULSION INTRAVENOUS at 13:51

## 2021-12-03 RX ADMIN — PROPOFOL 40 MG: 10 INJECTION, EMULSION INTRAVENOUS at 13:49

## 2021-12-03 NOTE — BRIEF OP NOTE
Brief Postoperative Note    Patient: Fernando Beckett  YOB: 1960  MRN: [id number]    Date of Procedure: 12/3/2021     Pre-Op Diagnosis: Ureteral stone [N20.1]    Post-Op Diagnosis: Same as preoperative diagnosis. Procedure(s):  CYSTOSCOPY RIGHT URETEROSCOPY LASER LITHO BASKET STONE EXTRACTION RIGHT STENT PLACEMENT    Surgeon(s): Jose Art MD    Surgical Assistant: None    Anesthesia: General     Estimated Blood Loss (mL): Minimal    Complications: None    Specimens:   ID Type Source Tests Collected by Time Destination   1 : right kidney stone [facility]  Jose Art MD 12/3/2021 1334 Pathology        Implants:   Implant Name Type Inv.  Item Serial No.  Lot No. LRB No. Used Action   STENT URET 6FR L26CM HYDR+ PGTL TAPR Marie Jitendraaric MRK  - [id number]  Anna Purl 6FR L26CM HYDR+ PGTL TAPR TIP GRAD IKLEY MRK   [employer]_ [id number] Right 1 Implanted       Drains: * No LDAs found *    Findings: See op note    Electronically Signed by Froilan Obrien MD on 12/3/2021 at 2:03 PM

## 2021-12-03 NOTE — ANESTHESIA PREPROCEDURE EVALUATION
Relevant Problems   RESPIRATORY SYSTEM   (+) Sleep apnea, obstructive      CARDIOVASCULAR   (+) Essential hypertension with goal blood pressure less than 130/80   (+) Paroxysmal atrial flutter (HCC)       Anesthetic History     Other anesthesia complications     Comments: Urinary retention     Review of Systems / Medical History  Patient summary reviewed, nursing notes reviewed and pertinent labs reviewed    Pulmonary        Sleep apnea: CPAP    Asthma        Neuro/Psych   Within defined limits           Cardiovascular    Hypertension: well controlled        Dysrhythmias : atrial fibrillation      Exercise tolerance: >4 METS  Comments: TTE 2019- EF 60-65%, no signif valve abnl   GI/Hepatic/Renal  Within defined limits              Endo/Other        Morbid obesity and arthritis     Other Findings   Comments: Hx DVT in past- maintained on Eliquis         Physical Exam    Airway  Mallampati: II  TM Distance: > 6 cm  Neck ROM: normal range of motion   Mouth opening: Normal     Cardiovascular  Regular rate and rhythm,  S1 and S2 normal,  no murmur, click, rub, or gallop             Dental  No notable dental hx       Pulmonary  Breath sounds clear to auscultation               Abdominal         Other Findings            Anesthetic Plan    ASA: 3  Anesthesia type: general          Induction: Intravenous  Anesthetic plan and risks discussed with: Patient and Family

## 2021-12-03 NOTE — DISCHARGE INSTRUCTIONS
3 - 5 Days You will be called to schedule stent removal next week. Please call my office if you develop fever > 100.5 or inability to urinate or other symptoms/concerns at [phone]        Ureteral Stent Placement: What to Expect at [address] [place] [address]  A ureteral (say \"you-REE-ter-ul\") stent is a thin, hollow tube that is placed in the ureter to help urine pass from the kidney into the bladder. Ureters are the tubes that connect the kidneys to the bladder. You may have a small amount of blood in your urine for 1 to 3 days after the procedure. While the stent is in place, you may have to urinate more often, feel a sudden need to urinate, or feel like you cannot completely empty your bladder. You may feel some pain when you urinate or do strenuous activity. You also may notice a small amount of blood in your urine after strenuous activities. These side effects usually do not prevent people from doing their normal daily activities. You may have a string attached to your stent. Do not to pull the string unless the doctor tells you to. The doctor will use the string to pull out the stent when you no longer need it. After the procedure, urine may flow better from your kidneys to your bladder. A ureteral stent may be left in place for several days or for as long as several months. Your doctor will take it out when you no longer need it. Cystoscopy: What to Expect at [address] [place] [address]  A cystoscopy is a procedure that lets a doctor look inside of the bladder and the urethra. The urethra is the tube that carries urine from the bladder to outside the body. The doctor uses a thin, lighted tube called a cystoscope to look for kidney or bladder stones, tumors, bleeding, or infection. After the cystoscopy, your urethra may be sore at first, and it may burn when you urinate for the first few days after the procedure. You may feel the need to urinate more often, and your urine may be pink.  These symptoms should get better in 1 or 2 days. You will probably be able to go back to work or most of your usual activities in 1 or 2 days. How can you care for yourself at home? Activity  · Rest when you feel tired. Getting enough sleep will help you recover. · Try to walk each day. Start by walking a little more than you did the day before. Bit by bit, increase the amount you walk. Walking boosts blood flow and helps prevent pneumonia and constipation. · Avoid strenuous activities, such as bicycle riding, jogging, weight lifting, or aerobic exercise, until your doctor says it is okay. · Ask your doctor when you can drive again. · Most people are able to return to work within 1 or 2 days after the procedure. · You may shower and take baths as usual.  · Ask your doctor when it is okay for you to have sex. Diet  · You can eat your normal diet. If your stomach is upset, try bland, low-fat foods like plain rice, broiled chicken, toast, and yogurt. · Drink plenty of fluids (unless your doctor tells you not to). Medicines  · Take pain medicines exactly as directed. ¨ If the doctor gave you a prescription medicine for pain, take it as prescribed. ¨ If you are not taking a prescription pain medicine, ask your doctor if you can take an over-the-counter medicine. · If you think your pain medicine is making you sick to your stomach:  ¨ Take your medicine after meals (unless your doctor has told you not to). ¨ Ask your doctor for a different pain medicine. · If your doctor prescribed antibiotics, take them as directed. Do not stop taking them just because you feel better. You need to take the full course of antibiotics. Medication Interaction:  During your procedure you potentially received a medication or medications which may reduce the effectiveness of oral contraceptives.  Please consider other forms of contraception for 1 month following your procedure if you are currently using oral contraceptives as your primary form of birth control. In addition to this, we recommend continuing your oral contraceptive as prescribed, unless otherwise instructed by your physician, during this time. Follow-up care is a key part of your treatment and safety. Be sure to make and go to all appointments, and call your doctor if you are having problems. It's also a good idea to know your test results and keep a list of the medicines you take. When should you call for help? Call 911 anytime you think you may need emergency care. For example, call if:  · You passed out (lost consciousness). · You have severe trouble breathing. · You have sudden chest pain and shortness of breath, or you cough up blood. · You have severe belly pain. Call your doctor now or seek immediate medical care if:  · You are sick to your stomach or cannot keep fluids down. · Your urine is still red or you see blood clots after you have urinated several times. · You have trouble passing urine or stool, especially if you have pain or swelling in your lower belly. · You have signs of a blood clot, such as:  ¨ Pain in your calf, back of the knee, thigh, or groin. ¨ Redness and swelling in your leg or groin. · You develop a fever or severe chills. · You have pain in your back just below your rib cage. This is called flank pain. Watch closely for changes in your health, and be sure to contact your doctor if:  · A burning feeling is normal for a day or two after the test, but call if it does not get better. · You have a frequent urge to urinate but can pass only small amounts of urine. · It is normal for the urine to have a pinkish color for a few days after the test, but call if it does not get better or if your urine is cloudy, smells bad, or has pus in it.     After general anesthesia or intravenous sedation, for 24 hours or while taking prescription Narcotics:  · Limit your activities  · A responsible adult needs to be with you for the next 24 hours  · Do not drive and operate hazardous machinery  · Do not make important personal or business decisions  · Do not drink alcoholic beverages  · If you have not urinated within 8 hours after discharge, and you are experiencing discomfort from urinary retention, please go to the nearest ED. · If you have sleep apnea and have a CPAP machine, please use it for all naps and sleeping. · Please use caution when taking narcotics and any of your home medications that may cause drowsiness. *  Please give a list of your current medications to your Primary Care Provider. *  Please update this list whenever your medications are discontinued, doses are      changed, or new medications (including over-the-counter products) are added. *  Please carry medication information at all times in case of emergency situations. These are general instructions for a healthy lifestyle:  No smoking/ No tobacco products/ Avoid exposure to second hand smoke  Surgeon General's Warning:  Quitting smoking now greatly reduces serious risk to your health. Obesity, smoking, and sedentary lifestyle greatly increases your risk for illness  A healthy diet, regular physical exercise & weight monitoring are important for maintaining a healthy lifestyle    You may be retaining fluid if you have a history of heart failure or if you experience any of the following symptoms:  Weight gain of 3 pounds or more overnight or 5 pounds in a week, increased swelling in our hands or feet or shortness of breath while lying flat in bed. Please call your doctor as soon as you notice any of these symptoms; do not wait until your next office visit.

## 2021-12-03 NOTE — ANESTHESIA POSTPROCEDURE EVALUATION
Procedure(s):  CYSTOSCOPY RIGHT URETEROSCOPY LASER LITHO BASKET STONE EXTRACTION RIGHT STENT PLACEMENT.     general    Anesthesia Post Evaluation      Multimodal analgesia: multimodal analgesia used between 6 hours prior to anesthesia start to PACU discharge  Patient location during evaluation: PACU  Patient participation: complete - patient participated  Level of consciousness: awake  Pain management: adequate  Airway patency: patent  Anesthetic complications: no  Cardiovascular status: acceptable  Respiratory status: acceptable, spontaneous ventilation and nonlabored ventilation  Hydration status: acceptable  Post anesthesia nausea and vomiting:  none      INITIAL Post-op Vital signs:   Vitals Value Taken Time   /84 12/03/21 1445   Temp 36.4 °C (97.5 °F) 12/03/21 1445   Pulse 75 12/03/21 1445   Resp 14 12/03/21 1445   SpO2 93 % 12/03/21 1445

## 2021-12-03 NOTE — H&P
[address] Urology H&P Note                                           12/03/21     Patient: Kojo Kinney  MRN: [id number]    Admission Date:  12/3/2021, 0  Admission Diagnosis: Ureteral stone [N20.1]    ASSESSMENT: 57 y.o. male with R ureter and kidney stones who presents for surgery today. PLAN:  I reviewed the risks/benefits/alternatives for stone treatment today in detail with the patient. Treatment options discussed include medical expulsive therapy (MET) vs. ESWL vs. Ureteroscopy/laser lithotropsy vs. PCNL. After our discussion, the patient would like to proceed with RIGHT Ureteroscopy/Laser Lithotripsy/Basket Extraction of Stone/Stent Placement. Risks of ureteroscopy that we discussed were bleeding, infection, injury to the bladder/ureter/kidney and surrounding structures, incomplete fragmentation of stones, steinstrasse, inability to pass stone fragments requiring additional operative intervention in the form of second look ureteroscopy/laser lithotripsy and/or stent placement, ureteral stricture, stent migration, stent pain, urinary retention, risks of general anesthesia, recurrent stones. The patient expressed understanding of the above.      >50% of visit spent counseling patient on the above.        __________________________________________________________________________________    HPI:     Kojo Kinney is a 57 y.o. male right kidney/ureter stones who presents for treatment today. No changes since last encounter with me. Off eliquis X 3 days. No fevers/chills. Past Medical History:  Past Medical History:   Diagnosis Date    Abnormal EKG 3/8/2017    Adverse effect of anesthesia     6-8 hours post-op B/P dropped and pt was not able to urinate- had to be cath. - after gastric sleeve    Arthritis     osteo-knee, toe    Asthma     childhood asthma    Borderline diabetes     diet controlled, 8/27/20 A1c 4.9    Current use of long term anticoagulation     no longer on Coumadin or Lovenox injections    History of depression     pt denies any further complications    History of narcotic [facility] ([facility]     History of pulmonary embolism 2015    Treated with Coumadin- then lovenox bridge- IVC filter placed-removed    HTN (hypertension)     controlled with meds    Kidney stone 2021    Persistent atrial fibrillation ([facility]     Followed by [facility]./ rate controlled, eliquis    Presence of IVC filter     placed prior to gastric sleeve.  Sleep apnea     uses CPAP    Status following surgery for weight loss 05/2019    gastric sleeve    Venous stasis        Past Surgical History:  Past Surgical History:   Procedure Laterality Date    HX COLONOSCOPY  10/20/2015    HX GASTRECTOMY  05/20/2019    sleeve    HX OTHER SURGICAL  05/2019    gastric sleeve    HX REFRACTIVE SURGERY Bilateral     HX TONSILLECTOMY  1965    IR IVC FILTER  05/08/2019    Dr Gerlene Bosworth IR REMOVE IVC FILTER [address]  6/21/2019       Medications:  No current facility-administered medications on file prior to encounter. Current Outpatient Medications on File Prior to Encounter   Medication Sig Dispense Refill    carvediloL (COREG) 6.25 mg tablet TAKE 1 TABLET TWICE DAILY  WITH MEALS 180 Tablet 1    calcium-cholecalciferol, d3, (CALCIUM 600 + D) 600-125 mg-unit tab Take 1 Tab by mouth daily.  multivitamin (ONE A DAY) tablet Take 1 Tab by mouth daily.  Yyyceuhv-Wfmz-Onipcy-Hyalur Ac 326-070-54-2 mg cap Take 1 Tab by mouth two (2) times a day.  Eliquis 5 mg tablet TAKE 1 TABLET BY MOUTH TWICE DAILY (Patient taking differently: Last dose 11/30/21 morning) 60 Tablet 5    cpap machine kit by Does Not Apply route. Allergies:   Allergies   Allergen Reactions    Amlodipine Other (comments)     edema    Contrave [Naltrexone-Bupropion] Other (comments)     Nausea first and then sweet craving    Hydrochlorothiazide Other (comments)     gout    Lisinopril Cough    Other Medication Other (comments)     NKDA-but do not prescribe narcotics or Mood altering drugs       Social History:  Social History     Socioeconomic History    Marital status:      Spouse name: Not on file    Number of children: Not on file    Years of education: Not on file    Highest education level: Not on file   Occupational History    Not on file   Tobacco Use    Smoking status: Never Smoker    Smokeless tobacco: Never Used   Vaping Use    Vaping Use: Never used   Substance and Sexual Activity    Alcohol use: No    Drug use: No     Comment: prior hx narcotic abuse at young age- none for the past 27 Years    Sexual activity: Not on file   Other Topics Concern     Service Not Asked    Blood Transfusions Not Asked    Caffeine Concern Not Asked    Occupational Exposure Not Asked    Hobby Hazards Not Asked    Sleep Concern Not Asked    Stress Concern Not Asked    Weight Concern Not Asked    Special Diet Not Asked    Back Care Not Asked    Exercise Yes     Comment: Healthy Self tiw and hiking some    Bike Helmet Not Asked   [address] Not Asked    Self-Exams Not Asked   Social History Narrative    Lives alone, working IT    Had to go into work for a few days and not walking much     Social Determinants of Health     Financial Resource Strain:     Difficulty of Paying Living Expenses: Not on file   Food Insecurity:     Worried About [address] in the Last Year: Not on file    [address] in the Last Year: Not on file   Transportation Needs:     Lack of Transportation (Medical): Not on file    Lack of Transportation (Non-Medical):  Not on file   Physical Activity:     Days of Exercise per Week: Not on file    Minutes of Exercise per Session: Not on file   Stress:     Feeling of Stress : Not on file   Social Connections:     Frequency of Communication with Friends and Family: Not on file    Frequency of Social Gatherings with Friends and Family: Not on file    Attends Evangelical Services: Not on file    Active Member of Clubs or Organizations: Not on file    Attends Club or Organization Meetings: Not on file    Marital Status: Not on file   Intimate Partner Violence:     Fear of Current or Ex-Partner: Not on file    Emotionally Abused: Not on file    Physically Abused: Not on file    Sexually Abused: Not on file   Housing Stability:     Unable to Pay for Housing in the Last Year: Not on file    Number of [place] in the Last Year: Not on file    Unstable Housing in the Last Year: Not on file       Family History:  Family History   Problem Relation Age of Onset    Hypertension Father     COPD Mother     Heart Disease Paternal Aunt     Diabetes Neg Hx        ROS:  Review of Systems - General ROS: negative for - chills, fatigue or fever  Respiratory ROS: no cough, shortness of breath, or wheezing  Cardiovascular ROS: no chest pain or dyspnea on exertion  Gastrointestinal ROS: no abdominal pain, change in bowel habits, or black or bloody stools  Genito-Urinary ROS: no dysuria, trouble voiding, or hematuria  Musculoskeletal ROS: negative  negative for - muscular weakness    Vitals:  Visit Vitals  /64 (BP 1 Location: Right upper arm, BP Patient Position: At rest)   Pulse 82   Temp 99.2 °F (37.3 °C)   Resp 18   Ht 5' 10\" (1.778 m)   Wt (!) 360 lb (163.3 kg)   SpO2 95%   BMI 51.65 kg/m²       Intake/Output:  No intake or output data in the 24 hours ending 12/03/21 1230     Physical Exam:   Visit Vitals  /64 (BP 1 Location: Right upper arm, BP Patient Position: At rest)   Pulse 82   Temp 99.2 °F (37.3 °C)   Resp 18   Ht 5' 10\" (1.778 m)   Wt (!) 360 lb (163.3 kg)   SpO2 95%   BMI 51.65 kg/m²        GENERAL: No acute distress, Awake, Alert, Oriented X 3  CARDIAC: regular rate and rhythm  CHEST AND LUNG: Easy work of breathing  ABDOMEN: soft, non tender, non-distended  SKIN: No rash, no erythema, no lacerations or abrasions, no ecchymosis  NEUROLOGIC: cranial nerves 2-12 grossly intact     Lab/Radiology/Other Diagnostic Tests:  No results for input(s): HGB, HCT, WBC, NA, K, CL, CO2, BUN, CR, GLU, CA, MG, ALBUMIN, AST, ALT, PREALB, INR, HGBEXT, HCTEXT, INREXT in the last 72 hours. No lab exists for component: PLTCT, PO4, TOTPROT, TOTBILI, ALKPHOS, KIKE, LIPASE, PT, PTT      David Hendrickson M.D.     [facility]  [address]  Phone: [phone]  Fax: [phone]

## 2021-12-04 NOTE — OP NOTES
[address] [place] [address]  OPERATIVE REPORT    Name:  Nerissa Killian  MR#:  [id number]  :  1960  ACCOUNT #:  [de-identified]  DATE OF SERVICE:  2021    PREOPERATIVE DIAGNOSIS:  Right ureter and right kidney stones. POSTOPERATIVE DIAGNOSIS:  Right ureter and right kidney stones. PROCEDURES PERFORMED:  Cystoscopy, right ureteroscopy, laser lithotripsy, basket stone extraction, right ureteral stent placement. SURGEON:  Meghan Schneider MD    ASSISTANT:  None    ANESTHESIA:  General.    COMPLICATIONS:  None. SPECIMENS REMOVED:  Right ureter and kidney stones for analysis. IMPLANTS:  6 x 26 double-J right ureteral stent with strings. ESTIMATED BLOOD LOSS:  Minimal.    DRAINS:  None. FINDINGS:  See op note. INDICATIONS FOR OPERATIVE PROCEDURE:  The patient is a 58-year-old gentleman with an obstructing 9-mm right proximal ureteral stone as well as nonobstructing right kidney stones that failed to pass with conservative management. He presents today for definitive treatment of the stone. PROCEDURE:  After informed consent was obtained and correct patient was identified in the preoperative holding area. He was taken back to operating room suite and placed on table in supine position. Time-out was performed confirming the correct patient and planned procedure. He received 3 g IV Ancef prior to the smooth induction of general endotracheal anesthesia. He was moved into dorsal lithotomy position and prepped and draped in the usual sterile fashion. I began the case by inserting a 22-Macedonian rigid cystoscope with a 30-degree lens in the urethra and advanced into the patient's bladder. Pancystoscopy was performed, which was unremarkable except for a moderate BPH with trilobar hypertrophy amenable to a TURP but not UroLift. He also had a high-riding bladder neck. The ureteral orifices were in their orthotopic positions bilaterally.   I cannulated the right ureteral orifice with a sensor wire. I advanced this under fluoroscopic guidance into the right renal pelvis. Once the wire was in position, I backloaded the scope off the wire. I reinserted the semi-rigid ureteroscope into the urethra and advanced it with a second sensor wire under direct vision up to the proximal right ureteral stone. I then removed the second sensor wire and inserted a 200-micron laser fiber with the settings of 0.6 joules and 6 Hz. I lasered the stone into basketable pieces. A [employer] basket was used to the basket the pieces and I deposited them into the bladder. A few of the pieces, however, blew up to the patient's kidney and were not able to be accessed with the semi-rigid ureteroscope. I therefore replaced the second sensor wire through the scope and removed the semi-rigid scope. I then placed an 11 x 13 ureteral access catheter under fluoroscopic guidance into the proximal right ureter. Once this was in position, I then removed the inner sheath leaving the outer sheath in place. I then switched to my flexible ureteroscope and inserted this through the access sheath into the patient's right kidney. Here, I encountered the additional stones that were nonobstructing as well as a few fragments from his ureter that had blown up into the kidney. I lasered these at the settings of 0.8 joules and 8 Hz to smaller pieces and used the [place] basket to removed them. These pieces were sent off for analysis. After I removed all the significant fragments from the patient's right kidney, I did not notice any other significant fragments as he did have a few smaller fragments that were too small for the basket and I felt to be passable with a stent. I therefore backed the scope down the patient's ureter removing the scope as well as the access sheath leaving the remaining sensor wire in place as a safety. There was no evidence of any further ureteral stones and no evidence of ureteral injury.   I then loaded the wire onto the rigid cystoscope and passed a 6 x 26 double-J right ureteral stent with strings of the wire under fluoroscopic guidance into the right renal pelvis. Once the stent was in position, I pulled the wire noting a good curl in the right renal pelvis as well as the patient's bladder. I left a string extruding from the meatus and secured into the penis with Mastisol and Steri-Strips. I drained the bladder completely. The patient was then awoken from anesthesia, transferred to PACU in stable condition. He tolerated the procedure well. There were no complications. All counts were correct at the end of the procedure.       Jeanette Magdaleno MD      [id number]  D:  12/03/2021 14:14  T:  12/04/2021 1:20  JOB #:  [id number]

## 2022-01-07 ENCOUNTER — HOSPITAL ENCOUNTER (OUTPATIENT)
Dept: ULTRASOUND IMAGING | Age: 62
Discharge: HOME | End: 2022-01-07
Attending: NURSE PRACTITIONER
Payer: COMMERCIAL

## 2022-01-07 DIAGNOSIS — N20.1 RIGHT URETERAL STONE: ICD-10-CM

## 2022-01-07 PROCEDURE — 76770 US EXAM ABDO BACK WALL COMP: CPT

## 2022-01-07 NOTE — PROGRESS NOTES
Renal ultrasound shows kidney stones on left and right that are not causing problems. Will monitor this with xray in offcie.  Otherwise normal.     Chay Bernal, NP

## 2022-03-18 PROBLEM — Z76.89 ENCOUNTER FOR WEIGHT MANAGEMENT: Status: ACTIVE | Noted: 2019-08-30

## 2022-03-19 PROBLEM — I10 ESSENTIAL HYPERTENSION WITH GOAL BLOOD PRESSURE LESS THAN 130/80: Status: ACTIVE | Noted: 2017-03-08

## 2022-03-19 PROBLEM — Z79.01 HX OF LONG TERM USE OF BLOOD THINNERS: Status: ACTIVE | Noted: 2021-02-10

## 2022-03-19 PROBLEM — Z92.29 HX OF LONG TERM USE OF BLOOD THINNERS: Status: ACTIVE | Noted: 2021-02-10

## 2022-03-19 PROBLEM — R36.1 HEMATOSPERMIA: Status: ACTIVE | Noted: 2021-02-10

## 2022-03-19 PROBLEM — M79.671 HEEL PAIN, CHRONIC, RIGHT: Status: ACTIVE | Noted: 2019-03-13

## 2022-03-19 PROBLEM — G89.29 HEEL PAIN, CHRONIC, RIGHT: Status: ACTIVE | Noted: 2019-03-13

## 2022-03-19 PROBLEM — Z98.84 S/P BARIATRIC SURGERY: Status: ACTIVE | Noted: 2019-07-10

## 2022-03-20 PROBLEM — R31.21 ASYMPTOMATIC MICROSCOPIC HEMATURIA: Status: ACTIVE | Noted: 2019-08-30

## 2022-09-19 ENCOUNTER — OFFICE VISIT (OUTPATIENT)
Dept: UROLOGY | Age: 62
End: 2022-09-19
Payer: COMMERCIAL

## 2022-09-19 DIAGNOSIS — R35.0 BENIGN PROSTATIC HYPERPLASIA WITH URINARY FREQUENCY: ICD-10-CM

## 2022-09-19 DIAGNOSIS — N40.1 BENIGN PROSTATIC HYPERPLASIA WITH URINARY FREQUENCY: ICD-10-CM

## 2022-09-19 DIAGNOSIS — N20.0 CALCULUS OF KIDNEY: Primary | ICD-10-CM

## 2022-09-19 LAB
BILIRUBIN, URINE, POC: NEGATIVE
BLOOD URINE, POC: NORMAL
GLUCOSE URINE, POC: NEGATIVE
KETONES, URINE, POC: NEGATIVE
LEUKOCYTE ESTERASE, URINE, POC: NEGATIVE
NITRITE, URINE, POC: NEGATIVE
PH, URINE, POC: 6 (ref 4.6–8)
PROTEIN,URINE, POC: NEGATIVE
PSA SERPL-MCNC: 0.3 NG/ML
SPECIFIC GRAVITY, URINE, POC: 1.02 (ref 1–1.03)
URINALYSIS CLARITY, POC: NORMAL
URINALYSIS COLOR, POC: NORMAL
UROBILINOGEN, POC: NORMAL

## 2022-09-19 PROCEDURE — 99213 OFFICE O/P EST LOW 20 MIN: CPT | Performed by: UROLOGY

## 2022-09-19 PROCEDURE — 81003 URINALYSIS AUTO W/O SCOPE: CPT | Performed by: UROLOGY

## 2022-09-19 ASSESSMENT — ENCOUNTER SYMPTOMS
BACK PAIN: 0
NAUSEA: 0

## 2022-09-19 NOTE — PROGRESS NOTES
Status following surgery for weight loss 05/2019    gastric sleeve    Venous stasis      Past Surgical History:   Procedure Laterality Date    COLONOSCOPY  10/20/2015    GASTRECTOMY  05/20/2019    sleeve    IR IVC FILTER PLACEMENT W IMAGING  05/08/2019    Dr Werner Orellana     IR IVC FILTER RETRIEVAL  6/21/2019    IR IVC FILTER RETRIEVAL  6/21/2019    IR IVC FILTER RETRIEVAL 6/21/2019 [facility]    OTHER SURGICAL HISTORY  05/2019    gastric sleeve    REFRACTIVE SURGERY Bilateral     TONSILLECTOMY  1965     Current Outpatient Medications   Medication Sig Dispense Refill    acetaminophen (TYLENOL) 500 MG tablet Take by mouth as needed      Calcium Carbonate-Vitamin D (CALCIUM-VITAMIN D) 600-125 MG-UNIT TABS Take 1 tablet by mouth daily      carvedilol (COREG) 6.25 MG tablet TAKE 1 TABLET TWICE DAILY WITH MEALS      Hyoscyamine Sulfate SL 0.125 MG SUBL Place 0.125 mg under the tongue every 4 hours as needed      rivaroxaban (XARELTO) 20 MG TABS tablet Take 20 mg by mouth daily (with breakfast)       No current facility-administered medications for this visit.      Allergies   Allergen Reactions    Amlodipine Other (See Comments)     edema    Hydrochlorothiazide Other (See Comments)     gout    Lisinopril Other (See Comments)    Naltrexone-Bupropion Hcl Er Other (See Comments)     Nausea first and then sweet craving     Social History     Socioeconomic History    Marital status:      Spouse name: Not on file    Number of children: Not on file    Years of education: Not on file    Highest education level: Not on file   Occupational History    Not on file   Tobacco Use    Smoking status: Never    Smokeless tobacco: Never   Substance and Sexual Activity    Alcohol use: No    Drug use: No    Sexual activity: Not on file   Other Topics Concern    Not on file   Social History Narrative    Lives alone, 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 GI:  Negative for nausea. Musculoskeletal:  Negative for back pain. Urinalysis  UA - Dipstick  Results for orders placed or performed in visit on 09/19/22   AMB POC URINALYSIS DIP STICK AUTO W/O MICRO   Result Value Ref Range    Color (UA POC)      Clarity (UA POC)      Glucose, Urine, POC Negative Negative    Bilirubin, Urine, POC Negative Negative    KETONES, Urine, POC Negative Negative    Specific Gravity, Urine, POC 1.020 1.001 - 1.035    Blood (UA POC) Moderate Negative    pH, Urine, POC 6.0 4.6 - 8.0    Protein, Urine, POC Negative Negative    Urobilinogen, POC 0.2 mg/dL     Nitrite, Urine, POC Negative Negative    Leukocyte Esterase, Urine, POC Negative Negative       There were no vitals taken for this visit. GENERAL: No acute distress, Awake, Alert, Oriented X 3, Gait normal  CARDIAC: regular rate and rhythm  CHEST AND LUNG: Easy work of breathing, clear to auscultation bilaterally, no cyanosis  ABDOMEN: soft, non tender, non-distended, positive bowel sounds, no organomegaly, no palpable masses, no guarding, no rebound tenderness  LELA: 30 gram, symmetric, smooth without nodules. SKIN: No rash, no erythema, no lacerations or abrasions, no ecchymosis  NEUROLOGIC: cranial nerves 2-12 grossly intact     Assessment and Plan    ICD-10-CM    1. Calculus of kidney  N20.0 AMB POC URINALYSIS DIP STICK AUTO W/O MICRO      2.  Benign prostatic hyperplasia with urinary frequency  N40.1 PSA, Diagnostic    R35.0 PSA, Diagnostic     PSA, Diagnostic Kidney Stones:  No symptoms at this time. No further hematuria. Follow up 1 year or sooner if needed    PSA Screening: Follow up 1 year with PSA prior. PSA WNL. LELA stable. I have spent 20 minutes today reviewing previous notes, test results and face to face with the patient as well as documenting. Yash Khoury M.D.     [facility]  [place] [address] [address] [facility]  Phone: [phone]  Fax: [phone]

## 2022-09-20 NOTE — RESULT ENCOUNTER NOTE
Anuel Fitzcomfort, your PSA is normal at 0.3. Please keep follow up next year as scheduled. Thanks!   Dr. Tariq Pulling

## 2022-12-01 DIAGNOSIS — I10 ESSENTIAL HYPERTENSION: Primary | ICD-10-CM

## 2022-12-01 RX ORDER — CARVEDILOL 6.25 MG/1
6.25 TABLET ORAL 2 TIMES DAILY WITH MEALS
Qty: 60 TABLET | Refills: 0 | Status: SHIPPED | OUTPATIENT
Start: 2022-12-01

## 2022-12-01 NOTE — TELEPHONE ENCOUNTER
Patient needs a 30 day supply of carvedilol to be sent to [facility]. Ext. Then [facility] the [facility]  No future appointment scheduled, last seen virtually in January 2022

## 2022-12-06 PROBLEM — E11.9 TYPE 2 DIABETES MELLITUS WITHOUT COMPLICATION, WITHOUT LONG-TERM CURRENT USE OF INSULIN (HCC): Status: ACTIVE | Noted: 2022-12-06

## 2022-12-06 PROBLEM — D69.6 THROMBOCYTOPENIA, UNSPECIFIED (HCC): Status: ACTIVE | Noted: 2022-12-06

## 2022-12-06 NOTE — PROGRESS NOTES
He has been not taking good care of himself, has regained weight. BP at home is around 150/85. He has had more episodes of atrial flutter. Not sleeping well, has been using CPAP but thinks it needs to be adjusted      Past Medical History, Past Surgical History, Family history, Social History, and Medications were all reviewed with the patient today and updated as necessary. Current Outpatient Medications   Medication Sig Dispense Refill    carvedilol (COREG) 6.25 MG tablet Take 1 tablet by mouth 2 times daily (with meals) 60 tablet 0    rivaroxaban (XARELTO) 20 MG TABS tablet Take 20 mg by mouth daily (with breakfast)       No current facility-administered medications for this visit. Allergies   Allergen Reactions    Amlodipine Other (See Comments)     edema    Hydrochlorothiazide Other (See Comments)     gout    Lisinopril Cough    Naltrexone-Bupropion Hcl Er Other (See Comments)     Nausea first and then sweet craving     Patient Active Problem List   Diagnosis    Laboratory examination ordered as part of a complete physical examination    Atrial flutter (HCC)    Hematospermia    Heel pain, chronic, right    Sleep apnea, obstructive    Body mass index (BMI) 50.0-59.9, adult (HCC)    S/P bariatric surgery    Hx of long term use of blood thinners    Venous insufficiency    History of gout    History of pulmonary embolism    History of narcotic addiction (HCC)    Essential hypertension    History of deep vein thrombosis (DVT) of lower extremity    Asymptomatic microscopic hematuria    Thrombocytopenia, unspecified (HCC)    Type 2 diabetes mellitus without complication, without long-term current use of insulin ([facility] [address] [address]    Screening for viral disease         Review of Systems   Constitutional:  Positive for fatigue and unexpected weight change. Negative for appetite change. Eyes:  Negative for visual disturbance. Neurological:  Negative for numbness.        OBJECTIVE:  BP (!) 138/104   Pulse 76   Ht 5' 10\" (1.778 m)   Wt (!) 402 lb (182.3 kg)   BMI 57.68 kg/m²      Physical Exam  Constitutional:       Appearance: Normal appearance. Neurological:      Mental Status: He is alert. Psychiatric:         Mood and Affect: Mood normal.         Behavior: Behavior normal.     Diabetic foot exam:   Left Foot:   Visual Exam: normal   Pulse DP: 2+ (normal)   Filament test: normal sensation   Vibratory Sensation: normal  Right Foot:   Visual Exam: normal   Pulse DP: 2+ (normal)   Filament test: normal sensation   Vibratory Sensation: normal      Medical problems and test results were reviewed with the patient today. No results found for this or any previous visit (from the past 672 hour(s)). ASSESSMENT and PLAN    1. Essential hypertension with goal blood pressure less than 130/80  2. Thrombocytopenia, unspecified ([address]  3. Type 2 diabetes mellitus without complication, without long-term current use of insulin ([address]  4. Atrial flutter, unspecified type ([address]  5. Sleep apnea, obstructive  6. History of narcotic addiction ([facility]  7. History of gout  8. S/P bariatric surgery  9. Hx of long term use of blood thinners  10. Essential hypertension  The following orders have not been finalized:  -     carvedilol (COREG) 12.5 MG tablet  11. Laboratory examination ordered as part of a complete physical examination  12. Body mass index (BMI) 50.0-59.9, adult ([address]  13.  Screening for viral disease       Increased coreg  Referrals to sleep,ophthalmology and for colonoscopy

## 2022-12-07 ENCOUNTER — OFFICE VISIT (OUTPATIENT)
Dept: INTERNAL MEDICINE CLINIC | Facility: CLINIC | Age: 62
End: 2022-12-07
Payer: COMMERCIAL

## 2022-12-07 VITALS
SYSTOLIC BLOOD PRESSURE: 138 MMHG | DIASTOLIC BLOOD PRESSURE: 104 MMHG | HEIGHT: 70 IN | WEIGHT: 315 LBS | HEART RATE: 76 BPM | BODY MASS INDEX: 45.1 KG/M2

## 2022-12-07 DIAGNOSIS — G47.33 SLEEP APNEA, OBSTRUCTIVE: ICD-10-CM

## 2022-12-07 DIAGNOSIS — Z11.59 SCREENING FOR VIRAL DISEASE: ICD-10-CM

## 2022-12-07 DIAGNOSIS — I10 ESSENTIAL HYPERTENSION: ICD-10-CM

## 2022-12-07 DIAGNOSIS — Z98.84 S/P BARIATRIC SURGERY: ICD-10-CM

## 2022-12-07 DIAGNOSIS — I48.92 ATRIAL FLUTTER, UNSPECIFIED TYPE (HCC): ICD-10-CM

## 2022-12-07 DIAGNOSIS — Z87.39 HISTORY OF GOUT: ICD-10-CM

## 2022-12-07 DIAGNOSIS — D69.6 THROMBOCYTOPENIA, UNSPECIFIED (HCC): ICD-10-CM

## 2022-12-07 DIAGNOSIS — Z00.00 LABORATORY EXAMINATION ORDERED AS PART OF A COMPLETE PHYSICAL EXAMINATION: ICD-10-CM

## 2022-12-07 DIAGNOSIS — F11.21 HISTORY OF NARCOTIC ADDICTION (HCC): ICD-10-CM

## 2022-12-07 DIAGNOSIS — Z86.010 HX OF COLONIC POLYPS: ICD-10-CM

## 2022-12-07 DIAGNOSIS — E11.9 TYPE 2 DIABETES MELLITUS WITHOUT COMPLICATION, WITHOUT LONG-TERM CURRENT USE OF INSULIN (HCC): ICD-10-CM

## 2022-12-07 DIAGNOSIS — Z92.29 HX OF LONG TERM USE OF BLOOD THINNERS: ICD-10-CM

## 2022-12-07 DIAGNOSIS — I10 ESSENTIAL HYPERTENSION WITH GOAL BLOOD PRESSURE LESS THAN 130/80: Primary | ICD-10-CM

## 2022-12-07 PROCEDURE — 3074F SYST BP LT 130 MM HG: CPT | Performed by: INTERNAL MEDICINE

## 2022-12-07 PROCEDURE — 3078F DIAST BP <80 MM HG: CPT | Performed by: INTERNAL MEDICINE

## 2022-12-07 PROCEDURE — 99214 OFFICE O/P EST MOD 30 MIN: CPT | Performed by: INTERNAL MEDICINE

## 2022-12-07 RX ORDER — CARVEDILOL 12.5 MG/1
12.5 TABLET ORAL 2 TIMES DAILY WITH MEALS
Qty: 180 TABLET | Refills: 4 | Status: SHIPPED | OUTPATIENT
Start: 2022-12-07

## 2022-12-07 ASSESSMENT — PATIENT HEALTH QUESTIONNAIRE - PHQ9
2. FEELING DOWN, DEPRESSED OR HOPELESS: 0
SUM OF ALL RESPONSES TO PHQ QUESTIONS 1-9: 0
1. LITTLE INTEREST OR PLEASURE IN DOING THINGS: 0
SUM OF ALL RESPONSES TO PHQ QUESTIONS 1-9: 0
SUM OF ALL RESPONSES TO PHQ9 QUESTIONS 1 & 2: 0

## 2022-12-15 NOTE — PROGRESS NOTES
[facility]  [address] [facility] , [address] [place] [address] Court, [address] [facility]  [phone]    Patient Name:  Kelsey Sharma  YOB: 1960      Office Visit 12/19/2022    CHIEF COMPLAINT:    Chief Complaint   Patient presents with    Sleep Apnea       HISTORY OF PRESENT ILLNESS:     The patient present in outpatient consultation at the request of Dr. Gina Leigh for management of obstructive sleep apnea. The patient underwent a diagnostic polysomnography in 2015 which indicated he had an AHI of 19.9/hour and the lowest oxygen saturation was 80%. His REM AHI was much higher at 49.5/hour. Since that time he was restarted on CPAP which was set at 11 cm with a EPR of 2 but his pressure was decreased to 9 cm at last office visit which was over 3 years ago due to some weight loss of approximately 10 pounds. He is using and benefiting from his CPAP on a daily basis and his average daily use is 5 hours and 50 minutes. His leak is minimal with a 95th percentile leak is 3.4 L/min. His AHI is 2/hour. He indicated that his pressure may need to be adjusted since he gained all the weight back over the last 3 years especially since he is not going to the gym frequently like he did previously and he is no longer hiking as he did in the past and he work from home since the [place] pandemic. His symptoms of sleep apnea may be a little worse and he has history of atrial flutter for which she is taking carvedilol and Xarelto. I think his pressure needs to be adjusted back to 11 cm at the very least and potentially we can change it to 11-15 cm since his machine have the fixture of [employer]. There is no history of cataplexy, hypnagogic hallucinations, or sleep paralysis. In addition, there is no history of frequent leg movements, kicking at night, or an inability to keep the legs still.   We discussed different aspect of weight management including moderate physical activity and lifestyle modification. He is planning to follow-up with Dr. Rebeca Nguyen at the surgical weight loss program since he had the surgery with him in the past.  I encouraged him to do that and focus on his lifestyle approach and moderate physical activity since seem to help him quite a bit. The [place] score today was 9 out of 24. Sleepiness Scale:         Past Medical History:   Diagnosis Date    Abnormal EKG 3/8/2017    Adverse effect of anesthesia     6-8 hours post-op B/P dropped and pt was not able to urinate- had to be cath. - after gastric sleeve    Arthritis     osteo-knee, toe    Asthma     childhood asthma    Atrial fibrillation ([address] 2017    Borderline diabetes     diet controlled, 8/27/20 A1c 4.9    Current use of long term anticoagulation     no longer on Coumadin or Lovenox injections    History of depression     pt denies any further complications    History of narcotic addiction ([address]     History of pulmonary embolism 2015    Treated with Coumadin- then lovenox bridge- IVC filter placed-removed    HTN (hypertension)     controlled with meds    Kidney stone 2021    Obesity Most of my life    Persistent atrial fibrillation ([address]     Followed by [facility]./ rate controlled, eliquis    Presence of IVC filter     placed prior to gastric sleeve.     Sleep apnea     uses CPAP    Status following surgery for weight loss 05/2019    gastric sleeve    Venous stasis        Patient Active Problem List   Diagnosis    Laboratory examination ordered as part of a complete physical examination    Atrial flutter (HCC)    Hematospermia    Heel pain, chronic, right    Sleep apnea, obstructive    Body mass index (BMI) 50.0-59.9, adult (HCC)    S/P bariatric surgery    Hx of long term use of blood thinners    Venous insufficiency    History of gout    History of pulmonary embolism    History of narcotic addiction ([address]    Essential hypertension    History of deep vein thrombosis (DVT) of lower extremity    Asymptomatic microscopic hematuria    Thrombocytopenia, unspecified (HCC)    Type 2 diabetes mellitus without complication, without long-term current use of insulin (HCC)    Screening for viral disease    Nocturnal hypoxemia       Past Surgical History:   Procedure Laterality Date    COLONOSCOPY  10/20/2015    GASTRECTOMY  05/20/2019    sleeve    IR IVC FILTER PLACEMENT W IMAGING  05/08/2019    Dr Rodrigo Osgood     IR IVC FILTER RETRIEVAL  6/21/2019    IR IVC FILTER RETRIEVAL  6/21/2019    IR IVC FILTER RETRIEVAL 6/21/2019 [facility] SPE[facility]    OTHER SURGICAL HISTORY  05/2019    gastric sleeve    REFRACTIVE SURGERY Bilateral     TONSILLECTOMY  1965       [unfilled]    Social History     Socioeconomic History    Marital status:      Spouse name: Not on file    Number of children: Not on file    Years of education: Not on file    Highest education level: Not on file   Occupational History    Not on file   Tobacco Use    Smoking status: Never    Smokeless tobacco: Never   Substance and Sexual Activity    Alcohol use: No    Drug use: No    Sexual activity: Not on file   Other Topics Concern    Not on file   Social History Narrative    Lives alone, working IT in an office now.  Working on more exercise at home and has a rowing machine     Social Determinants of Health     Financial Resource Strain: Not on file   Food Insecurity: Not on file   Transportation Needs: Not on file   Physical Activity: Not on file   Stress: Not on file   Social Connections: Not on file   Intimate Partner Violence: Not on file   Housing Stability: Not on file         Family History   Problem Relation Age of Onset    Heart Disease Paternal Aunt     COPD Mother     Hypertension Father     High Blood Pressure Father     Diabetes Neg Hx          Allergies   Allergen Reactions    Amlodipine Other (See Comments)     edema    Hydrochlorothiazide Other (See Comments)     gout    Lisinopril Cough    Naltrexone-Bupropion Hcl Er Other (See Comments)     Nausea first and then sweet craving         Current Outpatient Medications   Medication Sig    carvedilol (COREG) 12.5 MG tablet Take 1 tablet by mouth 2 times daily (with meals)    rivaroxaban (XARELTO) 20 MG TABS tablet Take 20 mg by mouth daily (with breakfast)     No current facility-administered medications for this visit. REVIEW OF SYSTEMS:   CONSTITUTIONAL:   There is no history of fever, chills, night sweats, weight loss, weight gain, persistent fatigue, or lethargy/hypersomnolence. EYES:   Denies problems with eye pain, erythema, blurred vision, or visual field loss. ENTM:   Denies history of tinnitus, epistaxis, sore throat, hoarseness, or dysphonia. LYMPH:   Denies swollen glands. CARDIAC:   No chest pain, pressure, discomfort, palpitations, orthopnea, murmurs, or edema. GI:   No dysphagia, heartburn reflux, nausea/vomiting, diarrhea, abdominal pain, or bleeding. :   Denies history of dysuria, hematuria, polyuria, or decreased urine output. MS:   No history of myalgias, arthralgias, bone pain, or muscle cramps. SKIN:   No history of rashes, jaundice, cyanosis, nodules, or ulcers. ENDO:   Negative for heat or cold intolerance. No history of DM. PSYCH:   Negative for anxiety, depression, insomnia, hallucinations. NEURO:   There is no history of AMS, persistent headache, decreased level of consciousness, seizures, or motor or sensory deficits. PHYSICAL EXAM:    Vitals:    12/19/22 1031   BP: (!) 140/90   Pulse: 58   Temp: 97 °F (36.1 °C)   SpO2: 99%        GENERAL APPEARANCE:   The patient is normal weight and in no respiratory distress. HEENT:   PERRL. Conjunctivae unremarkable. Nasal mucosa is without epistaxis, exudate, or polyps. Gums and dentition are unremarkable. There is severe oropharyngeal narrowing. He is Mallampati 4   NECK/LYMPHATIC:   Symmetrical with no elevation of jugular venous pulsation. Trachea midline. No thyroid enlargement.   No cervical adenopathy. LUNGS:   Normal respiratory effort with symmetrical lung expansion. Breath sounds diminished in the bases. HEART:   There is a regular rate and rhythm. No murmur, rub, or gallop. There is no edema in the lower extremities. ABDOMEN:   Soft and non-tender. No hepatosplenomegaly. Bowel sounds are normal.     SKIN:   There are no rashes, cyanosis, jaundice, or ecchymosis present. EXTREMITIES:   The extremities are unremarkable without clubbing, cyanosis, joint inflammation, degenerative, or ischemic change. MUSCULOSKELETAL:   There is no abnormal tone, muscle atrophy, or abnormal movement present. NEURO:   The patient is alert and oriented to person, place, and time. Memory appears intact and mood is normal.  No gross sensorimotor deficits are present. DIAGNOSTIC TESTS:  Npsg 3/6/15             ASSESSMENT:  (Medical Decision Making)         ICD-10-CM    1. Sleep apnea, obstructive , on CPAP at 11 cm. His pressure will be adjusted to 11-15 cm and he will need another mask fitting and his supply will be renewed and updated. G47.33 DME - DURABLE MEDICAL EQUIPMENT      2. Body mass index (BMI) 50.0-59.9, adult ([facility] contributing to complexity of care Z68.43       3. Nocturnal hypoxemia, related to sleep apnea and improved the CPAP treatment G47.34 DME - DURABLE MEDICAL EQUIPMENT      4.  Atrial flutter, unspecified type ([facility] continue to be a problem and currently on Xarelto and followed by [facility] cardiology I48.92              PLAN:    Change CPAP to 11-15 cm with a EPR 2    Continue proper sleep hygiene and positional therapy    He will need mask fitting and supply updated    Maintain his follow-up with cardiology and primary care physician    Continue other recommendation discussed previously    Follow-up with the [facility] loss program at [facility] as scheduled    Discussed with him moderate physical activity and lifestyle modification to help him with weight management in the long-term    Proper handout in this regard will be provided to him      He will return to the sleep center in 3 months or sooner if needed    All questions and concerns were addressed properly to his satisfaction    Orders Placed This Encounter   Procedures    DME - [facility] PULMON[facility]ARE  Phone: [phone] [address]  Dept: [phone]      Patient Name: Junito Howell  : 1960  Gender: male  Address: [address] [place] [id number]   Patient phone: [phone] (home) [phone] (work)      Primary Insurance: Payor: Christina Walter / Plan: [employer] / Product Type: *No Product type* /   Subscriber ID: [id number] - ([facility])      AMB Supply Order  Order Details     DME Location:    Order Date: 2022   The primary encounter diagnosis was Sleep apnea, obstructive. Diagnoses of Body mass index (BMI) 50.0-59.9, adult ([address] Nocturnal hypoxemia, and Atrial flutter, unspecified type [facility]) were also pertinent to this visit.              (  X   )Supplies Needed         Machine   (     ) CPAP Unit  (  x   ) Auto CPAP Unit  (     ) BiLevel Unit  (     ) Auto BiLevel Unit  (     ) ASV        (     ) Bilevel ST      Length of need: 12 months    Pressure:  11-15 cmH20  EPR: 2    Starting Ramp Pressure:    cm H20  Ramp Time: min         Patient had a diagnostic Apnea Hypopnea Index (AHI) of :     *SUPPLIES* Replace all as needed, or per coverage guidelines     Masks Type: need another mask fitting  (    ) -Full Face Mask (1 per 3 mon)  (    ) -Full Mask (1 per month) Interface/Cushion      ( x ) -Nasal Mask (1 per 3 mon)  ( x  ) - Nasal Mask (1 per month) Interface/Cushion  (  x   ) -Pillow (2 per mon)  (    x ) -Yemxfudjz (1 per 6 mon)            Other Supplies:    (   X  )-Gjroidjo (1 per 6 mon)  (   X  )-Hptxwq Tubing (1 per 3 mon)  (   X )- Disposable Filter (2 per mon)  (   x  )-Bsncvy Humidifier (1 per year)     (  x   )-Rabjuhbqb (sometimes used with Full Face Mask) (1 per 6 mos)  (    )-Tubing-without heat (1 per 3 mos)  (     )-Non-Disposable Filter (1 per 6 mos)  (  x   )-Water Chamber (1 per 6 mos)  (     )-Humidifier non-heated (1 per 5 yrs)      Signed Date: 12/19/2022  Electronically Signed By: Monica Gregory MD  Electronically Dated:  12/19/2022               No orders of the defined types were placed in this encounter. Over 50% of today's office visit was spent in face to face time reviewing test results, prognosis, importance of compliance, education about disease process, benefits of medications, instructions for management of acute flare-ups, and follow up plans. Total face to face time spent with patient and charting was 45 minutes.     Monica Gregory MD  Electronically signed

## 2022-12-19 ENCOUNTER — OFFICE VISIT (OUTPATIENT)
Dept: SLEEP MEDICINE | Age: 62
End: 2022-12-19
Payer: COMMERCIAL

## 2022-12-19 VITALS
HEIGHT: 70 IN | TEMPERATURE: 97 F | WEIGHT: 315 LBS | HEART RATE: 58 BPM | OXYGEN SATURATION: 99 % | BODY MASS INDEX: 45.1 KG/M2 | DIASTOLIC BLOOD PRESSURE: 90 MMHG | SYSTOLIC BLOOD PRESSURE: 140 MMHG

## 2022-12-19 DIAGNOSIS — I48.92 ATRIAL FLUTTER, UNSPECIFIED TYPE (HCC): ICD-10-CM

## 2022-12-19 DIAGNOSIS — G47.33 SLEEP APNEA, OBSTRUCTIVE: Primary | ICD-10-CM

## 2022-12-19 DIAGNOSIS — G47.34 NOCTURNAL HYPOXEMIA: ICD-10-CM

## 2022-12-19 PROCEDURE — 3074F SYST BP LT 130 MM HG: CPT | Performed by: INTERNAL MEDICINE

## 2022-12-19 PROCEDURE — 99204 OFFICE O/P NEW MOD 45 MIN: CPT | Performed by: INTERNAL MEDICINE

## 2022-12-19 PROCEDURE — 3078F DIAST BP <80 MM HG: CPT | Performed by: INTERNAL MEDICINE

## 2022-12-19 ASSESSMENT — SLEEP AND FATIGUE QUESTIONNAIRES
HOW LIKELY ARE YOU TO NOD OFF OR FALL ASLEEP WHILE WATCHING TV: 1
ESS TOTAL SCORE: 9
HOW LIKELY ARE YOU TO NOD OFF OR FALL ASLEEP WHILE SITTING QUIETLY AFTER LUNCH WITHOUT ALCOHOL: 1
HOW LIKELY ARE YOU TO NOD OFF OR FALL ASLEEP WHILE SITTING AND READING: 2
HOW LIKELY ARE YOU TO NOD OFF OR FALL ASLEEP IN A CAR, WHILE STOPPED FOR A FEW MINUTES IN TRAFFIC: 0
HOW LIKELY ARE YOU TO NOD OFF OR FALL ASLEEP WHILE LYING DOWN TO REST IN THE AFTERNOON WHEN CIRCUMSTANCES PERMIT: 3
HOW LIKELY ARE YOU TO NOD OFF OR FALL ASLEEP WHEN YOU ARE A PASSENGER IN A CAR FOR AN HOUR WITHOUT A BREAK: 1
HOW LIKELY ARE YOU TO NOD OFF OR FALL ASLEEP WHILE SITTING AND TALKING TO SOMEONE: 0
HOW LIKELY ARE YOU TO NOD OFF OR FALL ASLEEP WHILE SITTING INACTIVE IN A PUBLIC PLACE: 1

## 2022-12-19 NOTE — PATIENT INSTRUCTIONS
Sleep Hygiene Instructions    Sleep only as much as you need to feel refreshed during the following day. Restricting your time in bed helps to consolidate and deepen your sleep. Excessively long times in bed lead to fragmented and shallow sleep. Get up at your regular time the next day, no matter how little your slept. Get up at the same time each day, 7 days a week. A regular wake time in the morning leads to regular times on sleep onset, and helps to set your biological clock. Exercise regularly. Schedule exercise times so that they do not occur within 3 hours of when you intend to go to bed. Exercise makes it easier to initiate sleep and deepen sleep. Don't take your problems to bed. Plan some time earlier in the evening for working on your problems or planning the next day's activities. Worrying may interfere with initiating sleep and produce shallow sleep. Train yourself to use the bedroom only for sleep and sexual activity. This will help condition your brain to see bed as the place for sleeping. Do not read, watch TV or eat in bed. Do not try and fall asleep. This only makes the problem worse. Instead, turn on the light, leave the bedroom, and do something different like reading a book. Don't engage in stimulating activity. Return to bed only when you feel sleepy. Avoid long naps. Staying awake during the day helps to fall asleep at night. Naps totalling more than 30 minutes increase your chances of having trouble sleeping at night. Make sure that your bedroom is comfortable and free from light and noise. A comfortable, noise-free sleep environment will reduce the likelihood that you will wake up during the night. Noise that does not awaken you may disturb the quality of your sleep. Carpeting, insulated curtains, and closing the door may help. Make sure that your bedroom is at a comfortable temperature during the night.  Excessively warm or cold sleep environments may disturb sleep. Eat regular meals and di not go to bed hungry. Hunger may disturb sleep. A light snack at bedtime (especially carbohydrates) may help sleep, but avoid greasy or heavy foods. Avoid excessive liquids in the evening. Reducing liquid intake will minimize the need for night-time trips to the bathroom. Cut down on all caffeine products. Caffeinated beverages and food (Coffee, tea, cola, chocolate) can cause difficulty falling asleep, awakenings during the night, and shallow sleep. Even caffeine early in the day can disrupt night-time sleep. Avoid alcohol, especially in the evening. Although alcohol helps tense people fall asleep more easily, it causes awakenings later in the night. Smoking may disturb sleep. Nicotine is a stimulant. Try not to smoke during the night when you have trouble sleeping. Learning about Sleeping Well    What does sleeping well mean? Sleeping well means getting enough sleep. How much sleep is enough varies among people. The number of hours you sleep is not as improtant as how you feel when you wake up. If you do not feel refreshed, you probably need more sleep. Another sign of not getting enough sleep is feeling tired during the day. The average totally nightly sleep time is 7 1/2 to 8 hours. Healthy adults may need a little more or a little less than this. Why is getting enough sleep important? Getting enough quality sleep is a basic part of good health. When your sleep suffers, your mood and your thoughts can suffer too. Your thoughts can suffer too. You ma find yourself feeling more grumpy or stressed. No getting enough sleep also can lead to serious problems, including injury, accidents, anxiety, and depression. What might cause poor sleeping? Many things can cause sleep problems, including:    Stress. Stress can be caused by fear about a single event, such as giving a speech.   Or you may have ongoing stress, such as worry about work or school. Depression, anxiety, and other mental or emotional conditions. Changes in your sleep habits or surroundings. This includes changes that happen where you sleep, such as noise, light, or sleeping in a different bed. It also includes changes in your sleep pattern, such as having jet lab or working a late shift. Health problems, such as pain, breathing problems, and restless legs syndrome. Lack of regular exercise. How can you help yourself? Here are some tips that may help you sleep more soundly and wake up feeling more refreshed. Your sleeping area    Use your bedroom only for sleeping and sex. A bit of light reading may help you fall asleep. But if it doesn't, do your reading elsewhere in the house. Don't watch TV in bed. Be sure your bed is big enough to stretch out comfortable, especially if you have a sleep partner. Keep your bedroom quiet, dark, and cool. Use curtains, blinds, or sleep mask to block out light. To block out noise, use earplugs, soothing music, or a \"white noise\" machine. Your evening and bedtime routine    Create a relaxing bedtime routine. You might want to take a warm shower or bath, listen to soothing music, or drink a cup of non-caffeinated tea. Go to bed at the same time every night. And get up at the same time every morning, even if you feel tired. What to avoid:    Limit caffeine (coffee, tea, caffeinated sodas) during the day, and dont have any for at least 4 to 6 hours before bedtime. Don't drink alcohol before bedtime. Alcohol can cause you to wake up more often during the night. Don't smoke or use tobacco, especially in the evening. Nicotine can keep you awake. Don't like in bed away for too long. If you can't fall asleep, or if you wake up in the middle of the night and can't get back to sleep within 15 minutes or so, get out of bed and go to another room until you feel sleepy.     Don't take medicine right before bed that may keep you awake or make you feel hyper or enegerized. Your doctor can tell you if your medicine may do this and if you can take it earlier in the day. If you can't sleep:    Imagine yourself in a peaceful, pleasant scene. Focus on the details and feelings of being in a place that is relaxing. Get up and do a quiet or boring activity until you feel sleepy. Don't drink liquids after 6 p.m. if you wake up often because you have to go to the bathroom. Where can you learn more? Go to http://www.gray.com/    Enter H979 in the search box to learn more about \"Learning About Sleeping Well. \"           Learning About Sleeping Well  What does sleeping well mean? Sleeping well means getting enough sleep. How much sleep is enough varies among people. The number of hours you sleep is not as important as how you feel when you wake up. If you do not feel refreshed, you probably need more sleep. Another sign of not getting enough sleep is feeling tired during the day. The average total nightly sleep time is 7½ to 8 hours. Healthy adults may need a little more or a little less than this. Why is getting enough sleep important? Getting enough quality sleep is a basic part of good health. When your sleep suffers, your mood and your thoughts can suffer too. You may find yourself feeling more grumpy or stressed. Not getting enough sleep also can lead to serious problems, including injury, accidents, anxiety, and depression. What might cause poor sleeping? Many things can cause sleep problems, including:  Stress. Stress can be caused by fear about a single event, such as giving a speech. Or you may have ongoing stress, such as worry about work or school. Depression, anxiety, and other mental or emotional conditions. Changes in your sleep habits or surroundings. This includes changes that happen where you sleep, such as noise, light, or sleeping in a different bed.  It also includes changes in your sleep pattern, such as having jet lag or working a late shift. Health problems, such as pain, breathing problems, and restless legs syndrome. Lack of regular exercise. How can you help yourself? Here are some tips that may help you sleep more soundly and wake up feeling more refreshed. Your sleeping area   Use your bedroom only for sleeping and sex. A bit of light reading may help you fall asleep. But if it doesn't, do your reading elsewhere in the house. Don't watch TV in bed. Be sure your bed is big enough to stretch out comfortably, especially if you have a sleep partner. Keep your bedroom quiet, dark, and cool. Use curtains, blinds, or a sleep mask to block out light. To block out noise, use earplugs, soothing music, or a \"white noise\" machine. Your evening and bedtime routine   Get regular exercise, but not within 3 to 4 hours before your bedtime. Create a relaxing bedtime routine. You might want to take a warm shower or bath, listen to soothing music, or drink a cup of noncaffeinated tea. Go to bed at the same time every night. And get up at the same time every morning, even if you feel tired. What to avoid   Limit caffeine (coffee, tea, caffeinated sodas) during the day, and don't have any for at least 4 to 6 hours before bedtime. Don't drink alcohol before bedtime. Alcohol can cause you to wake up more often during the night. Don't smoke or use tobacco, especially in the evening. Nicotine can keep you awake. Don't take naps during the day, especially close to bedtime. Don't lie in bed awake for too long. If you can't fall asleep, or if you wake up in the middle of the night and can't get back to sleep within 15 minutes or so, get out of bed and go to another room until you feel sleepy. Don't take medicine right before bed that may keep you awake or make you feel hyper or energized.  Your doctor can tell you if your medicine may do this and if you can take it earlier in the day.  If you can't sleep   Imagine yourself in a peaceful, pleasant scene. Focus on the details and feelings of being in a place that is relaxing. Get up and do a quiet or boring activity until you feel sleepy. Don't drink any liquids after 6 p.m. if you wake up often because you have to go to the bathroom. Where can you learn more? Go to [employer]Central.be  Enter B344 in the search box to learn more about \"Learning About Sleeping Well. \"   © 4219-1954 Healthwise, Incorporated. Care instructions adapted under license by [facility] (which disclaims liability or warranty for this information). This care instruction is for use with your licensed healthcare professional. If you have questions about a medical condition or this instruction, always ask your healthcare professional. [address] any warranty or liability for your use of this information. Content Version: 47.9.387064; Current as of: March 12, 2014              Sleep Apnea: After Your Visit  Your Care Instructions  Sleep apnea means that you frequently stop breathing for 10 seconds or longer during sleep. It can be mild to severe, based on the number of times an hour that you stop breathing or have slowed breathing. Blocked or narrowed airways in your nose, mouth, or throat can cause sleep apnea. Your airway can become blocked when your throat muscles and tongue relax during sleep. You can treat sleep apnea at home by making lifestyle changes. You also can use a CPAP breathing machine that keeps tissues in the throat from blocking your airway. Or your doctor may suggest that you use a breathing device while you sleep. It helps keep your airway open. This could be a device that you put in your mouth. Other examples include strips or disks that you use on your nose. In some cases, surgery may be needed to remove enlarged tissues in the throat. Follow-up care is a key part of your treatment and safety.  Be sure to make and go to all appointments, and call your doctor if you are having problems. It's also a good idea to know your test results and keep a list of the medicines you take. How can you care for yourself at home? Lose weight, if needed. It may reduce the number of times you stop breathing or have slowed breathing. Sleep on your side. It may stop mild apnea. If you tend to roll onto your back, sew a pocket in the back of your pajama top. Put a tennis ball into the pocket, and stitch the pocket shut. This will help keep you from sleeping on your back. Avoid alcohol and medicines such as sleeping pills and sedatives before bed. Do not smoke. Smoking can make sleep apnea worse. If you need help quitting, talk to your doctor about stop-smoking programs and medicines. These can increase your chances of quitting for good. Prop up the head of your bed 4 to 6 inches by putting bricks under the legs of the bed. Treat breathing problems, such as a stuffy nose, caused by a cold or allergies. Try a continuous positive airway pressure (CPAP) breathing machine if your doctor recommends it. The machine keeps your airway open when you sleep. If CPAP does not work for you, ask your doctor if you can try other breathing machines. A bilevel positive airway pressure machine uses one type of air pressure for breathing in and another type for breathing out. Another device raises or lowers air pressure as needed while you breathe. Talk to your doctor if:  Your nose feels dry or bleeds when you use one of these machines. You may need to increase moisture in the air. A humidifier may help. Your nose is runny or stuffy from using a breathing machine. Decongestants or a corticosteroid nasal spray may help. You are sleepy during the day and it gets in the way of the normal things you do. Do not drive when you are drowsy. When should you call for help?   Watch closely for changes in your health, and be sure to contact your doctor if: You still have sleep apnea even though you have made lifestyle changes. You are thinking of trying a device such as CPAP. You are having problems using a CPAP or similar machine. Where can you learn more? Go to byyd.be  Enter J936 in the search box to learn more about \"Sleep Apnea: After Your Visit. \"   © 8720-1340 Healthwise, Incorporated. Care instructions adapted under license by [facility] (which disclaims liability or warranty for this information). This care instruction is for use with your licensed healthcare professional. If you have questions about a medical condition or this instruction, always ask your healthcare professional. [place] any warranty or liability for your use of this information. Content Version: 85.3.370457; Current as of: January 14, 2014                        Eating Healthy Foods: After Your Visit  Your Care Instructions  Eating healthy foods can help lower your risk for disease. Healthy food gives you energy and keeps your heart strong, your brain active, your muscles working, and your bones strong. A healthy diet includes a variety of foods from the basic food groups: grains, vegetables, fruits, milk and milk products, and meat and beans. Some people may eat more of their favorite foods from only one food group and, as a result, miss getting the nutrients they need. So, it is important to pay attention not only to what you eat but also to what you are missing from your diet. You can eat a healthy, balanced diet by making a few small changes. Follow-up care is a key part of your treatment and safety. Be sure to make and go to all appointments, and call your doctor if you are having problems. Its also a good idea to know your test results and keep a list of the medicines you take. How can you care for yourself at home? Look at what you eat  Keep a food diary for a week or two and record everything you eat or drink. Track the number of servings you eat from each food group. For a balanced diet every day, eat a variety of:  6 or more ounce-equivalents of grains, such as cereals, breads, crackers, rice, or pasta, every day. An ounce-equivalent is 1 slice of bread, 1 cup of ready-to-eat cereal, or ½ cup of cooked rice, cooked pasta, or cooked cereal.  2½ cups of vegetables, especially:  Dark-green vegetables such as broccoli and spinach. Orange vegetables such as carrots and sweet potatoes. Dry beans (such as palmer and kidney beans) and peas (such as lentils). 2 cups of fresh, frozen, or canned fruit. A small apple or 1 banana or orange equals 1 cup.  3 cups of nonfat or low-fat milk, yogurt, or other milk products. 5½ ounces of meat and beans, such as chicken, fish, lean meat, beans, nuts, and seeds. One egg, 1 tablespoon of peanut butter, ½ ounce nuts or seeds, or ¼ cup of cooked beans equals 1 ounce of meat. Learn how to read food labels for serving sizes and ingredients. Fast-food and convenience-food meals often contain few or no fruits or vegetables. Make sure you eat some fruits and vegetables to make the meal more nutritious. Look at your food diary. For each food group, add up what you have eaten and then divide the total by the number of days. This will give you an idea of how much you are eating from each food group. See if you can find some ways to change your diet to make it more healthy. Start small  Do not try to make dramatic changes to your diet all at once. You might feel that you are missing out on your favorite foods and then be more likely to fail. Start slowly, and gradually change your habits. Try some of the following:  Use whole wheat bread instead of white bread. Use nonfat or low-fat milk instead of whole milk. Eat brown rice instead of white rice, and eat whole wheat pasta instead of white-flour pasta. Try low-fat cheeses and low-fat yogurt.   Add more fruits and vegetables to meals and have them for snacks. Add lettuce, tomato, cucumber, and onion to sandwiches. Add fruit to yogurt and cereal.  Enjoy food  You can still eat your favorite foods. You just may need to eat less of them. If your favorite foods are high in fat, salt, and sugar, limit how often you eat them, but do not cut them out entirely. Eat a wide variety of foods. Make healthy choices when eating out  The type of restaurant you choose can help you make healthy choices. Even fast-food chains are now offering more low-fat or healthier choices on the menu. Choose smaller portions, or take half of your meal home. When eating out, try:  A veggie pizza with a whole wheat crust or grilled chicken (instead of sausage or pepperoni). Pasta with roasted vegetables, grilled chicken, or marinara sauce instead of cream sauce. A vegetable wrap or grilled chicken wrap. Broiled or poached food instead of fried or breaded items. Make healthy choices easy  Buy packaged, prewashed, ready-to-eat fresh vegetables and fruits, such as baby carrots, salad mixes, and chopped or shredded broccoli and cauliflower. Buy packaged, presliced fruits, such as melon or pineapple. Choose 100% fruit or vegetable juice instead of soda. Limit juice intake to 4 to 6 oz (½ to ¾ cup) a day. Blend low-fat yogurt, fruit juice, and canned or frozen fruit to make a smoothie for breakfast or a snack. Where can you learn more? Go to [employer].be  Enter T756 in the search box to learn more about \"Eating Healthy Foods: After Your Visit. \"   © 2643-3310 Healthwise, Incorporated. Care instructions adapted under license by [employer] (which disclaims liability or warranty for this information).  This care instruction is for use with your licensed healthcare professional. If you have questions about a medical condition or this instruction, always ask your healthcare professional. [place] [address] any warranty or liability for your use of this information. Content Version: 10.1.496486; Current as of: May 17, 2013                                    Learning About Physical Activity  What is physical activity? Physical activity is any kind of activity that gets your body moving. Being active means allowing your body to \"practice\" breathing, stretching, and lifting. The more practice your body gets, the better it works. The types of physical activity that can help you get fit and stay healthy include:  Aerobic or \"cardio\" activities that make your heart beat faster and make you breathe harder, such as brisk walking, riding a bike, or running. Aerobic activities strengthen your heart and lungs and build up your endurance. Strength training activities that make your muscles work against, or \"resist,\" something, such as lifting weights or doing push-ups. These activities help tone and strengthen your muscles. Stretches that allow you to move your joints and muscles through their full range of motion. Stretching helps you be more flexible and avoid injury. What are the benefits of physical activity? Being active is one of the best things you can do to get fit and stay healthy. It helps you to:  Feel stronger and have more energy to do all the things you like to do. Focus better at school or work and perform better in sports. Feel, think, and sleep better. Reach and stay at a healthy weight. Lose fat and build lean muscle. Lower your risk for serious health problems. Keep your bones, muscles, and joints strong. Being fit lets you do more physical activity. And it lets you work out harder without as much effort. How can you make physical activity part of your life? Get at least 30 minutes of exercise on most days of the week. Walking is a good choice. You also may want to do other activities, such as running, swimming, cycling, or playing tennis or team sports.   Pick activities that you like--ones that make your heart beat faster, your muscles stronger, and your muscles and joints more flexible. If you find more than one thing you like doing, do them all. You don't have to do the same thing every day. Get your heart pumping every day. Any activity that makes your heart beat faster and keeps it at that rate for a while counts. Here are some great ways to get your heart beating faster:  Go for a brisk walk, run, or bike ride. Go for a hike or swim. Go in-line skating. Play a game of touch football, basketball, or soccer. Ride a bike. Play tennis or racquetball. Climb stairs. Even some household chores can be aerobic--just do them at a faster pace. Vacuuming, raking or mowing the lawn, sweeping the garage, and washing and waxing the car all can help get your heart rate up. Strengthen your muscles during the week. You don't have to lift heavy weights or grow big, bulky muscles to get stronger. Doing a few simple activities that make your muscles work against, or \"resist,\" something can help you get stronger. For example, you can:  Do push-ups or sit-ups, which use your own body weight as resistance. Lift weights or dumbbells or use stretch bands at home or in a gym or community center. Stretch your muscles often. Stretching will help you as you become more active. It can help you stay flexible, loosen tight muscles, and avoid injury. It can also help improve your balance and posture and can be a great way to relax. Be sure to stretch the muscles you'll be using when you work out. Its best to warm your muscles slightly before you stretch them. Walk or do some other light aerobic activity for a few minutes, and then start stretching. When you stretch your muscles:  Do it slowly. Stretching is not about going fast or making sudden movements. Don't push or bounce during a stretch. Hold each stretch for at least 15 to 30 seconds, if you can. You should feel a stretch in the muscle, but not pain. Breathe out as you do the stretch.  Then breathe in as you hold the stretch. Don't hold your breath. If you're worried about how more activity might affect your health, have a checkup before you start. Follow any special advice your doctor gives you for getting a smart start. Where can you learn more? Go to [employer]be  Enter [id number] in the search box to learn more about \"Learning About Physical Activity. \"   © 8702-6496 [employer]. Care instructions adapted under license by [employer] (which disclaims liability or warranty for this information). This care instruction is for use with your licensed healthcare professional. If you have questions about a medical condition or this instruction, always ask your healthcare professional. [place] [address] any warranty or liability for your use of this information. Content Version: 40.6.448131; Current as of: November 15, 2013                                          Learning About CPAP for Sleep Apnea  What is CPAP? CPAP is a small machine that you use at home every night while you sleep. It increases air pressure in your throat to keep your airway open. When you have sleep apnea, this can help you sleep better so you feel much better. CPAP stands for \"continuous positive airway pressure. \"  The CPAP machine will have one of the following:  A mask that covers your nose and mouth  Prongs that fit into your nose  A mask that covers your nose only, the most common type. This type is called NCPAP. The N stands for \"nasal.\"  Why is it done? CPAP is usually the best treatment for obstructive sleep apnea. It is the first treatment choice and the most widely used. Your doctor may suggest CPAP if you have: Moderate to severe sleep apnea. Sleep apnea and coronary artery disease (CAD) or heart failure. How does it help? CPAP can help you have more normal sleep, so you feel less sleepy and more alert during the daytime.   CPAP may help keep heart failure or other heart problems from getting worse. CPAP may help lower your blood pressure. If you use CPAP, your bed partner may also sleep better because you are not snoring or restless. What are the side effects? Some people who use CPAP have:  A dry or stuffy nose and a sore throat. Irritated skin on the face. Sore eyes. Bloating. If you have any of these problems, work with your doctor to fix them. Here are some things you can try:  Be sure the mask or nasal prongs fit well. See if your doctor can adjust the pressure of your CPAP. If your nose is dry, try a humidifier. If your nose is runny or stuffy, try decongestant medicine or a steroid nasal spray. Be safe with medicines. Read and follow all instructions on the label. Do not use the medicine longer than the label says. If these things do not help, you might try a different type of machine. Some machines have air pressure that adjusts on its own. Others have air pressures that are different when you breathe in than when you breathe out. This may reduce discomfort caused by too much pressure in your nose. Where can you learn more? Go to [employer]  Enter Zayda Dhillon in the search box to learn more about \"Learning About CPAP for Sleep Apnea. \"   © 2152-1859 [employer]wise, [employer]orporated. Care instructions adapted under license by [facility] (which disclaims liability or warranty for this information). This care instruction is for use with your licensed healthcare professional. If you have questions about a medical condition or this instruction, always ask your healthcare professional. [place] [address] any warranty or liability for your use of this information. Content Version: 91.9.158375; Current as of: January 24, 2014    The company who will be taking care of your CPAP supplies is:     Address: [address] [place] [address] [address], [address]  Phone: [phone] Option #1  Fax: [phone]

## 2022-12-20 ENCOUNTER — TELEPHONE (OUTPATIENT)
Dept: SLEEP MEDICINE | Age: 62
End: 2022-12-20

## 2023-01-06 PROBLEM — Z00.00 LABORATORY EXAMINATION ORDERED AS PART OF A COMPLETE PHYSICAL EXAMINATION: Status: RESOLVED | Noted: 2019-08-30 | Resolved: 2023-01-06

## 2023-01-06 PROBLEM — Z11.59 SCREENING FOR VIRAL DISEASE: Status: RESOLVED | Noted: 2022-12-07 | Resolved: 2023-01-06

## 2023-01-13 DIAGNOSIS — Z00.00 LABORATORY EXAMINATION ORDERED AS PART OF A COMPLETE PHYSICAL EXAMINATION: ICD-10-CM

## 2023-01-13 DIAGNOSIS — Z11.59 SCREENING FOR VIRAL DISEASE: ICD-10-CM

## 2023-01-13 DIAGNOSIS — E11.9 TYPE 2 DIABETES MELLITUS WITHOUT COMPLICATION, WITHOUT LONG-TERM CURRENT USE OF INSULIN (HCC): ICD-10-CM

## 2023-01-13 LAB
ALBUMIN SERPL-MCNC: 3.9 G/DL (ref 3.2–4.6)
ALBUMIN/GLOB SERPL: 1.3 (ref 0.4–1.6)
ALP SERPL-CCNC: 62 U/L (ref 50–136)
ALT SERPL-CCNC: 26 U/L (ref 12–65)
ANION GAP SERPL CALC-SCNC: 7 MMOL/L (ref 2–11)
AST SERPL-CCNC: 19 U/L (ref 15–37)
BASOPHILS # BLD: 0.1 K/UL (ref 0–0.2)
BASOPHILS NFR BLD: 2 % (ref 0–2)
BILIRUB SERPL-MCNC: 0.6 MG/DL (ref 0.2–1.1)
BUN SERPL-MCNC: 11 MG/DL (ref 8–23)
CALCIUM SERPL-MCNC: 9.9 MG/DL (ref 8.3–10.4)
CHLORIDE SERPL-SCNC: 111 MMOL/L (ref 101–110)
CHOLEST SERPL-MCNC: 138 MG/DL
CO2 SERPL-SCNC: 26 MMOL/L (ref 21–32)
CREAT SERPL-MCNC: 1 MG/DL (ref 0.8–1.5)
CREAT UR-MCNC: 261 MG/DL
DIFFERENTIAL METHOD BLD: NORMAL
EOSINOPHIL # BLD: 0.2 K/UL (ref 0–0.8)
EOSINOPHIL NFR BLD: 4 % (ref 0.5–7.8)
ERYTHROCYTE [DISTWIDTH] IN BLOOD BY AUTOMATED COUNT: 13.4 % (ref 11.9–14.6)
EST. AVERAGE GLUCOSE BLD GHB EST-MCNC: 131 MG/DL
GLOBULIN SER CALC-MCNC: 3.1 G/DL (ref 2.8–4.5)
GLUCOSE SERPL-MCNC: 122 MG/DL (ref 65–100)
HBA1C MFR BLD: 6.2 % (ref 4.8–5.6)
HCT VFR BLD AUTO: 43.3 % (ref 41.1–50.3)
HDLC SERPL-MCNC: 52 MG/DL (ref 40–60)
HDLC SERPL: 2.7
HGB BLD-MCNC: 14.6 G/DL (ref 13.6–17.2)
HIV 1+2 AB+HIV1 P24 AG SERPL QL IA: NONREACTIVE
HIV 1/2 RESULT COMMENT: NORMAL
IMM GRANULOCYTES # BLD AUTO: 0 K/UL (ref 0–0.5)
IMM GRANULOCYTES NFR BLD AUTO: 0 % (ref 0–5)
LDLC SERPL CALC-MCNC: 70.4 MG/DL
LYMPHOCYTES # BLD: 1.9 K/UL (ref 0.5–4.6)
LYMPHOCYTES NFR BLD: 39 % (ref 13–44)
MCH RBC QN AUTO: 32.3 PG (ref 26.1–32.9)
MCHC RBC AUTO-ENTMCNC: 33.7 G/DL (ref 31.4–35)
MCV RBC AUTO: 95.8 FL (ref 82–102)
MICROALBUMIN UR-MCNC: 13.6 MG/DL (ref 0–3)
MICROALBUMIN/CREAT UR-RTO: 52 MG/G (ref 0–30)
MONOCYTES # BLD: 0.4 K/UL (ref 0.1–1.3)
MONOCYTES NFR BLD: 9 % (ref 4–12)
NEUTS SEG # BLD: 2.2 K/UL (ref 1.7–8.2)
NEUTS SEG NFR BLD: 46 % (ref 43–78)
NRBC # BLD: 0 K/UL (ref 0–0.2)
PLATELET # BLD AUTO: 203 K/UL (ref 150–450)
PMV BLD AUTO: 10.9 FL (ref 9.4–12.3)
POTASSIUM SERPL-SCNC: 4 MMOL/L (ref 3.5–5.1)
PROT SERPL-MCNC: 7 G/DL (ref 6.3–8.2)
RBC # BLD AUTO: 4.52 M/UL (ref 4.23–5.6)
SODIUM SERPL-SCNC: 144 MMOL/L (ref 133–143)
TRIGL SERPL-MCNC: 78 MG/DL (ref 35–150)
TSH, 3RD GENERATION: 2.65 UIU/ML (ref 0.36–3.74)
VLDLC SERPL CALC-MCNC: 15.6 MG/DL (ref 6–23)
WBC # BLD AUTO: 4.8 K/UL (ref 4.3–11.1)

## 2023-01-16 LAB
PSA FREE MFR SERPL: 100 %
PSA FREE SERPL-MCNC: 0.1 NG/ML
PSA SERPL-MCNC: 0.1 NG/ML

## 2023-01-17 NOTE — PROGRESS NOTES
Name: Shwetha Montano      MRN: [id number]       : 1960       Age: 60 y.o. Sex: male        Darryn Castro MD       CC:    Chief Complaint   Patient presents with    Follow-up     [facility] - [place] Screening; Sleeve 2019       HPI:  The patient is referred here for a colonoscopy by Dr. Kathleen Miller. Had two polyps removed when he had his last colonoscopy 5 years ago. No recent abdominal pain, nausea or vomiting. Family hx of colon cancer No      Previous colonoscopy Yes   BRBPR No   Melena No   Loss of appetite No   Weight loss No      Change in bowel habits No       HISTORY:    Past Medical History:   Diagnosis Date    Abnormal EKG 3/8/2017    Adverse effect of anesthesia     6-8 hours post-op B/P dropped and pt was not able to urinate- had to be cath. - after gastric sleeve    Arthritis     osteo-knee, toe    Asthma     childhood asthma    Atrial fibrillation ([address]     Borderline diabetes     diet controlled, 20 A1c 4.9    Current use of long term anticoagulation     no longer on Coumadin or Lovenox injections    History of depression     pt denies any further complications    History of narcotic addiction ([address]     History of pulmonary embolism     Treated with Coumadin- then lovenox bridge- IVC filter placed-removed    HTN (hypertension)     controlled with meds    Kidney stone     Obesity Most of my life    Persistent atrial fibrillation ([address]     Followed by [facility] Card./ rate controlled, eliquis    Presence of IVC filter     placed prior to gastric sleeve.     Sleep apnea     uses CPAP    Status following surgery for weight loss 2019    gastric sleeve    Venous stasis      Past Surgical History:   Procedure Laterality Date    COLONOSCOPY  10/20/2015    GASTRECTOMY  2019    sleeve    IR IVC FILTER PLACEMENT W IMAGING  2019    Dr Gris Hatch     IR IVC FILTER RETRIEVAL  2019    IR IVC FILTER RETRIEVAL  2019    IR IVC FILTER RETRIEVAL 2019 [facility] SPECI[facility]    OTHER SURGICAL HISTORY  05/2019    gastric sleeve    REFRACTIVE SURGERY Bilateral     TONSILLECTOMY  1965     Prior to Admission medications    Medication Sig Start Date End Date Taking? Authorizing Provider   carvedilol (COREG) 12.5 MG tablet Take 1 tablet by mouth 2 times daily (with meals) 12/7/22  Yes Doc Klein MD   rivaroxaban (XARELTO) 20 MG TABS tablet Take 20 mg by mouth daily (with breakfast) 1/28/22  Yes Ar Automatic Reconciliation     Social History     Tobacco Use    Smoking status: Never    Smokeless tobacco: Never   Substance Use Topics    Alcohol use: No    Drug use: No     Family History   Problem Relation Age of Onset    Heart Disease Paternal Aunt     COPD Mother     Hypertension Father     High Blood Pressure Father     Diabetes Neg Hx      . Allergies   Allergen Reactions    Amlodipine Other (See Comments)     edema    Hydrochlorothiazide Other (See Comments)     gout    Lisinopril Cough    Naltrexone-Bupropion Hcl Er Other (See Comments)     Nausea first and then sweet craving       Physical Exam:     BP (!) 163/94   Pulse 79   Ht 5' 10\" (1.778 m)   Wt (!) 408 lb (185.1 kg)   BMI 58.54 kg/m²     General: Alert, oriented, obese white male in no acute distress. Resp: Breathing is  non-labored. Lungs clear to auscultation without wheezing or rhonchi   CV: RRR. No murmurs, rubs or gallops appreciated. Abd: soft non-tender and non-distended without peritoneal signs. +bs    Extremities: No clubbing, cyanosis or edema. Strength intact. Assessment/Plan:  David Ledesma is a 60 y.o. male who is here for a colonoscopy due to a personal history of colon polyps. 1. Off Eliquis for three days. 2. Bowel prep    3. Colonoscopy with MAC. I went through the risks of bleeding, perforation of the colon and cardiopulmonary problems that can develop with the use of anesthesia.     Gisella Dumont MD    FACS   1/19/2023  4:04 PM

## 2023-01-18 ENCOUNTER — OFFICE VISIT (OUTPATIENT)
Dept: INTERNAL MEDICINE CLINIC | Facility: CLINIC | Age: 63
End: 2023-01-18
Payer: COMMERCIAL

## 2023-01-18 VITALS
WEIGHT: 315 LBS | BODY MASS INDEX: 45.1 KG/M2 | HEIGHT: 70 IN | SYSTOLIC BLOOD PRESSURE: 128 MMHG | DIASTOLIC BLOOD PRESSURE: 84 MMHG

## 2023-01-18 DIAGNOSIS — G47.33 SLEEP APNEA, OBSTRUCTIVE: ICD-10-CM

## 2023-01-18 DIAGNOSIS — E11.9 TYPE 2 DIABETES MELLITUS WITHOUT COMPLICATION, WITHOUT LONG-TERM CURRENT USE OF INSULIN (HCC): ICD-10-CM

## 2023-01-18 DIAGNOSIS — Z86.711 HISTORY OF PULMONARY EMBOLISM: ICD-10-CM

## 2023-01-18 DIAGNOSIS — D69.6 THROMBOCYTOPENIA, UNSPECIFIED (HCC): ICD-10-CM

## 2023-01-18 DIAGNOSIS — Z98.84 S/P BARIATRIC SURGERY: ICD-10-CM

## 2023-01-18 DIAGNOSIS — Z87.39 HISTORY OF GOUT: ICD-10-CM

## 2023-01-18 DIAGNOSIS — I48.92 ATRIAL FLUTTER, UNSPECIFIED TYPE (HCC): ICD-10-CM

## 2023-01-18 DIAGNOSIS — Z00.00 ROUTINE PHYSICAL EXAMINATION: Primary | ICD-10-CM

## 2023-01-18 PROBLEM — G47.34 NOCTURNAL HYPOXEMIA: Status: RESOLVED | Noted: 2022-12-19 | Resolved: 2023-01-18

## 2023-01-18 PROCEDURE — 3074F SYST BP LT 130 MM HG: CPT | Performed by: INTERNAL MEDICINE

## 2023-01-18 PROCEDURE — 99396 PREV VISIT EST AGE 40-64: CPT | Performed by: INTERNAL MEDICINE

## 2023-01-18 PROCEDURE — 3079F DIAST BP 80-89 MM HG: CPT | Performed by: INTERNAL MEDICINE

## 2023-01-18 SDOH — HEALTH STABILITY: PHYSICAL HEALTH: ON AVERAGE, HOW MANY DAYS PER WEEK DO YOU ENGAGE IN MODERATE TO STRENUOUS EXERCISE (LIKE A BRISK WALK)?: 2 DAYS

## 2023-01-18 SDOH — HEALTH STABILITY: PHYSICAL HEALTH: ON AVERAGE, HOW MANY MINUTES DO YOU ENGAGE IN EXERCISE AT THIS LEVEL?: 30 MIN

## 2023-01-18 ASSESSMENT — PATIENT HEALTH QUESTIONNAIRE - PHQ9
SUM OF ALL RESPONSES TO PHQ9 QUESTIONS 1 & 2: 0
2. FEELING DOWN, DEPRESSED OR HOPELESS: 0
SUM OF ALL RESPONSES TO PHQ QUESTIONS 1-9: 0
SUM OF ALL RESPONSES TO PHQ QUESTIONS 1-9: 0
1. LITTLE INTEREST OR PLEASURE IN DOING THINGS: 0
SUM OF ALL RESPONSES TO PHQ QUESTIONS 1-9: 0
SUM OF ALL RESPONSES TO PHQ QUESTIONS 1-9: 0

## 2023-01-18 NOTE — PROGRESS NOTES
Physical Exam     I have reviewed the patient's medical history in detail and updated the computerized patient record. 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 He has regained much of the weight he lost after the gastric sleeve surgery. History     Past Medical History:   Diagnosis Date    Abnormal EKG 3/8/2017    Adverse effect of anesthesia     6-8 hours post-op B/P dropped and pt was not able to urinate- had to be cath. - after gastric sleeve    Arthritis     osteo-knee, toe    Asthma     childhood asthma    Atrial fibrillation ([address] 2017    Borderline diabetes     diet controlled, 8/27/20 A1c 4.9    Current use of long term anticoagulation     no longer on Coumadin or Lovenox injections    History of depression     pt denies any further complications    History of narcotic addiction ([address]     History of pulmonary embolism 2015    Treated with Coumadin- then lovenox bridge- IVC filter placed-removed    HTN (hypertension)     controlled with meds    Kidney stone 2021    Obesity Most of my life    Persistent atrial fibrillation ([address]     Followed by [facility]./ rate controlled, eliquis    Presence of IVC filter     placed prior to gastric sleeve.     Sleep apnea     uses CPAP    Status following surgery for weight loss 05/2019    gastric sleeve    Venous stasis       Past Surgical History:   Procedure Laterality Date    COLONOSCOPY  10/20/2015    GASTRECTOMY  05/20/2019    sleeve    IR IVC FILTER PLACEMENT W IMAGING  05/08/2019    Dr Scarlet Canela     IR IVC FILTER RETRIEVAL  6/21/2019    IR IVC FILTER RETRIEVAL  6/21/2019    IR IVC FILTER RETRIEVAL 6/21/2019 [facility] SPECIALS    OTHER SURGICAL HISTORY  05/2019    gastric sleeve    REFRACTIVE SURGERY Bilateral     TONSILLECTOMY  1965     Current Outpatient Medications   Medication Sig Dispense Refill    carvedilol (COREG) 12.5 MG tablet Take 1 tablet by mouth 2 times daily (with meals) 180 tablet 4    rivaroxaban (XARELTO) 20 MG TABS tablet Take 20 mg by mouth daily (with breakfast)       No current facility-administered medications for this visit.      Allergies   Allergen Reactions    Amlodipine Other (See Comments)     edema    Hydrochlorothiazide Other (See Comments)     gout    Lisinopril Cough    Naltrexone-Bupropion Hcl Er Other (See Comments)     Nausea first and then sweet craving     Family History   Problem Relation Age of Onset    Heart Disease Paternal Aunt     COPD Mother     Hypertension Father     High Blood Pressure Father     Diabetes Neg Hx      Social History     Tobacco Use    Smoking status: Never    Smokeless tobacco: Never   Substance Use Topics    Alcohol use: No     Patient Active Problem List   Diagnosis    Routine physical examination    Atrial flutter (HCC)    Hematospermia    Heel pain, chronic, right    Sleep apnea, obstructive    Body mass index (BMI) 50.0-59.9, adult (HCC)    S/P bariatric surgery    Hx of long term use of blood thinners    Venous insufficiency    History of gout    History of pulmonary embolism    History of narcotic addiction [address]    Essential hypertension    History of deep vein thrombosis (DVT) of lower extremity    Asymptomatic microscopic hematuria    Thrombocytopenia, unspecified ([facility]    Type 2 diabetes mellitus without complication, without long-term current use of insulin [facility])     Health Maintenance     Health Maintenance   Topic Date Due    Colorectal Cancer Screen  10/20/2020    Diabetic foot exam  12/07/2023    Depression Screen  12/07/2023    Diabetic retinal exam  12/21/2023    A1C test (Diabetic or Prediabetic)  01/13/2024    Diabetic Alb to Cr ratio (uACR) test  01/13/2024    Lipids  01/13/2024    GFR test (Diabetes, CKD 3-4, OR last GFR 15-59)  01/13/2024    DTaP/Tdap/Td vaccine (2 - Td or Tdap) 09/09/2029    Flu vaccine  Completed    Shingles vaccine  Completed    Pneumococcal 0-64 years Vaccine  Completed    COVID-19 Vaccine  Completed    Hepatitis C screen  Completed    HIV screen Completed    Hepatitis A vaccine  Aged Out    Hib vaccine  Aged Out    Meningococcal (ACWY) vaccine  Aged Out     Immunization History   Administered Date(s) Administered    COVID-19, MODERNA Bivalent BOOSTER, (age 12y+), IM, 48 mcg/0.5 mL 10/19/2022    COVID-19, PFIZER PURPLE top, DILUTE for use, (age 15 y+), 30mcg/0.3mL 03/11/2021, 04/01/2021, 07/16/2022    Influenza Virus Vaccine 11/23/2016, 11/07/2017, 10/19/2022    Influenza, FLUARIX, FLULAVAL, Jaron Sours (age 10 mo+) AND AFLURIA, (age 1 y+), PF, 0.5mL 10/16/2018, 11/05/2019, 09/26/2020    Influenza, High Dose (Fluzone 65 yrs and older) 12/08/2015, 10/19/2018    Pneumococcal Polysaccharide (Hadamyibt11) 01/01/2013    Tdap (Boostrix, Adacel) 09/09/2019    Zoster Recombinant (Shingrix) 09/09/2019, 02/11/2020     Review of Systems   Review of Systems  Physical Examination   /84   Ht 5' 10\" (1.778 m)   Wt (!) 409 lb (185.5 kg)   BMI 58.69 kg/m²   Physical Exam  Constitutional:       Appearance: He is obese. HENT:      Right Ear: Tympanic membrane normal.      Left Ear: Tympanic membrane normal.   Neck:      Thyroid: No thyromegaly. Cardiovascular:      Rate and Rhythm: Normal rate and regular rhythm. Pulses: Normal pulses. Posterior tibial pulses are 2+ on the right side and 2+ on the left side. Abdominal:      General: Abdomen is flat. Palpations: Abdomen is soft. Tenderness: There is no abdominal tenderness. Musculoskeletal:      Cervical back: Normal range of motion. Right ankle: Swelling present. Left ankle: Swelling present. Comments: 1 + edema   Neurological:      Mental Status: He is alert.       Advice/Referrals/Counseling   Influenza Vaccine  Tdap vaccine  Colorectal cancer screening tests due soon  Prostate cancer screening  Cardiovascular screening  Screening for glaucoma    Medical nutrition therapy for individuals with diabetes or renal disease  Overweight/Obesity  Safety and Fall Prevention    Assessment/Plan   Lawanda Whitaker was seen today for annual exam.    Diagnoses and all orders for this visit:    Routine physical examination    Type 2 diabetes mellitus without complication, without long-term current use of insulin (HCC)    Sleep apnea, obstructive    Thrombocytopenia, unspecified (HCC)    History of pulmonary embolism    History of gout    S/P bariatric surgery    Atrial flutter, unspecified type ([address]  . He has occasional episodes of apparent atrial flutter but this is gotten better with adjustment of his CPAP mask. He remains on oral anticoagulants for his history of pulmonary embolism. He has regained a lot of the weight he lost and we discussed the importance of diet and regular aerobic exercise. He thinks it will help him to come back in 3 months to monitor his progress with weight and exercise. His rowing machine is the perfect exercise device for him  A1c was 6.2 that he did not want to start metformin now.   He does have some microalbuminuria but I am not sure what place ACEs and ARB's have in therapy in the person who is  nondiabetic

## 2023-01-19 ENCOUNTER — OFFICE VISIT (OUTPATIENT)
Dept: SURGERY | Age: 63
End: 2023-01-19

## 2023-01-19 VITALS
DIASTOLIC BLOOD PRESSURE: 94 MMHG | WEIGHT: 315 LBS | SYSTOLIC BLOOD PRESSURE: 163 MMHG | HEART RATE: 79 BPM | HEIGHT: 70 IN | BODY MASS INDEX: 45.1 KG/M2

## 2023-01-19 DIAGNOSIS — Z12.11 ENCOUNTER FOR SCREENING COLONOSCOPY: Primary | ICD-10-CM

## 2023-01-19 DIAGNOSIS — E66.01 MORBID OBESITY (HCC): ICD-10-CM

## 2023-01-23 ENCOUNTER — PREP FOR PROCEDURE (OUTPATIENT)
Dept: SURGERY | Age: 63
End: 2023-01-23

## 2023-01-23 PROBLEM — E66.01 MORBID OBESITY (HCC): Status: ACTIVE | Noted: 2023-01-23

## 2023-01-27 NOTE — PERIOP NOTE
Patient verified name, , and procedure. Type: 1a; abbreviated assessment per anesthesia guidelines    Labs per anesthesia: none    Instructed pt that they will be notified the day before their procedure by the GI Lab for time of arrival if their procedure is [address] and Pre-op for [place] cases. Arrival times should be called by 5 pm. If no phone is received the patient should contact the GI lab at [phone]. Follow diet and prep instructions per office including NPO status. If patient has NOT received instructions from office patient is advised to call surgeon office, verbalizes understanding. Bath or shower the night before and the am of surgery with non-moisturizing soap. No lotions, oils, powders, cologne on skin. No make up, eye make up or jewelry. Wear loose fitting comfortable, clean clothing. Must have adult present in building the entire time . TAKE ONLY THE FOLLOWING MEDICATION ON THE DAY OF THE PROCEDURE :  coreg    MEDICATIONS TO HOLD : Xarelto x 3 days per surgeon      The following discharge instructions reviewed with patient: medication given during procedure may cause drowsiness for several hours, therefore, do not drive or operate machinery for remainder of the day. You may not drink alcohol on the day of your procedure, please resume regular diet and activity unless otherwise directed. You may experience abdominal distention for several hours that is relieved by the passage of gas. Contact your physician if you have any of the following: fever or chills, severe abdominal pain or excessive amount of bleeding or a large amount when having a bowel movement. Occasional specks of blood with bowel movement would not be unusual.     You may be required to pay a deductible or co-pay on the day of your procedure. You can pre-pay by calling  [phone] if your surgery is at the [facility].

## 2023-02-03 NOTE — H&P
Name: Batsheva Humphrey      MRN: [id number]       : 1960       Age: 60 y.o. Sex: male        Rehana Guillen MD       CC:    No chief complaint on file. HPI:  The patient is referred here for a colonoscopy by Dr. Kendra Raza. Had two polyps removed when he had his last colonoscopy 5 years ago. No recent abdominal pain, nausea or vomiting. Family hx of colon cancer No      Previous colonoscopy Yes   BRBPR No   Melena No   Loss of appetite No   Weight loss No      Change in bowel habits No       HISTORY:    Past Medical History:   Diagnosis Date    Abnormal EKG 3/8/2017    Adverse effect of anesthesia     6-8 hours post-op B/P dropped and pt was not able to urinate- had to be cath. - after gastric sleeve    Arthritis     osteo-knee, toe    Asthma     childhood asthma    Atrial fibrillation ([address]     Borderline diabetes     diet controlled, 20 A1c 4.9    Current use of long term anticoagulation     no longer on Coumadin or Lovenox injections    History of depression     pt denies any further complications    History of narcotic addiction ([address]     History of pulmonary embolism     Treated with Coumadin- then lovenox bridge- IVC filter placed-removed    HTN (hypertension)     controlled with meds    Kidney stone     Obesity Most of my life    Persistent atrial fibrillation ([address]     Followed by [facility] Card./ rate controlled, xarelto    Presence of IVC filter     placed prior to gastric sleeve.     Sleep apnea     uses CPAP    Status following surgery for weight loss 2019    gastric sleeve    Venous stasis      Past Surgical History:   Procedure Laterality Date    COLONOSCOPY  10/20/2015    GASTRECTOMY  2019    sleeve    IR IVC FILTER PLACEMENT W IMAGING  2019    Dr Caitlin Marshall     IR IVC FILTER RETRIEVAL  2019    IR IVC FILTER RETRIEVAL  2019    IR IVC FILTER RETRIEVAL 2019 [facility] SPECI[facility]    OTHER SURGICAL HISTORY  2019    gastric sleeve REFRACTIVE SURGERY Bilateral     TONSILLECTOMY  1965     Prior to Admission medications    Medication Sig Start Date End Date Taking? Authorizing Provider   carvedilol (COREG) 12.5 MG tablet Take 1 tablet by mouth 2 times daily (with meals) 12/7/22   Etienne Hyatt MD   rivaroxaban (XARELTO) 20 MG TABS tablet Take 20 mg by mouth daily (with breakfast) 1/28/22   Ar Automatic Reconciliation     Social History     Tobacco Use    Smoking status: Never    Smokeless tobacco: Never   Vaping Use    Vaping Use: Never used   Substance Use Topics    Alcohol use: No    Drug use: No     Family History   Problem Relation Age of Onset    Heart Disease Paternal Aunt     COPD Mother     Hypertension Father     High Blood Pressure Father     Diabetes Neg Hx      . Allergies   Allergen Reactions    Amlodipine Other (See Comments)     edema    Hydrochlorothiazide Other (See Comments)     gout    Lisinopril Cough    Naltrexone-Bupropion Hcl Er Other (See Comments)     Nausea first and then sweet craving       Physical Exam:     Ht 5' 11\" (1.803 m)   Wt (!) 410 lb (186 kg)   BMI 57.18 kg/m²     General: Alert, oriented, obese white male in no acute distress. Resp: Breathing is  non-labored. Lungs clear to auscultation without wheezing or rhonchi   CV: RRR. No murmurs, rubs or gallops appreciated. Abd: soft non-tender and non-distended without peritoneal signs. +bs    Extremities: No clubbing, cyanosis or edema. Strength intact. Assessment/Plan:  Jermaine Hernandez is a 60 y.o. male who is here for a colonoscopy due to a personal history of colon polyps. 1. Off Eliquis for three days. 2. Bowel prep    3. Colonoscopy with MAC. I went through the risks of bleeding, perforation of the colon and cardiopulmonary problems that can develop with the use of anesthesia.     Mary James MD    FACS   2/3/2023  4:54 PM

## 2023-02-07 ENCOUNTER — ANESTHESIA EVENT (OUTPATIENT)
Dept: ENDOSCOPY | Age: 63
End: 2023-02-07
Payer: COMMERCIAL

## 2023-02-07 NOTE — PROGRESS NOTES
Spoke with patient. Patient advised to arrive at [phone] for his 1005 procedure. He was advised of our  policy and to register downstairs prior to coming to the floor. Patient verbalized understanding.

## 2023-02-08 ENCOUNTER — ANESTHESIA (OUTPATIENT)
Dept: ENDOSCOPY | Age: 63
End: 2023-02-08
Payer: COMMERCIAL

## 2023-02-08 ENCOUNTER — HOSPITAL ENCOUNTER (OUTPATIENT)
Age: 63
Setting detail: OUTPATIENT SURGERY
Discharge: HOME | End: 2023-02-08
Attending: SURGERY | Admitting: SURGERY
Payer: COMMERCIAL

## 2023-02-08 VITALS
BODY MASS INDEX: 44.1 KG/M2 | HEART RATE: 84 BPM | WEIGHT: 315 LBS | RESPIRATION RATE: 15 BRPM | OXYGEN SATURATION: 98 % | DIASTOLIC BLOOD PRESSURE: 68 MMHG | HEIGHT: 71 IN | SYSTOLIC BLOOD PRESSURE: 144 MMHG | TEMPERATURE: 97.2 F

## 2023-02-08 DIAGNOSIS — E66.01 MORBID OBESITY (HCC): ICD-10-CM

## 2023-02-08 PROBLEM — Z86.010 PERSONAL HISTORY OF COLONIC POLYPS: Status: ACTIVE | Noted: 2023-02-08

## 2023-02-08 PROBLEM — Z86.0100 PERSONAL HISTORY OF COLONIC POLYPS: Status: ACTIVE | Noted: 2023-02-08

## 2023-02-08 PROCEDURE — 3700000001 HC ADD 15 MINUTES (ANESTHESIA): Performed by: SURGERY

## 2023-02-08 PROCEDURE — 2580000003 HC RX 258: Performed by: ANESTHESIOLOGY

## 2023-02-08 PROCEDURE — 3700000000 HC ANESTHESIA ATTENDED CARE: Performed by: SURGERY

## 2023-02-08 PROCEDURE — 6360000002 HC RX W HCPCS: Performed by: REGISTERED NURSE

## 2023-02-08 PROCEDURE — 7100000010 HC PHASE II RECOVERY - FIRST 15 MIN: Performed by: SURGERY

## 2023-02-08 PROCEDURE — 2709999900 HC NON-CHARGEABLE SUPPLY: Performed by: SURGERY

## 2023-02-08 PROCEDURE — 7100000011 HC PHASE II RECOVERY - ADDTL 15 MIN: Performed by: SURGERY

## 2023-02-08 PROCEDURE — 45378 DIAGNOSTIC COLONOSCOPY: CPT | Performed by: SURGERY

## 2023-02-08 PROCEDURE — 3609027000 HC COLONOSCOPY: Performed by: SURGERY

## 2023-02-08 PROCEDURE — 2500000003 HC RX 250 WO HCPCS: Performed by: REGISTERED NURSE

## 2023-02-08 RX ORDER — LIDOCAINE HYDROCHLORIDE 10 MG/ML
1 INJECTION, SOLUTION INFILTRATION; PERINEURAL
Status: DISCONTINUED | OUTPATIENT
Start: 2023-02-08 | End: 2023-02-08 | Stop reason: HOSPADM

## 2023-02-08 RX ORDER — SODIUM CHLORIDE 0.9 % (FLUSH) 0.9 %
5-40 SYRINGE (ML) INJECTION PRN
Status: DISCONTINUED | OUTPATIENT
Start: 2023-02-08 | End: 2023-02-08 | Stop reason: HOSPADM

## 2023-02-08 RX ORDER — ONDANSETRON 2 MG/ML
4 INJECTION INTRAMUSCULAR; INTRAVENOUS
Status: CANCELLED | OUTPATIENT
Start: 2023-02-08 | End: 2023-02-09

## 2023-02-08 RX ORDER — SODIUM CHLORIDE, SODIUM LACTATE, POTASSIUM CHLORIDE, CALCIUM CHLORIDE 600; 310; 30; 20 MG/100ML; MG/100ML; MG/100ML; MG/100ML
INJECTION, SOLUTION INTRAVENOUS CONTINUOUS
Status: CANCELLED | OUTPATIENT
Start: 2023-02-08

## 2023-02-08 RX ORDER — LIDOCAINE HYDROCHLORIDE 20 MG/ML
INJECTION, SOLUTION EPIDURAL; INFILTRATION; INTRACAUDAL; PERINEURAL PRN
Status: DISCONTINUED | OUTPATIENT
Start: 2023-02-08 | End: 2023-02-08 | Stop reason: SDUPTHER

## 2023-02-08 RX ORDER — PROPOFOL 10 MG/ML
INJECTION, EMULSION INTRAVENOUS CONTINUOUS PRN
Status: DISCONTINUED | OUTPATIENT
Start: 2023-02-08 | End: 2023-02-08 | Stop reason: SDUPTHER

## 2023-02-08 RX ORDER — SODIUM CHLORIDE 0.9 % (FLUSH) 0.9 %
5-40 SYRINGE (ML) INJECTION EVERY 12 HOURS SCHEDULED
Status: DISCONTINUED | OUTPATIENT
Start: 2023-02-08 | End: 2023-02-08 | Stop reason: HOSPADM

## 2023-02-08 RX ORDER — PROPOFOL 10 MG/ML
INJECTION, EMULSION INTRAVENOUS PRN
Status: DISCONTINUED | OUTPATIENT
Start: 2023-02-08 | End: 2023-02-08 | Stop reason: SDUPTHER

## 2023-02-08 RX ORDER — SODIUM CHLORIDE, SODIUM LACTATE, POTASSIUM CHLORIDE, CALCIUM CHLORIDE 600; 310; 30; 20 MG/100ML; MG/100ML; MG/100ML; MG/100ML
INJECTION, SOLUTION INTRAVENOUS CONTINUOUS
Status: DISCONTINUED | OUTPATIENT
Start: 2023-02-08 | End: 2023-02-08 | Stop reason: HOSPADM

## 2023-02-08 RX ORDER — SODIUM CHLORIDE 9 MG/ML
INJECTION, SOLUTION INTRAVENOUS PRN
Status: DISCONTINUED | OUTPATIENT
Start: 2023-02-08 | End: 2023-02-08 | Stop reason: HOSPADM

## 2023-02-08 RX ADMIN — LIDOCAINE HYDROCHLORIDE 60 MG: 20 INJECTION, SOLUTION EPIDURAL; INFILTRATION; INTRACAUDAL; PERINEURAL at 10:04

## 2023-02-08 RX ADMIN — PROPOFOL 50 MG: 10 INJECTION, EMULSION INTRAVENOUS at 10:08

## 2023-02-08 RX ADMIN — PROPOFOL 50 MG: 10 INJECTION, EMULSION INTRAVENOUS at 10:04

## 2023-02-08 RX ADMIN — PROPOFOL 200 MCG/KG/MIN: 10 INJECTION, EMULSION INTRAVENOUS at 10:04

## 2023-02-08 RX ADMIN — SODIUM CHLORIDE, POTASSIUM CHLORIDE, SODIUM LACTATE AND CALCIUM CHLORIDE: 600; 310; 30; 20 INJECTION, SOLUTION INTRAVENOUS at 09:14

## 2023-02-08 ASSESSMENT — PAIN - FUNCTIONAL ASSESSMENT: PAIN_FUNCTIONAL_ASSESSMENT: NONE - DENIES PAIN

## 2023-02-08 NOTE — ANESTHESIA PRE PROCEDURE
Department of Anesthesiology  Preprocedure Note       Name:  Quincy Haynes   Age:  60 y.o.  :  1960                                          MRN:  [id number]         Date:  2023      Surgeon: Snow Ruggiero):  Caryle Sander, MD    Procedure: Procedure(s):  COLORECTAL CANCER SCREENING, NOT HIGH RISK / BMI 59    Medications prior to admission:   Prior to Admission medications    Medication Sig Start Date End Date Taking? Authorizing Provider   carvedilol (COREG) 12.5 MG tablet Take 1 tablet by mouth 2 times daily (with meals) 22   Sal Crenshaw MD   rivaroxaban (XARELTO) 20 MG TABS tablet Take 20 mg by mouth daily (with breakfast) 22   Ar Automatic Reconciliation       Current medications:    Current Facility-Administered Medications   Medication Dose Route Frequency Provider Last Rate Last Admin    lidocaine 1 % injection 1 mL  1 mL IntraDERmal Once PRN Americo Beck MD        lactated ringers IV soln infusion   IntraVENous Continuous Americo Beck MD        sodium chloride flush 0.9 % injection 5-40 mL  5-40 mL IntraVENous 2 times per day Americo Beck MD        sodium chloride flush 0.9 % injection 5-40 mL  5-40 mL IntraVENous PRN Americo Beck MD        0.9 % sodium chloride infusion   IntraVENous PRN Americo Beck MD           Allergies:     Allergies   Allergen Reactions    Amlodipine Other (See Comments)     edema    Hydrochlorothiazide Other (See Comments)     gout    Lisinopril Cough    Naltrexone-Bupropion Hcl Er Other (See Comments)     Nausea first and then sweet craving       Problem List:    Patient Active Problem List   Diagnosis Code    Routine physical examination Z00.00    Atrial flutter (HCC) I48.92    Hematospermia R36.1    Heel pain, chronic, right M79.671, G89.29    Sleep apnea, obstructive G47.33    Body mass index (BMI) 50.0-59.9, adult (HCC) Z68.43    S/P bariatric surgery Z98.84    Hx of long term use of blood thinners Z92.29    Venous insufficiency I87.2    History of gout Z87.39    History of pulmonary embolism Z86.711    History of narcotic addiction (HCC) F11.21    Essential hypertension I10    History of deep vein thrombosis (DVT) of lower extremity Z86.718    Asymptomatic microscopic hematuria R31.21    Thrombocytopenia, unspecified (HCC) D69.6    Type 2 diabetes mellitus without complication, without long-term current use of insulin (HCC) E11.9    Morbid obesity ([facility]) E66.01       Past Medical History:        Diagnosis Date    Abnormal EKG 3/8/2017    Adverse effect of anesthesia     6-8 hours post-op B/P dropped and pt was not able to urinate- had to be cath. - after gastric sleeve    Arthritis     osteo-knee, toe    Asthma     childhood asthma    Atrial fibrillation ([address] 2017    Borderline diabetes     diet controlled, 8/27/20 A1c 4.9    Current use of long term anticoagulation     no longer on Coumadin or Lovenox injections    History of depression     pt denies any further complications    History of narcotic addiction ([address]     History of pulmonary embolism 2015    Treated with Coumadin- then lovenox bridge- IVC filter placed-removed    HTN (hypertension)     controlled with meds    Kidney stone 2021    Obesity Most of my life    Persistent atrial fibrillation ([address]     Followed by [facility]./ rate controlled, xarelto    Presence of IVC filter     placed prior to gastric sleeve.     Sleep apnea     uses CPAP    Status following surgery for weight loss 05/2019    gastric sleeve    Venous stasis        Past Surgical History:        Procedure Laterality Date    COLONOSCOPY  10/20/2015    GASTRECTOMY  05/20/2019    sleeve    IR IVC FILTER PLACEMENT W IMAGING  05/08/2019    Dr Ryne Iniguez IR IVC FILTER RETRIEVAL  6/21/2019    IR IVC FILTER RETRIEVAL  6/21/2019    IR IVC FILTER RETRIEVAL 6/21/2019 [facility] SPECIALS    OTHER SURGICAL HISTORY  05/2019    gastric sleeve    REFRACTIVE SURGERY Bilateral     TONSILLECTOMY  1965       Social History:    Social History     Tobacco Use    Smoking status: Never    Smokeless tobacco: Never   Substance Use Topics    Alcohol use: No                                Counseling given: Not Answered      Vital Signs (Current):   Vitals:    01/27/23 1308   Weight: (!) 410 lb (186 kg)   Height: 5' 11\" (1.803 m)                                              BP Readings from Last 3 Encounters:   01/19/23 (!) 163/94   01/18/23 128/84   12/19/22 (!) 140/90       NPO Status:                                                                                 BMI:   Wt Readings from Last 3 Encounters:   01/27/23 (!) 410 lb (186 kg)   01/19/23 (!) 408 lb (185.1 kg)   01/18/23 (!) 409 lb (185.5 kg)     Body mass index is 57.18 kg/m². CBC:   Lab Results   Component Value Date/Time    WBC 4.8 01/13/2023 09:04 AM    RBC 4.52 01/13/2023 09:04 AM    HGB 14.6 01/13/2023 09:04 AM    HCT 43.3 01/13/2023 09:04 AM    MCV 95.8 01/13/2023 09:04 AM    RDW 13.4 01/13/2023 09:04 AM     01/13/2023 09:04 AM       CMP:   Lab Results   Component Value Date/Time     01/13/2023 09:04 AM    K 4.0 01/13/2023 09:04 AM     01/13/2023 09:04 AM    CO2 26 01/13/2023 09:04 AM    BUN 11 01/13/2023 09:04 AM    CREATININE 1.00 01/13/2023 09:04 AM    GFRAA >60 12/03/2021 11:59 AM    AGRATIO 1.9 08/19/2021 08:37 AM    LABGLOM >60 01/13/2023 09:04 AM    GLUCOSE 122 01/13/2023 09:04 AM    PROT 7.0 01/13/2023 09:04 AM    CALCIUM 9.9 01/13/2023 09:04 AM    BILITOT 0.6 01/13/2023 09:04 AM    ALKPHOS 62 01/13/2023 09:04 AM    ALKPHOS 70 08/19/2021 08:37 AM    AST 19 01/13/2023 09:04 AM    ALT 26 01/13/2023 09:04 AM       POC Tests: No results for input(s): POCGLU, POCNA, POCK, POCCL, POCBUN, POCHEMO, POCHCT in the last 72 hours.     Coags: No results found for: PROTIME, INR, APTT    HCG (If Applicable): No results found for: PREGTESTUR, PREGSERUM, HCG, HCGQUANT     ABGs: No results found for: PHART, PO2ART, HNU4CIN, EEC1ERY, BEART, A0AVUSAL     Type & Screen (If Applicable):  No results found for: LABABO, LABRH    Drug/Infectious Status (If Applicable):  Lab Results   Component Value Date/Time    HEPCAB 0.1 08/30/2016 02:58 PM       COVID-19 Screening (If Applicable):   Lab Results   Component Value Date/Time    COVID19 Not Detected 11/30/2021 03:28 PM    COVID19 Performed 11/30/2021 03:28 PM           Anesthesia Evaluation  Patient summary reviewed and Nursing notes reviewed no history of anesthetic complications:   Airway: Mallampati: II  TM distance: >3 FB   Neck ROM: full  Mouth opening: > = 3 FB   Dental: normal exam         Pulmonary:normal exam    (+) sleep apnea:  asthma:                            Cardiovascular:  Exercise tolerance: no interval change,   (+) hypertension:, dysrhythmias: atrial flutter,         Rhythm: regular  Rate: normal                    Neuro/Psych:   Negative Neuro/Psych ROS              GI/Hepatic/Renal:   (+) morbid obesity         ROS comment: S/P gastric sleeve. Endo/Other:    (+) Diabetes (Diet controlled, last A1C = 6.2)Type II DM, , blood dyscrasia: thrombocytopenia:., .                 Abdominal:             Vascular:   + DVT, .        ROS comment: Had IVC filter, now out, and is maintained on Xarelto. Other Findings:           Anesthesia Plan      TIVA     ASA 3               Use of blood products discussed with patient whom consented to blood products.                      Dedra Dover MD   2/8/2023

## 2023-02-08 NOTE — ANESTHESIA POSTPROCEDURE EVALUATION
Department of Anesthesiology  Postprocedure Note    Patient: Jeovanny Garcia  MRN: [id number]  YOB: 1960  Date of evaluation: 2/8/2023      Procedure Summary     Date: 02/08/23 Room / Location: [facility] ENDO 05 / [facility] ENDOSCOPY    Anesthesia Start: 8586 Anesthesia Stop: 4440    Procedure: COLORECTAL CANCER SCREENING, NOT HIGH RISK / BMI 59 Diagnosis:       Encounter for screening colonoscopy      Morbid obesity ([facility]      (Encounter for screening colonoscopy [Z12.11])      (Morbid obesity ([facility] [E66.01])    Surgeons: Eloy Amaro MD Responsible Provider: Oni Segovia MD    Anesthesia Type: TIVA ASA Status: 3          Anesthesia Type: No value filed.     Conner Phase I: Conner Score: 10    Conner Phase II: Conner Score: 10      Anesthesia Post Evaluation    Patient location during evaluation: PACU  Patient participation: complete - patient participated  Level of consciousness: awake and alert  Airway patency: patent  Nausea & Vomiting: no nausea and no vomiting  Complications: no  Cardiovascular status: hemodynamically stable  Respiratory status: acceptable, nonlabored ventilation and spontaneous ventilation  Hydration status: euvolemic  Comments: BP (!) 144/68   Pulse 84   Temp 97.2 °F (36.2 °C) (Temporal)   Resp 15   Ht 5' 11\" (1.803 m)   Wt (!) 410 lb (186 kg)   SpO2 98%   BMI 57.18 kg/m²     Multimodal analgesia pain management approach

## 2023-02-08 NOTE — OP NOTE
[facility]  OPERATIVE REPORT    Name:  Hong Schulz  MR#:  [id number]  :  1960  ACCOUNT #:  [de-identified]  DATE OF SERVICE:  2023    PREOPERATIVE DIAGNOSIS:  Personal history of colon polyps as well as morbid obesity with a BMI of 57. POSTOPERATIVE DIAGNOSES:  1. Good bowel prep. 2.  No masses or polyps. BMI of 57. PROCEDURE PERFORMED:  Colonoscopy. SURGEON:  Tiff Turner MD    ASSISTANT:  None. ANESTHESIA:  Monitored anesthesia care with Dr. Markus Dunne, the nurse anesthetist.    COMPLICATIONS:  None. SPECIMENS REMOVED:  None. IMPLANTS:  None. ESTIMATED BLOOD LOSS:  None. DRAINS:  None. HISTORY:  A 23-year-old male who I was asked to see by Dr. Magnus Moser for a colonoscopy. The patient was known to me from previous sleeve gastrectomy. He initially lost a great deal of weight, but then gave up on exercise and the diet, gained all the weight back, plus more. He was seen for a colonoscopy due to history of polyps. He went through a bowel prep on 2023 and came in today for the procedure. PROCEDURE:  The patient was seen in the preop area with a friend and then transported to room #5 at [address] [place] [address]. He was placed on the stretcher in the left lateral decubitus position. Oxygen via face mask was given and the O2 sats and vital signs were monitored by the Anesthesia staff throughout the procedure. I did a time-out identifying the patient, surgeon, procedure and his birth date of 1960. When everyone in the room agreed with the time-out, we began. We advanced a 190 adult scope through the anus once the side effect had taken hold. It was advanced from the anus all the way to the cecum. Cecum was identified with the ileocecal valve and cecal folds. External compression of right lower quadrant corresponded to an indentation seen with the scope. The appendiceal orifice was visualized.   We withdrew the scope over 6-1/2-minute period of time. I saw no mucosal abnormalities in the cecum, ascending colon, transverse colon, descending or sigmoid colon. The overall bowel prep was good. The scope was brought back to the rectum. No problems were noted. He did have an external hemorrhoid noted, which was slightly inflamed, but not thrombosed. He said this had come up doing the bowel prep the day before. FINDINGS:  1. Good prep. 2.  No masses or polyps noted. PLAN:  Repeat in 10 years or sooner if symptoms develop.         Evans Raymond MD      [id number]  D:  02/08/2023 10:24  T:  02/08/2023 18:15  JOB #:  [id number]

## 2023-02-08 NOTE — DISCHARGE INSTRUCTIONS
Gastrointestinal Colonoscopy/Flexible Sigmoidoscopy - Lower Exam Discharge Instructions  Call Dr. Rehana Riggins at [phone] for any problems or questions. Contact the doctors office for follow up appointment as directed  Medication may cause drowsiness for several hours, therefore, do not drive or operate machinery for remainder of the day. No alcohol today. Do not make any important decisions such signing legal paperwork. Ordinarily, you may resume regular diet and activity after exam unless otherwise specified by your physician. Because of air put into your colon during exam, you may experience some abdominal distension, relieved by the passage of gas, for several hours. Contact your physician if you have any of the following:  Excessive amount of bleeding - large amount when having a bowel movement.   Occasional specks of blood with bowel movement would not be unusual.  Severe abdominal pain  Fever or Chills      Any additional instructions:   Next colonoscopy in 10 years

## 2023-02-08 NOTE — INTERVAL H&P NOTE
Update History & Physical    The patient's History and Physical of 2/3/23 was reviewed with the patient and I examined the patient. There was no change. The surgical site was confirmed by the patient and me. Plan: The risks, benefits, expected outcome, and alternative to the recommended procedure have been discussed with the patient. Patient understands and wants to proceed with the procedure.      Electronically signed by Kiera Portillo MD on 2/8/2023 at 9:17 AM

## 2023-02-17 PROBLEM — Z00.00 ROUTINE PHYSICAL EXAMINATION: Status: RESOLVED | Noted: 2019-08-30 | Resolved: 2023-02-17

## 2023-03-21 DIAGNOSIS — Z86.718 HISTORY OF DEEP VEIN THROMBOSIS (DVT) OF LOWER EXTREMITY: ICD-10-CM

## 2023-03-21 DIAGNOSIS — Z86.711 HISTORY OF PULMONARY EMBOLISM: Primary | ICD-10-CM

## 2023-03-21 NOTE — PROGRESS NOTES
[address] [place] [address] Court, [address] [facility]  [phone]    Patient Name:  Nicolas Ferrer  YOB: 1960      Office Visit 3/22/2023    CHIEF COMPLAINT:    Chief Complaint   Patient presents with    Sleep Apnea    Follow-up    CPAP/BiPAP   [facility]   PRESSURE -11-15 cm   Mask- Pillows    Sleep Study- 3/6/15 AHI 19.9  Sao2 80%  3G MACHINE        HISTORY OF PRESENT ILLNESS:  Patient is a 59 yo male seen today for follow up of ROJELIO. Diagnostic sleep study on 03/06/2015 with an AHI of 19.9 and lowest oxygen saturation of 80%. He is prescribed cpap therapy with a humidifier set at 11-15 cm with a nasal pillow mask. Most recent download reveals AHI on PAP therapy is 1.4, leak is 13.3 and the hourly usage is 6 hours 13 minutes nightly. The overall use is 560 hours with days greater than four hours at 90/90. The patient is compliant with the Pap therapy and is feeling better as a result. He reports that he is doing much better with the pressure adjustments at his last visit. States that he is sleeping well through the night and awakens fully refreshed in the mornings. Denies any excessive daytime sleepiness or fatigue. Philpot score 7/24. He did also make some adjustments with his mask and headgear and states this is also improved his therapy. He does report that he continues to struggle with his weight but is hoping that with the weather changing he will be more motivated to get outdoors and walk. We also discussed the need to decrease carbohydrates in his diet to further help with weight reduction. He denies any major medical changes over the last 3 months. His blood pressure is slightly elevated today at 142/94. I did advise him to monitor his blood pressure at home and to follow-up with his primary care provider if it remains elevated.     Sleep Medicine 3/22/2023 12/19/2022   Sitting and reading 1 2   Watching TV 1 1   Sitting, inactive in a public

## 2023-03-21 NOTE — TELEPHONE ENCOUNTER
Pt needs a refill of rivaroxaban (XARELTO) 20 MG TABS tablet to be sent to [facility]. Pt would not say how many he had left but said he had been needing the refill for awhile and [facility] had been unable to reach us.

## 2023-03-22 ENCOUNTER — OFFICE VISIT (OUTPATIENT)
Dept: SLEEP MEDICINE | Age: 63
End: 2023-03-22
Payer: COMMERCIAL

## 2023-03-22 VITALS
WEIGHT: 315 LBS | DIASTOLIC BLOOD PRESSURE: 94 MMHG | RESPIRATION RATE: 15 BRPM | HEART RATE: 96 BPM | SYSTOLIC BLOOD PRESSURE: 142 MMHG | BODY MASS INDEX: 44.1 KG/M2 | OXYGEN SATURATION: 98 % | TEMPERATURE: 96.6 F | HEIGHT: 71 IN

## 2023-03-22 DIAGNOSIS — G47.33 OSA (OBSTRUCTIVE SLEEP APNEA): Primary | ICD-10-CM

## 2023-03-22 DIAGNOSIS — E66.01 CLASS 3 SEVERE OBESITY DUE TO EXCESS CALORIES WITH SERIOUS COMORBIDITY AND BODY MASS INDEX (BMI) OF 50.0 TO 59.9 IN ADULT (HCC): ICD-10-CM

## 2023-03-22 PROBLEM — E66.813 CLASS 3 SEVERE OBESITY DUE TO EXCESS CALORIES WITH SERIOUS COMORBIDITY AND BODY MASS INDEX (BMI) OF 50.0 TO 59.9 IN ADULT: Status: ACTIVE | Noted: 2023-01-23

## 2023-03-22 PROCEDURE — 3080F DIAST BP >= 90 MM HG: CPT | Performed by: NURSE PRACTITIONER

## 2023-03-22 PROCEDURE — 3077F SYST BP >= 140 MM HG: CPT | Performed by: NURSE PRACTITIONER

## 2023-03-22 PROCEDURE — 99213 OFFICE O/P EST LOW 20 MIN: CPT | Performed by: NURSE PRACTITIONER

## 2023-03-22 ASSESSMENT — SLEEP AND FATIGUE QUESTIONNAIRES
HOW LIKELY ARE YOU TO NOD OFF OR FALL ASLEEP WHILE SITTING AND TALKING TO SOMEONE: 0
HOW LIKELY ARE YOU TO NOD OFF OR FALL ASLEEP WHILE SITTING INACTIVE IN A PUBLIC PLACE: 0
HOW LIKELY ARE YOU TO NOD OFF OR FALL ASLEEP IN A CAR, WHILE STOPPED FOR A FEW MINUTES IN TRAFFIC: 0
HOW LIKELY ARE YOU TO NOD OFF OR FALL ASLEEP WHILE SITTING QUIETLY AFTER LUNCH WITHOUT ALCOHOL: 2
ESS TOTAL SCORE: 7
HOW LIKELY ARE YOU TO NOD OFF OR FALL ASLEEP WHEN YOU ARE A PASSENGER IN A CAR FOR AN HOUR WITHOUT A BREAK: 0
HOW LIKELY ARE YOU TO NOD OFF OR FALL ASLEEP WHILE SITTING AND READING: 1
HOW LIKELY ARE YOU TO NOD OFF OR FALL ASLEEP WHILE WATCHING TV: 1
HOW LIKELY ARE YOU TO NOD OFF OR FALL ASLEEP WHILE LYING DOWN TO REST IN THE AFTERNOON WHEN CIRCUMSTANCES PERMIT: 3

## 2023-03-22 NOTE — PATIENT INSTRUCTIONS
Continue CPAP 11-15 cm H2O with nightly compliance  Supply order renewed  Recommendations as above  Follow-up in 6 months or sooner if needed

## 2023-09-27 ENCOUNTER — NURSE ONLY (OUTPATIENT)
Dept: UROLOGY | Age: 63
End: 2023-09-27

## 2023-09-27 DIAGNOSIS — N40.1 BENIGN PROSTATIC HYPERPLASIA WITH URINARY FREQUENCY: ICD-10-CM

## 2023-09-27 DIAGNOSIS — R35.0 BENIGN PROSTATIC HYPERPLASIA WITH URINARY FREQUENCY: ICD-10-CM

## 2023-09-27 LAB — PSA SERPL-MCNC: 0.2 NG/ML

## 2023-10-04 ENCOUNTER — OFFICE VISIT (OUTPATIENT)
Dept: UROLOGY | Age: 63
End: 2023-10-04
Payer: COMMERCIAL

## 2023-10-04 DIAGNOSIS — N40.1 BENIGN PROSTATIC HYPERPLASIA WITH URINARY FREQUENCY: ICD-10-CM

## 2023-10-04 DIAGNOSIS — N20.0 CALCULUS OF KIDNEY: Primary | ICD-10-CM

## 2023-10-04 DIAGNOSIS — R35.0 BENIGN PROSTATIC HYPERPLASIA WITH URINARY FREQUENCY: ICD-10-CM

## 2023-10-04 LAB
BILIRUBIN, URINE, POC: NEGATIVE
BLOOD URINE, POC: NORMAL
GLUCOSE URINE, POC: NEGATIVE
KETONES, URINE, POC: NEGATIVE
LEUKOCYTE ESTERASE, URINE, POC: NEGATIVE
NITRITE, URINE, POC: NEGATIVE
PH, URINE, POC: 6.5 (ref 4.6–8)
PROTEIN,URINE, POC: NEGATIVE
SPECIFIC GRAVITY, URINE, POC: 1.01 (ref 1–1.03)
URINALYSIS CLARITY, POC: NORMAL
URINALYSIS COLOR, POC: NORMAL
UROBILINOGEN, POC: NORMAL

## 2023-10-04 PROCEDURE — 99213 OFFICE O/P EST LOW 20 MIN: CPT | Performed by: UROLOGY

## 2023-10-04 PROCEDURE — 81003 URINALYSIS AUTO W/O SCOPE: CPT | Performed by: UROLOGY

## 2023-10-04 ASSESSMENT — ENCOUNTER SYMPTOMS
NAUSEA: 0
BACK PAIN: 0

## 2023-10-04 NOTE — PROGRESS NOTES
[facility]  [facility]    [address] [facility] [address]  [facility]  [phone]    Kate Bowman  : 1960    Chief Complaint   Patient presents with    Follow-up          HPI     Kate Bowman is a 58 y.o. male with a PMH of obesity, PAF, HTN, and ROJELIO being seen today for kidney stone and PSA screening follow up. Initially seen by Dr. Dann Valdez for gross hematuria and hematospermia. Underwent CT abd pelvis for hematuria work up revealing partially obstructing 6x9 mm stone to right upper ureter. Pt underwent Cystoscopy, right ureteroscopy, laser lithotripsy, basket stone extraction, right ureteral stent placement by me  on 12/3/21. Was seen in office on 21 for stent removal. Post op NORMA 22 revealed small kirsten non obstructing renal stones. No hydronephrosis. Pt is asymptomatic today. No c/o pain, gross hematuria, or fever. Passed 1 stone over the past 1 year. On eliquis for hx of PE.    PSA trend. Lab Results   Component Value Date    PSA 0.2 2023    PSA 0.1 2023    PSA 0.3 2022    PSA 0.2 2021    PSA 0.1 2020       Past Medical History:   Diagnosis Date    Abnormal EKG 3/8/2017    Adverse effect of anesthesia     6-8 hours post-op B/P dropped and pt was not able to urinate- had to be cath. - after gastric sleeve    Arthritis     osteo-knee, toe    Asthma     childhood asthma    Atrial fibrillation ([address])     Borderline diabetes     diet controlled, 20 A1c 4.9    Current use of long term anticoagulation     no longer on Coumadin or Lovenox injections    History of depression     pt denies any further complications    History of narcotic addiction ([address])     History of pulmonary embolism     Treated with Coumadin- then lovenox bridge- IVC filter placed-removed    HTN (hypertension)     controlled with meds    Kidney stone     Obesity Most of my life    Persistent atrial fibrillation ([address])     Followed by

## 2023-11-08 NOTE — PROGRESS NOTES
[address] [place] [address]  [phone]    Patient Name:  Aida Pedersen  YOB: 1960      Office Visit 11/9/2023    CHIEF COMPLAINT:    Chief Complaint   Patient presents with    Follow-up    Sleep Apnea         HISTORY OF PRESENT ILLNESS:  Patient is a 57 yo  female seen today for follow up of sleep apnea. Patient's sleep study in 2015 revealed AHI of 19.9 with lowest desaturation 80%. He is prescribed cpap therapy with a humidifier set at 11-15cm with a nasal pillow mask. Most recent download reveals AHI on PAP therapy is 1.5, leak is 16.5 and the hourly usage is 5 hours 54 minutes nightly. The overall use is 530 hours with days greater than four hours at 90/90. The patient is compliant with the Pap therapy and is feeling better as a result. Patient states he has rested in the morning, denies any daytime fatigue and may nap on occasion. Current [place] score is 8/24. He is falling asleep easily and will awaken once per p.m. He states there are some nights that he has difficulty going back to sleep. He is still having some issues with hypertension, states the CPAP pressure feels fine. He does feel some fluttering in the morning, does have a history of A-fib and a flutter. Did discuss checking Sallyann Brooke on CPAP to ensure adequate oxygenation.     [place] Sleepiness Scale      11/9/2023     8:02 AM 3/22/2023     3:39 PM 12/19/2022    10:33 AM   Sleep Medicine   Sitting and reading 2 1 2   Watching TV 1 1 1   Sitting, inactive in a public place (e.g. a theatre or a meeting) 1 0 1   As a passenger in a car for an hour without a break 0 0 1   Lying down to rest in the afternoon when circumstances permit 3 3 3   Sitting and talking to someone 0 0 0   Sitting quietly after a lunch without alcohol 1 2 1   In a car, while stopped for a few minutes in traffic 0 0 0   [place] Sleepiness Score 8 7 9          Past Medical History:   Diagnosis

## 2023-11-09 ENCOUNTER — OFFICE VISIT (OUTPATIENT)
Dept: SLEEP MEDICINE | Age: 63
End: 2023-11-09
Payer: COMMERCIAL

## 2023-11-09 VITALS
BODY MASS INDEX: 44.1 KG/M2 | HEART RATE: 95 BPM | DIASTOLIC BLOOD PRESSURE: 86 MMHG | RESPIRATION RATE: 18 BRPM | WEIGHT: 315 LBS | TEMPERATURE: 97.4 F | HEIGHT: 71 IN | SYSTOLIC BLOOD PRESSURE: 142 MMHG | OXYGEN SATURATION: 98 %

## 2023-11-09 DIAGNOSIS — G47.34 NOCTURNAL HYPOXEMIA: ICD-10-CM

## 2023-11-09 DIAGNOSIS — G47.33 OSA (OBSTRUCTIVE SLEEP APNEA): Primary | ICD-10-CM

## 2023-11-09 PROCEDURE — 3079F DIAST BP 80-89 MM HG: CPT | Performed by: STUDENT IN AN ORGANIZED HEALTH CARE EDUCATION/TRAINING PROGRAM

## 2023-11-09 PROCEDURE — 99213 OFFICE O/P EST LOW 20 MIN: CPT | Performed by: STUDENT IN AN ORGANIZED HEALTH CARE EDUCATION/TRAINING PROGRAM

## 2023-11-09 PROCEDURE — 3077F SYST BP >= 140 MM HG: CPT | Performed by: STUDENT IN AN ORGANIZED HEALTH CARE EDUCATION/TRAINING PROGRAM

## 2023-11-09 ASSESSMENT — SLEEP AND FATIGUE QUESTIONNAIRES
HOW LIKELY ARE YOU TO NOD OFF OR FALL ASLEEP WHEN YOU ARE A PASSENGER IN A CAR FOR AN HOUR WITHOUT A BREAK: 0
HOW LIKELY ARE YOU TO NOD OFF OR FALL ASLEEP WHILE SITTING AND TALKING TO SOMEONE: 0
ESS TOTAL SCORE: 8
HOW LIKELY ARE YOU TO NOD OFF OR FALL ASLEEP WHILE SITTING INACTIVE IN A PUBLIC PLACE: 1
HOW LIKELY ARE YOU TO NOD OFF OR FALL ASLEEP WHILE SITTING QUIETLY AFTER LUNCH WITHOUT ALCOHOL: 1
HOW LIKELY ARE YOU TO NOD OFF OR FALL ASLEEP WHILE SITTING AND READING: 2
HOW LIKELY ARE YOU TO NOD OFF OR FALL ASLEEP WHILE LYING DOWN TO REST IN THE AFTERNOON WHEN CIRCUMSTANCES PERMIT: 3
HOW LIKELY ARE YOU TO NOD OFF OR FALL ASLEEP IN A CAR, WHILE STOPPED FOR A FEW MINUTES IN TRAFFIC: 0
HOW LIKELY ARE YOU TO NOD OFF OR FALL ASLEEP WHILE WATCHING TV: 1

## 2023-11-19 DIAGNOSIS — I10 ESSENTIAL HYPERTENSION: ICD-10-CM

## 2023-11-20 RX ORDER — CARVEDILOL 12.5 MG/1
12.5 TABLET ORAL 2 TIMES DAILY WITH MEALS
Qty: 180 TABLET | Refills: 1 | Status: SHIPPED | OUTPATIENT
Start: 2023-11-20

## 2023-12-05 ENCOUNTER — OFFICE VISIT (OUTPATIENT)
Dept: INTERNAL MEDICINE CLINIC | Facility: CLINIC | Age: 63
End: 2023-12-05
Payer: COMMERCIAL

## 2023-12-05 VITALS
SYSTOLIC BLOOD PRESSURE: 139 MMHG | WEIGHT: 315 LBS | DIASTOLIC BLOOD PRESSURE: 89 MMHG | BODY MASS INDEX: 44.1 KG/M2 | HEIGHT: 71 IN | HEART RATE: 90 BPM

## 2023-12-05 DIAGNOSIS — Z98.84 S/P BARIATRIC SURGERY: ICD-10-CM

## 2023-12-05 DIAGNOSIS — R73.9 HYPERGLYCEMIA: Primary | ICD-10-CM

## 2023-12-05 DIAGNOSIS — D68.69 SECONDARY HYPERCOAGULABLE STATE (HCC): ICD-10-CM

## 2023-12-05 DIAGNOSIS — Z86.010 PERSONAL HISTORY OF COLONIC POLYPS: ICD-10-CM

## 2023-12-05 DIAGNOSIS — I10 ESSENTIAL HYPERTENSION: ICD-10-CM

## 2023-12-05 DIAGNOSIS — D69.6 THROMBOCYTOPENIA, UNSPECIFIED (HCC): ICD-10-CM

## 2023-12-05 DIAGNOSIS — I48.92 ATRIAL FLUTTER, UNSPECIFIED TYPE (HCC): ICD-10-CM

## 2023-12-05 DIAGNOSIS — R73.9 HYPERGLYCEMIA: ICD-10-CM

## 2023-12-05 DIAGNOSIS — I48.91 ATRIAL FIBRILLATION, UNSPECIFIED TYPE (HCC): ICD-10-CM

## 2023-12-05 DIAGNOSIS — Z86.718 HISTORY OF DEEP VEIN THROMBOSIS (DVT) OF LOWER EXTREMITY: ICD-10-CM

## 2023-12-05 LAB
ANION GAP SERPL CALC-SCNC: 9 MMOL/L (ref 2–11)
BASOPHILS # BLD: 0 K/UL (ref 0–0.2)
BASOPHILS NFR BLD: 1 % (ref 0–2)
BUN SERPL-MCNC: 16 MG/DL (ref 8–23)
CALCIUM SERPL-MCNC: 9.2 MG/DL (ref 8.3–10.4)
CHLORIDE SERPL-SCNC: 108 MMOL/L (ref 103–113)
CO2 SERPL-SCNC: 22 MMOL/L (ref 21–32)
CREAT SERPL-MCNC: 1.2 MG/DL (ref 0.8–1.5)
DIFFERENTIAL METHOD BLD: NORMAL
EOSINOPHIL # BLD: 0.1 K/UL (ref 0–0.8)
EOSINOPHIL NFR BLD: 3 % (ref 0.5–7.8)
ERYTHROCYTE [DISTWIDTH] IN BLOOD BY AUTOMATED COUNT: 13 % (ref 11.9–14.6)
GLUCOSE SERPL-MCNC: 145 MG/DL (ref 65–100)
HCT VFR BLD AUTO: 45.4 % (ref 41.1–50.3)
HGB BLD-MCNC: 15.3 G/DL (ref 13.6–17.2)
IMM GRANULOCYTES # BLD AUTO: 0 K/UL (ref 0–0.5)
IMM GRANULOCYTES NFR BLD AUTO: 0 % (ref 0–5)
LYMPHOCYTES # BLD: 1.4 K/UL (ref 0.5–4.6)
LYMPHOCYTES NFR BLD: 31 % (ref 13–44)
MCH RBC QN AUTO: 32.1 PG (ref 26.1–32.9)
MCHC RBC AUTO-ENTMCNC: 33.7 G/DL (ref 31.4–35)
MCV RBC AUTO: 95.2 FL (ref 82–102)
MONOCYTES # BLD: 0.5 K/UL (ref 0.1–1.3)
MONOCYTES NFR BLD: 11 % (ref 4–12)
NEUTS SEG # BLD: 2.5 K/UL (ref 1.7–8.2)
NEUTS SEG NFR BLD: 54 % (ref 43–78)
NRBC # BLD: 0 K/UL (ref 0–0.2)
PLATELET # BLD AUTO: 199 K/UL (ref 150–450)
PMV BLD AUTO: 11.6 FL (ref 9.4–12.3)
POTASSIUM SERPL-SCNC: 4 MMOL/L (ref 3.5–5.1)
RBC # BLD AUTO: 4.77 M/UL (ref 4.23–5.6)
SODIUM SERPL-SCNC: 139 MMOL/L (ref 136–146)
WBC # BLD AUTO: 4.5 K/UL (ref 4.3–11.1)

## 2023-12-05 PROCEDURE — 3075F SYST BP GE 130 - 139MM HG: CPT | Performed by: INTERNAL MEDICINE

## 2023-12-05 PROCEDURE — 99214 OFFICE O/P EST MOD 30 MIN: CPT | Performed by: INTERNAL MEDICINE

## 2023-12-05 PROCEDURE — 93000 ELECTROCARDIOGRAM COMPLETE: CPT | Performed by: INTERNAL MEDICINE

## 2023-12-05 PROCEDURE — 3079F DIAST BP 80-89 MM HG: CPT | Performed by: INTERNAL MEDICINE

## 2023-12-05 RX ORDER — CARVEDILOL 25 MG/1
25 TABLET ORAL 2 TIMES DAILY WITH MEALS
Qty: 180 TABLET | Refills: 4 | Status: SHIPPED | OUTPATIENT
Start: 2023-12-05 | End: 2024-03-04

## 2023-12-05 SDOH — ECONOMIC STABILITY: FOOD INSECURITY: WITHIN THE PAST 12 MONTHS, THE FOOD YOU BOUGHT JUST DIDN'T LAST AND YOU DIDN'T HAVE MONEY TO GET MORE.: NEVER TRUE

## 2023-12-05 SDOH — ECONOMIC STABILITY: HOUSING INSECURITY
IN THE LAST 12 MONTHS, WAS THERE A TIME WHEN YOU DID NOT HAVE A STEADY PLACE TO SLEEP OR SLEPT IN A SHELTER (INCLUDING NOW)?: NO

## 2023-12-05 SDOH — ECONOMIC STABILITY: FOOD INSECURITY: WITHIN THE PAST 12 MONTHS, YOU WORRIED THAT YOUR FOOD WOULD RUN OUT BEFORE YOU GOT MONEY TO BUY MORE.: NEVER TRUE

## 2023-12-05 SDOH — ECONOMIC STABILITY: INCOME INSECURITY: HOW HARD IS IT FOR YOU TO PAY FOR THE VERY BASICS LIKE FOOD, HOUSING, MEDICAL CARE, AND HEATING?: NOT VERY HARD

## 2023-12-05 ASSESSMENT — PATIENT HEALTH QUESTIONNAIRE - PHQ9
2. FEELING DOWN, DEPRESSED OR HOPELESS: 0
SUM OF ALL RESPONSES TO PHQ QUESTIONS 1-9: 0
SUM OF ALL RESPONSES TO PHQ9 QUESTIONS 1 & 2: 0
SUM OF ALL RESPONSES TO PHQ QUESTIONS 1-9: 0
1. LITTLE INTEREST OR PLEASURE IN DOING THINGS: 0

## 2023-12-05 NOTE — PROGRESS NOTES
Very inactive now. Notes some heart racing in the am.  Doing well with CPAP. Hyperglycemia  This is a chronic problem. The problem has been gradually worsening. He has tried nothing for the symptoms. Hypertension  This is a chronic problem. The problem has been gradually worsening since onset. The problem is uncontrolled. Associated symptoms include palpitations. Risk factors for coronary artery disease include sedentary lifestyle and obesity. Past Medical History, Past Surgical History, Family history, Social History, and Medications were all reviewed with the patient today and updated as necessary. Current Outpatient Medications   Medication Sig Dispense Refill    carvedilol (COREG) 25 MG tablet Take 1 tablet by mouth with breakfast and with evening meal 180 tablet 4    Misc. Devices (CPAP MACHINE) MISC by Does not apply route      rivaroxaban (XARELTO) 20 MG TABS tablet Take 1 tablet by mouth daily (with breakfast) 90 tablet 3     No current facility-administered medications for this visit.      Allergies   Allergen Reactions    Amlodipine Other (See Comments)     edema    Hydrochlorothiazide Other (See Comments)     gout    Lisinopril Cough    Naltrexone-Bupropion Hcl Er Other (See Comments)     Nausea first and then sweet craving     Patient Active Problem List   Diagnosis    Atrial flutter (HCC)    Hematospermia    Heel pain, chronic, right    ROJELIO (obstructive sleep apnea)    Body mass index (BMI) 50.0-59.9, adult ([facility])    S/P bariatric surgery    Hx of long term use of blood thinners    Venous insufficiency    History of gout    History of pulmonary embolism    History of narcotic addiction ([facility])    Essential hypertension    History of deep vein thrombosis (DVT) of lower extremity    Asymptomatic microscopic hematuria    Thrombocytopenia, unspecified ([facility])    Type 2 diabetes mellitus without complication, without long-term current use of insulin ([facility])    Class 3 severe obesity due to excess

## 2023-12-06 LAB
EST. AVERAGE GLUCOSE BLD GHB EST-MCNC: 148 MG/DL
HBA1C MFR BLD: 6.8 % (ref 4.8–5.6)

## 2023-12-14 DIAGNOSIS — G47.33 OSA (OBSTRUCTIVE SLEEP APNEA): ICD-10-CM

## 2023-12-14 DIAGNOSIS — G47.34 NOCTURNAL HYPOXEMIA: ICD-10-CM

## 2024-01-07 PROBLEM — E78.49 OTHER HYPERLIPIDEMIA: Status: ACTIVE | Noted: 2024-01-07

## 2024-01-08 ENCOUNTER — OFFICE VISIT (OUTPATIENT)
Dept: INTERNAL MEDICINE CLINIC | Facility: CLINIC | Age: 64
End: 2024-01-08
Payer: COMMERCIAL

## 2024-01-08 VITALS
HEIGHT: 71 IN | WEIGHT: 315 LBS | DIASTOLIC BLOOD PRESSURE: 92 MMHG | SYSTOLIC BLOOD PRESSURE: 154 MMHG | BODY MASS INDEX: 44.1 KG/M2

## 2024-01-08 DIAGNOSIS — I10 ESSENTIAL HYPERTENSION: ICD-10-CM

## 2024-01-08 DIAGNOSIS — I48.91 ATRIAL FIBRILLATION, UNSPECIFIED TYPE (HCC): ICD-10-CM

## 2024-01-08 DIAGNOSIS — G47.33 OSA (OBSTRUCTIVE SLEEP APNEA): ICD-10-CM

## 2024-01-08 DIAGNOSIS — E66.01 CLASS 3 SEVERE OBESITY DUE TO EXCESS CALORIES WITH SERIOUS COMORBIDITY AND BODY MASS INDEX (BMI) OF 50.0 TO 59.9 IN ADULT (HCC): ICD-10-CM

## 2024-01-08 DIAGNOSIS — E78.49 OTHER HYPERLIPIDEMIA: ICD-10-CM

## 2024-01-08 DIAGNOSIS — F11.21 HISTORY OF NARCOTIC ADDICTION (HCC): ICD-10-CM

## 2024-01-08 DIAGNOSIS — Z86.718 HISTORY OF DEEP VEIN THROMBOSIS (DVT) OF LOWER EXTREMITY: ICD-10-CM

## 2024-01-08 DIAGNOSIS — E11.9 TYPE 2 DIABETES MELLITUS WITHOUT COMPLICATION, WITHOUT LONG-TERM CURRENT USE OF INSULIN (HCC): ICD-10-CM

## 2024-01-08 DIAGNOSIS — Z23 NEED FOR VACCINATION: ICD-10-CM

## 2024-01-08 DIAGNOSIS — D69.6 THROMBOCYTOPENIA, UNSPECIFIED (HCC): Primary | ICD-10-CM

## 2024-01-08 PROCEDURE — 90677 PCV20 VACCINE IM: CPT | Performed by: INTERNAL MEDICINE

## 2024-01-08 PROCEDURE — 90471 IMMUNIZATION ADMIN: CPT | Performed by: INTERNAL MEDICINE

## 2024-01-08 PROCEDURE — 3080F DIAST BP >= 90 MM HG: CPT | Performed by: INTERNAL MEDICINE

## 2024-01-08 PROCEDURE — 99213 OFFICE O/P EST LOW 20 MIN: CPT | Performed by: INTERNAL MEDICINE

## 2024-01-08 PROCEDURE — 3077F SYST BP >= 140 MM HG: CPT | Performed by: INTERNAL MEDICINE

## 2024-01-08 RX ORDER — LOSARTAN POTASSIUM 100 MG/1
100 TABLET ORAL DAILY
Qty: 90 TABLET | Refills: 1 | Status: SHIPPED | OUTPATIENT
Start: 2024-01-08

## 2024-01-08 ASSESSMENT — ENCOUNTER SYMPTOMS: SHORTNESS OF BREATH: 0

## 2024-01-08 ASSESSMENT — PATIENT HEALTH QUESTIONNAIRE - PHQ9
SUM OF ALL RESPONSES TO PHQ QUESTIONS 1-9: 0
SUM OF ALL RESPONSES TO PHQ9 QUESTIONS 1 & 2: 0
2. FEELING DOWN, DEPRESSED OR HOPELESS: 0
SUM OF ALL RESPONSES TO PHQ QUESTIONS 1-9: 0
1. LITTLE INTEREST OR PLEASURE IN DOING THINGS: 0

## 2024-01-08 NOTE — PROGRESS NOTES
excess calories with serious comorbidity and body mass index (BMI) of 50.0 to 59.9 in adult (HCC)    Personal history of colonic polyps    Secondary hypercoagulable state (HCC)    Atrial fibrillation (HCC)    Other hyperlipidemia         Review of Systems   Respiratory:  Negative for shortness of breath.          OBJECTIVE:  BP (!) 154/92   Ht 1.803 m (5' 11\")   Wt (!) 183.7 kg (405 lb)   BMI 56.49 kg/m²      Physical Exam     Medical problems and test results were reviewed with the patient today.     No results found for this or any previous visit (from the past 672 hour(s)).      ASSESSMENT and PLAN    1. Thrombocytopenia, unspecified ([facility])  2. History of narcotic addiction ([facility])  3. Atrial fibrillation, unspecified type ([facility])  4. Type 2 diabetes mellitus without complication, without long-term current use of insulin ([facility])  5. History of deep vein thrombosis (DVT) of lower extremity  6. ROJELIO (obstructive sleep apnea)  7. Essential hypertension  8. Other hyperlipidemia  9. Class 3 severe obesity due to excess calories with serious comorbidity and body mass index (BMI) of 50.0 to 59.9 in adult ([facility])       Will add losartan as BP still running in the 140s  Long discussion also about the importance of aerobic exercise     No follow-ups on file.

## 2024-03-03 DIAGNOSIS — Z86.718 HISTORY OF DEEP VEIN THROMBOSIS (DVT) OF LOWER EXTREMITY: ICD-10-CM

## 2024-03-03 DIAGNOSIS — Z86.711 HISTORY OF PULMONARY EMBOLISM: ICD-10-CM

## 2024-03-04 RX ORDER — RIVAROXABAN 20 MG/1
20 TABLET, FILM COATED ORAL
Qty: 90 TABLET | Refills: 3 | OUTPATIENT
Start: 2024-03-04

## 2024-03-08 DIAGNOSIS — D69.6 THROMBOCYTOPENIA, UNSPECIFIED (HCC): ICD-10-CM

## 2024-03-08 DIAGNOSIS — E11.9 TYPE 2 DIABETES MELLITUS WITHOUT COMPLICATION, WITHOUT LONG-TERM CURRENT USE OF INSULIN (HCC): ICD-10-CM

## 2024-03-08 DIAGNOSIS — Z00.00 LABORATORY EXAMINATION ORDERED AS PART OF A ROUTINE GENERAL MEDICAL EXAMINATION: ICD-10-CM

## 2024-03-08 DIAGNOSIS — Z00.00 LABORATORY EXAMINATION ORDERED AS PART OF A ROUTINE GENERAL MEDICAL EXAMINATION: Primary | ICD-10-CM

## 2024-03-08 DIAGNOSIS — E78.49 OTHER HYPERLIPIDEMIA: ICD-10-CM

## 2024-03-08 LAB
ALBUMIN SERPL-MCNC: 3.8 G/DL (ref 3.2–4.6)
ALBUMIN/GLOB SERPL: 1.2 (ref 0.4–1.6)
ALP SERPL-CCNC: 62 U/L (ref 50–136)
ALT SERPL-CCNC: 30 U/L (ref 12–65)
ANION GAP SERPL CALC-SCNC: 5 MMOL/L (ref 2–11)
APPEARANCE UR: CLEAR
AST SERPL-CCNC: 21 U/L (ref 15–37)
BASOPHILS # BLD: 0.1 K/UL (ref 0–0.2)
BASOPHILS NFR BLD: 1 % (ref 0–2)
BILIRUB SERPL-MCNC: 0.7 MG/DL (ref 0.2–1.1)
BILIRUB UR QL: NEGATIVE
BUN SERPL-MCNC: 16 MG/DL (ref 8–23)
CALCIUM SERPL-MCNC: 9.2 MG/DL (ref 8.3–10.4)
CHLORIDE SERPL-SCNC: 110 MMOL/L (ref 103–113)
CHOLEST SERPL-MCNC: 126 MG/DL
CO2 SERPL-SCNC: 27 MMOL/L (ref 21–32)
COLOR UR: NORMAL
CREAT SERPL-MCNC: 1 MG/DL (ref 0.8–1.5)
CREAT UR-MCNC: 117 MG/DL
DIFFERENTIAL METHOD BLD: NORMAL
EOSINOPHIL # BLD: 0.2 K/UL (ref 0–0.8)
EOSINOPHIL NFR BLD: 4 % (ref 0.5–7.8)
ERYTHROCYTE [DISTWIDTH] IN BLOOD BY AUTOMATED COUNT: 13.5 % (ref 11.9–14.6)
EST. AVERAGE GLUCOSE BLD GHB EST-MCNC: 169 MG/DL
GLOBULIN SER CALC-MCNC: 3.2 G/DL (ref 2.8–4.5)
GLUCOSE SERPL-MCNC: 138 MG/DL (ref 65–100)
GLUCOSE UR STRIP.AUTO-MCNC: NEGATIVE MG/DL
HBA1C MFR BLD: 7.5 % (ref 4.8–5.6)
HCT VFR BLD AUTO: 43.3 % (ref 41.1–50.3)
HDLC SERPL-MCNC: 49 MG/DL (ref 40–60)
HDLC SERPL: 2.6
HGB BLD-MCNC: 14.5 G/DL (ref 13.6–17.2)
HGB UR QL STRIP: NEGATIVE
IMM GRANULOCYTES # BLD AUTO: 0 K/UL (ref 0–0.5)
IMM GRANULOCYTES NFR BLD AUTO: 0 % (ref 0–5)
KETONES UR QL STRIP.AUTO: NEGATIVE MG/DL
LDLC SERPL CALC-MCNC: 55.4 MG/DL
LEUKOCYTE ESTERASE UR QL STRIP.AUTO: NEGATIVE
LYMPHOCYTES # BLD: 1.5 K/UL (ref 0.5–4.6)
LYMPHOCYTES NFR BLD: 34 % (ref 13–44)
MCH RBC QN AUTO: 31.7 PG (ref 26.1–32.9)
MCHC RBC AUTO-ENTMCNC: 33.5 G/DL (ref 31.4–35)
MCV RBC AUTO: 94.7 FL (ref 82–102)
MICROALBUMIN UR-MCNC: 5.71 MG/DL
MICROALBUMIN/CREAT UR-RTO: 49 MG/G (ref 0–30)
MONOCYTES # BLD: 0.4 K/UL (ref 0.1–1.3)
MONOCYTES NFR BLD: 9 % (ref 4–12)
NEUTS SEG # BLD: 2.4 K/UL (ref 1.7–8.2)
NEUTS SEG NFR BLD: 52 % (ref 43–78)
NITRITE UR QL STRIP.AUTO: NEGATIVE
NRBC # BLD: 0 K/UL (ref 0–0.2)
PH UR STRIP: 5 (ref 5–9)
PLATELET # BLD AUTO: 189 K/UL (ref 150–450)
PMV BLD AUTO: 11.5 FL (ref 9.4–12.3)
POTASSIUM SERPL-SCNC: 4.1 MMOL/L (ref 3.5–5.1)
PROT SERPL-MCNC: 7 G/DL (ref 6.3–8.2)
PROT UR STRIP-MCNC: NEGATIVE MG/DL
RBC # BLD AUTO: 4.57 M/UL (ref 4.23–5.6)
SODIUM SERPL-SCNC: 142 MMOL/L (ref 136–146)
SP GR UR REFRACTOMETRY: 1.02 (ref 1–1.02)
TRIGL SERPL-MCNC: 108 MG/DL (ref 35–150)
TSH, 3RD GENERATION: 1.99 UIU/ML (ref 0.36–3.74)
UROBILINOGEN UR QL STRIP.AUTO: 0.2 EU/DL (ref 0.2–1)
VLDLC SERPL CALC-MCNC: 21.6 MG/DL (ref 6–23)
WBC # BLD AUTO: 4.6 K/UL (ref 4.3–11.1)

## 2024-03-15 ENCOUNTER — OFFICE VISIT (OUTPATIENT)
Dept: INTERNAL MEDICINE CLINIC | Facility: CLINIC | Age: 64
End: 2024-03-15
Payer: COMMERCIAL

## 2024-03-15 VITALS
SYSTOLIC BLOOD PRESSURE: 138 MMHG | DIASTOLIC BLOOD PRESSURE: 78 MMHG | BODY MASS INDEX: 44.1 KG/M2 | WEIGHT: 315 LBS | HEIGHT: 71 IN

## 2024-03-15 DIAGNOSIS — D69.6 THROMBOCYTOPENIA, UNSPECIFIED (HCC): ICD-10-CM

## 2024-03-15 DIAGNOSIS — M25.561 RIGHT KNEE PAIN, UNSPECIFIED CHRONICITY: ICD-10-CM

## 2024-03-15 DIAGNOSIS — E11.9 TYPE 2 DIABETES MELLITUS WITHOUT COMPLICATION, WITHOUT LONG-TERM CURRENT USE OF INSULIN (HCC): ICD-10-CM

## 2024-03-15 DIAGNOSIS — E66.01 CLASS 3 SEVERE OBESITY DUE TO EXCESS CALORIES WITH SERIOUS COMORBIDITY AND BODY MASS INDEX (BMI) OF 50.0 TO 59.9 IN ADULT (HCC): ICD-10-CM

## 2024-03-15 DIAGNOSIS — Z00.00 ROUTINE PHYSICAL EXAMINATION: Primary | ICD-10-CM

## 2024-03-15 DIAGNOSIS — Z86.010 PERSONAL HISTORY OF COLONIC POLYPS: ICD-10-CM

## 2024-03-15 DIAGNOSIS — I48.91 ATRIAL FIBRILLATION, UNSPECIFIED TYPE (HCC): ICD-10-CM

## 2024-03-15 DIAGNOSIS — G47.33 OSA (OBSTRUCTIVE SLEEP APNEA): ICD-10-CM

## 2024-03-15 DIAGNOSIS — I10 ESSENTIAL HYPERTENSION: ICD-10-CM

## 2024-03-15 DIAGNOSIS — Z86.711 HISTORY OF PULMONARY EMBOLISM: ICD-10-CM

## 2024-03-15 DIAGNOSIS — Z86.718 HISTORY OF DEEP VEIN THROMBOSIS (DVT) OF LOWER EXTREMITY: ICD-10-CM

## 2024-03-15 PROCEDURE — 3078F DIAST BP <80 MM HG: CPT | Performed by: INTERNAL MEDICINE

## 2024-03-15 PROCEDURE — 99396 PREV VISIT EST AGE 40-64: CPT | Performed by: INTERNAL MEDICINE

## 2024-03-15 PROCEDURE — 3075F SYST BP GE 130 - 139MM HG: CPT | Performed by: INTERNAL MEDICINE

## 2024-03-15 RX ORDER — TIRZEPATIDE 2.5 MG/.5ML
2.5 INJECTION, SOLUTION SUBCUTANEOUS WEEKLY
Qty: 2 ML | Refills: 0 | Status: SHIPPED | OUTPATIENT
Start: 2024-03-15

## 2024-03-15 RX ORDER — CARVEDILOL 25 MG/1
25 TABLET ORAL 2 TIMES DAILY WITH MEALS
Qty: 180 TABLET | Refills: 3 | Status: SHIPPED | OUTPATIENT
Start: 2024-03-15

## 2024-03-15 RX ORDER — LOSARTAN POTASSIUM 100 MG/1
100 TABLET ORAL DAILY
Qty: 90 TABLET | Refills: 3 | Status: SHIPPED | OUTPATIENT
Start: 2024-03-15

## 2024-03-15 RX ORDER — TIRZEPATIDE 5 MG/.5ML
5 INJECTION, SOLUTION SUBCUTANEOUS WEEKLY
Qty: 2 ML | Refills: 2 | Status: SHIPPED | OUTPATIENT
Start: 2024-03-15

## 2024-03-15 SDOH — ECONOMIC STABILITY: FOOD INSECURITY: WITHIN THE PAST 12 MONTHS, THE FOOD YOU BOUGHT JUST DIDN'T LAST AND YOU DIDN'T HAVE MONEY TO GET MORE.: NEVER TRUE

## 2024-03-15 SDOH — ECONOMIC STABILITY: FOOD INSECURITY: WITHIN THE PAST 12 MONTHS, YOU WORRIED THAT YOUR FOOD WOULD RUN OUT BEFORE YOU GOT MONEY TO BUY MORE.: NEVER TRUE

## 2024-03-15 SDOH — ECONOMIC STABILITY: INCOME INSECURITY: HOW HARD IS IT FOR YOU TO PAY FOR THE VERY BASICS LIKE FOOD, HOUSING, MEDICAL CARE, AND HEATING?: NOT VERY HARD

## 2024-03-15 ASSESSMENT — PATIENT HEALTH QUESTIONNAIRE - PHQ9
SUM OF ALL RESPONSES TO PHQ9 QUESTIONS 1 & 2: 0
1. LITTLE INTEREST OR PLEASURE IN DOING THINGS: 0
SUM OF ALL RESPONSES TO PHQ QUESTIONS 1-9: 0
2. FEELING DOWN, DEPRESSED OR HOPELESS: 0
SUM OF ALL RESPONSES TO PHQ QUESTIONS 1-9: 0

## 2024-03-15 NOTE — PROGRESS NOTES
Physical Exam     I have reviewed the patient's medical history in detail and updated the computerized patient record.     He is thinking about [facility]. And hopes he will be more active. S/p bariatric surgery and would like to work on weight  More trouble with his right knee with swelling and pain.   Compliant with CPAP    History     Past Medical History:   Diagnosis Date    Abnormal EKG 3/8/2017    Adverse effect of anesthesia     6-8 hours post-op B/P dropped and pt was not able to urinate- had to be cath.- after gastric sleeve    Arthritis     osteo-knee, toe    Asthma     childhood asthma    Atrial fibrillation (HCC) 2017    Borderline diabetes     diet controlled, 8/27/20 A1c 4.9    Current use of long term anticoagulation     no longer on Coumadin or Lovenox injections    History of depression     pt denies any further complications    History of narcotic addiction (HCC)     History of pulmonary embolism 2015    Treated with Coumadin- then lovenox bridge- IVC filter placed-removed    HTN (hypertension)     controlled with meds    Kidney stone 2021    Obesity Most of my life    Persistent atrial fibrillation (HCC)     Followed by [facility] rate controlled, xarelto    Presence of IVC filter     placed prior to gastric sleeve.    Sleep apnea     uses CPAP    Status following surgery for weight loss 05/2019    gastric sleeve    Venous stasis       Past Surgical History:   Procedure Laterality Date    COLONOSCOPY  10/20/2015    COLONOSCOPY N/A 2/8/2023    COLORECTAL CANCER SCREENING, NOT HIGH RISK / BMI 59 performed by Scott Zelaya MD at [facility] ENDOSCOPY    GASTRECTOMY  05/20/2019    sleeve    IR IVC FILTER PLACEMENT W IMAGING  05/08/2019    Dr Lance     IR IVC FILTER RETRIEVAL  6/21/2019    IR IVC FILTER RETRIEVAL  6/21/2019    IR IVC FILTER RETRIEVAL 6/21/2019 [facility] RADIOLOGY SPECIALS    OTHER SURGICAL HISTORY  05/2019    gastric sleeve    REFRACTIVE SURGERY Bilateral     TONSILLECTOMY  1965     Current 
hours.  He may be able to come in the morning to use the bike  and rowing machine.

## 2024-03-27 ENCOUNTER — HOSPITAL ENCOUNTER (OUTPATIENT)
Dept: PHYSICAL THERAPY | Age: 64
Setting detail: RECURRING SERIES
Discharge: HOME | End: 2024-03-30
Attending: INTERNAL MEDICINE
Payer: COMMERCIAL

## 2024-03-27 DIAGNOSIS — R26.2 DIFFICULTY IN WALKING, NOT ELSEWHERE CLASSIFIED: ICD-10-CM

## 2024-03-27 DIAGNOSIS — M25.561 RIGHT KNEE PAIN, UNSPECIFIED CHRONICITY: Primary | ICD-10-CM

## 2024-03-27 PROCEDURE — 97162 PT EVAL MOD COMPLEX 30 MIN: CPT

## 2024-03-27 PROCEDURE — 97110 THERAPEUTIC EXERCISES: CPT

## 2024-03-27 ASSESSMENT — PAIN SCALES - GENERAL: PAINLEVEL_OUTOF10: 2

## 2024-03-27 NOTE — PROGRESS NOTES
Cash Colbert  : 1960  Primary: Gordo Mcclellan (Mani MABRY)  Secondary:  [facility] @ [address] [place]  [place] [address]  Phone: [phone]  Fax: [phone] Plan Frequency: 2x/wk for 90 days    Plan of Care/Certification Expiration Date: 24        Plan of Care/Certification Expiration Date:  Plan of Care/Certification Expiration Date: 24    Frequency/Duration: Plan Frequency: 2x/wk for 90 days      Time In/Out:   Time In: 1515  Time Out: 1600      PT Visit Info:    Progress Note Due Date: 24  Total # of Visits to Date: 1  Progress Note Counter: 1      Visit Count:  1    OUTPATIENT PHYSICAL THERAPY:   Treatment Note 3/27/2024       Episode  (PT: Right Knee Pain)               Treatment Diagnosis:    Right knee pain, unspecified chronicity  Difficulty in walking, not elsewhere classified  Medical/Referring Diagnosis:    Right knee pain, unspecified chronicity    Referring Physician:  Rosemarie Baird MD MD Orders:  PT Eval and Treat   Return MD Appt:  Appt with Dr Diams (ortho) 24   Date of Onset:  2024  Allergies:   Amlodipine, Hydrochlorothiazide, Lisinopril, and Naltrexone-bupropion hcl er  Restrictions/Precautions:   None      Interventions Planned (Treatment may consist of any combination of the following):     See Assessment Note    Subjective Comments:   Pt reports R knee pain  Initial Pain Level::     2/10  Post Session Pain Level:       2/10  Medications Last Reviewed:  3/27/2024  Updated Objective Findings:  See Evaluation Note from today  Treatment   THERAPEUTIC EXERCISE: (10 minutes):    Exercises per grid below to improve mobility and strength.  Required minimal verbal cues to promote proper body alignment and promote proper body mechanics.  Progressed resistance and repetitions as indicated.   Date:  24 Date:   Date:     Activity/Exercise Parameters Parameters Parameters   Quad Sets 10 reps  5 sec

## 2024-03-27 NOTE — THERAPY EVALUATION
Training, Gait Training, Home Exercise Program (HEP), Manual Therapy, Pain Management, Modalities:,   Heat/Cold,   Ultrasound, and   E-stim - unattended, Range of Motion (ROM), Therapeutic Activites, and Therapeutic Exercise/Strengthening , Aquatic Therapy  Goals: (Goals have been discussed and agreed upon with patient.)  Short-Term Functional Goals: Time Frame: 4 weeks  Pt will increase ROM R knee 0-105 degrees to assist with daily activities  Pt will be independent with HEP  Discharge Goals: Time Frame: 12 weeks  Pt will increase strength R knee 5/5 to assist with daily activities  Pt will have improved LEFS score to greater than 40 to show overall improvement in function  Pt will perform 20 minutes household cleaning activities independently with min to no c/o R knee pain       Outcome Measure:   Tool Used: Lower Extremity Functional Scale (LEFS)  Score:  Initial: 33/80 Most Recent: X/80 (Date: -- )   Interpretation of Score: 20 questions each scored on a 5 point scale with 0 representing \"extreme difficulty or unable to perform\" and 4 representing \"no difficulty\".  The lower the score, the greater the functional disability. 80/80 represents no disability.  Minimal detectable change is 9 points.    Medical Necessity:   > Patient demonstrates good rehab potential due to higher previous functional level.  Reason For Services/Other Comments:  > Patient continues to require skilled intervention due to decreased ROM/strength R knee with increased pain leading to decreased functional status.      Regarding Cash Colbert's therapy, I certify that the treatment plan above will be carried out by a therapist or under their direction.  Thank you for this referral,  Harsha Kemp, PT     Referring Physician Signature: Rosemarie Baird MD                    Charge Capture  Appt Desk

## 2024-04-02 ENCOUNTER — HOSPITAL ENCOUNTER (OUTPATIENT)
Dept: PHYSICAL THERAPY | Age: 64
Setting detail: RECURRING SERIES
Discharge: HOME | End: 2024-04-05
Attending: INTERNAL MEDICINE
Payer: COMMERCIAL

## 2024-04-02 PROCEDURE — 97110 THERAPEUTIC EXERCISES: CPT

## 2024-04-02 ASSESSMENT — PAIN SCALES - GENERAL: PAINLEVEL_OUTOF10: 3

## 2024-04-02 NOTE — PROGRESS NOTES
Cash Colbert  : 1960  Primary: Gordo Mcclellan (Mani MABRY)  Secondary:  [facility] @ [address] [place]  [place] [address]  Phone: [phone]  Fax: [phone] Plan Frequency: 2x/wk for 90 days    Plan of Care/Certification Expiration Date: 24        Plan of Care/Certification Expiration Date:  Plan of Care/Certification Expiration Date: 24    Frequency/Duration: Plan Frequency: 2x/wk for 90 days      Time In/Out:   Time In: 1515  Time Out: 1605      PT Visit Info:    Progress Note Due Date: 24  Total # of Visits to Date: 2  Progress Note Counter: 2      Visit Count:  2    OUTPATIENT PHYSICAL THERAPY:   Treatment Note 2024       Episode  (PT: Right Knee Pain)               Treatment Diagnosis:    Right knee pain, unspecified chronicity  Difficulty in walking, not elsewhere classified  Medical/Referring Diagnosis:    Right knee pain, unspecified chronicity    Referring Physician:  Rosemarie Baird MD MD Orders:  PT Eval and Treat   Return MD Appt:  Appt with Dr Dimas (ortho) 24   Date of Onset:  2024  Allergies:   Amlodipine, Hydrochlorothiazide, Lisinopril, and Naltrexone-bupropion hcl er  Restrictions/Precautions:   None      Interventions Planned (Treatment may consist of any combination of the following):     See Assessment Note    Subjective Comments:   Pt states he hasn't had as much catching in his knee this week.  Initial Pain Level::     3/10  Post Session Pain Level:       3/10  Medications Last Reviewed:  2024  Updated Objective Findings:  None Today  Treatment   THERAPEUTIC EXERCISE: (40 minutes):    Exercises per grid below to improve mobility and strength.  Required minimal verbal cues to promote proper body alignment and promote proper body mechanics.  Progressed resistance and repetitions as indicated.   Date:  24 Date:  24 Date:     Activity/Exercise Parameters Parameters Parameters   Quad

## 2024-04-09 ENCOUNTER — HOSPITAL ENCOUNTER (OUTPATIENT)
Dept: PHYSICAL THERAPY | Age: 64
Setting detail: RECURRING SERIES
Discharge: HOME | End: 2024-04-12
Attending: INTERNAL MEDICINE
Payer: COMMERCIAL

## 2024-04-09 PROCEDURE — 97110 THERAPEUTIC EXERCISES: CPT

## 2024-04-09 NOTE — PROGRESS NOTES
S[facility]   4/30/2024  2:20 PM Manish Dimas MD POAI [facility]B   9/16/2024  8:30 AM Armida Zhang APRN - CNP MLMIM [facility]B   9/30/2024  9:30 AM [id number] BLOOD DRAW [id number] [facility] AMB   10/7/2024  3:45 PM Yash Gonzalez MD [id number] [facility]B

## 2024-04-11 ENCOUNTER — HOSPITAL ENCOUNTER (OUTPATIENT)
Dept: PHYSICAL THERAPY | Age: 64
Setting detail: RECURRING SERIES
Discharge: HOME | End: 2024-04-14
Attending: INTERNAL MEDICINE
Payer: COMMERCIAL

## 2024-04-11 PROCEDURE — 97110 THERAPEUTIC EXERCISES: CPT

## 2024-04-11 ASSESSMENT — PAIN SCALES - GENERAL: PAINLEVEL_OUTOF10: 4

## 2024-04-11 NOTE — PROGRESS NOTES
reps  5 sec holds 20 reps  5 sec holds   SLR Flexion 10 reps  3 sec holds  (HEP) 15 reps  3 sec holds 15 reps  3 sec holds #2   SAQ's 10 reps  5 sec holds  (HEP) 20 reps  3 sec holds 20 reps  3 sec holds #2   Heel Slides 10 reps  5 sec holds  (HEP) 15 reps  5 sec holds    NuStep  Level 2  6 minutes Level 4  8 minutes   Standing Heel/Toe Raises, Marching, Hip Abduction and Hip Extension  20 reps each  B LE's 20 reps each  B LE's #   TKE with Theraband  Green T-band  20 reps  R LE Green T-band  20 reps   Gastroc Slantboard  3 reps  20 sec holds 3 reps  20 sec holds   LAQ's  20 reps  3 sec holds 20 reps  3 sec holds #2   Bridging  10 reps 10 reps   Hip Adduction with Ball   15 reps  5 sec holds   Theraband Clam Shells in Hook Lying   Red T-band  20 reps   SKTC   3 reps  20 sec holds  R LE     MODALITIES:       *  Cold Pack Therapy in order to provide analgesia.  To R knee.  Skin clear afterwards.    Treatment/Session Summary:    Treatment Assessment:  Has appt with Dr Dimas on the 30th.  Patient did well today, Right knee caught with pain a few times today, patient careful when moving his knee into flexion.    Communication/Consultation:  None today  Equipment provided today:  None  Recommendations/Intent for next treatment session: Next visit will focus on progression of ROM/strengthening exercises R knee as tolerable.    >Total Treatment Billable Duration:  40 minutes   Time In: 1515  Time Out: 1600    OZË MCCULLOUGH PTA         Charge Capture  [employer]  Appt Desk     Future Appointments   Date Time Provider Department Center   4/16/2024  3:15 PM Harsha Kemp, PT [facility]   4/18/2024  3:15 PM Harsha Kemp, PT [facility]   4/23/2024  3:15 PM Harsha Kemp, PT [facility]   4/25/2024  3:15 PM Harsha Kemp, PT [facility]   4/30/2024  2:20 PM Manish Dimas MD POAI [facility]   9/16/2024  8:30 AM Armida Zhang, NINO - CNP MLMIM [facility]   9/30/2024  9:30 AM [id number] BLOOD DRAW [id number] [facility]

## 2024-04-14 PROBLEM — Z00.00 ROUTINE PHYSICAL EXAMINATION: Status: RESOLVED | Noted: 2019-08-30 | Resolved: 2024-04-14

## 2024-04-16 ENCOUNTER — HOSPITAL ENCOUNTER (OUTPATIENT)
Dept: PHYSICAL THERAPY | Age: 64
Setting detail: RECURRING SERIES
Discharge: HOME | End: 2024-04-19
Attending: INTERNAL MEDICINE
Payer: COMMERCIAL

## 2024-04-16 PROCEDURE — 97110 THERAPEUTIC EXERCISES: CPT

## 2024-04-16 ASSESSMENT — PAIN SCALES - GENERAL: PAINLEVEL_OUTOF10: 1

## 2024-04-16 NOTE — PROGRESS NOTES
Cash Colbert  : 1960  Primary: Gordo Mcclellan (Mani MABRY)  Secondary:  [facility] @ [address] [place]  [place] [address]  Phone: [phone]  Fax: [phone] Plan Frequency: 2x/wk for 90 days    Plan of Care/Certification Expiration Date: 24        Plan of Care/Certification Expiration Date:  Plan of Care/Certification Expiration Date: 24    Frequency/Duration: Plan Frequency: 2x/wk for 90 days      Time In/Out:   Time In: 1515  Time Out: 1600      PT Visit Info:    Progress Note Due Date: 24  Total # of Visits to Date: 5  Progress Note Counter: 5      Visit Count:  5    OUTPATIENT PHYSICAL THERAPY:   Treatment Note 2024       Episode  (PT: Right Knee Pain)               Treatment Diagnosis:    Right knee pain, unspecified chronicity  Difficulty in walking, not elsewhere classified  Medical/Referring Diagnosis:    Right knee pain, unspecified chronicity    Referring Physician:  Rosemarie Baird MD MD Orders:  PT Eval and Treat   Return MD Appt:  Appt with Dr Dimas (ortho) 24   Date of Onset:  2024  Allergies:   Amlodipine, Hydrochlorothiazide, Lisinopril, and Naltrexone-bupropion hcl er  Restrictions/Precautions:   None      Interventions Planned (Treatment may consist of any combination of the following):     See Assessment Note    Subjective Comments:  Pt states his knee is better.  He states he has only had 2 episodes of catching in his R knee since his last visit, and the episodes weren't bad.  Initial Pain Level::     10  Post Session Pain Level:       /10  Medications Last Reviewed:  2024  Updated Objective Findings:  None Today  Treatment   THERAPEUTIC EXERCISE: (40 minutes):    Exercises per grid below to improve mobility and strength.  Required minimal verbal cues to promote proper body alignment and promote proper body mechanics.  Progressed resistance and repetitions as indicated.   Date:  24

## 2024-04-18 ENCOUNTER — HOSPITAL ENCOUNTER (OUTPATIENT)
Dept: PHYSICAL THERAPY | Age: 64
Setting detail: RECURRING SERIES
Discharge: HOME | End: 2024-04-21
Attending: INTERNAL MEDICINE
Payer: COMMERCIAL

## 2024-04-18 PROCEDURE — 97110 THERAPEUTIC EXERCISES: CPT

## 2024-04-18 ASSESSMENT — PAIN SCALES - GENERAL: PAINLEVEL_OUTOF10: 2

## 2024-04-18 NOTE — PROGRESS NOTES
Cash Colbert  : 1960  Primary: Gordo Mcclellan (Mani MABRY)  Secondary:  [facility] @ [address] [place]  [place] [address]  Phone: [phone]  Fax: [phone] Plan Frequency: 2x/wk for 90 days    Plan of Care/Certification Expiration Date: 24        Plan of Care/Certification Expiration Date:  Plan of Care/Certification Expiration Date: 24    Frequency/Duration: Plan Frequency: 2x/wk for 90 days      Time In/Out:   Time In: 1515  Time Out: 1600      PT Visit Info:    Progress Note Due Date: 24  Total # of Visits to Date: 6  Progress Note Counter: 6      Visit Count:  6    OUTPATIENT PHYSICAL THERAPY:   Treatment Note 2024       Episode  (PT: Right Knee Pain)               Treatment Diagnosis:    Right knee pain, unspecified chronicity  Difficulty in walking, not elsewhere classified  Medical/Referring Diagnosis:    Right knee pain, unspecified chronicity    Referring Physician:  Rosemarie Baird MD MD Orders:  PT Eval and Treat   Return MD Appt:  Appt with Dr Dimas (ortho) 24   Date of Onset:  2024  Allergies:   Amlodipine, Hydrochlorothiazide, Lisinopril, and Naltrexone-bupropion hcl er  Restrictions/Precautions:   None      Interventions Planned (Treatment may consist of any combination of the following):     See Assessment Note    Subjective Comments:  Pt reports a little soreness following his last session, but not bad.  Initial Pain Level::     2/10  Post Session Pain Level:       2/10  Medications Last Reviewed:  2024  Updated Objective Findings:  None Today  Treatment   THERAPEUTIC EXERCISE: (40 minutes):    Exercises per grid below to improve mobility and strength.  Required minimal verbal cues to promote proper body alignment and promote proper body mechanics.  Progressed resistance and repetitions as indicated.   Date:  24 Date:  24 Date:  24   Activity/Exercise Parameters     Quad Sets

## 2024-04-23 ENCOUNTER — HOSPITAL ENCOUNTER (OUTPATIENT)
Dept: PHYSICAL THERAPY | Age: 64
Setting detail: RECURRING SERIES
Discharge: HOME | End: 2024-04-26
Attending: INTERNAL MEDICINE
Payer: COMMERCIAL

## 2024-04-23 PROCEDURE — 97110 THERAPEUTIC EXERCISES: CPT

## 2024-04-23 ASSESSMENT — PAIN SCALES - GENERAL: PAINLEVEL_OUTOF10: 1

## 2024-04-23 NOTE — PROGRESS NOTES
holds 20 reps  5 sec holds   SLR Flexion 15 reps  3 sec holds 15 reps  3 sec holds 15 reps  3 sec holds   SAQ's 2 Lbs  20 reps  B LE's 2 Lbs  20 reps  B LE's 2 Lbs  20 reps  B LE's   Heel Slides      NuStep Level 4  8 minutes Level 4  8 minutes Level 4  8 minutes   Standing Heel/Toe Raises, Marching, Hip Abduction and Hip Extension 1 Lb  20 reps each  B LE's 1 Lb  20 reps each  B LE's 2 Lbs  20 reps each  B LE's   TKE with Theraband Green T-band  20 reps Green T-band  20 reps Blue T-band  20 reps   Gastroc Slantboard 3 reps  20 sec holds] 3 reps  20 sec holds 3 reps  20 sec holds   LAQ's 2 Lbs  20 reps  B LE's 2 Lbs  20 reps  B LE's 2 Lbs  20 reps  B LE's   Bridging 15 reps 15 reps 15 reps   Hip Adduction with Ball 15 reps  5 sec holds 15 reps  5 sec holds    Theraband Clam Shells in Hook Lying Green T-band  20 reps Green T-band  20 reps Green T-band  20 reps   SKTC        MODALITIES: (Not Today)       *  Cold Pack Therapy in order to provide analgesia.  To R knee.  Skin clear afterwards.    Treatment/Session Summary:    Treatment Assessment:  Pt tolerated treatments well today with no c/o.    Communication/Consultation:  None today  Equipment provided today:  None  Recommendations/Intent for next treatment session: Next visit will focus on progression of ROM/strengthening exercises R knee as tolerable.    >Total Treatment Billable Duration:  40 minutes   Time In: 1515  Time Out: 1600    Harsha Kemp PT         Charge Capture  [employer] Portal  Appt Desk     Future Appointments   Date Time Provider Department Center   4/25/2024  3:15 PM Harsha Kemp, PT [facility]   4/30/2024  2:20 PM Manish Dimas MD POAI [facility]   9/16/2024  8:30 AM Armida Zhang, APRN - CNP MLMIM [facility] AMB   9/30/2024  9:30 AM [id number] BLOOD DRAW [id number] [facility]   10/7/2024  3:45 PM Yash Gonzalez MD [id number] [facility] AM[facility]

## 2024-04-25 ENCOUNTER — HOSPITAL ENCOUNTER (OUTPATIENT)
Dept: PHYSICAL THERAPY | Age: 64
Setting detail: RECURRING SERIES
Discharge: HOME | End: 2024-04-28
Attending: INTERNAL MEDICINE
Payer: COMMERCIAL

## 2024-04-25 PROCEDURE — 97110 THERAPEUTIC EXERCISES: CPT

## 2024-04-25 ASSESSMENT — PAIN SCALES - GENERAL: PAINLEVEL_OUTOF10: 2

## 2024-04-25 NOTE — PROGRESS NOTES
Cash Colbert  : 1960  Primary: Gordo Mcclellan (Mani MABRY)  Secondary:  [facility] @ [address] [place]  [place] [address]  Phone: [phone]  Fax: [phone] Plan Frequency: 2x/wk for 90 days    Plan of Care/Certification Expiration Date: 24        Plan of Care/Certification Expiration Date:  Plan of Care/Certification Expiration Date: 24    Frequency/Duration: Plan Frequency: 2x/wk for 90 days      Time In/Out:   Time In: 1515  Time Out: 1600      PT Visit Info:    Progress Note Due Date: 24  Total # of Visits to Date: 8  Progress Note Counter: 8      Visit Count:  8    OUTPATIENT PHYSICAL THERAPY:   Treatment Note 2024       Episode  (PT: Right Knee Pain)               Treatment Diagnosis:    Right knee pain, unspecified chronicity  Difficulty in walking, not elsewhere classified  Medical/Referring Diagnosis:    Right knee pain, unspecified chronicity    Referring Physician:  Rosemarie Baird MD MD Orders:  PT Eval and Treat   Return MD Appt:  Appt with Dr Dimas (ortho) 24   Date of Onset:  2024  Allergies:   Amlodipine, Hydrochlorothiazide, Lisinopril, and Naltrexone-bupropion hcl er  Restrictions/Precautions:   None      Interventions Planned (Treatment may consist of any combination of the following):     See Assessment Note    Subjective Comments:  Pt states his knee is feeling good today.  He states he sees Dr Dimas for evaluation next week.  Initial Pain Level::     2/10  Post Session Pain Level:       2/10  Medications Last Reviewed:  2024  Updated Objective Findings:  None Today  Treatment   THERAPEUTIC EXERCISE: (40 minutes):    Exercises per grid below to improve mobility and strength.  Required minimal verbal cues to promote proper body alignment and promote proper body mechanics.  Progressed resistance and repetitions as indicated.   Date:  24 Date:  24 Date:  24   Activity/Exercise

## 2024-04-30 ENCOUNTER — OFFICE VISIT (OUTPATIENT)
Dept: ORTHOPEDIC SURGERY | Age: 64
End: 2024-04-30
Payer: COMMERCIAL

## 2024-04-30 DIAGNOSIS — M17.11 PRIMARY OSTEOARTHRITIS OF RIGHT KNEE: ICD-10-CM

## 2024-04-30 DIAGNOSIS — M25.561 RIGHT KNEE PAIN, UNSPECIFIED CHRONICITY: Primary | ICD-10-CM

## 2024-04-30 PROCEDURE — 99203 OFFICE O/P NEW LOW 30 MIN: CPT | Performed by: NURSE PRACTITIONER

## 2024-04-30 NOTE — PROGRESS NOTES
Patient ID:  Cash Colbert  [id number]  63 y.o.  1960    Today: April 30, 2024          Chief Complaint:  Right Knee pain    HPI:       Cash Colbert is a 63 y.o. male seen for evaluation and treatment of right knee pain. Patient reports a longstanding history of pain involving the knee. The patient complains of knee pain with activities, reports pain as mostly occurring along the joint lines, reports stiffness of the knee with prolonged inactivity, and swelling/pain at the end of the day and after increased physical activity. Generally, symptoms improve with sitting/rest. The pain affects the patient’s activities of daily living and quality of life. Patient reports progressive pain and instability in the knee. The pain has been present for an extended period of time. Pain ranges from approximately 4-8 in a cyclical fashion with periods of acute exacerbation.     Prior procedures on the knee include none.    Patient has attempted prior conservative treatment including Tylenol and currently going to physical therapy.  He notes good results with PT.      Past Medical History:  Past Medical History:   Diagnosis Date    Abnormal EKG 3/8/2017    Adverse effect of anesthesia     6-8 hours post-op B/P dropped and pt was not able to urinate- had to be cath.- after gastric sleeve    Arthritis     osteo-knee, toe    Asthma     childhood asthma    Atrial fibrillation (HCC) 2017    Borderline diabetes     diet controlled, 8/27/20 A1c 4.9    Current use of long term anticoagulation     no longer on Coumadin or Lovenox injections    History of depression     pt denies any further complications    History of narcotic addiction (HCC)     History of pulmonary embolism 2015    Treated with Coumadin- then lovenox bridge- IVC filter placed-removed    HTN (hypertension)     controlled with meds    Kidney stone 2021    Obesity Most of my life    Persistent atrial fibrillation (HCC)     Followed by [facility]./

## 2024-05-07 ENCOUNTER — TELEPHONE (OUTPATIENT)
Dept: ORTHOPEDIC SURGERY | Age: 64
End: 2024-05-07

## 2024-05-07 NOTE — TELEPHONE ENCOUNTER
[email or web address] is calling about the Euflexxa syringe. They did receive the form but it has a digital signature. Please resend.

## 2024-05-08 ENCOUNTER — HOSPITAL ENCOUNTER (OUTPATIENT)
Dept: PHYSICAL THERAPY | Age: 64
Setting detail: RECURRING SERIES
Discharge: HOME | End: 2024-05-11
Attending: ORTHOPAEDIC SURGERY
Payer: COMMERCIAL

## 2024-05-08 PROCEDURE — 97110 THERAPEUTIC EXERCISES: CPT

## 2024-05-08 ASSESSMENT — PAIN SCALES - GENERAL: PAINLEVEL_OUTOF10: 2

## 2024-05-08 NOTE — PROGRESS NOTES
Cash Colbert  : 1960  Primary: Gordo Mcclellan (Mani MABRY)  Secondary:  [facility] [place]note 2[facility] @ [address] [place]  [place] [address]  Phone: [phone]  Fax: [phone] Plan Frequency: 2x/wk for 90 days    Plan of Care/Certification Expiration Date: 24        Plan of Care/Certification Expiration Date:  Plan of Care/Certification Expiration Date: 24    Frequency/Duration: Plan Frequency: 2x/wk for 90 days      Time In/Out:   Time In: 1520  Time Out: 1600      PT Visit Info:    Progress Note Due Date: 24  Total # of Visits to Date: 9  Progress Note Counter: 1      Visit Count:  9    OUTPATIENT PHYSICAL THERAPY:   Treatment Note 2024       Episode  (PT: Right Knee Pain)               Treatment Diagnosis:    Right knee pain, unspecified chronicity  Difficulty in walking, not elsewhere classified  Medical/Referring Diagnosis:    Right knee pain, unspecified chronicity    Referring Physician:  Manish Dimas MD; Rosemarie Baird MD MD Orders:  PT Eval and Treat   Return MD Appt:  Appt with Dr Dimas (ortho) 24  Date of Onset:  2024  Allergies:   Amlodipine, Hydrochlorothiazide, Lisinopril, and Naltrexone-bupropion hcl er  Restrictions/Precautions:   None      Interventions Planned (Treatment may consist of any combination of the following):     See Assessment Note    Subjective Comments:  Pt states he saw Dr Dimas's PA for evaluation and he states he doesn't need a knee replacement and MD wants him to continue PT and get viscosupplementation.  Initial Pain Level::     2/10  Post Session Pain Level:       2/10  Medications Last Reviewed:  2024  Updated Objective Findings:  None Today  Treatment   THERAPEUTIC EXERCISE: (40 minutes):    Exercises per grid below to improve mobility and strength.  Required minimal verbal cues to promote proper body alignment and promote proper body mechanics.  Progressed resistance and repetitions as

## 2024-05-08 NOTE — THERAPY EVALUATION
learn new concepts?: Demonstration; Pictures/Videos    Fall Risk Scale:   Jordan Total Score: 0    Dominant Side:  right handed  Other Clinical Tests:  No recent diagnostic testing done of R knee  Personal Factors:        Sex:  male        Age:  63 y.o.        Profession:  Pt works in [employer]  Assessment & Plan    OBJECTIVE   Observations:     Patellar girth R 57.8, L 58.0  Ambulation/WC:     Marked antalgic gait noted with decreased stance time R LE  Functional Mobility:    Pt able to transition sit to supine and supine to sit modified independent   Sensation:   Intact to light touch R LE  Knee:   ROM:  R knee extension = -5 degrees  R knee flexion = 100 degrees    L knee extension = 0 degrees  L knee flexion = 107 degrees    Strength:  R knee extension = 4+/5 (mild c/o R knee pain)  R knee flexion = 5/5 (mild c/o R knee pain)    L knee extension = 5/5  L knee flexion = 5/5    Palpation:  Tenderness medial joint line R knee    Special Tests:  Anterior/Posterior Drawer and Varus/Valgus Stress Testing negative R knee    ASSESSMENT   Initial Assessment:  Patient presents with decreased R knee ROM, decreased R knee strength and increased pain leading to decreased functional status.  Pt would benefit from skilled physical therapy services to address the above deficits and help patient return to prior level of function.     Progress Note 05-08-24:  Pt has attended 9 visits of physical therapy from 03-27-24 to 05-08-24 with increased ROM/strength R knee.  He would benefit from continued skilled physical therapy services to help return to prior level of function.    Therapy Problem List: (Impacting functional limitations):    Increased Pain, Decreased Strength, Decreased ROM, Decreased Ambulation Ability/Technique, Decreased Functional Mobility, Decreased Kendall with Home Exercise Program, Decreased Balance, and Decreased Activity Tolerance/Endurance*   Therapy Prognosis:   Good     Initial Assessment Complexity:

## 2024-05-10 ENCOUNTER — HOSPITAL ENCOUNTER (OUTPATIENT)
Dept: PHYSICAL THERAPY | Age: 64
Setting detail: RECURRING SERIES
Discharge: HOME | End: 2024-05-13
Attending: ORTHOPAEDIC SURGERY
Payer: COMMERCIAL

## 2024-05-10 PROCEDURE — 97110 THERAPEUTIC EXERCISES: CPT

## 2024-05-10 ASSESSMENT — PAIN SCALES - GENERAL: PAINLEVEL_OUTOF10: 2

## 2024-05-10 NOTE — PROGRESS NOTES
Cash Colbert  : 1960  Primary: Gordo Mcclellan (Mani MABRY)  Secondary:  [facility] @ [address] [place]  [place] [address]  Phone: [phone]  Fax: [phone] Plan Frequency: 2x/wk for 90 days    Plan of Care/Certification Expiration Date: 24        Plan of Care/Certification Expiration Date:  Plan of Care/Certification Expiration Date: 24    Frequency/Duration: Plan Frequency: 2x/wk for 90 days      Time In/Out:   Time In: 1515  Time Out: 1555      PT Visit Info:    Progress Note Due Date: 24  Total # of Visits to Date: 10  Progress Note Counter: 2      Visit Count:  10    OUTPATIENT PHYSICAL THERAPY:   Treatment Note 5/10/2024       Episode  (PT: Right Knee Pain)               Treatment Diagnosis:    Right knee pain, unspecified chronicity  Difficulty in walking, not elsewhere classified  Medical/Referring Diagnosis:    Right knee pain, unspecified chronicity    Referring Physician:  Manish Dimas MD; Rosemarie Baird MD MD Orders:  PT Eval and Treat   Return MD Appt:  Appt with Dr Dimas (ortho) 24  Date of Onset:  2024  Allergies:   Amlodipine, Hydrochlorothiazide, Lisinopril, and Naltrexone-bupropion hcl er  Restrictions/Precautions:   None      Interventions Planned (Treatment may consist of any combination of the following):     See Assessment Note    Subjective Comments:    Patient reports he is doing ok right now.    Initial Pain Level::     2/10  Post Session Pain Level:       2/10  Medications Last Reviewed:  5/10/2024  Updated Objective Findings:  None Today  Treatment   THERAPEUTIC EXERCISE: (40 minutes):    Exercises per grid below to improve mobility and strength.  Required minimal verbal cues to promote proper body alignment and promote proper body mechanics.  Progressed resistance and repetitions as indicated.   Date:  24 Date:  24 Date:  5/10/2024   Activity/Exercise      Quad Sets 20 reps  5

## 2024-05-14 ENCOUNTER — HOSPITAL ENCOUNTER (OUTPATIENT)
Dept: PHYSICAL THERAPY | Age: 64
Setting detail: RECURRING SERIES
Discharge: HOME | End: 2024-05-17
Attending: ORTHOPAEDIC SURGERY
Payer: COMMERCIAL

## 2024-05-14 PROCEDURE — 97110 THERAPEUTIC EXERCISES: CPT

## 2024-05-14 ASSESSMENT — PAIN SCALES - GENERAL: PAINLEVEL_OUTOF10: 0

## 2024-05-14 NOTE — PROGRESS NOTES
Progressed resistance and repetitions as indicated.   Date:  05-08-24 Date:  5/10/2024 Date:  05-14-24   Activity/Exercise      Quad Sets 20 reps  5 sec holds 20 reps  5 sec holds 20 reps  5 sec holds   SLR Flexion 15 reps  3 sec holds 20 reps  3 sec holds 20 reps  3 sec holds   SAQ's 2 Lbs  20 reps  B LE's 2 Lbs  20 reps  B LE's 2 Lbs  20 reps  B LE's   Heel Slides      NuStep Level 4  10 minutes Level 4  10 minutes Level 4  10 minutes   Standing Heel/Toe Raises, Marching, Hip Abduction and Hip Extension 2 Lbs  20 reps each  B LE's 2 Lbs  20 reps each  B LE's 2 Lbs  20 reps each  B LE's   TKE with Theraband Blue T-band  20 reps Blue T-band  20 reps Black T-band  20 reps   Gastroc Slantboard 3 reps  20 sec holds 3 reps  20 sec holds 3 reps  20 sec holds   LAQ's 2 Lbs  20 reps  B LE's 2 Lbs  20 reps  B LE's 2 Lbs  20 reps  B LE's   Bridging 15 reps 15 reps 15 reps   Hip Adduction with Ball      Theraband Clam Shells in Hook Lying Green T-band  20 reps Green T-band  20 reps Blue T-band  20 reps   SKTC        MODALITIES: (0 minutes)       *  Cold Pack Therapy in order to provide analgesia.  To R knee.  Skin clear afterwards.    Treatment/Session Summary:    Treatment Assessment:    Patient tolerated treatments well today with no c/o.  Communication/Consultation:  None today  Equipment provided today:  None  Recommendations/Intent for next treatment session: Next visit will focus on progression of ROM/strengthening exercises R knee as tolerable.    >Total Treatment Billable Duration:  40 minutes   Time In: 1515  Time Out: 1600    Harsha Kemp PT         Charge Capture  [employer] Portal  Appt Desk     Future Appointments   Date Time Provider Department Center   5/16/2024  3:15 PM Katty Goode PTA [facility]   5/21/2024  3:15 PM Katty Goode PTA [facility]   5/23/2024  3:15 PM Harsha Kemp PT [facility]   9/16/2024  8:30 AM Armida Zhang, NINO - CNP [facility]   9/30/2024  9:30 AM [id number]

## 2024-05-15 ENCOUNTER — TELEPHONE (OUTPATIENT)
Dept: ORTHOPEDIC SURGERY | Age: 64
End: 2024-05-15

## 2024-05-16 ENCOUNTER — APPOINTMENT (OUTPATIENT)
Dept: PHYSICAL THERAPY | Age: 64
End: 2024-05-16
Attending: ORTHOPAEDIC SURGERY
Payer: COMMERCIAL

## 2024-05-21 ENCOUNTER — HOSPITAL ENCOUNTER (OUTPATIENT)
Dept: PHYSICAL THERAPY | Age: 64
Setting detail: RECURRING SERIES
Discharge: HOME | End: 2024-05-24
Attending: ORTHOPAEDIC SURGERY
Payer: COMMERCIAL

## 2024-05-21 PROCEDURE — 97110 THERAPEUTIC EXERCISES: CPT

## 2024-05-21 ASSESSMENT — PAIN SCALES - GENERAL: PAINLEVEL_OUTOF10: 2

## 2024-05-21 NOTE — PROGRESS NOTES
Cash Colbert  : 1960  Primary: Gordo Mcclellan (Mani MABRY)  Secondary:  [facility] @ [address] [place]  [place] [address]  Phone: [phone]  Fax: [phone] Plan Frequency: 2x/wk for 90 days    Plan of Care/Certification Expiration Date: 24        Plan of Care/Certification Expiration Date:  Plan of Care/Certification Expiration Date: 24    Frequency/Duration: Plan Frequency: 2x/wk for 90 days      Time In/Out:   Time In: 1515  Time Out: 1555      PT Visit Info:    Progress Note Due Date: 24  Total # of Visits to Date: 12  Progress Note Counter: 4      Visit Count:  12    OUTPATIENT PHYSICAL THERAPY:   Treatment Note 2024       Episode  (PT: Right Knee Pain)               Treatment Diagnosis:    Right knee pain, unspecified chronicity  Difficulty in walking, not elsewhere classified  Medical/Referring Diagnosis:    Right knee pain, unspecified chronicity    Referring Physician:  Manish Dimas MD; Rosemarie Baird MD MD Orders:  PT Eval and Treat   Return MD Appt:  Appt with Dr Dimas (ortho) 24  Date of Onset:  2024  Allergies:   Amlodipine, Hydrochlorothiazide, Lisinopril, and Naltrexone-bupropion hcl er  Restrictions/Precautions:   None      Interventions Planned (Treatment may consist of any combination of the following):     See Assessment Note    Subjective Comments:      Initial Pain Level::     2/10  Post Session Pain Level:       1/10  Medications Last Reviewed:  2024  Updated Objective Findings:  None Today  Treatment   THERAPEUTIC EXERCISE: (40 minutes):    Exercises per grid below to improve mobility and strength.  Required minimal verbal cues to promote proper body alignment and promote proper body mechanics.  Progressed resistance and repetitions as indicated.   Date:  24 Date:  5/10/2024 Date:  24   Activity/Exercise      Quad Sets 20 reps  5 sec holds 20 reps  5 sec holds 20 reps  5

## 2024-05-23 ENCOUNTER — HOSPITAL ENCOUNTER (OUTPATIENT)
Dept: PHYSICAL THERAPY | Age: 64
Setting detail: RECURRING SERIES
Discharge: HOME | End: 2024-05-26
Attending: ORTHOPAEDIC SURGERY
Payer: COMMERCIAL

## 2024-05-23 PROCEDURE — 97110 THERAPEUTIC EXERCISES: CPT

## 2024-05-23 ASSESSMENT — PAIN SCALES - GENERAL: PAINLEVEL_OUTOF10: 1

## 2024-05-23 NOTE — PROGRESS NOTES
Cash Colbert  : 1960  Primary: Gordo Mcclellan (Mani MABRY)  Secondary:  [facility][note 3] @ [address] [place]  [place] [address]  Phone: [phone]  Fax: [phone] Plan Frequency: 2x/wk for 90 days    Plan of Care/Certification Expiration Date: 24        Plan of Care/Certification Expiration Date:  Plan of Care/Certification Expiration Date: 24    Frequency/Duration: Plan Frequency: 2x/wk for 90 days      Time In/Out:   Time In: 1515  Time Out: 1600      PT Visit Info:    Progress Note Due Date: 24  Total # of Visits to Date: 13  Progress Note Counter: 5      Visit Count:  13    OUTPATIENT PHYSICAL THERAPY:   Treatment Note 2024       Episode  (PT: Right Knee Pain)               Treatment Diagnosis:    Right knee pain, unspecified chronicity  Difficulty in walking, not elsewhere classified  Medical/Referring Diagnosis:    Right knee pain, unspecified chronicity    Referring Physician:  Manish Dimas MD; Rosemarie Baird MD MD Orders:  PT Eval and Treat   Return MD Appt:  Appt with Dr Dimas (ortho) 24  Date of Onset:  2024  Allergies:   Amlodipine, Hydrochlorothiazide, Lisinopril, and Naltrexone-bupropion hcl er  Restrictions/Precautions:   None      Interventions Planned (Treatment may consist of any combination of the following):     See Assessment Note    Subjective Comments:  Pt states his knees are feeling ok today.    Initial Pain Level::     10  Post Session Pain Level:       1/10  Medications Last Reviewed:  2024  Updated Objective Findings:  None Today  Treatment   THERAPEUTIC EXERCISE: (40 minutes):    Exercises per grid below to improve mobility and strength.  Required minimal verbal cues to promote proper body alignment and promote proper body mechanics.  Progressed resistance and repetitions as indicated.   Date:  24 Date:  5/10/2024 Date:  24 Date:  24   Activity/Exercise       Quad

## 2024-06-05 ENCOUNTER — OFFICE VISIT (OUTPATIENT)
Dept: ORTHOPEDIC SURGERY | Age: 64
End: 2024-06-05
Payer: COMMERCIAL

## 2024-06-05 DIAGNOSIS — M17.11 PRIMARY OSTEOARTHRITIS OF RIGHT KNEE: Primary | ICD-10-CM

## 2024-06-05 PROCEDURE — 20610 DRAIN/INJ JOINT/BURSA W/O US: CPT | Performed by: NURSE PRACTITIONER

## 2024-06-05 RX ORDER — HYALURONATE SODIUM 10 MG/ML
20 SYRINGE (ML) INTRAARTICULAR ONCE
Status: COMPLETED | OUTPATIENT
Start: 2024-06-05 | End: 2024-06-05

## 2024-06-05 RX ADMIN — Medication 20 MG: at 15:22

## 2024-06-05 NOTE — PROGRESS NOTES
Diagnosis: Right Knee Arthritis    Patient present today for the first viscosupplimentation injection in the right knee. Prior injection was tolerated well.    Risks, benefits, and alteratives were discussed with patient prior to injection.    Under sterile technique, the right knee was injected with Euflexxa (2ml).  The patient tolerated the procedure well.    Patient is advised to ice the knee over the next week or so.  Continue taking prior oral medications. The patient can follow-up as directed for the second injection in the series.    Patient provided own Euflexxa.      VASILE CHAU, NINO - CNP

## 2024-06-12 ENCOUNTER — OFFICE VISIT (OUTPATIENT)
Dept: ORTHOPEDIC SURGERY | Age: 64
End: 2024-06-12
Payer: COMMERCIAL

## 2024-06-12 DIAGNOSIS — M17.11 PRIMARY OSTEOARTHRITIS OF RIGHT KNEE: Primary | ICD-10-CM

## 2024-06-12 PROCEDURE — 20610 DRAIN/INJ JOINT/BURSA W/O US: CPT | Performed by: NURSE PRACTITIONER

## 2024-06-12 RX ORDER — HYALURONATE SODIUM 10 MG/ML
20 SYRINGE (ML) INTRAARTICULAR ONCE
Status: COMPLETED | OUTPATIENT
Start: 2024-06-12 | End: 2024-06-12

## 2024-06-12 RX ADMIN — Medication 20 MG: at 15:28

## 2024-06-12 NOTE — PROGRESS NOTES
Diagnosis: Right Knee Arthritis    Patient present today for the 2nd viscosupplimentation injection in the right knee. Prior injection was tolerated well.    Risks, benefits, and alteratives were discussed with patient prior to injection.    Under sterile technique, the right knee was injected with Euflexxa (2ml).  The patient tolerated the procedure well.    Patient is advised to ice the knee over the next week or so.  Continue taking prior oral medications. The patient can follow-up as directed for the final injection in the series.      Patient provided own Euflexxa.       VASILE CHAU, NINO - CNP

## 2024-06-19 ENCOUNTER — OFFICE VISIT (OUTPATIENT)
Dept: ORTHOPEDIC SURGERY | Age: 64
End: 2024-06-19
Payer: COMMERCIAL

## 2024-06-19 DIAGNOSIS — M17.11 PRIMARY OSTEOARTHRITIS OF RIGHT KNEE: Primary | ICD-10-CM

## 2024-06-19 PROCEDURE — 20610 DRAIN/INJ JOINT/BURSA W/O US: CPT | Performed by: NURSE PRACTITIONER

## 2024-06-19 RX ORDER — HYALURONATE SODIUM 10 MG/ML
20 SYRINGE (ML) INTRAARTICULAR ONCE
Status: COMPLETED | OUTPATIENT
Start: 2024-06-19 | End: 2024-06-19

## 2024-06-19 RX ADMIN — Medication 20 MG: at 15:05

## 2024-06-19 NOTE — PROGRESS NOTES
Diagnosis: Right Knee arthritis    Patient present today for the 3rd viscosupplimentation injection in the right knee. Prior injection was tolerated well.    Risks, benefits, and alteratives were discussed with patient prior to injection.  A timeout was performed which included identifying the patient by name and date of birth, verifying correct procedure and correct site(s).     Under sterile technique, the right knee was injected with Euflexxa (2ml).  The patient tolerated the procedure well.    Patient is advised to ice the knee over the next week or so.  Continue taking prior oral medications. The patient can follow-up as directed for the final injection in the series.      Patient provided own Euflexxa.      VASILE CHAU, NINO - CNP

## 2024-09-16 ENCOUNTER — TELEPHONE (OUTPATIENT)
Dept: INTERNAL MEDICINE CLINIC | Facility: CLINIC | Age: 64
End: 2024-09-16

## 2024-09-16 ENCOUNTER — OFFICE VISIT (OUTPATIENT)
Dept: INTERNAL MEDICINE CLINIC | Facility: CLINIC | Age: 64
End: 2024-09-16
Payer: MEDICARE

## 2024-09-16 VITALS
WEIGHT: 315 LBS | BODY MASS INDEX: 44.1 KG/M2 | SYSTOLIC BLOOD PRESSURE: 132 MMHG | HEIGHT: 71 IN | DIASTOLIC BLOOD PRESSURE: 88 MMHG

## 2024-09-16 DIAGNOSIS — F11.21 HISTORY OF NARCOTIC ADDICTION (HCC): ICD-10-CM

## 2024-09-16 DIAGNOSIS — E66.01 MORBID OBESITY (HCC): ICD-10-CM

## 2024-09-16 DIAGNOSIS — D69.6 THROMBOCYTOPENIA, UNSPECIFIED (HCC): ICD-10-CM

## 2024-09-16 DIAGNOSIS — D68.69 SECONDARY HYPERCOAGULABLE STATE (HCC): ICD-10-CM

## 2024-09-16 DIAGNOSIS — R00.2 PALPITATIONS: ICD-10-CM

## 2024-09-16 DIAGNOSIS — Z87.39 HISTORY OF GOUT: ICD-10-CM

## 2024-09-16 DIAGNOSIS — I10 ESSENTIAL HYPERTENSION: ICD-10-CM

## 2024-09-16 DIAGNOSIS — E11.9 TYPE 2 DIABETES MELLITUS WITHOUT COMPLICATION, WITHOUT LONG-TERM CURRENT USE OF INSULIN (HCC): ICD-10-CM

## 2024-09-16 DIAGNOSIS — R31.21 ASYMPTOMATIC MICROSCOPIC HEMATURIA: ICD-10-CM

## 2024-09-16 DIAGNOSIS — Z98.84 S/P BARIATRIC SURGERY: ICD-10-CM

## 2024-09-16 DIAGNOSIS — E78.00 PURE HYPERCHOLESTEROLEMIA: ICD-10-CM

## 2024-09-16 DIAGNOSIS — E83.42 HYPOMAGNESEMIA: Primary | ICD-10-CM

## 2024-09-16 DIAGNOSIS — Z87.442 HISTORY OF KIDNEY STONES: ICD-10-CM

## 2024-09-16 DIAGNOSIS — I48.91 ATRIAL FIBRILLATION, UNSPECIFIED TYPE (HCC): ICD-10-CM

## 2024-09-16 DIAGNOSIS — Z86.718 HISTORY OF DEEP VEIN THROMBOSIS (DVT) OF LOWER EXTREMITY: ICD-10-CM

## 2024-09-16 DIAGNOSIS — Z92.29 HX OF LONG TERM USE OF BLOOD THINNERS: ICD-10-CM

## 2024-09-16 DIAGNOSIS — G47.33 OSA ON CPAP: ICD-10-CM

## 2024-09-16 DIAGNOSIS — R00.2 PALPITATIONS: Primary | ICD-10-CM

## 2024-09-16 DIAGNOSIS — Z86.711 HISTORY OF PULMONARY EMBOLISM: ICD-10-CM

## 2024-09-16 LAB
ALBUMIN SERPL-MCNC: 3.8 G/DL (ref 3.2–4.6)
ALBUMIN/GLOB SERPL: 1.3 (ref 1–1.9)
ALP SERPL-CCNC: 61 U/L (ref 40–129)
ALT SERPL-CCNC: 30 U/L (ref 12–65)
ANION GAP SERPL CALC-SCNC: 12 MMOL/L (ref 9–18)
AST SERPL-CCNC: 28 U/L (ref 15–37)
BACTERIA URNS QL MICRO: NEGATIVE /HPF
BASOPHILS # BLD: 0 K/UL (ref 0–0.2)
BASOPHILS NFR BLD: 1 % (ref 0–2)
BILIRUB SERPL-MCNC: 0.6 MG/DL (ref 0–1.2)
BUN SERPL-MCNC: 15 MG/DL (ref 8–23)
CALCIUM SERPL-MCNC: 9.1 MG/DL (ref 8.8–10.2)
CHLORIDE SERPL-SCNC: 105 MMOL/L (ref 98–107)
CO2 SERPL-SCNC: 25 MMOL/L (ref 20–28)
CREAT SERPL-MCNC: 0.98 MG/DL (ref 0.8–1.3)
CREAT UR-MCNC: 179 MG/DL (ref 39–259)
DIFFERENTIAL METHOD BLD: NORMAL
EOSINOPHIL # BLD: 0.2 K/UL (ref 0–0.8)
EOSINOPHIL NFR BLD: 4 % (ref 0.5–7.8)
EPI CELLS #/AREA URNS HPF: NORMAL /HPF (ref 0–5)
ERYTHROCYTE [DISTWIDTH] IN BLOOD BY AUTOMATED COUNT: 12.7 % (ref 11.9–14.6)
EST. AVERAGE GLUCOSE BLD GHB EST-MCNC: 185 MG/DL
GLOBULIN SER CALC-MCNC: 2.9 G/DL (ref 2.3–3.5)
GLUCOSE SERPL-MCNC: 168 MG/DL (ref 70–99)
HBA1C MFR BLD: 8.1 % (ref 0–5.6)
HCT VFR BLD AUTO: 42.9 % (ref 41.1–50.3)
HGB BLD-MCNC: 14.1 G/DL (ref 13.6–17.2)
HYALINE CASTS URNS QL MICRO: NORMAL /LPF
IMM GRANULOCYTES # BLD AUTO: 0 K/UL (ref 0–0.5)
IMM GRANULOCYTES NFR BLD AUTO: 0 % (ref 0–5)
LYMPHOCYTES # BLD: 1.6 K/UL (ref 0.5–4.6)
LYMPHOCYTES NFR BLD: 32 % (ref 13–44)
MAGNESIUM SERPL-MCNC: 1.7 MG/DL (ref 1.8–2.4)
MCH RBC QN AUTO: 31.5 PG (ref 26.1–32.9)
MCHC RBC AUTO-ENTMCNC: 32.9 G/DL (ref 31.4–35)
MCV RBC AUTO: 95.8 FL (ref 82–102)
MICROALBUMIN UR-MCNC: 5.95 MG/DL (ref 0–20)
MICROALBUMIN/CREAT UR-RTO: 33 MG/G (ref 0–30)
MONOCYTES # BLD: 0.5 K/UL (ref 0.1–1.3)
MONOCYTES NFR BLD: 11 % (ref 4–12)
NEUTS SEG # BLD: 2.6 K/UL (ref 1.7–8.2)
NEUTS SEG NFR BLD: 52 % (ref 43–78)
NRBC # BLD: 0 K/UL (ref 0–0.2)
PLATELET # BLD AUTO: 168 K/UL (ref 150–450)
PMV BLD AUTO: 11.5 FL (ref 9.4–12.3)
POTASSIUM SERPL-SCNC: 4.1 MMOL/L (ref 3.5–5.1)
PROT SERPL-MCNC: 6.7 G/DL (ref 6.3–8.2)
RBC # BLD AUTO: 4.48 M/UL (ref 4.23–5.6)
RBC #/AREA URNS HPF: NORMAL /HPF (ref 0–5)
SODIUM SERPL-SCNC: 142 MMOL/L (ref 136–145)
TSH, 3RD GENERATION: 3.47 UIU/ML (ref 0.27–4.2)
WBC # BLD AUTO: 5 K/UL (ref 4.3–11.1)
WBC URNS QL MICRO: NORMAL /HPF (ref 0–4)

## 2024-09-16 PROCEDURE — 3052F HG A1C>EQUAL 8.0%<EQUAL 9.0%: CPT | Performed by: NURSE PRACTITIONER

## 2024-09-16 PROCEDURE — 1123F ACP DISCUSS/DSCN MKR DOCD: CPT | Performed by: NURSE PRACTITIONER

## 2024-09-16 PROCEDURE — 99215 OFFICE O/P EST HI 40 MIN: CPT | Performed by: NURSE PRACTITIONER

## 2024-09-16 PROCEDURE — G2211 COMPLEX E/M VISIT ADD ON: HCPCS | Performed by: NURSE PRACTITIONER

## 2024-09-16 PROCEDURE — 93000 ELECTROCARDIOGRAM COMPLETE: CPT | Performed by: NURSE PRACTITIONER

## 2024-09-16 PROCEDURE — 3079F DIAST BP 80-89 MM HG: CPT | Performed by: NURSE PRACTITIONER

## 2024-09-16 PROCEDURE — 3075F SYST BP GE 130 - 139MM HG: CPT | Performed by: NURSE PRACTITIONER

## 2024-09-16 RX ORDER — CARVEDILOL 25 MG/1
25 TABLET ORAL 2 TIMES DAILY WITH MEALS
Qty: 180 TABLET | Refills: 3 | Status: SHIPPED | OUTPATIENT
Start: 2024-09-16

## 2024-09-16 RX ORDER — METFORMIN HCL 500 MG
500 TABLET, EXTENDED RELEASE 24 HR ORAL 2 TIMES DAILY WITH MEALS
Qty: 180 TABLET | Refills: 2 | Status: SHIPPED | OUTPATIENT
Start: 2024-09-16

## 2024-09-16 RX ORDER — LOSARTAN POTASSIUM 100 MG/1
100 TABLET ORAL DAILY
Qty: 90 TABLET | Refills: 3 | Status: SHIPPED | OUTPATIENT
Start: 2024-09-16

## 2024-09-16 ASSESSMENT — ENCOUNTER SYMPTOMS
COUGH: 0
WHEEZING: 0
APNEA: 1

## 2024-09-18 RX ORDER — MAGNESIUM OXIDE 400 MG/1
400 TABLET ORAL DAILY
Qty: 30 TABLET | Refills: 1 | Status: SHIPPED | OUTPATIENT
Start: 2024-09-18

## 2024-09-19 ENCOUNTER — TELEPHONE (OUTPATIENT)
Dept: SLEEP MEDICINE | Age: 64
End: 2024-09-19

## 2024-09-20 ENCOUNTER — OFFICE VISIT (OUTPATIENT)
Dept: SLEEP MEDICINE | Age: 64
End: 2024-09-20

## 2024-09-20 VITALS
DIASTOLIC BLOOD PRESSURE: 86 MMHG | BODY MASS INDEX: 44.1 KG/M2 | SYSTOLIC BLOOD PRESSURE: 136 MMHG | RESPIRATION RATE: 17 BRPM | OXYGEN SATURATION: 94 % | HEART RATE: 81 BPM | HEIGHT: 71 IN | WEIGHT: 315 LBS

## 2024-09-20 DIAGNOSIS — E66.01 MORBID OBESITY (HCC): ICD-10-CM

## 2024-09-20 DIAGNOSIS — G47.33 OSA ON CPAP: Primary | ICD-10-CM

## 2024-09-20 ASSESSMENT — SLEEP AND FATIGUE QUESTIONNAIRES
HOW LIKELY ARE YOU TO NOD OFF OR FALL ASLEEP WHILE WATCHING TV: MODERATE CHANCE OF DOZING
HOW LIKELY ARE YOU TO NOD OFF OR FALL ASLEEP WHILE LYING DOWN TO REST IN THE AFTERNOON WHEN CIRCUMSTANCES PERMIT: HIGH CHANCE OF DOZING
HOW LIKELY ARE YOU TO NOD OFF OR FALL ASLEEP WHILE SITTING AND READING: MODERATE CHANCE OF DOZING
HOW LIKELY ARE YOU TO NOD OFF OR FALL ASLEEP WHILE SITTING INACTIVE IN A PUBLIC PLACE: WOULD NEVER DOZE
HOW LIKELY ARE YOU TO NOD OFF OR FALL ASLEEP WHEN YOU ARE A PASSENGER IN A CAR FOR AN HOUR WITHOUT A BREAK: SLIGHT CHANCE OF DOZING
HOW LIKELY ARE YOU TO NOD OFF OR FALL ASLEEP WHILE SITTING AND TALKING TO SOMEONE: WOULD NEVER DOZE
HOW LIKELY ARE YOU TO NOD OFF OR FALL ASLEEP IN A CAR, WHILE STOPPED FOR A FEW MINUTES IN TRAFFIC: WOULD NEVER DOZE
ESS TOTAL SCORE: 9
HOW LIKELY ARE YOU TO NOD OFF OR FALL ASLEEP WHILE SITTING QUIETLY AFTER LUNCH WITHOUT ALCOHOL: SLIGHT CHANCE OF DOZING

## 2024-10-02 ENCOUNTER — TELEPHONE (OUTPATIENT)
Dept: UROLOGY | Age: 64
End: 2024-10-02

## 2024-10-02 NOTE — TELEPHONE ENCOUNTER
Called pt to inform office is closed and to send to [facility] BD station. Pt confirmed

## 2024-10-04 DIAGNOSIS — N40.1 BENIGN PROSTATIC HYPERPLASIA WITH URINARY FREQUENCY: ICD-10-CM

## 2024-10-04 DIAGNOSIS — R35.0 BENIGN PROSTATIC HYPERPLASIA WITH URINARY FREQUENCY: ICD-10-CM

## 2024-10-04 LAB — PSA SERPL-MCNC: 0.2 NG/ML (ref 0–4)

## 2024-10-14 ENCOUNTER — TELEPHONE (OUTPATIENT)
Dept: SLEEP MEDICINE | Age: 64
End: 2024-10-14

## 2024-10-14 NOTE — TELEPHONE ENCOUNTER
Patient says that he has not heard from the DME about the new cpap set up has gotten.ask for a cakk

## 2024-10-22 NOTE — PROGRESS NOTES
Followed by [facility]./ rate controlled, xarelto    Presence of IVC filter     placed prior to gastric sleeve.    Sleep apnea     uses CPAP    Status following surgery for weight loss 05/2019    gastric sleeve    Venous stasis      Past Surgical History:   Procedure Laterality Date    COLONOSCOPY  10/20/2015    COLONOSCOPY N/A 2/8/2023    COLORECTAL CANCER SCREENING, NOT HIGH RISK / BMI 59 performed by Scott Zelaya MD at [facility] ENDOSCOPY    GASTRECTOMY  05/20/2019    sleeve    IR IVC FILTER PLACEMENT W IMAGING  05/08/2019    Dr Lance     IR IVC FILTER RETRIEVAL  6/21/2019    IR IVC FILTER RETRIEVAL  6/21/2019    IR IVC FILTER RETRIEVAL 6/21/2019 [facility] RADIOLOGY SPECIALS    OTHER SURGICAL HISTORY  05/2019    gastric sleeve    REFRACTIVE SURGERY Bilateral     TONSILLECTOMY  1965     Current Outpatient Medications   Medication Sig Dispense Refill    empagliflozin (JARDIANCE) 10 MG tablet Take 1 tablet by mouth daily 90 tablet 1    magnesium oxide (MAG-OX) 400 MG tablet Take 1 tablet by mouth daily 30 tablet 1    carvedilol (COREG) 25 MG tablet Take 1 tablet by mouth with breakfast and with evening meal 180 tablet 3    losartan (COZAAR) 100 MG tablet Take 1 tablet by mouth daily 90 tablet 3    rivaroxaban (XARELTO) 20 MG TABS tablet Take 1 tablet by mouth daily (with breakfast) 90 tablet 3    metFORMIN (GLUCOPHAGE-XR) 500 MG extended release tablet Take 1 tablet by mouth with breakfast and with evening meal 180 tablet 2    Misc. Devices (CPAP MACHINE) MISC by Does not apply route       No current facility-administered medications for this visit.     Allergies   Allergen Reactions    Amlodipine Other (See Comments)     edema    Hydrochlorothiazide Other (See Comments)     gout    Lisinopril Cough    Naltrexone-Bupropion Hcl Er Other (See Comments)     Nausea first and then sweet craving     Social History     Socioeconomic History    Marital status:      Spouse name: Not on file    Number of children: Not on

## 2024-10-23 ENCOUNTER — OFFICE VISIT (OUTPATIENT)
Dept: UROLOGY | Age: 64
End: 2024-10-23
Payer: MEDICARE

## 2024-10-23 DIAGNOSIS — R35.0 BENIGN PROSTATIC HYPERPLASIA WITH URINARY FREQUENCY: ICD-10-CM

## 2024-10-23 DIAGNOSIS — N40.1 BENIGN PROSTATIC HYPERPLASIA WITH URINARY FREQUENCY: ICD-10-CM

## 2024-10-23 DIAGNOSIS — N20.0 CALCULUS OF KIDNEY: Primary | ICD-10-CM

## 2024-10-23 LAB
BILIRUBIN, URINE, POC: NEGATIVE
BLOOD URINE, POC: NORMAL
GLUCOSE URINE, POC: >=1000 MG/DL
KETONES, URINE, POC: NEGATIVE MG/DL
LEUKOCYTE ESTERASE, URINE, POC: NEGATIVE
NITRITE, URINE, POC: NEGATIVE
PH, URINE, POC: 5.5 (ref 4.6–8)
PROTEIN,URINE, POC: NEGATIVE MG/DL
SPECIFIC GRAVITY, URINE, POC: 1.01 (ref 1–1.03)
URINALYSIS CLARITY, POC: NORMAL
URINALYSIS COLOR, POC: NORMAL
UROBILINOGEN, POC: NORMAL MG/DL

## 2024-10-23 PROCEDURE — 99213 OFFICE O/P EST LOW 20 MIN: CPT | Performed by: UROLOGY

## 2024-10-23 PROCEDURE — 81003 URINALYSIS AUTO W/O SCOPE: CPT | Performed by: UROLOGY

## 2024-10-23 PROCEDURE — 1123F ACP DISCUSS/DSCN MKR DOCD: CPT | Performed by: UROLOGY

## 2024-10-23 ASSESSMENT — ENCOUNTER SYMPTOMS
BACK PAIN: 0
NAUSEA: 0

## 2024-12-05 NOTE — PROGRESS NOTES
[facility]  [address]  [place] [address]  [phone]    Patient Name:  Cash Colbert  YOB: 1960      Office Visit 12/9/2024    Chief Complaint   Patient presents with    Follow-up    Sleep Apnea       HISTORY OF PRESENT ILLNESS:    This pleasant gentleman came in today for a follow-up visit  To recap:  Patient has been on CPAP for over 20 years.  Last polysomnogram 2015 shows AHI was 19.9 with lowest sat 80%.  Patient was on auto CPAP 12-15 cm H2O with C-Flex 2 and using a nasal mask.  He reported he was using a chinstrap as well.    Patient also had good compliance and was benefiting from PAP therapy.    The patient also history of atrial flutter, hypertension and is on blood thinners as well as blood pressure medications.  Limited some blood pressure medications because of some side effects.    At this time:  Patient is currently using his new machine every day.  Use at 34 out of 34 days.  33 days or more than 4 hours.  Average sleep is 6 hours and 20 minutes.  95th percentile pressure is 13.8.  95th percentile air leak is 23.3 L/min.  AHI is down to 1.1.    Currently reports he has no problems with the pressure and he really likes the new machine since it has heated humidity which she finds very comfortable.    He does report that the mask is a problem.  He is currently using a nasal mask and reports he is using a chinstrap but it does not hold.  He indicated now he is putting tape over his mouth and he indicates that does not always work since the tape is difficult to get off and may pop off at nighttime.  He is contemplating that he needs to transition to a full facemask and is wondering what our options are.    Weight wise he is down about 3 pounds since last time at 417 pounds.    He indicates he does not take water pills, since they cause gout for him.  But he does have leg swelling.    Drake score 7/24      Download:           12/9/2024     1:59

## 2024-12-09 ENCOUNTER — OFFICE VISIT (OUTPATIENT)
Dept: SLEEP MEDICINE | Age: 64
End: 2024-12-09
Payer: MEDICARE

## 2024-12-09 VITALS
TEMPERATURE: 97.9 F | DIASTOLIC BLOOD PRESSURE: 93 MMHG | WEIGHT: 315 LBS | OXYGEN SATURATION: 96 % | HEIGHT: 71 IN | BODY MASS INDEX: 44.1 KG/M2 | RESPIRATION RATE: 20 BRPM | SYSTOLIC BLOOD PRESSURE: 144 MMHG | HEART RATE: 61 BPM

## 2024-12-09 DIAGNOSIS — Z78.9 DIFFICULTY USING CONTINUOUS POSITIVE AIRWAY PRESSURE (CPAP) NASAL MASK: ICD-10-CM

## 2024-12-09 DIAGNOSIS — G47.34 NOCTURNAL HYPOXEMIA: ICD-10-CM

## 2024-12-09 DIAGNOSIS — G47.33 OSA ON CPAP: Primary | ICD-10-CM

## 2024-12-09 PROCEDURE — 99214 OFFICE O/P EST MOD 30 MIN: CPT | Performed by: INTERNAL MEDICINE

## 2024-12-09 PROCEDURE — 3077F SYST BP >= 140 MM HG: CPT | Performed by: INTERNAL MEDICINE

## 2024-12-09 PROCEDURE — G2211 COMPLEX E/M VISIT ADD ON: HCPCS | Performed by: INTERNAL MEDICINE

## 2024-12-09 PROCEDURE — 1123F ACP DISCUSS/DSCN MKR DOCD: CPT | Performed by: INTERNAL MEDICINE

## 2024-12-09 PROCEDURE — 3080F DIAST BP >= 90 MM HG: CPT | Performed by: INTERNAL MEDICINE

## 2024-12-09 ASSESSMENT — SLEEP AND FATIGUE QUESTIONNAIRES
HOW LIKELY ARE YOU TO NOD OFF OR FALL ASLEEP WHEN YOU ARE A PASSENGER IN A CAR FOR AN HOUR WITHOUT A BREAK: WOULD NEVER DOZE
ESS TOTAL SCORE: 7
HOW LIKELY ARE YOU TO NOD OFF OR FALL ASLEEP WHILE WATCHING TV: SLIGHT CHANCE OF DOZING
HOW LIKELY ARE YOU TO NOD OFF OR FALL ASLEEP IN A CAR, WHILE STOPPED FOR A FEW MINUTES IN TRAFFIC: WOULD NEVER DOZE
HOW LIKELY ARE YOU TO NOD OFF OR FALL ASLEEP WHILE SITTING QUIETLY AFTER LUNCH WITHOUT ALCOHOL: SLIGHT CHANCE OF DOZING
HOW LIKELY ARE YOU TO NOD OFF OR FALL ASLEEP WHILE LYING DOWN TO REST IN THE AFTERNOON WHEN CIRCUMSTANCES PERMIT: HIGH CHANCE OF DOZING
HOW LIKELY ARE YOU TO NOD OFF OR FALL ASLEEP WHILE SITTING INACTIVE IN A PUBLIC PLACE: SLIGHT CHANCE OF DOZING
HOW LIKELY ARE YOU TO NOD OFF OR FALL ASLEEP WHILE SITTING AND TALKING TO SOMEONE: WOULD NEVER DOZE
HOW LIKELY ARE YOU TO NOD OFF OR FALL ASLEEP WHILE SITTING AND READING: SLIGHT CHANCE OF DOZING

## 2025-01-13 DIAGNOSIS — E11.9 TYPE 2 DIABETES MELLITUS WITHOUT COMPLICATION, WITHOUT LONG-TERM CURRENT USE OF INSULIN (HCC): ICD-10-CM

## 2025-01-13 DIAGNOSIS — I10 ESSENTIAL HYPERTENSION: ICD-10-CM

## 2025-01-13 DIAGNOSIS — R00.2 PALPITATIONS: Primary | ICD-10-CM

## 2025-01-13 DIAGNOSIS — E78.00 PURE HYPERCHOLESTEROLEMIA: ICD-10-CM

## 2025-01-14 DIAGNOSIS — R00.2 PALPITATIONS: ICD-10-CM

## 2025-01-14 DIAGNOSIS — E78.00 PURE HYPERCHOLESTEROLEMIA: ICD-10-CM

## 2025-01-14 DIAGNOSIS — I10 ESSENTIAL HYPERTENSION: ICD-10-CM

## 2025-01-14 DIAGNOSIS — E11.9 TYPE 2 DIABETES MELLITUS WITHOUT COMPLICATION, WITHOUT LONG-TERM CURRENT USE OF INSULIN (HCC): ICD-10-CM

## 2025-01-14 LAB
ALBUMIN SERPL-MCNC: 3.7 G/DL (ref 3.2–4.6)
ALBUMIN/GLOB SERPL: 1.3 (ref 1–1.9)
ALP SERPL-CCNC: 63 U/L (ref 40–129)
ALT SERPL-CCNC: 31 U/L (ref 8–55)
ANION GAP SERPL CALC-SCNC: 12 MMOL/L (ref 7–16)
APPEARANCE UR: CLEAR
AST SERPL-CCNC: 31 U/L (ref 15–37)
BACTERIA URNS QL MICRO: NEGATIVE /HPF
BASOPHILS # BLD: 0.05 K/UL (ref 0–0.2)
BASOPHILS NFR BLD: 1.1 % (ref 0–2)
BILIRUB SERPL-MCNC: 0.8 MG/DL (ref 0–1.2)
BILIRUB UR QL: NEGATIVE
BUN SERPL-MCNC: 13 MG/DL (ref 8–23)
CALCIUM SERPL-MCNC: 9.2 MG/DL (ref 8.8–10.2)
CHLORIDE SERPL-SCNC: 105 MMOL/L (ref 98–107)
CO2 SERPL-SCNC: 23 MMOL/L (ref 20–29)
COLOR UR: ABNORMAL
CREAT SERPL-MCNC: 0.93 MG/DL (ref 0.8–1.3)
CREAT UR-MCNC: 170 MG/DL (ref 39–259)
DIFFERENTIAL METHOD BLD: NORMAL
EOSINOPHIL # BLD: 0.13 K/UL (ref 0–0.8)
EOSINOPHIL NFR BLD: 2.8 % (ref 0.5–7.8)
EPI CELLS #/AREA URNS HPF: ABNORMAL /HPF (ref 0–5)
ERYTHROCYTE [DISTWIDTH] IN BLOOD BY AUTOMATED COUNT: 12.8 % (ref 11.9–14.6)
EST. AVERAGE GLUCOSE BLD GHB EST-MCNC: 176 MG/DL
GLOBULIN SER CALC-MCNC: 2.9 G/DL (ref 2.3–3.5)
GLUCOSE SERPL-MCNC: 146 MG/DL (ref 70–99)
GLUCOSE UR STRIP.AUTO-MCNC: NEGATIVE MG/DL
HBA1C MFR BLD: 7.8 % (ref 0–5.6)
HCT VFR BLD AUTO: 45.3 % (ref 41.1–50.3)
HGB BLD-MCNC: 15.2 G/DL (ref 13.6–17.2)
HGB UR QL STRIP: NEGATIVE
HYALINE CASTS URNS QL MICRO: ABNORMAL /LPF
IMM GRANULOCYTES # BLD AUTO: 0 K/UL (ref 0–0.5)
IMM GRANULOCYTES NFR BLD AUTO: 0 % (ref 0–5)
KETONES UR QL STRIP.AUTO: NEGATIVE MG/DL
LEUKOCYTE ESTERASE UR QL STRIP.AUTO: NEGATIVE
LYMPHOCYTES # BLD: 1.48 K/UL (ref 0.5–4.6)
LYMPHOCYTES NFR BLD: 32.3 % (ref 13–44)
MAGNESIUM SERPL-MCNC: 1.9 MG/DL (ref 1.8–2.4)
MCH RBC QN AUTO: 31.7 PG (ref 26.1–32.9)
MCHC RBC AUTO-ENTMCNC: 33.6 G/DL (ref 31.4–35)
MCV RBC AUTO: 94.4 FL (ref 82–102)
MICROALBUMIN UR-MCNC: 5.73 MG/DL (ref 0–20)
MICROALBUMIN/CREAT UR-RTO: 34 MG/G (ref 0–30)
MONOCYTES # BLD: 0.42 K/UL (ref 0.1–1.3)
MONOCYTES NFR BLD: 9.2 % (ref 4–12)
NEUTS SEG # BLD: 2.5 K/UL (ref 1.7–8.2)
NEUTS SEG NFR BLD: 54.6 % (ref 43–78)
NITRITE UR QL STRIP.AUTO: NEGATIVE
NRBC # BLD: 0 K/UL (ref 0–0.2)
PH UR STRIP: 5.5 (ref 5–9)
PLATELET # BLD AUTO: 179 K/UL (ref 150–450)
PMV BLD AUTO: 11.2 FL (ref 9.4–12.3)
POTASSIUM SERPL-SCNC: 4.1 MMOL/L (ref 3.5–5.1)
PROT SERPL-MCNC: 6.6 G/DL (ref 6.3–8.2)
PROT UR STRIP-MCNC: ABNORMAL MG/DL
RBC # BLD AUTO: 4.8 M/UL (ref 4.23–5.6)
RBC #/AREA URNS HPF: ABNORMAL /HPF (ref 0–5)
SODIUM SERPL-SCNC: 140 MMOL/L (ref 136–145)
SP GR UR REFRACTOMETRY: 1.02 (ref 1–1.02)
UROBILINOGEN UR QL STRIP.AUTO: 0.2 EU/DL (ref 0.2–1)
WBC # BLD AUTO: 4.6 K/UL (ref 4.3–11.1)
WBC URNS QL MICRO: ABNORMAL /HPF (ref 0–4)

## 2025-01-16 ENCOUNTER — OFFICE VISIT (OUTPATIENT)
Dept: INTERNAL MEDICINE CLINIC | Facility: CLINIC | Age: 65
End: 2025-01-16

## 2025-01-16 VITALS
DIASTOLIC BLOOD PRESSURE: 84 MMHG | WEIGHT: 315 LBS | HEIGHT: 71 IN | BODY MASS INDEX: 44.1 KG/M2 | SYSTOLIC BLOOD PRESSURE: 128 MMHG

## 2025-01-16 DIAGNOSIS — I48.91 ATRIAL FIBRILLATION, UNSPECIFIED TYPE (HCC): ICD-10-CM

## 2025-01-16 DIAGNOSIS — Z00.00 WELCOME TO MEDICARE PREVENTIVE VISIT: Primary | ICD-10-CM

## 2025-01-16 DIAGNOSIS — E11.65 TYPE 2 DIABETES MELLITUS WITH HYPERGLYCEMIA, WITHOUT LONG-TERM CURRENT USE OF INSULIN (HCC): ICD-10-CM

## 2025-01-16 DIAGNOSIS — E66.01 MORBID OBESITY: ICD-10-CM

## 2025-01-16 DIAGNOSIS — F11.21 HISTORY OF NARCOTIC ADDICTION (HCC): ICD-10-CM

## 2025-01-16 ASSESSMENT — VISUAL ACUITY
OD_CC: 20/20
OS_CC: 20/20

## 2025-01-16 ASSESSMENT — PATIENT HEALTH QUESTIONNAIRE - PHQ9
2. FEELING DOWN, DEPRESSED OR HOPELESS: NOT AT ALL
SUM OF ALL RESPONSES TO PHQ QUESTIONS 1-9: 0
SUM OF ALL RESPONSES TO PHQ9 QUESTIONS 1 & 2: 0
SUM OF ALL RESPONSES TO PHQ QUESTIONS 1-9: 0
1. LITTLE INTEREST OR PLEASURE IN DOING THINGS: NOT AT ALL
SUM OF ALL RESPONSES TO PHQ QUESTIONS 1-9: 0
SUM OF ALL RESPONSES TO PHQ QUESTIONS 1-9: 0

## 2025-01-16 ASSESSMENT — LIFESTYLE VARIABLES
HOW MANY STANDARD DRINKS CONTAINING ALCOHOL DO YOU HAVE ON A TYPICAL DAY: PATIENT DOES NOT DRINK
HOW OFTEN DO YOU HAVE A DRINK CONTAINING ALCOHOL: NEVER

## 2025-01-16 NOTE — PROGRESS NOTES
Medicare Annual Wellness Visit    Cash Colbert is here for Medicare AWV (Welcome to medicare.)    Assessment & Plan   Welcome to Medicare preventive visit  -     Welcome to Medicare (IPPE) Screening EKG [[id number]]  Type 2 diabetes mellitus with hyperglycemia, without long-term current use of insulin (HCC)  -     Semaglutide,0.25 or 0.5MG/DOS, 2 MG/3ML SOPN; 0.25 mg weekly for 4 weeks, then 0.5 mg weekly, Disp-3 mL, R-2Normal  History of narcotic addiction (HCC)  Atrial fibrillation, unspecified type (HCC)  Discussed BMI and healthy weight and diet, weight bearing exercise, smoking avoidance, sun protection and medication compliance. Reviewed appropriate health maintenance screening. The patient was counseled regarding screening procedures and recommended schedules for regular prostate exam, PSA, self testicular exam, GI hemoccult testing, colonoscopy and recommended vaccinations.   PSA per urology. Colonoscopy and immunizations current. ACP on file.   ECG: Atrial fibrillation. Rate 82 bpm. No change from previous.   Reviewed A1C and discussed significance. GLP1 would be good addition for better control. Discussed medication and side effects in detail. Trial of Ozempic if covered by insurance     Return for follow up scheduled.     Subjective   The following acute and/or chronic problems were also addressed today:  Diabetes with poor control. Taking and tolerating metformin xr 500 mg bid with meals and Jardiance 10 mg. A1C down from 8.1% to 7.8%. Monitoring but not regularly.    Patient's complete Health Risk Assessment and screening values have been reviewed and are found in Flowsheets. The following problems were reviewed today and where indicated follow up appointments were made and/or referrals ordered.    Positive Risk Factor Screenings with Interventions:             General HRA Questions:  Select all that apply: (!) New or Increased Pain  Interventions - Pain:  Left knee pain. Would like PT with water

## 2025-01-16 NOTE — PATIENT INSTRUCTIONS
Learning About Being Active as an Older Adult  Why is being active important as you get older?     Being active is one of the best things you can do for your health. And it's never too late to start. Being active--or getting active, if you aren't already--has definite benefits. It can:  Give you more energy,  Keep your mind sharp.  Improve balance to reduce your risk of falls.  Help you manage chronic illness with fewer medicines.  No matter how old you are, how fit you are, or what health problems you have, there is a form of activity that will work for you. And the more physical activity you can do, the better your overall health will be.  What kinds of activity can help you stay healthy?  Being more active will make your daily activities easier. Physical activity includes planned exercise and things you do in daily life. There are four types of activity:  Aerobic.  Doing aerobic activity makes your heart and lungs strong.  Includes walking, dancing, and gardening.  Aim for at least 2½ hours spread throughout the week.  It improves your energy and can help you sleep better.  Muscle-strengthening.  This type of activity can help maintain muscle and strengthen bones.  Includes climbing stairs, using resistance bands, and lifting or carrying heavy loads.  Aim for at least twice a week.  It can help protect the knees and other joints.  Stretching.  Stretching gives you better range of motion in joints and muscles.  Includes upper arm stretches, calf stretches, and gentle yoga.  Aim for at least twice a week, preferably after your muscles are warmed up from other activities.  It can help you function better in daily life.  Balancing.  This helps you stay coordinated and have good posture.  Includes heel-to-toe walking, momo chi, and certain types of yoga.  Aim for at least 3 days a week.  It can reduce your risk of falling.  Even if you have a hard time meeting the recommendations, it's better to be more active

## 2025-01-17 DIAGNOSIS — E11.65 TYPE 2 DIABETES MELLITUS WITH HYPERGLYCEMIA, WITHOUT LONG-TERM CURRENT USE OF INSULIN (HCC): ICD-10-CM

## 2025-01-17 NOTE — TELEPHONE ENCOUNTER
Attempted to complete prior authorization, per cover my meds \"The patient currently has access to the requested medication and a Prior Authorization is not needed for the patient/medication.\"    Patient was notified and states [facility] in out of stock of medication and asked if it can [facility] sent to [facility]. Rx pended for [facility].

## 2025-01-24 ENCOUNTER — OFFICE VISIT (OUTPATIENT)
Dept: INTERNAL MEDICINE CLINIC | Facility: CLINIC | Age: 65
End: 2025-01-24

## 2025-01-24 VITALS — BODY MASS INDEX: 44.1 KG/M2 | WEIGHT: 315 LBS | HEIGHT: 71 IN

## 2025-01-24 DIAGNOSIS — I48.11 LONGSTANDING PERSISTENT ATRIAL FIBRILLATION (HCC): ICD-10-CM

## 2025-01-24 DIAGNOSIS — D68.69 SECONDARY HYPERCOAGULABLE STATE (HCC): ICD-10-CM

## 2025-01-24 DIAGNOSIS — E83.42 HYPOMAGNESEMIA: ICD-10-CM

## 2025-01-24 DIAGNOSIS — I87.2 VENOUS INSUFFICIENCY: ICD-10-CM

## 2025-01-24 DIAGNOSIS — E66.01 MORBID OBESITY: ICD-10-CM

## 2025-01-24 DIAGNOSIS — Z92.29 HX OF LONG TERM USE OF BLOOD THINNERS: ICD-10-CM

## 2025-01-24 DIAGNOSIS — Z86.718 HISTORY OF DEEP VEIN THROMBOSIS (DVT) OF LOWER EXTREMITY: ICD-10-CM

## 2025-01-24 DIAGNOSIS — I10 ESSENTIAL HYPERTENSION: Primary | ICD-10-CM

## 2025-01-24 DIAGNOSIS — Z87.442 HISTORY OF KIDNEY STONES: ICD-10-CM

## 2025-01-24 DIAGNOSIS — Z87.39 HISTORY OF GOUT: ICD-10-CM

## 2025-01-24 DIAGNOSIS — E11.65 TYPE 2 DIABETES MELLITUS WITH HYPERGLYCEMIA, WITHOUT LONG-TERM CURRENT USE OF INSULIN (HCC): ICD-10-CM

## 2025-01-24 DIAGNOSIS — Z86.711 HISTORY OF PULMONARY EMBOLISM: ICD-10-CM

## 2025-01-24 DIAGNOSIS — G47.33 OSA ON CPAP: ICD-10-CM

## 2025-01-24 DIAGNOSIS — Z98.84 S/P BARIATRIC SURGERY: ICD-10-CM

## 2025-01-24 PROBLEM — D69.6 THROMBOCYTOPENIA, UNSPECIFIED (HCC): Status: RESOLVED | Noted: 2022-12-06 | Resolved: 2025-01-24

## 2025-01-24 RX ORDER — MAGNESIUM OXIDE 400 MG/1
400 TABLET ORAL DAILY
Qty: 90 TABLET | Refills: 3 | Status: SHIPPED | OUTPATIENT
Start: 2025-01-24

## 2025-01-24 ASSESSMENT — ENCOUNTER SYMPTOMS
NAUSEA: 0
DIARRHEA: 0
CONSTIPATION: 0
COUGH: 0
VOICE CHANGE: 0
EYE PAIN: 0
SHORTNESS OF BREATH: 0
BLOOD IN STOOL: 0
ABDOMINAL PAIN: 0
EYE REDNESS: 0
COLOR CHANGE: 0

## 2025-01-24 NOTE — PROGRESS NOTES
PROGRESS NOTE      Chief Complaint   Patient presents with    Diabetes     Ext lab f/u. Exam       HPI    Hypertension: Losartan 100 mg and carvedilol 25 mg.  Blood pressure improved on recheck.  Monitoring at home 120s/70s.       Persistent atrial fibrillation: Xarelto 20 mg and carvedilol for rate control. Has not seen cardiology since Dr Huntley retired (> 5 years)     History of DVT / PE: No identified precipitant.  Xarelto 20 mg indefinitely.      ROJELIO with CPAP: Recent visit with sleep medicine. CPAP replaced.      Type 2 diabetes: A1C 7.8%. Metformin xr 500 mg bid, Jardiance 10 mg and starting Ozempic weekly titrated to 0.5 mg weekly      Morbid obesity, s/p bariatric surgery: 2018. High weight prior to surgery - 430 lbs. Low weight 260 lbs after 1 year. Current weight 413 lbs. Has rowing machine. Planning to get more active now that he is retired     Recovering addict: History of narcotic addiction. In 20s. Currently no use of drugs or alcohol.     History of kidney stone: S/p lithotripsy. Followed by urology    BPH     Gout: with hctz. No flares after discontinuation    Chronic lymphedema: Has compression stockings and wearing intermittently.              Past Medical History, Past Surgical History, Family history, Social History, and Medications were all reviewed and updated as necessary.     Current Outpatient Medications   Medication Sig Dispense Refill    empagliflozin (JARDIANCE) 10 MG tablet Take 1 tablet by mouth daily 90 tablet 3    magnesium oxide (MAG-OX) 400 MG tablet Take 1 tablet by mouth daily 90 tablet 3    carvedilol (COREG) 25 MG tablet Take 1 tablet by mouth with breakfast and with evening meal 180 tablet 3    losartan (COZAAR) 100 MG tablet Take 1 tablet by mouth daily 90 tablet 3    rivaroxaban (XARELTO) 20 MG TABS tablet Take 1 tablet by mouth daily (with breakfast) 90 tablet 3    metFORMIN (GLUCOPHAGE-XR) 500 MG extended release tablet Take 1 tablet by mouth with breakfast and with

## 2025-01-27 ENCOUNTER — TELEPHONE (OUTPATIENT)
Dept: INTERNAL MEDICINE CLINIC | Facility: CLINIC | Age: 65
End: 2025-01-27

## 2025-01-27 NOTE — TELEPHONE ENCOUNTER
----- Message from NINO Weber - CNP sent at 1/24/2025  5:41 PM EST -----  Sorry. We had talked about him seeing cardiology (had not seen anyone since Dr Huntley retired). I would like him to see a cardiologist, so I am going to make a referral for this

## 2025-01-31 ENCOUNTER — INITIAL CONSULT (OUTPATIENT)
Age: 65
End: 2025-01-31
Payer: MEDICARE

## 2025-01-31 VITALS
HEART RATE: 82 BPM | SYSTOLIC BLOOD PRESSURE: 134 MMHG | HEIGHT: 70 IN | DIASTOLIC BLOOD PRESSURE: 76 MMHG | WEIGHT: 315 LBS | BODY MASS INDEX: 45.1 KG/M2

## 2025-01-31 DIAGNOSIS — I48.11 LONGSTANDING PERSISTENT ATRIAL FIBRILLATION (HCC): Primary | ICD-10-CM

## 2025-01-31 DIAGNOSIS — Z86.79 HISTORY OF ATRIAL FLUTTER: ICD-10-CM

## 2025-01-31 DIAGNOSIS — Z86.718 HISTORY OF VENOUS THROMBOEMBOLISM: ICD-10-CM

## 2025-01-31 DIAGNOSIS — I10 HYPERTENSION, UNSPECIFIED TYPE: ICD-10-CM

## 2025-01-31 DIAGNOSIS — I34.0 MILD MITRAL REGURGITATION BY PRIOR ECHOCARDIOGRAM: ICD-10-CM

## 2025-01-31 PROBLEM — I50.22 HEART FAILURE WITH MID-RANGE EJECTION FRACTION (HCC): Status: ACTIVE | Noted: 2025-01-31

## 2025-01-31 PROCEDURE — 99214 OFFICE O/P EST MOD 30 MIN: CPT | Performed by: INTERNAL MEDICINE

## 2025-01-31 PROCEDURE — 3078F DIAST BP <80 MM HG: CPT | Performed by: INTERNAL MEDICINE

## 2025-01-31 PROCEDURE — 1123F ACP DISCUSS/DSCN MKR DOCD: CPT | Performed by: INTERNAL MEDICINE

## 2025-01-31 PROCEDURE — 3075F SYST BP GE 130 - 139MM HG: CPT | Performed by: INTERNAL MEDICINE

## 2025-01-31 NOTE — PROGRESS NOTES
[facility] CARDIOLOGY History & Physical                 Reason for Visit: Longstanding persistent atrial fibrillation    Subjective:     Patient is a 64 y.o. male with a PMH of longstanding persistent atrial fibrillation, hypertension, atrial flutter, VTE (DVT and PE), diabetes, narcotic addiction and post bariatric surgery who presents as a referral to reestablish care.  The patient had a TTE in May 2019 that was noted to demonstrate a normal EF and mild MR.  The patient denies angina and dyspnea.    Past Medical History:   Diagnosis Date    Abnormal EKG 3/8/2017    Adverse effect of anesthesia     6-8 hours post-op B/P dropped and pt was not able to urinate- had to be cath.- after gastric sleeve    Arthritis     osteo-knee, toe    Asthma     childhood asthma    Atrial fibrillation (HCC) 2017    Borderline diabetes     diet controlled, 8/27/20 A1c 4.9    Current use of long term anticoagulation     no longer on Coumadin or Lovenox injections    History of depression     pt denies any further complications    History of narcotic addiction (HCC)     History of pulmonary embolism 2015    Treated with Coumadin- then lovenox bridge- IVC filter placed-removed    HTN (hypertension)     controlled with meds    Kidney stone 2021    Obesity Most of my life    Persistent atrial fibrillation (HCC)     Followed by [facility]./ rate controlled, xarelto    Presence of IVC filter     placed prior to gastric sleeve.    Sleep apnea     uses CPAP    Status following surgery for weight loss 05/2019    gastric sleeve    Venous stasis       Past Surgical History:   Procedure Laterality Date    COLONOSCOPY  10/20/2015    COLONOSCOPY N/A 2/8/2023    COLORECTAL CANCER SCREENING, NOT HIGH RISK / BMI 59 performed by Scott Zelaya MD at [facility] ENDOSCOPY    GASTRECTOMY  05/20/2019    sleeve    IR GUIDED IVC FILTER PLACEMENT  05/08/2019    Dr Lance     IR GUIDED IVC FILTER RETRIEVAL  6/21/2019    IR GUIDED IVC FILTER RETRIEVAL  6/21/2019    IR

## 2025-02-27 DIAGNOSIS — I71.20 THORACIC AORTIC ANEURYSM WITHOUT RUPTURE, UNSPECIFIED PART: Primary | ICD-10-CM

## 2025-02-28 ENCOUNTER — TELEPHONE (OUTPATIENT)
Age: 65
End: 2025-02-28

## 2025-02-28 NOTE — TELEPHONE ENCOUNTER
----- Message from Dr. Enrique Bustos MD sent at 2/27/2025  5:58 PM EST -----  Please let the patient know that his EF was measured to be normal.  He is noted to have an ascending aorta of 4 cm, which would be dilated.  Therefore, I ordered a thoracic CTA to get a better measurement.

## 2025-02-28 NOTE — TELEPHONE ENCOUNTER
Advised patient of echo results and Dr. Bustos's response. Advised patient that someone from [facility]iology Scheduling will be calling to schedule thoracic CTA. Advised patient that he may call [facility] Sched[facility] to schedule CTA at [phone]. Patient verbalized understanding.

## 2025-03-24 NOTE — PROGRESS NOTES
[facility] CARDIOLOGY Follow Up                 Reason for Visit: Longstanding persistent AF    Subjective:     Patient is a 64 y.o. male with a PMH of TAA, longstanding persistent atrial fibrillation, hypertension, atrial flutter, VTE (DVT and PE), diabetes, narcotic addiction and post bariatric surgery who presents for follow-up.  The patient was last seen in January 2025.  A TTE was ordered.  The patient had a TTE in February 2025 that was noted to demonstrate a normal EF.  The ascending aorta was noted to be 4 cm.  A thoracic CTA was ordered.  He had a thoracic CTA this morning that noted a 3.7 cm ascending aorta.  The patient denies angina and dyspnea.    Past Medical History:   Diagnosis Date    Abnormal EKG 3/8/2017    Adverse effect of anesthesia     6-8 hours post-op B/P dropped and pt was not able to urinate- had to be cath.- after gastric sleeve    Arthritis     osteo-knee, toe    Asthma     childhood asthma    Atrial fibrillation (HCC) 2017    Borderline diabetes     diet controlled, 8/27/20 A1c 4.9    Current use of long term anticoagulation     no longer on Coumadin or Lovenox injections    History of depression     pt denies any further complications    History of narcotic addiction (HCC)     History of pulmonary embolism 2015    Treated with Coumadin- then lovenox bridge- IVC filter placed-removed    HTN (hypertension)     controlled with meds    Kidney stone 2021    Obesity Most of my life    Persistent atrial fibrillation (HCC)     Followed by [facility] rate controlled, xarelto    Presence of IVC filter     placed prior to gastric sleeve.    Sleep apnea     uses CPAP    Status following surgery for weight loss 05/2019    gastric sleeve    Venous stasis       Past Surgical History:   Procedure Laterality Date    COLONOSCOPY  10/20/2015    COLONOSCOPY N/A 2/8/2023    COLORECTAL CANCER SCREENING, NOT HIGH RISK / BMI 59 performed by Scott Zelaya MD at [facility] ENDOSCOPY    GASTRECTOMY  05/20/2019

## 2025-03-26 ENCOUNTER — OFFICE VISIT (OUTPATIENT)
Age: 65
End: 2025-03-26
Payer: MEDICARE

## 2025-03-26 VITALS
BODY MASS INDEX: 45.1 KG/M2 | SYSTOLIC BLOOD PRESSURE: 138 MMHG | DIASTOLIC BLOOD PRESSURE: 72 MMHG | WEIGHT: 315 LBS | HEART RATE: 80 BPM | HEIGHT: 70 IN

## 2025-03-26 DIAGNOSIS — Z86.718 HISTORY OF VENOUS THROMBOEMBOLISM: ICD-10-CM

## 2025-03-26 DIAGNOSIS — I48.11 LONGSTANDING PERSISTENT ATRIAL FIBRILLATION (HCC): Primary | ICD-10-CM

## 2025-03-26 DIAGNOSIS — I71.20 THORACIC AORTIC ANEURYSM WITHOUT RUPTURE, UNSPECIFIED PART: ICD-10-CM

## 2025-03-26 DIAGNOSIS — I10 HYPERTENSION, UNSPECIFIED TYPE: ICD-10-CM

## 2025-03-26 DIAGNOSIS — Z86.79 HISTORY OF ATRIAL FLUTTER: ICD-10-CM

## 2025-03-26 PROCEDURE — 1123F ACP DISCUSS/DSCN MKR DOCD: CPT | Performed by: INTERNAL MEDICINE

## 2025-03-26 PROCEDURE — 3078F DIAST BP <80 MM HG: CPT | Performed by: INTERNAL MEDICINE

## 2025-03-26 PROCEDURE — 99214 OFFICE O/P EST MOD 30 MIN: CPT | Performed by: INTERNAL MEDICINE

## 2025-03-26 PROCEDURE — 3075F SYST BP GE 130 - 139MM HG: CPT | Performed by: INTERNAL MEDICINE

## 2025-03-28 ENCOUNTER — TELEPHONE (OUTPATIENT)
Dept: INTERNAL MEDICINE CLINIC | Facility: CLINIC | Age: 65
End: 2025-03-28

## 2025-03-28 NOTE — TELEPHONE ENCOUNTER
Called patient to reschedule his appointment with soha, no answer, left vm to return my call or schedule via [employer].

## 2025-05-10 DIAGNOSIS — E11.65 TYPE 2 DIABETES MELLITUS WITH HYPERGLYCEMIA, WITHOUT LONG-TERM CURRENT USE OF INSULIN (HCC): ICD-10-CM

## 2025-05-12 RX ORDER — SEMAGLUTIDE 0.68 MG/ML
INJECTION, SOLUTION SUBCUTANEOUS
Refills: 2 | OUTPATIENT
Start: 2025-05-12

## 2025-05-19 NOTE — PROGRESS NOTES
PROGRESS NOTE      Chief Complaint   Patient presents with    Diabetes     4 month follow-up with lab review       Assessment & Plan  Type 2 diabetes mellitus:  - A1c improved to 6.1% from previous readings of 7.8% and 8.1%  - Current regimen: metformin  mg twice daily, Jardiance 10 mg, Ozempic 0.5 mg weekly  - Continue current medication regimen  - Nonfasting labs to be checked at next follow-up in 4 months    Persistent atrial fibrillation:  - Current medications: Xarelto 20 mg, carvedilol for rate control  - Echogram: EF 55-60%, mild concentric hypertrophy  - CTA chest: thoracic aortic aneurysm 3.7 cm  - Continue medications as recommended by cardiologist  - Follow-up with cardiology for monitoring thoracic aortic aneurysm    Hypertension:  - Blood pressure well-controlled on losartan 100 mg and carvedilol 25 mg twice daily    History of deep vein thrombosis/pulmonary embolism:  - Continue Xarelto 20 mg indefinitely    Morbid obesity:  - Weight loss: now 387 pounds from 413 pounds in January  - Continue exercise routine at the  and monitor diet  - Ozempic contributing positively to weight loss  - Encouraged to maintain physical activity and dietary modifications    Chronic lymphedema:  - Non-compliant with wearing compression stockings  - Compression stockings recommended    Hypomagnesemia:  - Magnesium levels within normal range  - Continue Mag-Ox 400 mg daily    Obstructive sleep apnea:  - Continue using CPAP machine    Knee pain:  - Improvement with pool exercises  - No further treatment desired at this time    Lower back pain:  - Likely degenerative in nature  - Continue current exercise regimen and use Tylenol as needed  - Consider formal physical therapy if pain persists    Follow-up:  - Patient to follow up in 4 months  - Nonfasting labs to be checked at next visit    SUBJECTIVE      History of Present Illness  The patient is a 64-year-old male here today for a follow-up on type 2 diabetes,

## 2025-05-20 ENCOUNTER — RESULTS FOLLOW-UP (OUTPATIENT)
Dept: INTERNAL MEDICINE CLINIC | Facility: CLINIC | Age: 65
End: 2025-05-20

## 2025-05-20 DIAGNOSIS — E83.42 HYPOMAGNESEMIA: ICD-10-CM

## 2025-05-20 DIAGNOSIS — I10 ESSENTIAL HYPERTENSION: ICD-10-CM

## 2025-05-20 DIAGNOSIS — E11.65 TYPE 2 DIABETES MELLITUS WITH HYPERGLYCEMIA, WITHOUT LONG-TERM CURRENT USE OF INSULIN (HCC): ICD-10-CM

## 2025-05-20 LAB
ALBUMIN SERPL-MCNC: 3.8 G/DL (ref 3.2–4.6)
ALBUMIN/GLOB SERPL: 1.1 (ref 1–1.9)
ALP SERPL-CCNC: 68 U/L (ref 40–129)
ALT SERPL-CCNC: 29 U/L (ref 8–55)
ANION GAP SERPL CALC-SCNC: 12 MMOL/L (ref 7–16)
AST SERPL-CCNC: 34 U/L (ref 15–37)
BILIRUB SERPL-MCNC: 0.7 MG/DL (ref 0–1.2)
BUN SERPL-MCNC: 14 MG/DL (ref 8–23)
CALCIUM SERPL-MCNC: 9.4 MG/DL (ref 8.8–10.2)
CHLORIDE SERPL-SCNC: 103 MMOL/L (ref 98–107)
CHOLEST SERPL-MCNC: 138 MG/DL (ref 0–200)
CO2 SERPL-SCNC: 26 MMOL/L (ref 20–29)
CREAT SERPL-MCNC: 1.03 MG/DL (ref 0.8–1.3)
CREAT UR-MCNC: 190 MG/DL (ref 39–259)
EST. AVERAGE GLUCOSE BLD GHB EST-MCNC: 127 MG/DL
GLOBULIN SER CALC-MCNC: 3.5 G/DL (ref 2.3–3.5)
GLUCOSE SERPL-MCNC: 113 MG/DL (ref 70–99)
HBA1C MFR BLD: 6.1 % (ref 0–5.6)
HDLC SERPL-MCNC: 54 MG/DL (ref 40–60)
HDLC SERPL: 2.6 (ref 0–5)
LDLC SERPL CALC-MCNC: 68 MG/DL (ref 0–100)
MAGNESIUM SERPL-MCNC: 2.3 MG/DL (ref 1.8–2.4)
MICROALBUMIN UR-MCNC: 1.49 MG/DL (ref 0–20)
MICROALBUMIN/CREAT UR-RTO: 8 MG/G (ref 0–30)
POTASSIUM SERPL-SCNC: 4 MMOL/L (ref 3.5–5.1)
PROT SERPL-MCNC: 7.3 G/DL (ref 6.3–8.2)
SODIUM SERPL-SCNC: 141 MMOL/L (ref 136–145)
TRIGL SERPL-MCNC: 80 MG/DL (ref 0–150)
VLDLC SERPL CALC-MCNC: 16 MG/DL (ref 6–23)

## 2025-05-21 ENCOUNTER — OFFICE VISIT (OUTPATIENT)
Dept: INTERNAL MEDICINE CLINIC | Facility: CLINIC | Age: 65
End: 2025-05-21
Payer: MEDICARE

## 2025-05-21 VITALS — SYSTOLIC BLOOD PRESSURE: 132 MMHG | WEIGHT: 315 LBS | DIASTOLIC BLOOD PRESSURE: 84 MMHG | BODY MASS INDEX: 55.59 KG/M2

## 2025-05-21 DIAGNOSIS — E66.01 MORBID OBESITY (HCC): ICD-10-CM

## 2025-05-21 DIAGNOSIS — I50.22 HEART FAILURE WITH MID-RANGE EJECTION FRACTION (HCC): ICD-10-CM

## 2025-05-21 DIAGNOSIS — Z79.01 HX OF LONG TERM USE OF BLOOD THINNERS: ICD-10-CM

## 2025-05-21 DIAGNOSIS — Z98.84 S/P BARIATRIC SURGERY: ICD-10-CM

## 2025-05-21 DIAGNOSIS — I71.20 THORACIC AORTIC ANEURYSM WITHOUT RUPTURE, UNSPECIFIED PART: ICD-10-CM

## 2025-05-21 DIAGNOSIS — D68.69 SECONDARY HYPERCOAGULABLE STATE: ICD-10-CM

## 2025-05-21 DIAGNOSIS — I10 PRIMARY HYPERTENSION: ICD-10-CM

## 2025-05-21 DIAGNOSIS — Z86.711 HISTORY OF PULMONARY EMBOLISM: ICD-10-CM

## 2025-05-21 DIAGNOSIS — E83.42 HYPOMAGNESEMIA: ICD-10-CM

## 2025-05-21 DIAGNOSIS — I48.91 ATRIAL FIBRILLATION, UNSPECIFIED TYPE (HCC): ICD-10-CM

## 2025-05-21 DIAGNOSIS — G47.33 OSA ON CPAP: ICD-10-CM

## 2025-05-21 DIAGNOSIS — I34.0 MILD MITRAL REGURGITATION BY PRIOR ECHOCARDIOGRAM: ICD-10-CM

## 2025-05-21 DIAGNOSIS — Z86.718 HISTORY OF DEEP VEIN THROMBOSIS (DVT) OF LOWER EXTREMITY: ICD-10-CM

## 2025-05-21 DIAGNOSIS — E11.9 TYPE 2 DIABETES MELLITUS WITHOUT COMPLICATION, WITHOUT LONG-TERM CURRENT USE OF INSULIN (HCC): Primary | ICD-10-CM

## 2025-05-21 PROCEDURE — 3044F HG A1C LEVEL LT 7.0%: CPT | Performed by: NURSE PRACTITIONER

## 2025-05-21 PROCEDURE — 1123F ACP DISCUSS/DSCN MKR DOCD: CPT | Performed by: NURSE PRACTITIONER

## 2025-05-21 PROCEDURE — 3079F DIAST BP 80-89 MM HG: CPT | Performed by: NURSE PRACTITIONER

## 2025-05-21 PROCEDURE — 99214 OFFICE O/P EST MOD 30 MIN: CPT | Performed by: NURSE PRACTITIONER

## 2025-05-21 PROCEDURE — 3075F SYST BP GE 130 - 139MM HG: CPT | Performed by: NURSE PRACTITIONER

## 2025-05-21 PROCEDURE — G2211 COMPLEX E/M VISIT ADD ON: HCPCS | Performed by: NURSE PRACTITIONER

## 2025-05-21 RX ORDER — CARVEDILOL 25 MG/1
25 TABLET ORAL 2 TIMES DAILY WITH MEALS
Qty: 180 TABLET | Refills: 3 | Status: SHIPPED | OUTPATIENT
Start: 2025-05-21

## 2025-05-21 RX ORDER — LOSARTAN POTASSIUM 100 MG/1
100 TABLET ORAL DAILY
Qty: 90 TABLET | Refills: 3 | Status: SHIPPED | OUTPATIENT
Start: 2025-05-21

## 2025-05-21 RX ORDER — METFORMIN HYDROCHLORIDE 500 MG/1
500 TABLET, EXTENDED RELEASE ORAL 2 TIMES DAILY WITH MEALS
Qty: 180 TABLET | Refills: 2 | Status: SHIPPED | OUTPATIENT
Start: 2025-05-21

## 2025-05-21 ASSESSMENT — ENCOUNTER SYMPTOMS
SHORTNESS OF BREATH: 0
BACK PAIN: 1

## 2025-06-25 ASSESSMENT — SLEEP AND FATIGUE QUESTIONNAIRES
HOW LIKELY ARE YOU TO NOD OFF OR FALL ASLEEP WHILE SITTING AND READING: MODERATE CHANCE OF DOZING
HOW LIKELY ARE YOU TO NOD OFF OR FALL ASLEEP WHILE SITTING INACTIVE IN A PUBLIC PLACE: WOULD NEVER DOZE
HOW LIKELY ARE YOU TO NOD OFF OR FALL ASLEEP IN A CAR, WHILE STOPPED FOR A FEW MINUTES IN TRAFFIC: WOULD NEVER DOZE
HOW LIKELY ARE YOU TO NOD OFF OR FALL ASLEEP WHILE SITTING AND TALKING TO SOMEONE: WOULD NEVER DOZE
HOW LIKELY ARE YOU TO NOD OFF OR FALL ASLEEP WHILE WATCHING TV: SLIGHT CHANCE OF DOZING
HOW LIKELY ARE YOU TO NOD OFF OR FALL ASLEEP WHILE WATCHING TV: SLIGHT CHANCE OF DOZING
HOW LIKELY ARE YOU TO NOD OFF OR FALL ASLEEP IN A CAR, WHILE STOPPED FOR A FEW MINUTES IN TRAFFIC: WOULD NEVER DOZE
HOW LIKELY ARE YOU TO NOD OFF OR FALL ASLEEP WHILE LYING DOWN TO REST IN THE AFTERNOON WHEN CIRCUMSTANCES PERMIT: HIGH CHANCE OF DOZING
HOW LIKELY ARE YOU TO NOD OFF OR FALL ASLEEP WHILE SITTING QUIETLY AFTER LUNCH WITHOUT ALCOHOL: SLIGHT CHANCE OF DOZING
ESS TOTAL SCORE: 7
HOW LIKELY ARE YOU TO NOD OFF OR FALL ASLEEP WHILE SITTING AND READING: MODERATE CHANCE OF DOZING
HOW LIKELY ARE YOU TO NOD OFF OR FALL ASLEEP WHILE SITTING INACTIVE IN A PUBLIC PLACE: WOULD NEVER DOZE
HOW LIKELY ARE YOU TO NOD OFF OR FALL ASLEEP WHEN YOU ARE A PASSENGER IN A CAR FOR AN HOUR WITHOUT A BREAK: WOULD NEVER DOZE
HOW LIKELY ARE YOU TO NOD OFF OR FALL ASLEEP WHILE LYING DOWN TO REST IN THE AFTERNOON WHEN CIRCUMSTANCES PERMIT: HIGH CHANCE OF DOZING
HOW LIKELY ARE YOU TO NOD OFF OR FALL ASLEEP WHILE SITTING AND TALKING TO SOMEONE: WOULD NEVER DOZE
HOW LIKELY ARE YOU TO NOD OFF OR FALL ASLEEP WHILE SITTING QUIETLY AFTER LUNCH WITHOUT ALCOHOL: SLIGHT CHANCE OF DOZING
HOW LIKELY ARE YOU TO NOD OFF OR FALL ASLEEP WHEN YOU ARE A PASSENGER IN A CAR FOR AN HOUR WITHOUT A BREAK: WOULD NEVER DOZE

## 2025-06-25 NOTE — PROGRESS NOTES
[facility]  [address]  [place] [address]  [phone]    Patient Name:  Cash Colbert  YOB: 1960      Office Visit 6/26/2025    Chief Complaint   Patient presents with    Follow-up    Sleep Apnea    CPAP/BiPAP       HISTORY OF PRESENT ILLNESS:    This pleasant gentleman came in today for a follow-up visit  To recap:  Patient has been on CPAP for over 20 years.  Last polysomnogram 2015 shows AHI was 19.9 with lowest sat 80%.  Patient was on auto CPAP 12-15 cm H2O with C-Flex 2 and using a nasal mask.  He reported he was using a chinstrap as well.  Last time I did order him Kenny fullface mask if it makes a difference.    Patient also had good compliance and was benefiting from PAP therapy.    The patient also history of atrial flutter, hypertension and is on blood thinners as well as blood pressure medications.  Limited some blood pressure medications because of some side effects.    At this time:  Currently he is using his auto CPAP every day.  His use of 90 out of 90 days 88 days more than 4 hours average sleep is 4 minutes shy of 6 hours per night.  AHI is down to 1.0.  95th percentile pressure is 13.6 cm H2O 95% air leak is 15.3 L/min.  Currently he reports he has no problems with the airway pressure, his mask or humidity.    He indicates he is back to using nasal pillows with a chinstrap.  Indicated could not tolerate the Kenny fullface mask did try for about 3 weeks.  But currently happy with his current set up.    Weight soria he indicates he is kind of up-and-down.  Slightly down a little bit at this time.  Does have leg swelling but indicates does not take water pills because he has gout episodes in the past.  Does follow-up with cardiology.  He is also on semaglutide at this time since he does have diabetes.      His [place] score remains at 7/24      6/25/2025     7:55 AM 12/9/2024     1:59 PM 9/20/2024     9:12 AM 11/9/2023     8:02 AM 3/22/2023

## 2025-06-26 ENCOUNTER — OFFICE VISIT (OUTPATIENT)
Dept: SLEEP MEDICINE | Age: 65
End: 2025-06-26
Payer: MEDICARE

## 2025-06-26 VITALS
BODY MASS INDEX: 45.1 KG/M2 | WEIGHT: 315 LBS | HEART RATE: 90 BPM | OXYGEN SATURATION: 96 % | HEIGHT: 70 IN | RESPIRATION RATE: 14 BRPM | SYSTOLIC BLOOD PRESSURE: 136 MMHG | DIASTOLIC BLOOD PRESSURE: 86 MMHG

## 2025-06-26 DIAGNOSIS — G47.34 NOCTURNAL HYPOXEMIA: ICD-10-CM

## 2025-06-26 DIAGNOSIS — G47.33 OSA ON CPAP: Primary | ICD-10-CM

## 2025-06-26 PROCEDURE — G2211 COMPLEX E/M VISIT ADD ON: HCPCS | Performed by: INTERNAL MEDICINE

## 2025-06-26 PROCEDURE — 99214 OFFICE O/P EST MOD 30 MIN: CPT | Performed by: INTERNAL MEDICINE

## 2025-06-26 PROCEDURE — 3075F SYST BP GE 130 - 139MM HG: CPT | Performed by: INTERNAL MEDICINE

## 2025-06-26 PROCEDURE — 1123F ACP DISCUSS/DSCN MKR DOCD: CPT | Performed by: INTERNAL MEDICINE

## 2025-06-26 PROCEDURE — 3079F DIAST BP 80-89 MM HG: CPT | Performed by: INTERNAL MEDICINE

## (undated) DEVICE — TROCAR ENDOSCP L100MM DIA15MM BLDELSS STBL SL ENDOPATH XCEL

## (undated) DEVICE — RELOAD STPL L60MM H2.3-4.2MM VERY THCK TISS BLK 6 ROW

## (undated) DEVICE — SINGLE-USE DIGITAL FLEXIBLE URETEROSCOPE: Brand: LITHOVUE

## (undated) DEVICE — SINGLE BASIN: Brand: CARDINAL HEALTH

## (undated) DEVICE — SUTURE MCRYL SZ 4-0 L27IN ABSRB UD L19MM PS-2 1/2 CIR PRIM Y426H

## (undated) DEVICE — SYRINGE MED 3ML CLR PLAS STD N CTRL LUERLOCK TIP DISP

## (undated) DEVICE — CANNULA NSL ORAL AD FOR CAPNOFLEX CO2 O2 AIRLFE

## (undated) DEVICE — CONTAINER SPEC HISTOLOGY 900ML POLYPR

## (undated) DEVICE — KIT THORCENT 8FR L5IN POLYUR W/ 18/22/25GA NDL 3 W STPCOCK

## (undated) DEVICE — POUCH SPEC RETRV SHFT 15MM 13X23CM GRN POLYUR DBL RNG HNDL

## (undated) DEVICE — NEEDLE SYR 18GA L1.5IN RED PLAS HUB S STL BLNT FILL W/O

## (undated) DEVICE — GLOVE SURG SZ 75 CRM LTX FREE POLYISOPRENE POLYMER BEAD ANTI

## (undated) DEVICE — SYRINGE MED 5ML STD CLR PLAS LUERLOCK TIP N CTRL DISP

## (undated) DEVICE — STERILE POLYISOPRENE POWDER-FREE SURGICAL GLOVES: Brand: PROTEXIS

## (undated) DEVICE — APPLICATOR BNDG 1MM ADH PREMIERPRO EXOFIN

## (undated) DEVICE — GOWN,REINFORCED,POLY,AURORA,XXLARGE,STR: Brand: MEDLINE

## (undated) DEVICE — [HIGH FLOW INSUFFLATOR,  DO NOT USE IF PACKAGE IS DAMAGED,  KEEP DRY,  KEEP AWAY FROM SUNLIGHT,  PROTECT FROM HEAT AND RADIOACTIVE SOURCES.]: Brand: PNEUMOSURE

## (undated) DEVICE — CONTAINER,SPECIMEN,O.R.STRL,4.5OZ: Brand: MEDLINE

## (undated) DEVICE — BLADELESS OPTICAL TROCAR WITH FIXATION CANNULA: Brand: VERSAONE

## (undated) DEVICE — LAP CHOLE: Brand: MEDLINE INDUSTRIES, INC.

## (undated) DEVICE — (D)PREP SKN CHLRAPRP APPL 26ML -- CONVERT TO ITEM 371833

## (undated) DEVICE — FLEXIVA  PULSE  AND  FLEXIVA  PULSE  TRACTIP  LASER  FIBERS  ARE  HIGH  POWER  SINGLE-USE FIBER: Brand: FLEXIVA PULSE

## (undated) DEVICE — GDWIRE 3CM FLX-TIP 0.038X150CM -- BX/5 SENSOR

## (undated) DEVICE — GEL MEDC ULTRASOUND 5L -- REPLACED BY 326862

## (undated) DEVICE — CYSTO/BLADDER IRRIGATION SET, REGULATING CLAMP

## (undated) DEVICE — SOLUTION IV 1000ML 0.9% SOD CHL

## (undated) DEVICE — DRAPE,TOP,102X53,STERILE: Brand: MEDLINE

## (undated) DEVICE — TRAY PREP DRY W/ PREM GLV 2 APPL 6 SPNG 2 UNDPD 1 OVERWRAP

## (undated) DEVICE — SUT ETHBND 0 30IN SH GRN --

## (undated) DEVICE — CLIP APPLIER: Brand: ENDO CLIP II

## (undated) DEVICE — RELOAD STPL L60MM H1.5-3.6MM REG TISS BLU GRIPPING SURF B

## (undated) DEVICE — GARMENT,MEDLINE,DVT,INT,CALF,LG, GEN2: Brand: MEDLINE

## (undated) DEVICE — VISIGI 3D®  CALIBRATION SYSTEM  SIZE 40FR SLEEVE/STD: Brand: BOEHRINGER® VISIGI™ 3D SLEEVE GASTRECTOMY CALIBRATION SYSTEM, SIZE 40FR

## (undated) DEVICE — BLADELESS OPTICAL TROCAR WITH FIXATION CANNULA: Brand: VERSAPORT

## (undated) DEVICE — SEALER TISS L45CM DIA5MM ARTC ADV BPLR STR TIP LAP APPRCH

## (undated) DEVICE — TELFA NON-ADHERENT ABSORBENT DRESSING: Brand: TELFA

## (undated) DEVICE — URETERAL ACCESS SHEATH SET: Brand: NAVIGATOR HD

## (undated) DEVICE — KENDALL RADIOLUCENT FOAM MONITORING ELECTRODE RECTANGULAR SHAPE: Brand: KENDALL

## (undated) DEVICE — 2, DISPOSABLE SUCTION/IRRIGATOR WITHOUT DISPOSABLE TIP: Brand: STRYKEFLOW

## (undated) DEVICE — SOL ANTI-FOG 6ML MEDC -- MEDICHOICE - CONVERT TO 358427

## (undated) DEVICE — RELOAD STPL H4.1X2MM DIA60MM THCK TISS GRN 6 ROW PWR GST B

## (undated) DEVICE — 2000CC GUARDIAN II: Brand: GUARDIAN

## (undated) DEVICE — SOLUTION IRRIG 3000ML H2O STRL BAG

## (undated) DEVICE — GOWN,PREVENTION PLUS,2XL,ST,22/CS: Brand: MEDLINE

## (undated) DEVICE — NEEDLE HYPO 21GA L1.5IN INTRAMUSCULAR S STL LATCH BVL UP

## (undated) DEVICE — STAPLER SKIN L440MM 32MM LNG 12 FIRING B FRM PWR + GRIPPING

## (undated) DEVICE — PACK SURGICAL PROCEDURE KIT CYSTOSCOPY TOTE

## (undated) DEVICE — REM POLYHESIVE ADULT PATIENT RETURN ELECTRODE: Brand: VALLEYLAB

## (undated) DEVICE — UNIVERSAL FIXATION CANNULA: Brand: VERSAONE

## (undated) DEVICE — AGENT HEMSTAT W3XL4IN OXIDIZED REGENERATED CELOS ABSRB FOR

## (undated) DEVICE — NITINOL STONE RETRIEVAL DEVICE: Brand: DAKOTA

## (undated) DEVICE — KENDALL SCD EXPRESS SLEEVES, KNEE LENGTH, LARGE: Brand: KENDALL SCD

## (undated) DEVICE — CONNECTOR TBNG OD5-7MM O2 END DISP